# Patient Record
Sex: FEMALE | Race: BLACK OR AFRICAN AMERICAN | Employment: OTHER | URBAN - METROPOLITAN AREA
[De-identification: names, ages, dates, MRNs, and addresses within clinical notes are randomized per-mention and may not be internally consistent; named-entity substitution may affect disease eponyms.]

---

## 2017-02-16 ENCOUNTER — APPOINTMENT (OUTPATIENT)
Dept: CV DIAGNOSTICS | Facility: HOSPITAL | Age: 72
DRG: 292 | End: 2017-02-16
Attending: INTERNAL MEDICINE
Payer: MEDICARE

## 2017-02-16 ENCOUNTER — HOSPITAL ENCOUNTER (INPATIENT)
Facility: HOSPITAL | Age: 72
LOS: 4 days | Discharge: HOME | DRG: 292 | End: 2017-02-20
Attending: EMERGENCY MEDICINE | Admitting: HOSPITALIST
Payer: MEDICARE

## 2017-02-16 ENCOUNTER — PRIOR ORIGINAL RECORDS (OUTPATIENT)
Dept: OTHER | Age: 72
End: 2017-02-16

## 2017-02-16 ENCOUNTER — APPOINTMENT (OUTPATIENT)
Dept: GENERAL RADIOLOGY | Facility: HOSPITAL | Age: 72
DRG: 292 | End: 2017-02-16
Payer: MEDICARE

## 2017-02-16 DIAGNOSIS — J81.0 ACUTE PULMONARY EDEMA (HCC): ICD-10-CM

## 2017-02-16 DIAGNOSIS — I11.0 HYPERTENSIVE HEART DISEASE WITH HEART FAILURE (HCC): ICD-10-CM

## 2017-02-16 DIAGNOSIS — J44.1 COPD EXACERBATION (HCC): Primary | ICD-10-CM

## 2017-02-16 LAB
ALLENS TEST: POSITIVE
ARTERIAL BLD GAS O2 SATURATION: 95 % (ref 92–100)
ARTERIAL BLOOD GAS BASE EXCESS: 0
ARTERIAL BLOOD GAS HCO3: 24.7 MEQ/L (ref 22–26)
ARTERIAL BLOOD GAS PCO2: 41 MM HG (ref 35–45)
ARTERIAL BLOOD GAS PH: 7.4 (ref 7.35–7.45)
ARTERIAL BLOOD GAS PO2: 83 MM HG (ref 80–105)
ATRIAL RATE: 91 BPM
BASOPHILS # BLD AUTO: 0.04 X10(3) UL (ref 0–0.1)
BASOPHILS NFR BLD AUTO: 0.6 %
BUN BLD-MCNC: 12 MG/DL (ref 8–20)
CALCIUM BLD-MCNC: 8.7 MG/DL (ref 8.3–10.3)
CALCULATED O2 SATURATION: 96 % (ref 92–100)
CARBOXYHEMOGLOBIN: 1.8 % SAT (ref 0–3)
CHLORIDE: 109 MMOL/L (ref 101–111)
CO2: 27 MMOL/L (ref 22–32)
CREAT BLD-MCNC: 0.84 MG/DL (ref 0.55–1.02)
EOSINOPHIL # BLD AUTO: 0.04 X10(3) UL (ref 0–0.3)
EOSINOPHIL NFR BLD AUTO: 0.6 %
ERYTHROCYTE [DISTWIDTH] IN BLOOD BY AUTOMATED COUNT: 15.9 % (ref 11.5–16)
GLUCOSE BLD-MCNC: 140 MG/DL (ref 65–99)
GLUCOSE BLD-MCNC: 260 MG/DL (ref 65–99)
GLUCOSE BLD-MCNC: 89 MG/DL (ref 70–99)
HCT VFR BLD AUTO: 40.3 % (ref 34–50)
HGB BLD-MCNC: 12.7 G/DL (ref 12–16)
IMMATURE GRANULOCYTE COUNT: 0.02 X10(3) UL (ref 0–1)
IMMATURE GRANULOCYTE RATIO %: 0.3 %
IONIZED CALCIUM: 1.17 MMOL/L (ref 1.12–1.32)
L/M: 10 L/MIN
LACTIC ACID ARTERIAL: <1.3 MMOL/L (ref 0.5–2)
LYMPHOCYTES # BLD AUTO: 1.1 X10(3) UL (ref 0.9–4)
LYMPHOCYTES NFR BLD AUTO: 15.8 %
MCH RBC QN AUTO: 28.8 PG (ref 27–33.2)
MCHC RBC AUTO-ENTMCNC: 31.5 G/DL (ref 31–37)
MCV RBC AUTO: 91.4 FL (ref 81–100)
METHEMOGLOBIN: 0.6 % SAT (ref 0.4–1.5)
MONOCYTES # BLD AUTO: 0.43 X10(3) UL (ref 0.1–0.6)
MONOCYTES NFR BLD AUTO: 6.2 %
NEUTROPHIL ABS PRELIM: 5.35 X10 (3) UL (ref 1.3–6.7)
NEUTROPHILS # BLD AUTO: 5.35 X10(3) UL (ref 1.3–6.7)
NEUTROPHILS NFR BLD AUTO: 76.5 %
P AXIS: 67 DEGREES
P-R INTERVAL: 144 MS
PATIENT TEMPERATURE: 98.6 F
PLATELET # BLD AUTO: 252 10(3)UL (ref 150–450)
POTASSIUM BLOOD GAS: 3.3 MMOL/L (ref 3.6–5.1)
POTASSIUM SERPL-SCNC: 3.5 MMOL/L (ref 3.6–5.1)
Q-T INTERVAL: 430 MS
QRS DURATION: 146 MS
QTC CALCULATION (BEZET): 528 MS
R AXIS: 51 DEGREES
RBC # BLD AUTO: 4.41 X10(6)UL (ref 3.8–5.1)
RED CELL DISTRIBUTION WIDTH-SD: 52.9 FL (ref 35.1–46.3)
RESPIRATORY PANEL NEG:: NEGATIVE
SODIUM BLOOD GAS: 143 MMOL/L (ref 136–144)
SODIUM SERPL-SCNC: 143 MMOL/L (ref 136–144)
T AXIS: 28 DEGREES
TOTAL HEMOGLOBIN: 12.7 G/DL (ref 11.7–16)
TROPONIN: 0.05 NG/ML (ref ?–0.05)
VENTRICULAR RATE: 91 BPM
WBC # BLD AUTO: 7 X10(3) UL (ref 4–13)

## 2017-02-16 PROCEDURE — 93306 TTE W/DOPPLER COMPLETE: CPT | Performed by: INTERNAL MEDICINE

## 2017-02-16 PROCEDURE — 93306 TTE W/DOPPLER COMPLETE: CPT

## 2017-02-16 PROCEDURE — 71010 XR CHEST AP PORTABLE  (CPT=71010): CPT

## 2017-02-16 PROCEDURE — 99220 INITIAL OBSERVATION CARE,LEVL III: CPT | Performed by: HOSPITALIST

## 2017-02-16 RX ORDER — ASPIRIN 81 MG/1
324 TABLET, CHEWABLE ORAL ONCE
Status: COMPLETED | OUTPATIENT
Start: 2017-02-16 | End: 2017-02-16

## 2017-02-16 RX ORDER — SODIUM CHLORIDE 9 MG/ML
INJECTION, SOLUTION INTRAVENOUS CONTINUOUS
Status: DISCONTINUED | OUTPATIENT
Start: 2017-02-16 | End: 2017-02-17

## 2017-02-16 RX ORDER — METHYLPREDNISOLONE SODIUM SUCCINATE 125 MG/2ML
125 INJECTION, POWDER, LYOPHILIZED, FOR SOLUTION INTRAMUSCULAR; INTRAVENOUS ONCE
Status: COMPLETED | OUTPATIENT
Start: 2017-02-16 | End: 2017-02-16

## 2017-02-16 RX ORDER — BENAZEPRIL/HYDROCHLOROTHIAZIDE 20 MG-25MG
1 TABLET ORAL DAILY
Status: ON HOLD | COMMUNITY
End: 2017-02-20

## 2017-02-16 RX ORDER — SIMVASTATIN 40 MG
40 TABLET ORAL NIGHTLY
Status: ON HOLD | COMMUNITY
End: 2017-02-20

## 2017-02-16 RX ORDER — ENOXAPARIN SODIUM 100 MG/ML
0.5 INJECTION SUBCUTANEOUS EVERY EVENING
Status: DISCONTINUED | OUTPATIENT
Start: 2017-02-16 | End: 2017-02-20

## 2017-02-16 RX ORDER — BUPROPION HYDROCHLORIDE 150 MG/1
150 TABLET, EXTENDED RELEASE ORAL 2 TIMES DAILY
Status: DISCONTINUED | OUTPATIENT
Start: 2017-02-16 | End: 2017-02-20

## 2017-02-16 RX ORDER — LISINOPRIL 20 MG/1
20 TABLET ORAL 2 TIMES DAILY
Status: ON HOLD | COMMUNITY
End: 2017-02-20

## 2017-02-16 RX ORDER — NITROGLYCERIN 20 MG/100ML
50 INJECTION INTRAVENOUS CONTINUOUS
Status: DISCONTINUED | OUTPATIENT
Start: 2017-02-16 | End: 2017-02-16

## 2017-02-16 RX ORDER — IPRATROPIUM BROMIDE AND ALBUTEROL SULFATE 2.5; .5 MG/3ML; MG/3ML
3 SOLUTION RESPIRATORY (INHALATION) EVERY 4 HOURS PRN
Status: DISCONTINUED | OUTPATIENT
Start: 2017-02-16 | End: 2017-02-20

## 2017-02-16 RX ORDER — LISINOPRIL 20 MG/1
20 TABLET ORAL 2 TIMES DAILY
Status: DISCONTINUED | OUTPATIENT
Start: 2017-02-16 | End: 2017-02-16

## 2017-02-16 RX ORDER — HEPARIN SODIUM 5000 [USP'U]/ML
5000 INJECTION, SOLUTION INTRAVENOUS; SUBCUTANEOUS EVERY 12 HOURS
Status: DISCONTINUED | OUTPATIENT
Start: 2017-02-16 | End: 2017-02-16

## 2017-02-16 RX ORDER — ALBUTEROL SULFATE 2.5 MG/3ML
SOLUTION RESPIRATORY (INHALATION) EVERY 6 HOURS PRN
COMMUNITY
End: 2017-06-20 | Stop reason: ALTCHOICE

## 2017-02-16 RX ORDER — HYDRALAZINE HYDROCHLORIDE 50 MG/1
50 TABLET, FILM COATED ORAL 2 TIMES DAILY
Status: ON HOLD | COMMUNITY
End: 2017-02-20

## 2017-02-16 RX ORDER — FUROSEMIDE 20 MG/1
20 TABLET ORAL DAILY
Status: ON HOLD | COMMUNITY
End: 2017-02-20

## 2017-02-16 RX ORDER — POTASSIUM CHLORIDE 20 MEQ/1
40 TABLET, EXTENDED RELEASE ORAL EVERY 4 HOURS
Status: COMPLETED | OUTPATIENT
Start: 2017-02-16 | End: 2017-02-17

## 2017-02-16 RX ORDER — ONDANSETRON 2 MG/ML
4 INJECTION INTRAMUSCULAR; INTRAVENOUS EVERY 6 HOURS PRN
Status: DISCONTINUED | OUTPATIENT
Start: 2017-02-16 | End: 2017-02-20

## 2017-02-16 RX ORDER — ALBUTEROL SULFATE 90 UG/1
1 AEROSOL, METERED RESPIRATORY (INHALATION) AS NEEDED
COMMUNITY
End: 2017-06-20 | Stop reason: ALTCHOICE

## 2017-02-16 RX ORDER — ACETAMINOPHEN 325 MG/1
650 TABLET ORAL EVERY 6 HOURS PRN
Status: DISCONTINUED | OUTPATIENT
Start: 2017-02-16 | End: 2017-02-20

## 2017-02-16 RX ORDER — HYDRALAZINE HYDROCHLORIDE 50 MG/1
50 TABLET, FILM COATED ORAL 2 TIMES DAILY
Status: DISCONTINUED | OUTPATIENT
Start: 2017-02-16 | End: 2017-02-17

## 2017-02-16 RX ORDER — FUROSEMIDE 10 MG/ML
40 INJECTION INTRAMUSCULAR; INTRAVENOUS ONCE
Status: COMPLETED | OUTPATIENT
Start: 2017-02-16 | End: 2017-02-16

## 2017-02-16 RX ORDER — BENAZEPRIL/HYDROCHLOROTHIAZIDE 20 MG-25MG
1 TABLET ORAL DAILY
Status: DISCONTINUED | OUTPATIENT
Start: 2017-02-16 | End: 2017-02-16 | Stop reason: RX

## 2017-02-16 RX ORDER — MELOXICAM 15 MG/1
15 TABLET ORAL DAILY
COMMUNITY
End: 2017-06-20 | Stop reason: ALTCHOICE

## 2017-02-16 RX ORDER — ATORVASTATIN CALCIUM 20 MG/1
20 TABLET, FILM COATED ORAL NIGHTLY
Status: DISCONTINUED | OUTPATIENT
Start: 2017-02-16 | End: 2017-02-17

## 2017-02-16 RX ORDER — BUPROPION HYDROCHLORIDE 150 MG/1
150 TABLET, EXTENDED RELEASE ORAL 2 TIMES DAILY
COMMUNITY
End: 2017-07-14 | Stop reason: ALTCHOICE

## 2017-02-16 RX ORDER — ALENDRONATE SODIUM 10 MG/1
10 TABLET ORAL
COMMUNITY
End: 2018-01-12 | Stop reason: ALTCHOICE

## 2017-02-16 RX ORDER — NITROGLYCERIN 20 MG/100ML
INJECTION INTRAVENOUS CONTINUOUS
Status: DISCONTINUED | OUTPATIENT
Start: 2017-02-16 | End: 2017-02-17

## 2017-02-16 NOTE — H&P
[facility] HOSPITALIST  History and Physical     Nita Pena Patient Status:  Observation    1945 MRN [id number]   [facility] 2NE-A Attending Kira Corbett, [address] [facility] Day # 0 PCP PHYSICIAN NONSTAFF     Chief Complaint: SOB    Histor guarding or organomegaly. Neurologic: No focal neurological deficits. Musculoskeletal: Moves all extremities. Extremities: No edema or cyanosis. Integument: No rashes or lesions. Psychiatric: Appropriate mood and affect.       Diagnostic Data:      L

## 2017-02-16 NOTE — PROGRESS NOTES
[facility] Progress Note:  Anticoagulation Weight Dose Adjustment for enoxaparin (LOVENOX)    Codey Loja is a 73year old female who has been prescribed enoxaparin (LOVENOX) 40 mg daily for VTE prophylaxis.       Estimated Creatinine Clearance: 50 m

## 2017-02-16 NOTE — ED PROVIDER NOTES
Patient Seen in: [facility] Emergency Department    History   Patient presents with:  Dyspnea TAZ SOB (respiratory)    Stated Complaint: TAZ    HPI    Natanael Zheng is a pleasant 79-year-old female with a history of COPD who comes into the emergency departme Notable for the following:     Potassium Blood Gas 3.3 (*)     All other components within normal limits   BASIC METABOLIC PANEL (8) - Abnormal; Notable for the following:     Potassium 3.5 (*)     All other components within normal limits   TROPONIN I - A distress. Disposition and Plan     Clinical Impression:  COPD exacerbation ([facility] [address]  (primary encounter diagnosis)    Disposition:  There is no disposition on file for this visit. Follow-up:  No follow-up provider specified. Medications Prescribed:   Alejandro Khan

## 2017-02-16 NOTE — CM/SW NOTE
Pt identified as \"out of network\". Spoke with Dr. Richmond Hudson, who called [address] for direct admission. During MD to MD, it was determined that patient was not stable for transfer - Nitro gtt, elevated troponin, TAZ.   Patient to be admitted

## 2017-02-16 NOTE — CONSULTS
[facility]  Cardiology History & Physical    Ruiz Waldrop Patient Status:  Emergency    1945 MRN [id number]   Location [facility] Attending Salo Mast MD   Hosp Day # 0 PCP No primary care provider on file. Intravenous Continuous   albuterol Sulfate (VENTOLIN) (5 MG/ML) 0.5% nebulizer solution 10 mg 10 mg Nebulization Continuous   Ipratropium Bromide (ATROVENT) 0.02 % nebulizer solution 1.5 mg 1.5 mg Nebulization Once       Allergies:  No Known Allergies    R with COPD exacerbation, ?ALEXANDRA, obesity and probably systolic/diastolic HF contributing. Agree with CXR and basic labs including BNP, serial troponin and D-Dimer. Will order STAT echo in EC to assess right/left heart function.   Agree with admission with te

## 2017-02-17 ENCOUNTER — PRIOR ORIGINAL RECORDS (OUTPATIENT)
Dept: OTHER | Age: 72
End: 2017-02-17

## 2017-02-17 LAB
BUN BLD-MCNC: 20 MG/DL (ref 8–20)
CALCIUM BLD-MCNC: 7.8 MG/DL (ref 8.3–10.3)
CHLORIDE: 110 MMOL/L (ref 101–111)
CK: 1065 IU/L (ref 26–192)
CK: 562 IU/L (ref 26–192)
CKMB: 5.7 NG/ML (ref 0–5)
CKMB: 7.4 NG/ML (ref 0–5)
CO2: 27 MMOL/L (ref 22–32)
CREAT BLD-MCNC: 0.84 MG/DL (ref 0.55–1.02)
GLUCOSE BLD-MCNC: 119 MG/DL (ref 65–99)
GLUCOSE BLD-MCNC: 76 MG/DL (ref 65–99)
GLUCOSE BLD-MCNC: 80 MG/DL (ref 65–99)
GLUCOSE BLD-MCNC: 85 MG/DL (ref 65–99)
GLUCOSE BLD-MCNC: 91 MG/DL (ref 70–99)
HAV IGM SER QL: 1.9 MG/DL (ref 1.7–3)
MB INDEX: 1 % (ref ?–4)
MB INDEX: 1 % (ref ?–4)
POTASSIUM SERPL-SCNC: 4 MMOL/L (ref 3.6–5.1)
POTASSIUM SERPL-SCNC: 4 MMOL/L (ref 3.6–5.1)
PRO-BETA NATRIURETIC PEPTIDE: 1864 PG/ML (ref ?–125)
SODIUM SERPL-SCNC: 145 MMOL/L (ref 136–144)
TROPONIN: <0.046 NG/ML (ref ?–0.05)
TROPONIN: <0.046 NG/ML (ref ?–0.05)
TSI SER-ACNC: 0.81 MIU/ML (ref 0.35–5.5)

## 2017-02-17 PROCEDURE — 99232 SBSQ HOSP IP/OBS MODERATE 35: CPT | Performed by: HOSPITALIST

## 2017-02-17 RX ORDER — HYDRALAZINE HYDROCHLORIDE 50 MG/1
50 TABLET, FILM COATED ORAL EVERY 8 HOURS SCHEDULED
Status: DISCONTINUED | OUTPATIENT
Start: 2017-02-17 | End: 2017-02-20

## 2017-02-17 RX ORDER — CARVEDILOL 6.25 MG/1
6.25 TABLET ORAL 2 TIMES DAILY WITH MEALS
Status: DISCONTINUED | OUTPATIENT
Start: 2017-02-17 | End: 2017-02-19

## 2017-02-17 RX ORDER — FUROSEMIDE 20 MG/1
20 TABLET ORAL
Status: DISCONTINUED | OUTPATIENT
Start: 2017-02-18 | End: 2017-02-19

## 2017-02-17 RX ORDER — FUROSEMIDE 10 MG/ML
40 INJECTION INTRAMUSCULAR; INTRAVENOUS ONCE
Status: COMPLETED | OUTPATIENT
Start: 2017-02-17 | End: 2017-02-17

## 2017-02-17 NOTE — PAYOR COMM NOTE
Attending Physician: Julien Jorge DO    Review Type: CONTINUED STAY  Reviewer: Donovan Felton     Date: February 17, 2017 - 1:32 PM  Payor: [address] Number: [id number]  Admit date: 2/16/2017 10:22 AM        REVIEWER COMMENTS

## 2017-02-17 NOTE — CM/SW NOTE
02/17/17 1200   CM/SW Referral Data   Referral Source Physician;Nurse   Reason for Referral Protocol order set   Specify order set CHF;COPD   Informant Patient   Pertinent Medical Hx   Primary Care Physician Name Dr. Lucius Wilson   Patient Info   Patient

## 2017-02-17 NOTE — DIETARY NOTE
Nutrition Short Note   Dietitian consult received per heart failure standing order. Reviewed and provided handouts on meal planning for those with CHF. Discussed sodium and fluid guidelines, foods to avoid, seasoning without salt. Pt receptive.  Pt verbaliz

## 2017-02-17 NOTE — PROGRESS NOTES
[facility] Progress Note  Subjective:  Breathing much better today no chest pain    Objective:  133/70  Afebrile  SR  I/O -91    BUN/cr 20/0.84     Troponin negative   CPK 1065  MB 7.5  Index 1    Neuro:awake/alert  HEENT:noJVD, moist mucosa ARB/diuretic upon discharge. Follow up will be with Dr. Juliano Roy in our office.

## 2017-02-17 NOTE — PLAN OF CARE
Patient/Family Goals    • Patient/Family Long Term Goal Progressing    • Patient/Family Short Term Goal Progressing        Patient is alert and oriented x4. NSR on tele. VS stable. Patient currently on 4 L NC. Denies chest pain at this time.  Nitro gtt at 5

## 2017-02-17 NOTE — PAYOR COMM NOTE
Attending Physician: Dorothy Hicks DO    Review Type: ADMISSION   Reviewer: Katie Olivo       Date: February 17, 2017 - 1:11 PM  Payor: [address] [place] [address] Road Number: [id number]  Admit date: 2/16/2017 10:22 AM       REVIEWER COMMENTS  C consistent with a left bundle branch block. 3. Mitral valve: Structurally normal valve. There was mild regurgitation. 4. Left atrium: The left atrial volume was markedly increased. 5. Right atrium: The atrium was moderately dilated.   6. Pulmonary arteri METABOLIC PANEL (8) - Abnormal; Notable for the following:     Potassium 3.5 (*)     All other components within normal limits   TROPONIN I - Abnormal; Notable for the following:     Troponin 0.049 (*)     All other components within normal limits   BASIC

## 2017-02-17 NOTE — PROGRESS NOTES
[facility]PITALIST  Progress Note     Willis Macedo Patient Status:  Inpatient    1945 MRN [id number]   [facility] 2NE-A Attending Kristi Garcia, [address] [facility] Day # 1 PCP PHYSICIAN NONSTAFF     Chief Complaint: SOB    S: Patient seen a 20/25) combination tablet   Oral Daily       ASSESSMENT / PLAN:     1. Dyspnea secondary to acute combined systolic/diastolic heart failure and pulmonary HTN  1. Continue diuresis per cardio   2. Echo noted   3. Off nitro gtt  2.  Hypertensive heart disease

## 2017-02-18 LAB
BUN BLD-MCNC: 24 MG/DL (ref 8–20)
CALCIUM BLD-MCNC: 8.3 MG/DL (ref 8.3–10.3)
CHLORIDE: 108 MMOL/L (ref 101–111)
CK: 1661 IU/L (ref 26–192)
CKMB: 5.3 NG/ML (ref 0–5)
CO2: 30 MMOL/L (ref 22–32)
CREAT BLD-MCNC: 0.87 MG/DL (ref 0.55–1.02)
EST. AVERAGE GLUCOSE BLD GHB EST-MCNC: 123 MG/DL (ref 68–126)
GLUCOSE BLD-MCNC: 126 MG/DL (ref 65–99)
GLUCOSE BLD-MCNC: 127 MG/DL (ref 65–99)
GLUCOSE BLD-MCNC: 139 MG/DL (ref 65–99)
GLUCOSE BLD-MCNC: 148 MG/DL (ref 65–99)
GLUCOSE BLD-MCNC: 159 MG/DL (ref 65–99)
GLUCOSE BLD-MCNC: 35 MG/DL (ref 65–99)
GLUCOSE BLD-MCNC: 39 MG/DL (ref 65–99)
GLUCOSE BLD-MCNC: 39 MG/DL (ref 65–99)
GLUCOSE BLD-MCNC: 41 MG/DL (ref 70–99)
GLUCOSE BLD-MCNC: 42 MG/DL (ref 65–99)
GLUCOSE BLD-MCNC: 85 MG/DL (ref 65–99)
GLUCOSE BLD-MCNC: 98 MG/DL (ref 65–99)
HAV IGM SER QL: 2.3 MG/DL (ref 1.7–3)
HBA1C MFR BLD HPLC: 5.9 % (ref ?–5.7)
MB INDEX: <1 % (ref ?–4)
POTASSIUM SERPL-SCNC: 3.8 MMOL/L (ref 3.6–5.1)
SODIUM SERPL-SCNC: 145 MMOL/L (ref 136–144)
TROPONIN: 0.08 NG/ML (ref ?–0.05)

## 2017-02-18 PROCEDURE — 99232 SBSQ HOSP IP/OBS MODERATE 35: CPT | Performed by: HOSPITALIST

## 2017-02-18 RX ORDER — DEXTROSE MONOHYDRATE 25 G/50ML
50 INJECTION, SOLUTION INTRAVENOUS
Status: DISCONTINUED | OUTPATIENT
Start: 2017-02-18 | End: 2017-02-20

## 2017-02-18 RX ORDER — HYDROCHLOROTHIAZIDE 25 MG/1
25 TABLET ORAL DAILY
Status: DISCONTINUED | OUTPATIENT
Start: 2017-02-18 | End: 2017-02-20

## 2017-02-18 RX ORDER — LOSARTAN POTASSIUM 50 MG/1
50 TABLET ORAL DAILY
Status: DISCONTINUED | OUTPATIENT
Start: 2017-02-18 | End: 2017-02-19

## 2017-02-18 RX ORDER — DEXTROSE MONOHYDRATE 25 G/50ML
INJECTION, SOLUTION INTRAVENOUS
Status: COMPLETED
Start: 2017-02-18 | End: 2017-02-18

## 2017-02-18 RX ORDER — POTASSIUM CHLORIDE 20 MEQ/1
40 TABLET, EXTENDED RELEASE ORAL ONCE
Status: COMPLETED | OUTPATIENT
Start: 2017-02-18 | End: 2017-02-18

## 2017-02-18 RX ORDER — DEXTROSE MONOHYDRATE 25 G/50ML
INJECTION, SOLUTION INTRAVENOUS
Status: DISPENSED
Start: 2017-02-18 | End: 2017-02-18

## 2017-02-18 NOTE — PLAN OF CARE
Alert. Oriented. sr per tele. Denies cp. Sob w/ exertion. On o2 @2l/nc. Chronic back pain. Tylenol given. poc updated and discussed with patient. Cont. to monitor pt

## 2017-02-18 NOTE — PROGRESS NOTES
[facility] Progress Note  Subjective:  No pain, dyspnea improved.   Denies myalgias    Objective:  142/60  Afebrile  SR  I/O -495     BUN/cr 24/0.87     Troponin 0.080   CPK 1661    Neuro:awake/alert  HEENT:noJVD, moist mucosa  Cardiac:S1 S2 re

## 2017-02-18 NOTE — PROGRESS NOTES
[facility]PITAL[facility]  Progress Note     Melanie Knows Patient Status:  Inpatient    1945 MRN [id number]   [facility] 2NE-A Attending Juan Parkinson, [address] [facility] Day # 2 PCP PHYSICIAN NONSTAFF     Chief Complaint: SOB    S: Patient seen a 150 mg Oral BID   • insulin detemir  70 Units Subcutaneous Nightly   • enoxaparin  0.5 mg/kg Subcutaneous QPM       ASSESSMENT / PLAN:     1. Dyspnea secondary to acute combined systolic/diastolic heart failure and pulmonary HTN  1.  Continue diuresis per c

## 2017-02-18 NOTE — PROGRESS NOTES
notified w/ elevated CPK, and sl.elevated troponin. Asymptomatic. . Was also notified w/ low bs-accucheck 42 and blood draw was 41. Hypoglycemic protocol followed. 1amp d50 given. Repeat bs was 126. Pt does not have glucometer at home.  Been di

## 2017-02-18 NOTE — PLAN OF CARE
Rec up at bedside.   Pt and VS appear stable post intervention by noc RN d/t low BS  Pt glucose has been labile most off shift    Most recently noted to have BS of 35 mg/dl at lunch despite 1amp of dextrose given earlier this a.m.  1/2 amp of dextrose given

## 2017-02-19 LAB
BUN BLD-MCNC: 20 MG/DL (ref 8–20)
CALCIUM BLD-MCNC: 8.4 MG/DL (ref 8.3–10.3)
CHLORIDE: 109 MMOL/L (ref 101–111)
CK: 786 IU/L (ref 26–192)
CKMB: 2.1 NG/ML (ref 0–5)
CO2: 25 MMOL/L (ref 22–32)
CREAT BLD-MCNC: 0.79 MG/DL (ref 0.55–1.02)
GLUCOSE BLD-MCNC: 105 MG/DL (ref 70–99)
GLUCOSE BLD-MCNC: 110 MG/DL (ref 65–99)
GLUCOSE BLD-MCNC: 116 MG/DL (ref 65–99)
GLUCOSE BLD-MCNC: 137 MG/DL (ref 65–99)
GLUCOSE BLD-MCNC: 97 MG/DL (ref 65–99)
MB INDEX: <1 % (ref ?–4)
POTASSIUM SERPL-SCNC: 4.1 MMOL/L (ref 3.6–5.1)
SODIUM SERPL-SCNC: 140 MMOL/L (ref 136–144)

## 2017-02-19 PROCEDURE — 99232 SBSQ HOSP IP/OBS MODERATE 35: CPT | Performed by: HOSPITALIST

## 2017-02-19 RX ORDER — POLYETHYLENE GLYCOL 3350 17 G/17G
17 POWDER, FOR SOLUTION ORAL DAILY PRN
Status: DISCONTINUED | OUTPATIENT
Start: 2017-02-19 | End: 2017-02-20

## 2017-02-19 RX ORDER — DOCUSATE SODIUM 100 MG/1
100 CAPSULE, LIQUID FILLED ORAL 2 TIMES DAILY PRN
Status: DISCONTINUED | OUTPATIENT
Start: 2017-02-19 | End: 2017-02-20

## 2017-02-19 RX ORDER — CARVEDILOL 12.5 MG/1
12.5 TABLET ORAL 2 TIMES DAILY WITH MEALS
Status: DISCONTINUED | OUTPATIENT
Start: 2017-02-19 | End: 2017-02-20

## 2017-02-19 RX ORDER — CARVEDILOL 12.5 MG/1
12.5 TABLET ORAL 2 TIMES DAILY WITH MEALS
Status: DISCONTINUED | OUTPATIENT
Start: 2017-02-19 | End: 2017-02-19

## 2017-02-19 RX ORDER — LOSARTAN POTASSIUM 100 MG/1
100 TABLET ORAL DAILY
Status: DISCONTINUED | OUTPATIENT
Start: 2017-02-19 | End: 2017-02-20

## 2017-02-19 RX ORDER — FUROSEMIDE 10 MG/ML
40 INJECTION INTRAMUSCULAR; INTRAVENOUS
Status: DISCONTINUED | OUTPATIENT
Start: 2017-02-19 | End: 2017-02-20

## 2017-02-19 NOTE — PROGRESS NOTES
[facility] Progress Note  Subjective:  Feels so much better today - dyspnea improved    Objective:  153/77  Afebrile  SR w/ NSVT  I/O equal     BUN/cr 20/0.79  Repeat CPK pending    Neuro:awake/alert  HEENT:noJVD, moist mucosa  Cardiac:S1 S2 r

## 2017-02-19 NOTE — PROGRESS NOTES
[facility]PITAL[facility]  Progress Note     Aguila Old Patient Status:  Inpatient    1945 MRN [id number]   [facility] 2NE-A Attending Dorothy Hicks, [address] [facility] Day # 3 PCP PHYSICIAN NONSTAFF     Chief Complaint: SOB    S: Patient seen a insulin aspart  1-5 Units Subcutaneous TID CC and HS   • BuPROPion HCl ER (SR)  150 mg Oral BID   • enoxaparin  0.5 mg/kg Subcutaneous QPM       ASSESSMENT / PLAN:     1.  Dyspnea secondary to acute combined systolic/diastolic heart failure and pulmonary HT

## 2017-02-20 ENCOUNTER — PRIOR ORIGINAL RECORDS (OUTPATIENT)
Dept: OTHER | Age: 72
End: 2017-02-20

## 2017-02-20 VITALS
OXYGEN SATURATION: 97 % | HEART RATE: 82 BPM | SYSTOLIC BLOOD PRESSURE: 109 MMHG | RESPIRATION RATE: 20 BRPM | TEMPERATURE: 98 F | BODY MASS INDEX: 43.98 KG/M2 | HEIGHT: 62.01 IN | WEIGHT: 242.06 LBS | DIASTOLIC BLOOD PRESSURE: 72 MMHG

## 2017-02-20 LAB
BUN BLD-MCNC: 22 MG/DL (ref 8–20)
CALCIUM BLD-MCNC: 8.1 MG/DL (ref 8.3–10.3)
CHLORIDE: 107 MMOL/L (ref 101–111)
CO2: 27 MMOL/L (ref 22–32)
CREAT BLD-MCNC: 0.85 MG/DL (ref 0.55–1.02)
GLUCOSE BLD-MCNC: 115 MG/DL (ref 70–99)
GLUCOSE BLD-MCNC: 116 MG/DL (ref 65–99)
GLUCOSE BLD-MCNC: 128 MG/DL (ref 65–99)
POTASSIUM SERPL-SCNC: 4.3 MMOL/L (ref 3.6–5.1)
SODIUM SERPL-SCNC: 143 MMOL/L (ref 136–144)

## 2017-02-20 PROCEDURE — 99239 HOSP IP/OBS DSCHRG MGMT >30: CPT | Performed by: HOSPITALIST

## 2017-02-20 RX ORDER — HYDRALAZINE HYDROCHLORIDE 50 MG/1
50 TABLET, FILM COATED ORAL EVERY 8 HOURS SCHEDULED
Qty: 270 TABLET | Refills: 6 | Status: SHIPPED | OUTPATIENT
Start: 2017-02-20 | End: 2017-12-04 | Stop reason: DRUGHIGH

## 2017-02-20 RX ORDER — CARVEDILOL 12.5 MG/1
12.5 TABLET ORAL 2 TIMES DAILY WITH MEALS
Qty: 60 TABLET | Refills: 6 | Status: SHIPPED | OUTPATIENT
Start: 2017-02-20 | End: 2018-03-07

## 2017-02-20 RX ORDER — LOSARTAN POTASSIUM AND HYDROCHLOROTHIAZIDE 25; 100 MG/1; MG/1
1 TABLET ORAL DAILY
Qty: 30 TABLET | Refills: 6 | Status: SHIPPED | OUTPATIENT
Start: 2017-02-20 | End: 2017-10-06

## 2017-02-20 RX ORDER — TORSEMIDE 20 MG/1
20 TABLET ORAL DAILY
Status: DISCONTINUED | OUTPATIENT
Start: 2017-02-20 | End: 2017-02-20

## 2017-02-20 RX ORDER — TORSEMIDE 20 MG/1
20 TABLET ORAL DAILY
Qty: 30 TABLET | Refills: 6 | Status: SHIPPED | OUTPATIENT
Start: 2017-02-20 | End: 2019-09-24

## 2017-02-20 NOTE — PROGRESS NOTES
[facility]  Cardiology Progress Note    Subjective:  No chest pain. Remains on oxygen, typically does not use at home.     Objective:  /75 mmHg  Pulse 77  Temp(Src) 98.5 °F (36.9 °C) (Oral)  Resp 20  Ht 5' 2.01\" (1.575 m)  Wt 242 lb 1 oz (109.8 WITH OUT PT VISIT.   Polly Cali MD

## 2017-02-20 NOTE — CM/SW NOTE
SW received response through [address] from [facility] that they will be able to accept the patient. DEMARCUS spoke to Ranulfo Rouse with [facility] ([id number]), asked to deliver portable tank to patient's room from [facility]. DEMARCUS updated RN and patient.     [facility]  [phone]

## 2017-02-20 NOTE — PLAN OF CARE
Tele D/C'd. IV discontinued with catheter intact. Patient denies chest pain, SOB, dizziness or palpitations. Patient denies calf tenderness. Patient discharge instructions and medication side effects reviewed with patient and verbalized understanding.  Rx g

## 2017-02-20 NOTE — DISCHARGE SUMMARY
[facility] [place] HOSPITALIST  DISCHARGE SUMMARY     Ant Goodwin Patient Status:  Inpatient    1945 MRN [id number]   [facility] 2NE-A Attending Veronica Cardoza, 160[address] [facility] Day # 4 PCP PHYSICIAN NONSTAFF     Date of Admission: 2017  Date of outpatient monitoring. Patient discharged home in good condition.      Procedures during hospitalization:   • None    Incidental or significant findings and recommendations (brief descriptions):  • None    Lab/Test results pending at Discharge:   · None 2/20/2017  9:44 AM   Commonly known as:  WELLBUTRIN SR        Take 150 mg by mouth 2 (two) times daily. Refills:  0       Meloxicam 15 MG Tabs        Take 15 mg by mouth daily.     Refills:  0       MetFORMIN HCl 850 MG Tabs   Commonly known as:  [address]

## 2017-02-20 NOTE — PAYOR COMM NOTE
Attending Physician: Fariha Hanna DO    2/19    Chief Complaint: SOB    S: Patient seen and examined. Feeling better. Denies chest pain or SOB.      Vitals:  Temp:  [98 °F (36.7 °C)-98.4 °F (36.9 °C)] 98.3 °F (36.8 °C)  Pulse:  [73-88] 73  Resp:  [20-22]

## 2017-02-20 NOTE — CM/SW NOTE
SW informed by RN that patient will need O2 at discharge. SW looked up in network agencies for home o2, SW sent referral to [facility] through [address], waiting for response.     [place], [facility]

## 2017-02-20 NOTE — PAYOR COMM NOTE
Attending Physician: Letitia Bennett DO    2/18    Chief Complaint: SOB    S: Patient seen and examined. Breathing improved but still gets winded after walking.  Denies chest pain    Vitals:  Temp:  [97.8 °F (36.6 °C)-98.6 °F (37 °C)] 98.2 °F (36.8 °C)  Puls

## 2017-02-21 NOTE — CM/SW NOTE
02/21/17 0700   Discharge disposition   Discharged to: Home or [address] [place] [address] services after discharge DME   HME provider Other (comment)  (Scar Watkins)   Discharge transportation Private car   Patient discharged 2/20/17

## 2017-02-25 ENCOUNTER — TELEPHONE (OUTPATIENT)
Dept: RESPIRATORY THERAPY | Facility: HOSPITAL | Age: 72
End: 2017-02-25

## 2017-02-27 ENCOUNTER — PRIOR ORIGINAL RECORDS (OUTPATIENT)
Dept: OTHER | Age: 72
End: 2017-02-27

## 2017-03-01 LAB
BUN: 12 MG/DL
BUN: 22 MG/DL
CALCIUM: 8.1 MG/DL
CALCIUM: 8.7 MG/DL
CHLORIDE: 107 MEQ/L
CHLORIDE: 109 MEQ/L
CREATININE, SERUM: 0.84 MG/DL
CREATININE, SERUM: 0.85 MG/DL
GLUCOSE: 115 MG/DL
GLUCOSE: 89 MG/DL
HEMATOCRIT: 40.3 %
HEMOGLOBIN A1C: 5.9 %
HEMOGLOBIN: 12.7 G/DL
MAGNESIUM: 1.9 MG/DL
PLATELETS: 252 K/UL
POTASSIUM, SERUM: 3.5 MEQ/L
POTASSIUM, SERUM: 4 MEQ/L
POTASSIUM, SERUM: 4.3 MEQ/L
RED BLOOD COUNT: 4.41 X 10-6/U
SODIUM: 143 MEQ/L
SODIUM: 143 MEQ/L
THYROID STIMULATING HORMONE: 0.81 MLU/L
WHITE BLOOD COUNT: 7 X 10-3/U

## 2017-03-06 ENCOUNTER — PRIOR ORIGINAL RECORDS (OUTPATIENT)
Dept: OTHER | Age: 72
End: 2017-03-06

## 2017-03-28 ENCOUNTER — PRIOR ORIGINAL RECORDS (OUTPATIENT)
Dept: OTHER | Age: 72
End: 2017-03-28

## 2017-03-29 ENCOUNTER — PRIOR ORIGINAL RECORDS (OUTPATIENT)
Dept: OTHER | Age: 72
End: 2017-03-29

## 2017-03-29 LAB
BUN: 45 MG/DL
CALCIUM: 8.8 MG/DL
CHLORIDE: 98 MEQ/L
CREATININE, SERUM: 1.02 MG/DL
GLUCOSE: 227 MG/DL
POTASSIUM, SERUM: 3.6 MEQ/L
SODIUM: 139 MEQ/L

## 2017-04-04 ENCOUNTER — PRIOR ORIGINAL RECORDS (OUTPATIENT)
Dept: OTHER | Age: 72
End: 2017-04-04

## 2017-04-05 ENCOUNTER — PRIOR ORIGINAL RECORDS (OUTPATIENT)
Dept: OTHER | Age: 72
End: 2017-04-05

## 2017-04-11 ENCOUNTER — PRIOR ORIGINAL RECORDS (OUTPATIENT)
Dept: OTHER | Age: 72
End: 2017-04-11

## 2017-04-12 LAB
ALBUMIN: 4.2 G/DL
ALKALINE PHOSPHATATE(ALK PHOS): 81 IU/L
BILIRUBIN TOTAL: 0.3 MG/DL
BUN: 20 MG/DL
CALCIUM: 9.3 MG/DL
CHLORIDE: 100 MEQ/L
CHOLESTEROL, TOTAL: 229 MG/DL
CREATININE KINASE: 256 U/L
CREATININE, SERUM: 0.76 MG/DL
GLUCOSE: 145 MG/DL
HDL CHOLESTEROL: 32 MG/DL
HEMATOCRIT: 39.3 %
HEMOGLOBIN: 13 G/DL
LDL CHOLESTEROL: 145 MG/DL
PLATELETS: 272 K/UL
POTASSIUM, SERUM: 4 MEQ/L
PROBNP: 491 PG/ML
PROTEIN, TOTAL: 7.4 G/DL
RED BLOOD COUNT: 4.5 X 10-6/U
SGOT (AST): 15 IU/L
SGPT (ALT): 15 IU/L
SODIUM: 141 MEQ/L
TRIGLYCERIDES: 258 MG/DL
WHITE BLOOD COUNT: 5.22 X 10-3/U

## 2017-06-20 ENCOUNTER — TELEPHONE (OUTPATIENT)
Dept: INTERNAL MEDICINE CLINIC | Facility: CLINIC | Age: 72
End: 2017-06-20

## 2017-06-20 ENCOUNTER — OFFICE VISIT (OUTPATIENT)
Dept: INTERNAL MEDICINE CLINIC | Facility: CLINIC | Age: 72
End: 2017-06-20

## 2017-06-20 VITALS
SYSTOLIC BLOOD PRESSURE: 116 MMHG | WEIGHT: 242 LBS | HEART RATE: 78 BPM | HEIGHT: 63 IN | TEMPERATURE: 99 F | OXYGEN SATURATION: 92 % | BODY MASS INDEX: 42.88 KG/M2 | DIASTOLIC BLOOD PRESSURE: 74 MMHG | RESPIRATION RATE: 14 BRPM

## 2017-06-20 DIAGNOSIS — E11.9 TYPE 2 DIABETES MELLITUS WITHOUT COMPLICATION, WITHOUT LONG-TERM CURRENT USE OF INSULIN (HCC): Primary | ICD-10-CM

## 2017-06-20 DIAGNOSIS — E78.5 HYPERLIPIDEMIA, UNSPECIFIED HYPERLIPIDEMIA TYPE: ICD-10-CM

## 2017-06-20 DIAGNOSIS — Z99.89 OSA ON CPAP: ICD-10-CM

## 2017-06-20 DIAGNOSIS — G47.33 OSA ON CPAP: ICD-10-CM

## 2017-06-20 DIAGNOSIS — E66.01 MORBID OBESITY WITH BMI OF 40.0-44.9, ADULT (HCC): ICD-10-CM

## 2017-06-20 DIAGNOSIS — I11.0 HYPERTENSIVE HEART DISEASE WITH HEART FAILURE (HCC): ICD-10-CM

## 2017-06-20 DIAGNOSIS — M89.9 BONE DISORDER: ICD-10-CM

## 2017-06-20 PROBLEM — J81.0 ACUTE PULMONARY EDEMA (HCC): Status: RESOLVED | Noted: 2017-02-16 | Resolved: 2017-06-20

## 2017-06-20 PROCEDURE — 99204 OFFICE O/P NEW MOD 45 MIN: CPT | Performed by: PHYSICIAN ASSISTANT

## 2017-06-20 RX ORDER — FUROSEMIDE 20 MG/1
TABLET ORAL
Refills: 0 | COMMUNITY
Start: 2017-05-22 | End: 2017-06-20

## 2017-06-20 RX ORDER — METRONIDAZOLE 500 MG/1
500 TABLET ORAL 3 TIMES DAILY
COMMUNITY
End: 2017-06-20

## 2017-06-20 RX ORDER — LISINOPRIL 20 MG/1
20 TABLET ORAL
COMMUNITY
Start: 2016-09-26 | End: 2017-06-20

## 2017-06-20 RX ORDER — FUROSEMIDE 20 MG/1
20 TABLET ORAL
COMMUNITY
Start: 2017-01-20 | End: 2017-06-20

## 2017-06-20 NOTE — TELEPHONE ENCOUNTER
Patient informed of cardiology referral and provided contact information, patient verbalized understanding

## 2017-06-20 NOTE — PROGRESS NOTES
Jen Zheng is a 73year old female. HPI:   Patient presents to establish care. DM - off insulin as of 2/2017 when a1c found to be 5.9 in the hospital.  AM FBS in the 130s. No hypoglycemia. Tries to stick with a low carb diet.   HTN - complian 116/74 mmHg  Pulse 78  Temp(Src) 98.6 °F (37 °C) (Oral)  Resp 14  Ht 63\"  Wt 242 lb  BMI 42.88 kg/m2  SpO2 92%  GENERAL: well developed, well nourished, in no acute distress  SKIN: no rashes, no suspicious lesions  HEENT: normocephalic, atraumatic, conjun to Dr. Ayaz Howard - referred to Dr. Radha Moses/Sleep - referred to Dr. Nancy Pandey    The patient indicates understanding of these issues and agrees to the plan.   The patient is asked to return here in 2 weeks for f/u of above issues and l

## 2017-06-20 NOTE — PATIENT INSTRUCTIONS
Please do your lab work ASAP. Remember to fast for 10-12 hours but drink plenty of water.     Please call Zachary Jimenez at [phone] to set up the following tests:  - DEXA (bone density scan)    Please call the sleep center to sched

## 2017-07-06 NOTE — TELEPHONE ENCOUNTER
Received letter from [employer] stating that Dr Fredrick Saunders is also not in network and referral has been denied. Jes Gomez notified and per Jes Gomez, pt needs to contact insurance company to find a cardiologist in network.      Pt notified of all informa

## 2017-07-11 ENCOUNTER — LAB ENCOUNTER (OUTPATIENT)
Dept: LAB | Age: 72
End: 2017-07-11
Attending: INTERNAL MEDICINE
Payer: MEDICARE

## 2017-07-11 ENCOUNTER — PRIOR ORIGINAL RECORDS (OUTPATIENT)
Dept: OTHER | Age: 72
End: 2017-07-11

## 2017-07-11 DIAGNOSIS — G47.33 OSA ON CPAP: ICD-10-CM

## 2017-07-11 DIAGNOSIS — Z99.89 OSA ON CPAP: ICD-10-CM

## 2017-07-11 DIAGNOSIS — E11.9 TYPE 2 DIABETES MELLITUS WITHOUT COMPLICATION, WITHOUT LONG-TERM CURRENT USE OF INSULIN (HCC): ICD-10-CM

## 2017-07-11 DIAGNOSIS — E66.01 MORBID OBESITY WITH BMI OF 40.0-44.9, ADULT (HCC): ICD-10-CM

## 2017-07-11 DIAGNOSIS — I11.0 HYPERTENSIVE HEART DISEASE WITH HEART FAILURE (HCC): ICD-10-CM

## 2017-07-11 LAB
ALT SERPL-CCNC: 39 U/L (ref 14–54)
AST SERPL-CCNC: 17 U/L (ref 15–41)
BASOPHILS # BLD AUTO: 0.04 X10(3) UL (ref 0–0.1)
BASOPHILS NFR BLD AUTO: 0.5 %
BUN BLD-MCNC: 31 MG/DL (ref 8–20)
CALCIUM BLD-MCNC: 8.6 MG/DL (ref 8.3–10.3)
CHLORIDE: 104 MMOL/L (ref 101–111)
CHOLEST SMN-MCNC: 282 MG/DL (ref ?–200)
CO2: 27 MMOL/L (ref 22–32)
CREAT BLD-MCNC: 1.14 MG/DL (ref 0.55–1.02)
CREAT UR-SCNC: 158 MG/DL
EOSINOPHIL # BLD AUTO: 0.16 X10(3) UL (ref 0–0.3)
EOSINOPHIL NFR BLD AUTO: 2.1 %
ERYTHROCYTE [DISTWIDTH] IN BLOOD BY AUTOMATED COUNT: 14.2 % (ref 11.5–16)
EST. AVERAGE GLUCOSE BLD GHB EST-MCNC: 174 MG/DL (ref 68–126)
GLUCOSE BLD-MCNC: 164 MG/DL (ref 70–99)
HBA1C MFR BLD HPLC: 7.7 % (ref ?–5.7)
HCT VFR BLD AUTO: 37.2 % (ref 34–50)
HDLC SERPL-MCNC: 32 MG/DL (ref 45–?)
HDLC SERPL: 8.81 {RATIO} (ref ?–4.44)
HGB BLD-MCNC: 12 G/DL (ref 12–16)
IMMATURE GRANULOCYTE COUNT: 0.04 X10(3) UL (ref 0–1)
IMMATURE GRANULOCYTE RATIO %: 0.5 %
LDLC SERPL DIRECT ASSAY-MCNC: 140 MG/DL (ref ?–100)
LYMPHOCYTES # BLD AUTO: 1.98 X10(3) UL (ref 0.9–4)
LYMPHOCYTES NFR BLD AUTO: 25.7 %
MCH RBC QN AUTO: 29.8 PG (ref 27–33.2)
MCHC RBC AUTO-ENTMCNC: 32.3 G/DL (ref 31–37)
MCV RBC AUTO: 92.3 FL (ref 81–100)
MICROALBUMIN UR-MCNC: 0.54 MG/DL
MICROALBUMIN/CREAT 24H UR-RTO: 3.4 UG/MG (ref ?–30)
MONOCYTES # BLD AUTO: 0.61 X10(3) UL (ref 0.1–0.6)
MONOCYTES NFR BLD AUTO: 7.9 %
NEUTROPHIL ABS PRELIM: 4.87 X10 (3) UL (ref 1.3–6.7)
NEUTROPHILS # BLD AUTO: 4.87 X10(3) UL (ref 1.3–6.7)
NEUTROPHILS NFR BLD AUTO: 63.3 %
NONHDLC SERPL-MCNC: 250 MG/DL (ref ?–130)
PLATELET # BLD AUTO: 263 10(3)UL (ref 150–450)
POTASSIUM SERPL-SCNC: 3.6 MMOL/L (ref 3.6–5.1)
RBC # BLD AUTO: 4.03 X10(6)UL (ref 3.8–5.1)
RED CELL DISTRIBUTION WIDTH-SD: 48.1 FL (ref 35.1–46.3)
SODIUM SERPL-SCNC: 140 MMOL/L (ref 136–144)
TRIGLYCERIDES: 411 MG/DL (ref ?–150)
TSI SER-ACNC: 2.46 MIU/ML (ref 0.35–5.5)
WBC # BLD AUTO: 7.7 X10(3) UL (ref 4–13)

## 2017-07-11 PROCEDURE — 80061 LIPID PANEL: CPT

## 2017-07-11 PROCEDURE — 84443 ASSAY THYROID STIM HORMONE: CPT

## 2017-07-11 PROCEDURE — 83036 HEMOGLOBIN GLYCOSYLATED A1C: CPT

## 2017-07-11 PROCEDURE — 36415 COLL VENOUS BLD VENIPUNCTURE: CPT

## 2017-07-11 PROCEDURE — 85025 COMPLETE CBC W/AUTO DIFF WBC: CPT

## 2017-07-11 PROCEDURE — 82570 ASSAY OF URINE CREATININE: CPT

## 2017-07-11 PROCEDURE — 84460 ALANINE AMINO (ALT) (SGPT): CPT

## 2017-07-11 PROCEDURE — 83721 ASSAY OF BLOOD LIPOPROTEIN: CPT

## 2017-07-11 PROCEDURE — 84450 TRANSFERASE (AST) (SGOT): CPT

## 2017-07-11 PROCEDURE — 80048 BASIC METABOLIC PNL TOTAL CA: CPT

## 2017-07-11 PROCEDURE — 82043 UR ALBUMIN QUANTITATIVE: CPT

## 2017-07-14 ENCOUNTER — OFFICE VISIT (OUTPATIENT)
Dept: INTERNAL MEDICINE CLINIC | Facility: CLINIC | Age: 72
End: 2017-07-14

## 2017-07-14 VITALS
OXYGEN SATURATION: 95 % | HEART RATE: 74 BPM | BODY MASS INDEX: 43.41 KG/M2 | RESPIRATION RATE: 16 BRPM | TEMPERATURE: 98 F | DIASTOLIC BLOOD PRESSURE: 72 MMHG | HEIGHT: 63 IN | WEIGHT: 245 LBS | SYSTOLIC BLOOD PRESSURE: 130 MMHG

## 2017-07-14 DIAGNOSIS — E78.5 HYPERLIPIDEMIA, UNSPECIFIED HYPERLIPIDEMIA TYPE: ICD-10-CM

## 2017-07-14 DIAGNOSIS — E11.65 UNCONTROLLED TYPE 2 DIABETES MELLITUS WITH HYPERGLYCEMIA, WITHOUT LONG-TERM CURRENT USE OF INSULIN (HCC): Primary | ICD-10-CM

## 2017-07-14 DIAGNOSIS — G47.33 OSA ON CPAP: ICD-10-CM

## 2017-07-14 DIAGNOSIS — E66.01 MORBID OBESITY WITH BMI OF 40.0-44.9, ADULT (HCC): ICD-10-CM

## 2017-07-14 DIAGNOSIS — Z99.89 OSA ON CPAP: ICD-10-CM

## 2017-07-14 DIAGNOSIS — I11.0 HYPERTENSIVE HEART DISEASE WITH HEART FAILURE (HCC): ICD-10-CM

## 2017-07-14 PROBLEM — J44.1 COPD EXACERBATION (HCC): Status: RESOLVED | Noted: 2017-02-16 | Resolved: 2017-07-14

## 2017-07-14 PROCEDURE — 99214 OFFICE O/P EST MOD 30 MIN: CPT | Performed by: PHYSICIAN ASSISTANT

## 2017-07-14 RX ORDER — SIMVASTATIN 20 MG
20 TABLET ORAL NIGHTLY
Qty: 90 TABLET | Refills: 1 | Status: SHIPPED | OUTPATIENT
Start: 2017-07-14 | End: 2017-12-04 | Stop reason: ALTCHOICE

## 2017-07-14 NOTE — PROGRESS NOTES
Antonio Samaniego is a 73year old female. HPI:   Patient presents for f/u of recent labs. DM - off insulin as of 2/2017 when a1c found to be 5.9 in the hospital.  Compliant with metformin, tries to stick with a low carb diet.   HTN - compliant with m headaches, LH, dizziness  EXT: denies edema    EXAM:   /72 (BP Location: Left arm, Patient Position: Sitting, Cuff Size: large)   Pulse 74   Temp 97.9 °F (36.6 °C) (Oral)   Resp 16   Ht 63\"   Wt 245 lb   SpO2 95%   BMI 43.40 kg/m²   GENERAL: well de Dr. Guadalupe Moses/Sleep - referred to Dr. Prasanna Araya    The patient indicates understanding of these issues and agrees to the plan. The patient is asked to return here in 3 months (after labs complete) for med visit and Medicare AWV.

## 2017-07-14 NOTE — PATIENT INSTRUCTIONS
Diabetes:  - increase metformin to 1 tablet THREE times a day (take with meals)    High Cholesterol:  - resume simvastatin 20 mg - take 1 tablet nightly    Please call Zachary Jimenez at [phone] to set up the following tests:  - DEX

## 2017-08-21 ENCOUNTER — HOSPITAL ENCOUNTER (OUTPATIENT)
Dept: BONE DENSITY | Age: 72
Discharge: HOME | End: 2017-08-21
Attending: PHYSICIAN ASSISTANT
Payer: MEDICARE

## 2017-08-21 DIAGNOSIS — M89.9 BONE DISORDER: ICD-10-CM

## 2017-08-21 PROCEDURE — 77080 DXA BONE DENSITY AXIAL: CPT | Performed by: PHYSICIAN ASSISTANT

## 2017-09-11 ENCOUNTER — TELEPHONE (OUTPATIENT)
Dept: INTERNAL MEDICINE CLINIC | Facility: CLINIC | Age: 72
End: 2017-09-11

## 2017-09-11 NOTE — TELEPHONE ENCOUNTER
Patient called the office regarding the appointment she had scheduled today. Pt stated she will not be able to make it in, due to ride issues. Informed pt the appointment will be a no show for today. Explained no show/cancellation policy/future fee's.  Pt

## 2017-09-19 ENCOUNTER — OFFICE VISIT (OUTPATIENT)
Dept: INTERNAL MEDICINE CLINIC | Facility: CLINIC | Age: 72
End: 2017-09-19

## 2017-09-19 VITALS
HEART RATE: 70 BPM | HEIGHT: 63 IN | DIASTOLIC BLOOD PRESSURE: 86 MMHG | OXYGEN SATURATION: 93 % | TEMPERATURE: 98 F | WEIGHT: 242.5 LBS | SYSTOLIC BLOOD PRESSURE: 142 MMHG | RESPIRATION RATE: 14 BRPM | BODY MASS INDEX: 42.97 KG/M2

## 2017-09-19 DIAGNOSIS — M54.50 CHRONIC BILATERAL LOW BACK PAIN WITHOUT SCIATICA: Primary | ICD-10-CM

## 2017-09-19 DIAGNOSIS — I11.0 HYPERTENSIVE HEART DISEASE WITH HEART FAILURE (HCC): ICD-10-CM

## 2017-09-19 DIAGNOSIS — G89.29 CHRONIC BILATERAL LOW BACK PAIN WITHOUT SCIATICA: Primary | ICD-10-CM

## 2017-09-19 DIAGNOSIS — M54.2 NECK AND SHOULDER PAIN: ICD-10-CM

## 2017-09-19 DIAGNOSIS — M25.519 NECK AND SHOULDER PAIN: ICD-10-CM

## 2017-09-19 PROCEDURE — 99214 OFFICE O/P EST MOD 30 MIN: CPT | Performed by: PHYSICIAN ASSISTANT

## 2017-09-19 RX ORDER — MELOXICAM 15 MG/1
TABLET ORAL
Refills: 5 | COMMUNITY
Start: 2017-09-03 | End: 2018-01-12

## 2017-09-19 RX ORDER — LISINOPRIL 20 MG/1
TABLET ORAL
Refills: 0 | COMMUNITY
Start: 2017-07-23 | End: 2018-03-07

## 2017-09-19 RX ORDER — ALBUTEROL SULFATE 2.5 MG/3ML
SOLUTION RESPIRATORY (INHALATION)
Refills: 2 | COMMUNITY
Start: 2017-08-31 | End: 2018-08-07

## 2017-09-19 NOTE — PROGRESS NOTES
HPI:   David Ferrari is a 81year old female who is here for LBP x several months. Denies injury or new activity. C/o mid low back pain that sometimes radiates up her back. Also c/o R sided neck and upper back/shoulder pain.   Describes a constant d Full ROM of c-spine. Neg Spurling.   LUNGS: clear to auscultation  CARDIO: RRR without murmur  GI: soft, non-tender, non-distended  EXTREMITIES: no cyanosis, clubbing or edema  BACK: mid to low lumbar spine is tender to palpation, +bilat (R>L) lumbar megan to return here in 1 month after labs complete. Addendum 9/27/17:  DM eye exam done 9/15/17 by Dr. Margreta Gitelman - no DR. Exam did show developing cataracts and mild HTN retinopathy. DM flowsheet updated.

## 2017-10-04 ENCOUNTER — OFFICE VISIT (OUTPATIENT)
Dept: PHYSICAL THERAPY | Age: 72
End: 2017-10-04
Attending: PHYSICIAN ASSISTANT
Payer: MEDICARE

## 2017-10-04 DIAGNOSIS — M25.519 NECK AND SHOULDER PAIN: ICD-10-CM

## 2017-10-04 DIAGNOSIS — M54.50 CHRONIC BILATERAL LOW BACK PAIN WITHOUT SCIATICA: ICD-10-CM

## 2017-10-04 DIAGNOSIS — G89.29 CHRONIC BILATERAL LOW BACK PAIN WITHOUT SCIATICA: ICD-10-CM

## 2017-10-04 DIAGNOSIS — M54.2 NECK AND SHOULDER PAIN: ICD-10-CM

## 2017-10-04 PROCEDURE — 97163 PT EVAL HIGH COMPLEX 45 MIN: CPT

## 2017-10-06 ENCOUNTER — APPOINTMENT (OUTPATIENT)
Dept: LAB | Age: 72
End: 2017-10-06
Attending: PHYSICIAN ASSISTANT
Payer: MEDICARE

## 2017-10-06 ENCOUNTER — OFFICE VISIT (OUTPATIENT)
Dept: INTERNAL MEDICINE CLINIC | Facility: CLINIC | Age: 72
End: 2017-10-06

## 2017-10-06 ENCOUNTER — PRIOR ORIGINAL RECORDS (OUTPATIENT)
Dept: OTHER | Age: 72
End: 2017-10-06

## 2017-10-06 VITALS
WEIGHT: 245 LBS | DIASTOLIC BLOOD PRESSURE: 80 MMHG | SYSTOLIC BLOOD PRESSURE: 128 MMHG | RESPIRATION RATE: 17 BRPM | OXYGEN SATURATION: 93 % | BODY MASS INDEX: 43.41 KG/M2 | HEIGHT: 63 IN | HEART RATE: 81 BPM | TEMPERATURE: 99 F

## 2017-10-06 DIAGNOSIS — E11.65 UNCONTROLLED TYPE 2 DIABETES MELLITUS WITH HYPERGLYCEMIA, WITHOUT LONG-TERM CURRENT USE OF INSULIN (HCC): ICD-10-CM

## 2017-10-06 DIAGNOSIS — E78.5 HYPERLIPIDEMIA, UNSPECIFIED HYPERLIPIDEMIA TYPE: ICD-10-CM

## 2017-10-06 DIAGNOSIS — I11.0 HYPERTENSIVE HEART DISEASE WITH HEART FAILURE (HCC): ICD-10-CM

## 2017-10-06 DIAGNOSIS — R30.0 DYSURIA: ICD-10-CM

## 2017-10-06 DIAGNOSIS — R10.30 LOWER ABDOMINAL PAIN: Primary | ICD-10-CM

## 2017-10-06 DIAGNOSIS — K59.00 CONSTIPATION, UNSPECIFIED CONSTIPATION TYPE: ICD-10-CM

## 2017-10-06 PROCEDURE — 81003 URINALYSIS AUTO W/O SCOPE: CPT | Performed by: PHYSICIAN ASSISTANT

## 2017-10-06 PROCEDURE — 84450 TRANSFERASE (AST) (SGOT): CPT

## 2017-10-06 PROCEDURE — 80061 LIPID PANEL: CPT

## 2017-10-06 PROCEDURE — 84460 ALANINE AMINO (ALT) (SGPT): CPT

## 2017-10-06 PROCEDURE — 80048 BASIC METABOLIC PNL TOTAL CA: CPT

## 2017-10-06 PROCEDURE — 83036 HEMOGLOBIN GLYCOSYLATED A1C: CPT

## 2017-10-06 PROCEDURE — 99214 OFFICE O/P EST MOD 30 MIN: CPT | Performed by: PHYSICIAN ASSISTANT

## 2017-10-06 PROCEDURE — 36415 COLL VENOUS BLD VENIPUNCTURE: CPT

## 2017-10-06 NOTE — PATIENT INSTRUCTIONS
Abdominal Pain:  - increase fluid intake  - bland diet for the next few days  - call if symptoms changing, worsening, or not improving

## 2017-10-09 NOTE — PROGRESS NOTES
SPINE EVALUATION:   Referring Physician: Dr. Sonam Moon  Diagnosis: Posture Dysfunction     Date of Service: 10/9/2017     PATIENT SUMMARY   Greta Ding is a 81year old female who presents to therapy today with complaints of 6 month onset of neck and b pain increased with flexion and extension. Cervical Spine AROM:   All motions WFL's except R rotation, side bend and extension limited. Produce R neck and upper trapezius pain. Sitting x 1 minutes produced B posterior thigh pain.   Sustained lumbar G Code: [id number]    Patient/Family/Caregiver was advised of these findings, precautions, and treatment options and has agreed to actively participate in planning and for this course of care.     Thank you for your referral. Please co-sign or sign and return

## 2017-10-10 ENCOUNTER — PRIOR ORIGINAL RECORDS (OUTPATIENT)
Dept: OTHER | Age: 72
End: 2017-10-10

## 2017-10-11 ENCOUNTER — OFFICE VISIT (OUTPATIENT)
Dept: PHYSICAL THERAPY | Age: 72
End: 2017-10-11
Attending: PHYSICIAN ASSISTANT
Payer: MEDICARE

## 2017-10-11 PROCEDURE — 97112 NEUROMUSCULAR REEDUCATION: CPT

## 2017-10-11 NOTE — PROGRESS NOTES
Dx:     Posture Dysfunction    Authorized # of Visits:  Medicare            Subjective: Has c/o neck and referred L shoulder pain, LBP with intermittent LE symptoms.      Objective:     Date: 10/11/2017 TX#: 2/ Date:               TX#: 3/   Date: -Complaint with HEP. Plan: Check HEP, progress accordingly, address goals.      Charges: NM re ed 4      Total Timed Treatment: 50 min  Total Treatment Time: 50 min

## 2017-10-12 ENCOUNTER — OFFICE VISIT (OUTPATIENT)
Dept: PHYSICAL THERAPY | Age: 72
End: 2017-10-12
Attending: PHYSICIAN ASSISTANT
Payer: MEDICARE

## 2017-10-12 PROCEDURE — 97112 NEUROMUSCULAR REEDUCATION: CPT

## 2017-10-12 NOTE — PROGRESS NOTES
Dx:     Posture Dysfunction    Authorized # of Visits:  Medicare            Subjective: States she felt better after tx yesterday and her HEP which she did in the afternoon. Less neck and back stiffness.  No jose juan at neck or back now,     Objective:     Date Again no symptoms before, during, or after ex's. Made good effort to complete all ex's  Had greater difficulty with recruiting TA with ab brace exercise, required extensive cueing and practice to accomplish. Goals in progress.        Goals:   -Restore cer

## 2017-10-16 ENCOUNTER — APPOINTMENT (OUTPATIENT)
Dept: PHYSICAL THERAPY | Age: 72
End: 2017-10-16
Attending: PHYSICIAN ASSISTANT
Payer: MEDICARE

## 2017-10-17 ENCOUNTER — PATIENT OUTREACH (OUTPATIENT)
Dept: CASE MANAGEMENT | Age: 72
End: 2017-10-17

## 2017-10-19 ENCOUNTER — OFFICE VISIT (OUTPATIENT)
Dept: PHYSICAL THERAPY | Age: 72
End: 2017-10-19
Attending: PHYSICIAN ASSISTANT
Payer: MEDICARE

## 2017-10-19 PROCEDURE — 97530 THERAPEUTIC ACTIVITIES: CPT

## 2017-10-19 PROCEDURE — 97112 NEUROMUSCULAR REEDUCATION: CPT

## 2017-10-19 NOTE — PROGRESS NOTES
Dx:     Posture Dysfunction    Authorized # of Visits:  Medicare            Subjective: Doing her HEP regularly. Neck and back stiffness and pain well improved. Likes the exercises, feels good. Feels abdominal muscles working.  Bending during dressing ea ed:  Posture:  HEP:  Supine, cranial flexion with hold 5 seconds x 15 reps NM re ed:  Posture:  HEP:  Supine, cranial flexion with hold 5 seconds x 15 reps         There ex[de-identified]  B long axis distraction to decrease lordosis, 60 sec holds  6 min         There neck pain with raising arms. Progress abdominal, lumbo pelvic ex's and HEP.  .     Charges: NM re ed 2 there act 1     Total Timed Treatment: 45 min  Total Treatment Time: 45 min

## 2017-10-24 ENCOUNTER — OFFICE VISIT (OUTPATIENT)
Dept: PHYSICAL THERAPY | Age: 72
End: 2017-10-24
Attending: PHYSICIAN ASSISTANT
Payer: MEDICARE

## 2017-10-24 LAB
BUN: 9 MG/DL
CALCIUM: 8.6 MG/DL
CHLORIDE: 106 MEQ/L
CHOLESTEROL, TOTAL: 135 MG/DL
CHOLESTEROL, TOTAL: 282 MG/DL
CREATININE, SERUM: 0.82 MG/DL
GLUCOSE: 107 MG/DL
HDL CHOLESTEROL: 31 MG/DL
HDL CHOLESTEROL: 32 MG/DL
HEMOGLOBIN A1C: 10.3 %
LDL CHOLESTEROL: 68 MG/DL
POTASSIUM, SERUM: 3.6 MEQ/L
SGOT (AST): 15 IU/L
SGPT (ALT): 19 IU/L
SODIUM: 141 MEQ/L
TRIGLYCERIDES: 181 MG/DL
TRIGLYCERIDES: 411 MG/DL

## 2017-10-24 PROCEDURE — 97530 THERAPEUTIC ACTIVITIES: CPT

## 2017-10-24 PROCEDURE — 97112 NEUROMUSCULAR REEDUCATION: CPT

## 2017-10-31 ENCOUNTER — APPOINTMENT (OUTPATIENT)
Dept: PHYSICAL THERAPY | Age: 72
End: 2017-10-31
Attending: PHYSICIAN ASSISTANT
Payer: MEDICARE

## 2017-11-07 ENCOUNTER — APPOINTMENT (OUTPATIENT)
Dept: PHYSICAL THERAPY | Age: 72
End: 2017-11-07
Attending: PHYSICIAN ASSISTANT
Payer: MEDICARE

## 2017-11-13 ENCOUNTER — PRIOR ORIGINAL RECORDS (OUTPATIENT)
Dept: OTHER | Age: 72
End: 2017-11-13

## 2017-11-14 ENCOUNTER — APPOINTMENT (OUTPATIENT)
Dept: PHYSICAL THERAPY | Age: 72
End: 2017-11-14
Attending: PHYSICIAN ASSISTANT
Payer: MEDICARE

## 2017-11-20 ENCOUNTER — TELEPHONE (OUTPATIENT)
Dept: INTERNAL MEDICINE CLINIC | Facility: CLINIC | Age: 72
End: 2017-11-20

## 2017-11-20 NOTE — TELEPHONE ENCOUNTER
Patient called stating she thought she was suppose to start insulin again but order never called into [facility]  Please call patient

## 2017-11-28 NOTE — TELEPHONE ENCOUNTER
Patient informed to increase Metformin and have follow-up visit with Unique Gerard, patient verbalized understanding, will schedule appointment and bring BS log

## 2017-11-29 ENCOUNTER — PRIOR ORIGINAL RECORDS (OUTPATIENT)
Dept: OTHER | Age: 72
End: 2017-11-29

## 2017-12-01 ENCOUNTER — PRIOR ORIGINAL RECORDS (OUTPATIENT)
Dept: OTHER | Age: 72
End: 2017-12-01

## 2017-12-04 ENCOUNTER — LAB ENCOUNTER (OUTPATIENT)
Dept: LAB | Age: 72
End: 2017-12-04
Attending: PEDIATRICS
Payer: MEDICARE

## 2017-12-04 ENCOUNTER — OFFICE VISIT (OUTPATIENT)
Dept: INTERNAL MEDICINE CLINIC | Facility: CLINIC | Age: 72
End: 2017-12-04

## 2017-12-04 VITALS
BODY MASS INDEX: 42.88 KG/M2 | SYSTOLIC BLOOD PRESSURE: 158 MMHG | OXYGEN SATURATION: 99 % | WEIGHT: 242 LBS | HEART RATE: 72 BPM | TEMPERATURE: 99 F | HEIGHT: 63 IN | RESPIRATION RATE: 20 BRPM | DIASTOLIC BLOOD PRESSURE: 76 MMHG

## 2017-12-04 DIAGNOSIS — M54.41 ACUTE BILATERAL LOW BACK PAIN WITH BILATERAL SCIATICA: ICD-10-CM

## 2017-12-04 DIAGNOSIS — R53.83 FATIGUE: Primary | ICD-10-CM

## 2017-12-04 DIAGNOSIS — R05.9 COUGH: ICD-10-CM
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
## 2017-12-04 DIAGNOSIS — I11.0 HYPERTENSIVE HEART DISEASE WITH HEART FAILURE (HCC): Primary | ICD-10-CM

## 2017-12-04 DIAGNOSIS — I50.9 CONGESTIVE HEART FAILURE, UNSPECIFIED CONGESTIVE HEART FAILURE CHRONICITY, UNSPECIFIED CONGESTIVE HEART FAILURE TYPE: ICD-10-CM

## 2017-12-04 DIAGNOSIS — Z13.0 SCREENING FOR IRON DEFICIENCY ANEMIA: ICD-10-CM

## 2017-12-04 DIAGNOSIS — E11.65 UNCONTROLLED TYPE 2 DIABETES MELLITUS WITH HYPERGLYCEMIA, WITH LONG-TERM CURRENT USE OF INSULIN (HCC): ICD-10-CM

## 2017-12-04 DIAGNOSIS — M54.42 ACUTE BILATERAL LOW BACK PAIN WITH BILATERAL SCIATICA: ICD-10-CM

## 2017-12-04 DIAGNOSIS — E78.5 HYPERLIPIDEMIA, UNSPECIFIED HYPERLIPIDEMIA TYPE: ICD-10-CM

## 2017-12-04 DIAGNOSIS — Z79.4 UNCONTROLLED TYPE 2 DIABETES MELLITUS WITH HYPERGLYCEMIA, WITH LONG-TERM CURRENT USE OF INSULIN (HCC): ICD-10-CM

## 2017-12-04 PROCEDURE — 83550 IRON BINDING TEST: CPT

## 2017-12-04 PROCEDURE — 82728 ASSAY OF FERRITIN: CPT

## 2017-12-04 PROCEDURE — 36415 COLL VENOUS BLD VENIPUNCTURE: CPT

## 2017-12-04 PROCEDURE — 83540 ASSAY OF IRON: CPT

## 2017-12-04 PROCEDURE — 99214 OFFICE O/P EST MOD 30 MIN: CPT | Performed by: PHYSICIAN ASSISTANT

## 2017-12-04 RX ORDER — ASPIRIN 81 MG
81 TABLET, DELAYED RELEASE (ENTERIC COATED) ORAL DAILY
Refills: 0 | Status: ON HOLD | COMMUNITY
Start: 2017-12-01

## 2017-12-04 RX ORDER — ATORVASTATIN CALCIUM 20 MG/1
20 TABLET, FILM COATED ORAL NIGHTLY
Refills: 5 | COMMUNITY
Start: 2017-10-10 | End: 2021-02-16

## 2017-12-04 RX ORDER — HYDRALAZINE HYDROCHLORIDE 100 MG/1
100 TABLET, FILM COATED ORAL 2 TIMES DAILY
Refills: 1 | COMMUNITY
Start: 2017-12-04 | End: 2018-05-04

## 2017-12-04 RX ORDER — HYDRALAZINE HYDROCHLORIDE 100 MG/1
100 TABLET, FILM COATED ORAL 3 TIMES DAILY
Refills: 1 | COMMUNITY
Start: 2017-11-13 | End: 2017-12-04

## 2017-12-04 NOTE — PATIENT INSTRUCTIONS
Please see Dr. Zohra ADKINS for hospital follow up and to recheck your blood pressure. Please see Marcelo ADKINS for your diabetes. Please call to schedule physical therapy for your back. Follow up visit with Nicolás John in 3 months.
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
## 2017-12-07 ENCOUNTER — TELEPHONE (OUTPATIENT)
Dept: INTERNAL MEDICINE CLINIC | Facility: CLINIC | Age: 72
End: 2017-12-07

## 2017-12-11 ENCOUNTER — OFFICE VISIT (OUTPATIENT)
Dept: PHYSICAL THERAPY | Age: 72
End: 2017-12-11
Attending: PHYSICIAN ASSISTANT
Payer: MEDICARE

## 2017-12-11 DIAGNOSIS — M54.42 ACUTE BILATERAL LOW BACK PAIN WITH BILATERAL SCIATICA: ICD-10-CM
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
## 2017-12-11 DIAGNOSIS — M54.41 ACUTE BILATERAL LOW BACK PAIN WITH BILATERAL SCIATICA: ICD-10-CM

## 2017-12-11 PROCEDURE — 97110 THERAPEUTIC EXERCISES: CPT

## 2017-12-11 PROCEDURE — 97162 PT EVAL MOD COMPLEX 30 MIN: CPT

## 2017-12-13 ENCOUNTER — OFFICE VISIT (OUTPATIENT)
Dept: PHYSICAL THERAPY | Age: 72
End: 2017-12-13
Attending: PHYSICIAN ASSISTANT
Payer: MEDICARE

## 2017-12-13 PROCEDURE — 97110 THERAPEUTIC EXERCISES: CPT

## 2017-12-13 PROCEDURE — 97112 NEUROMUSCULAR REEDUCATION: CPT
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
## 2017-12-14 ENCOUNTER — PRIOR ORIGINAL RECORDS (OUTPATIENT)
Dept: OTHER | Age: 72
End: 2017-12-14

## 2017-12-15 ENCOUNTER — OFFICE VISIT (OUTPATIENT)
Dept: ENDOCRINOLOGY CLINIC | Facility: CLINIC | Age: 72
End: 2017-12-15

## 2017-12-15 VITALS
DIASTOLIC BLOOD PRESSURE: 72 MMHG | RESPIRATION RATE: 18 BRPM | HEIGHT: 63 IN | HEART RATE: 92 BPM | SYSTOLIC BLOOD PRESSURE: 126 MMHG | WEIGHT: 241 LBS | TEMPERATURE: 98 F | BODY MASS INDEX: 42.7 KG/M2

## 2017-12-15 DIAGNOSIS — E78.00 PURE HYPERCHOLESTEROLEMIA: ICD-10-CM

## 2017-12-15 DIAGNOSIS — I11.0 HYPERTENSIVE HEART DISEASE WITH HEART FAILURE (HCC): ICD-10-CM

## 2017-12-15 DIAGNOSIS — E11.8 TYPE 2 DIABETES MELLITUS WITH COMPLICATION, WITHOUT LONG-TERM CURRENT USE OF INSULIN (HCC): Primary | ICD-10-CM

## 2017-12-15 PROCEDURE — 83036 HEMOGLOBIN GLYCOSYLATED A1C: CPT | Performed by: NURSE PRACTITIONER

## 2017-12-15 PROCEDURE — 99214 OFFICE O/P EST MOD 30 MIN: CPT | Performed by: NURSE PRACTITIONER

## 2017-12-16 NOTE — PROGRESS NOTES
CC: Patient presents with:  Diabetes: new pt. referred by PCP. needs new meter.       HISTORY:  Past Medical History:   Diagnosis Date   • Congestive heart disease ([address]    • Diabetes ([address]    • Essential hypertension    • Hyperlipidemia    • Obesity    • S 68      Medication:  Atorvastatin  SE denies     ROS:   Constitutional: Negative for fever, chills and fatigue. No distress. Eyes: Negative for visual disturbance.  Last eye exam 9/15/17  with SANJAY Verduzco   Respiratory: Negative for cough, chest tightness, Disp: 60 tablet, Rfl: 6  •  torsemide 20 MG Oral Tab, Take 1 tablet (20 mg total) by mouth daily. , Disp: 30 tablet, Rfl: 6  •  Alendronate Sodium 10 MG Oral Tab, Take 10 mg by mouth every morning before breakfast., Disp: , Rfl:     Exam:  /72   Pulse Encounter      Hgb A1C    Meds & Refills for this Visit:  No prescriptions requested or ordered in this encounter    Imaging & Consults:  None    DM Health Maintenance  Up to date on annual labs repeat in January   Ophtho / dilated eye exam: up to date due

## 2017-12-18 ENCOUNTER — APPOINTMENT (OUTPATIENT)
Dept: PHYSICAL THERAPY | Age: 72
End: 2017-12-18
Attending: PHYSICIAN ASSISTANT
Payer: MEDICARE

## 2017-12-19 ENCOUNTER — PRIOR ORIGINAL RECORDS (OUTPATIENT)
Dept: OTHER | Age: 72
End: 2017-12-19

## 2017-12-19 RX ORDER — MELOXICAM 7.5 MG/1
7.5 TABLET ORAL DAILY
Qty: 30 TABLET | Refills: 0 | Status: SHIPPED | OUTPATIENT
Start: 2017-12-19 | End: 2018-01-19

## 2017-12-19 RX ORDER — MELOXICAM 15 MG/1
TABLET ORAL
Qty: 30 TABLET | Refills: 0 | OUTPATIENT
Start: 2017-12-19

## 2017-12-20 ENCOUNTER — OFFICE VISIT (OUTPATIENT)
Dept: PHYSICAL THERAPY | Age: 72
End: 2017-12-20
Attending: PHYSICIAN ASSISTANT
Payer: MEDICARE

## 2017-12-20 PROCEDURE — 97112 NEUROMUSCULAR REEDUCATION: CPT

## 2017-12-20 PROCEDURE — 97110 THERAPEUTIC EXERCISES: CPT

## 2017-12-20 RX ORDER — MELOXICAM 7.5 MG/1
TABLET ORAL
Qty: 90 TABLET | Refills: 0 | OUTPATIENT
Start: 2017-12-20

## 2017-12-20 NOTE — TELEPHONE ENCOUNTER
Medication(s) to Refill:   Pending Prescriptions Disp Refills    MELOXICAM 7.5 MG Oral Tab [Pharmacy Med Name: MELOXICAM 7.5MG TABLETS] 90 tablet 0     Sig: TAKE 1 TABLET(7.5 MG) BY MOUTH DAILY
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
## 2017-12-28 RX ORDER — INSULIN GLARGINE 100 [IU]/ML
INJECTION, SOLUTION SUBCUTANEOUS
Qty: 15 ML | Refills: 0 | Status: SHIPPED | OUTPATIENT
Start: 2017-12-28 | End: 2018-01-16

## 2017-12-29 ENCOUNTER — LAB ENCOUNTER (OUTPATIENT)
Dept: LAB | Age: 72
End: 2017-12-29
Attending: INTERNAL MEDICINE
Payer: MEDICARE

## 2017-12-29 ENCOUNTER — PRIOR ORIGINAL RECORDS (OUTPATIENT)
Dept: OTHER | Age: 72
End: 2017-12-29

## 2017-12-29 DIAGNOSIS — I10 ESSENTIAL HYPERTENSION, MALIGNANT: ICD-10-CM

## 2017-12-29 DIAGNOSIS — I50.9 HEART FAILURE (HCC): Primary | ICD-10-CM

## 2017-12-29 PROCEDURE — 80048 BASIC METABOLIC PNL TOTAL CA: CPT

## 2017-12-29 PROCEDURE — 36415 COLL VENOUS BLD VENIPUNCTURE: CPT

## 2018-01-02 ENCOUNTER — PRIOR ORIGINAL RECORDS (OUTPATIENT)
Dept: OTHER | Age: 73
End: 2018-01-02

## 2018-01-02 LAB
BUN: 23 MG/DL
CALCIUM: 9.3 MG/DL
CHLORIDE: 103 MEQ/L
CREATININE, SERUM: 0.94 MG/DL
GLUCOSE: 99 MG/DL
POTASSIUM, SERUM: 3.5 MEQ/L
SODIUM: 138 MEQ/L

## 2018-01-04 ENCOUNTER — OFFICE VISIT (OUTPATIENT)
Dept: PHYSICAL THERAPY | Age: 73
End: 2018-01-04
Attending: PHYSICIAN ASSISTANT
Payer: MEDICARE

## 2018-01-04 PROCEDURE — 97112 NEUROMUSCULAR REEDUCATION: CPT

## 2018-01-04 PROCEDURE — 97110 THERAPEUTIC EXERCISES: CPT
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
## 2018-01-09 NOTE — PATIENT INSTRUCTIONS
Neck & Back Pain:  - call to schedule physical therapy  - alternate ice and heat (10-15 minutes at a time, 2-3 times a day)  - may take meloxicam - 1 tablet daily AS NEEDED  - may take tylenol (acetaminophen) 1,000 mg every 8 hours as needed (do not exceed
supervision

## 2018-01-10 ENCOUNTER — OFFICE VISIT (OUTPATIENT)
Dept: PHYSICAL THERAPY | Age: 73
End: 2018-01-10
Attending: PHYSICIAN ASSISTANT
Payer: MEDICARE

## 2018-01-10 PROCEDURE — 97110 THERAPEUTIC EXERCISES: CPT

## 2018-01-10 PROCEDURE — 97112 NEUROMUSCULAR REEDUCATION: CPT
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
## 2018-01-11 LAB
BUN: 18 MG/DL
BUN: 20 MG/DL
CALCIUM: 8.6 MG/DL
CALCIUM: 8.6 MG/DL
CHLORIDE: 102 MEQ/L
CHLORIDE: 103 MEQ/L
CREATININE, SERUM: 0.7 MG/DL
CREATININE, SERUM: 0.79 MG/DL
GLUCOSE: 113 MG/DL
GLUCOSE: 200 MG/DL
HEMATOCRIT: 36.8 %
HEMOGLOBIN A1C: 7.2 %
HEMOGLOBIN: 12.2 G/DL
MAGNESIUM: 1.8 MG/DL
MAGNESIUM: 2.3 MG/DL
PLATELETS: 253 K/UL
POTASSIUM, SERUM: 3.2 MEQ/L
POTASSIUM, SERUM: 3.4 MEQ/L
POTASSIUM, SERUM: 3.5 MEQ/L
PROBNP: 642 PG/ML
RED BLOOD COUNT: 4.04 X 10-6/U
SODIUM: 144 MEQ/L
SODIUM: 144 MEQ/L
WHITE BLOOD COUNT: 8.3 X 10-3/U

## 2018-01-12 ENCOUNTER — OFFICE VISIT (OUTPATIENT)
Dept: INTERNAL MEDICINE CLINIC | Facility: CLINIC | Age: 73
End: 2018-01-12

## 2018-01-12 ENCOUNTER — HOSPITAL ENCOUNTER (OUTPATIENT)
Dept: GENERAL RADIOLOGY | Age: 73
Discharge: HOME | End: 2018-01-12
Attending: PHYSICIAN ASSISTANT
Payer: MEDICARE

## 2018-01-12 VITALS
BODY MASS INDEX: 43.32 KG/M2 | HEIGHT: 63 IN | OXYGEN SATURATION: 97 % | RESPIRATION RATE: 14 BRPM | WEIGHT: 244.5 LBS | HEART RATE: 77 BPM | SYSTOLIC BLOOD PRESSURE: 128 MMHG | DIASTOLIC BLOOD PRESSURE: 60 MMHG | TEMPERATURE: 99 F

## 2018-01-12 DIAGNOSIS — Z99.89 OSA ON CPAP: ICD-10-CM

## 2018-01-12 DIAGNOSIS — Z00.00 ENCOUNTER FOR ANNUAL HEALTH EXAMINATION: Primary | ICD-10-CM

## 2018-01-12 DIAGNOSIS — E78.00 PURE HYPERCHOLESTEROLEMIA: ICD-10-CM

## 2018-01-12 DIAGNOSIS — G47.33 OSA ON CPAP: ICD-10-CM

## 2018-01-12 DIAGNOSIS — M25.561 ACUTE PAIN OF RIGHT KNEE: ICD-10-CM

## 2018-01-12 DIAGNOSIS — M85.80 OSTEOPENIA, UNSPECIFIED LOCATION: ICD-10-CM

## 2018-01-12 DIAGNOSIS — I11.0 HYPERTENSIVE HEART DISEASE WITH HEART FAILURE (HCC): ICD-10-CM

## 2018-01-12 DIAGNOSIS — Z72.0 TOBACCO ABUSE: ICD-10-CM

## 2018-01-12 DIAGNOSIS — I42.8 NON-ISCHEMIC CARDIOMYOPATHY (HCC): ICD-10-CM

## 2018-01-12 DIAGNOSIS — E66.01 MORBID OBESITY WITH BMI OF 40.0-44.9, ADULT (HCC): ICD-10-CM

## 2018-01-12 DIAGNOSIS — Z12.11 SCREENING FOR COLON CANCER: ICD-10-CM

## 2018-01-12 DIAGNOSIS — E11.8 TYPE 2 DIABETES MELLITUS WITH COMPLICATION, WITHOUT LONG-TERM CURRENT USE OF INSULIN (HCC): ICD-10-CM

## 2018-01-12 DIAGNOSIS — Z11.59 NEED FOR HEPATITIS C SCREENING TEST: ICD-10-CM

## 2018-01-12 DIAGNOSIS — H26.9 CATARACT OF BOTH EYES, UNSPECIFIED CATARACT TYPE: ICD-10-CM

## 2018-01-12 PROCEDURE — G0438 PPPS, INITIAL VISIT: HCPCS | Performed by: PHYSICIAN ASSISTANT

## 2018-01-12 PROCEDURE — 73562 X-RAY EXAM OF KNEE 3: CPT | Performed by: PHYSICIAN ASSISTANT

## 2018-01-12 PROCEDURE — 99214 OFFICE O/P EST MOD 30 MIN: CPT | Performed by: PHYSICIAN ASSISTANT

## 2018-01-12 RX ORDER — BUPROPION HYDROCHLORIDE 150 MG/1
150 TABLET ORAL DAILY
Qty: 90 TABLET | Refills: 1 | Status: SHIPPED | OUTPATIENT
Start: 2018-01-12 | End: 2018-05-29

## 2018-01-12 NOTE — PROGRESS NOTES
HPI:   Millie Pierson is a 81year old female who presents for a Medicare Initial Annual Wellness visit (Once after 12 month Medicare anniversary) , f/u of chronic health issues, and acute R knee pain. R knee pain x 2 weeks.   Denies injury or new ac care planning including the explanation and discussion of advance directives standard forms performed Face to Face with patient and Family/surrogate (if present), and forms available to patient in AVS       She does NOT have a Power of  for Sunust 10/06/2017   ALT 19 10/06/2017   CA 9.3 12/29/2017   TSH 2.460 07/11/2017   CREATSERUM 0.94 12/29/2017   GLU 99 12/29/2017        CBC  (most recent labs)     Lab Results  Component Value Date   WBC 7.7 07/11/2017   HGB 12.0 07/11/2017   .0 07/11/201 otherwise  SKIN: denies any unusual skin lesions  EYES: denies blurred vision or double vision  HEENT: denies nasal congestion, sinus pain or ST  LUNGS: denies shortness of breath with exertion  CARDIOVASCULAR: denies chest pain on exertion, SOB, KYLE, palp abnormalities  AXILLA: no palpable lymph nodes  MUSCULOSKELETAL: no spinal tenderness, no CVA tenderness  EXTREMITIES: no cyanosis, clubbing or edema, pedal pulses present and symmetric bilaterally  R KNEE: no bruising, swelling, erythema. ROM 0-120.   Dif past six months, have you lost more than 10 pounds without trying?: 2 - No  Has your appetite been poor?: No  How does the patient maintain a good energy level?: Stretching  How would you describe your daily physical activity?: Light  How would you describ this patient. Update Health Maintenance if applicable    Chlamydia  Annually if high risk No results found for: CHLAMYDIA No flowsheet data found.     Screening Mammogram      Mammogram Annually to 78, then as discussed Mammogram,1 Yr due on 08/28/1987 [place] data found. Creat/alb ratio  Annually Malb/Cre Calc (ug/mg)   Date Value   07/11/2017 3.4        LDL  Annually LDL Cholesterol Calc (mg/dL)   Date Value   09/19/2016 64     LDL Cholesterol (mg/dL)   Date Value   10/06/2017 68    No flowsheet data found. scores of 6.3 / 0.4% thus no indication for bisphosphonate tx. # Vaccines: rec yearly flu shot, rec TdaP & shingles at pharmacy, pt states she had pneumonia vaccine at last PCP office - will request record to find out which one.   # Hep C screening: AB ord

## 2018-01-12 NOTE — PATIENT INSTRUCTIONS
Knee Pain:  - ice (10-15 minutes at a time, 3 times a day)  - meloxicam - 1 tablet daily with food as needed  - may take tylenol (acetaminophen) 1,000 mg every 8 hours as needed (do not exceed 3,000 mg daily)  - home exercises  - weight loss!  - return for

## 2018-01-16 DIAGNOSIS — E11.8 TYPE 2 DIABETES MELLITUS WITH COMPLICATION, WITHOUT LONG-TERM CURRENT USE OF INSULIN (HCC): Primary | ICD-10-CM

## 2018-01-17 ENCOUNTER — OFFICE VISIT (OUTPATIENT)
Dept: INTERNAL MEDICINE CLINIC | Facility: CLINIC | Age: 73
End: 2018-01-17

## 2018-01-17 VITALS
OXYGEN SATURATION: 95 % | TEMPERATURE: 99 F | SYSTOLIC BLOOD PRESSURE: 140 MMHG | HEIGHT: 63 IN | DIASTOLIC BLOOD PRESSURE: 70 MMHG | RESPIRATION RATE: 18 BRPM | WEIGHT: 246.5 LBS | HEART RATE: 88 BPM | BODY MASS INDEX: 43.68 KG/M2

## 2018-01-17 DIAGNOSIS — M17.11 PRIMARY OSTEOARTHRITIS OF RIGHT KNEE: Primary | ICD-10-CM

## 2018-01-17 DIAGNOSIS — E11.8 TYPE 2 DIABETES MELLITUS WITH COMPLICATION, WITHOUT LONG-TERM CURRENT USE OF INSULIN (HCC): ICD-10-CM

## 2018-01-17 PROCEDURE — 20610 DRAIN/INJ JOINT/BURSA W/O US: CPT | Performed by: PHYSICIAN ASSISTANT

## 2018-01-17 RX ORDER — INSULIN GLARGINE 100 [IU]/ML
INJECTION, SOLUTION SUBCUTANEOUS
Qty: 15 ML | Refills: 0 | Status: SHIPPED | OUTPATIENT
Start: 2018-01-17 | End: 2018-01-22

## 2018-01-17 RX ORDER — TRIAMCINOLONE ACETONIDE 40 MG/ML
40 INJECTION, SUSPENSION INTRA-ARTICULAR; INTRAMUSCULAR ONCE
Status: COMPLETED | OUTPATIENT
Start: 2018-01-17 | End: 2018-01-17

## 2018-01-17 RX ADMIN — TRIAMCINOLONE ACETONIDE 40 MG: 40 INJECTION, SUSPENSION INTRA-ARTICULAR; INTRAMUSCULAR at 10:33:00

## 2018-01-17 NOTE — PROGRESS NOTES
Emily Doherty is a 81year old female. HPI:   Patient presents for cortisone injection for R knee pain. 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 She has had pain for the past 3 weeks. Denies injury or new activity.   Sam Haro ice, tylenol/meloxicam prn, wt loss, HEP. Cortisone injection given today. # DM: uncontrolled but improving. a1c 7.7 in 12/2017.  Following with Joan Ulloa.   Monitor BS following cortisone shot.  - depression screen done 1/12/18  - foot exam done 1/1 screening: DEXA 8/2017 showed osteopenia of L spine. FRAX scores of 6.3 / 0.4% thus no indication for bisphosphonate tx.   # Vaccines: rec yearly flu shot, rec TdaP & shingles at pharmacy, pt states she had pneumonia vaccine at last PCP office - will reque

## 2018-01-20 RX ORDER — MELOXICAM 7.5 MG/1
TABLET ORAL
Qty: 30 TABLET | Refills: 0 | Status: SHIPPED | OUTPATIENT
Start: 2018-01-20 | End: 2018-03-01

## 2018-01-22 ENCOUNTER — TELEPHONE (OUTPATIENT)
Dept: ENDOCRINOLOGY CLINIC | Facility: CLINIC | Age: 73
End: 2018-01-22

## 2018-01-22 NOTE — TELEPHONE ENCOUNTER
Received fax from [facility] that insurance is not covering lantus per [facility] to switch to tresiba 70 units sending to local pharmacy called pt to let her know about the change and to call back if she has any questions.

## 2018-01-27 ENCOUNTER — TELEPHONE (OUTPATIENT)
Dept: INTERNAL MEDICINE CLINIC | Facility: CLINIC | Age: 73
End: 2018-01-27

## 2018-02-02 ENCOUNTER — TELEPHONE (OUTPATIENT)
Dept: INTERNAL MEDICINE CLINIC | Facility: CLINIC | Age: 73
End: 2018-02-02

## 2018-02-02 DIAGNOSIS — Z12.39 BREAST CANCER SCREENING: Primary | ICD-10-CM

## 2018-02-02 NOTE — TELEPHONE ENCOUNTER
Please call patient. Received [facility] records. Last one 6/2016. Due for repeat if she'd like to continue to screen yearly. [facility] ordered. She is also overdue for c-scope. Referred to Dr. David Ramirez.   Please see her ASAP or do FIT test.    Lastly, she had a p

## 2018-02-20 RX ORDER — PEN NEEDLE, DIABETIC 31 GX5/16"
NEEDLE, DISPOSABLE MISCELLANEOUS
Qty: 1 BOX | Refills: 0 | Status: SHIPPED | OUTPATIENT
Start: 2018-02-20 | End: 2018-05-04

## 2018-03-01 RX ORDER — MELOXICAM 7.5 MG/1
TABLET ORAL
Qty: 30 TABLET | Refills: 0 | Status: SHIPPED | OUTPATIENT
Start: 2018-03-01 | End: 2018-04-03

## 2018-03-07 ENCOUNTER — OFFICE VISIT (OUTPATIENT)
Dept: INTERNAL MEDICINE CLINIC | Facility: CLINIC | Age: 73
End: 2018-03-07

## 2018-03-07 ENCOUNTER — TELEPHONE (OUTPATIENT)
Dept: INTERNAL MEDICINE CLINIC | Facility: CLINIC | Age: 73
End: 2018-03-07

## 2018-03-07 VITALS
BODY MASS INDEX: 43.59 KG/M2 | HEART RATE: 72 BPM | HEIGHT: 63 IN | RESPIRATION RATE: 14 BRPM | WEIGHT: 246 LBS | TEMPERATURE: 99 F | SYSTOLIC BLOOD PRESSURE: 132 MMHG | OXYGEN SATURATION: 95 % | DIASTOLIC BLOOD PRESSURE: 74 MMHG

## 2018-03-07 DIAGNOSIS — E11.8 TYPE 2 DIABETES MELLITUS WITH COMPLICATION, WITHOUT LONG-TERM CURRENT USE OF INSULIN (HCC): ICD-10-CM

## 2018-03-07 DIAGNOSIS — E66.01 MORBID OBESITY WITH BMI OF 40.0-44.9, ADULT (HCC): ICD-10-CM

## 2018-03-07 DIAGNOSIS — M17.11 PRIMARY OSTEOARTHRITIS OF RIGHT KNEE: Primary | ICD-10-CM

## 2018-03-07 PROCEDURE — 99213 OFFICE O/P EST LOW 20 MIN: CPT | Performed by: PHYSICIAN ASSISTANT

## 2018-03-07 RX ORDER — CARVEDILOL 12.5 MG/1
25 TABLET ORAL 2 TIMES DAILY WITH MEALS
Qty: 60 TABLET | Refills: 6 | COMMUNITY
Start: 2018-03-07 | End: 2018-05-04

## 2018-03-07 RX ORDER — SACUBITRIL AND VALSARTAN 49; 51 MG/1; MG/1
1 TABLET, FILM COATED ORAL 2 TIMES DAILY
Refills: 3 | COMMUNITY
Start: 2018-02-22 | End: 2018-03-07 | Stop reason: DRUGHIGH

## 2018-03-07 RX ORDER — SACUBITRIL AND VALSARTAN 97; 103 MG/1; MG/1
1 TABLET, FILM COATED ORAL
Refills: 3 | COMMUNITY
Start: 2018-02-09 | End: 2018-05-04

## 2018-03-07 NOTE — TELEPHONE ENCOUNTER
Juan Man, please obtain PA (if necessary) and order Synvisc One injection for patient (RIGHT knee only). Please call to schedule patient for injection once medication received.   John Heredia PA-C

## 2018-03-07 NOTE — PATIENT INSTRUCTIONS
We will order the gel injection for your knee (Synvisc) and let you know when you can schedule a follow up appointment for your shot. Please do your lab work ASAP. Remember to fast for 10-12 hours but drink plenty of water.     Please see Kiko Flair

## 2018-03-07 NOTE — PROGRESS NOTES
Mitra Woo is a 81year old female. HPI:   Patient presents for f/u of R knee pain. Had a cortisone injection on 1/17/18 which provided about 75% improvement but for only two weeks.   Pain is located on the medial aspect of her knee and sometimes tylenol/meloxicam prn, wt loss, HEP. Cortisone injection 1/17/18 - good relief but for only two weeks. Will order Synvisc One injection. Again counseled on importance of weight loss. # Morbid obesity: cont to focus on lifestyle changes.   # DM: [place] Lelia Rater (daughter)     Card - sees Dr. Robson Alexander - Dr. Jayla Moses/Sleep - referred to Dr. Buddy Linares - referred to Dr. Gely Enamorado    The patient indicates understanding of these issues and agrees to the plan.   The patient is asked to retur

## 2018-03-15 ENCOUNTER — LAB ENCOUNTER (OUTPATIENT)
Dept: LAB | Age: 73
End: 2018-03-15
Attending: INTERNAL MEDICINE
Payer: MEDICARE

## 2018-03-15 ENCOUNTER — PRIOR ORIGINAL RECORDS (OUTPATIENT)
Dept: OTHER | Age: 73
End: 2018-03-15

## 2018-03-15 DIAGNOSIS — I10 ESSENTIAL HYPERTENSION, MALIGNANT: ICD-10-CM

## 2018-03-15 DIAGNOSIS — I27.20 PROGRESSIVE PULMONARY HYPERTENSION (HCC): Primary | ICD-10-CM

## 2018-03-15 DIAGNOSIS — I11.0 HYPERTENSIVE HEART DISEASE WITH HEART FAILURE (HCC): ICD-10-CM

## 2018-03-15 DIAGNOSIS — E11.8 TYPE 2 DIABETES MELLITUS WITH COMPLICATION, WITHOUT LONG-TERM CURRENT USE OF INSULIN (HCC): ICD-10-CM

## 2018-03-15 DIAGNOSIS — Z11.59 NEED FOR HEPATITIS C SCREENING TEST: ICD-10-CM

## 2018-03-15 DIAGNOSIS — E78.00 PURE HYPERCHOLESTEROLEMIA: ICD-10-CM

## 2018-03-15 DIAGNOSIS — I50.32 CHRONIC DIASTOLIC HEART FAILURE (HCC): ICD-10-CM

## 2018-03-15 LAB
ALBUMIN SERPL-MCNC: 3.6 G/DL (ref 3.5–4.8)
ALP LIVER SERPL-CCNC: 80 U/L (ref 55–142)
ALT SERPL-CCNC: 14 U/L (ref 14–54)
AST SERPL-CCNC: 9 U/L (ref 15–41)
BILIRUB SERPL-MCNC: 0.4 MG/DL (ref 0.1–2)
BUN BLD-MCNC: 45 MG/DL (ref 8–20)
BUN BLD-MCNC: 45 MG/DL (ref 8–20)
CALCIUM BLD-MCNC: 9.1 MG/DL (ref 8.3–10.3)
CALCIUM BLD-MCNC: 9.4 MG/DL (ref 8.3–10.3)
CHLORIDE: 106 MMOL/L (ref 101–111)
CHLORIDE: 106 MMOL/L (ref 101–111)
CHOLEST SMN-MCNC: 131 MG/DL (ref ?–200)
CO2: 28 MMOL/L (ref 22–32)
CO2: 29 MMOL/L (ref 22–32)
CREAT BLD-MCNC: 1.15 MG/DL (ref 0.55–1.02)
CREAT BLD-MCNC: 1.2 MG/DL (ref 0.55–1.02)
CREAT UR-SCNC: 109 MG/DL
EST. AVERAGE GLUCOSE BLD GHB EST-MCNC: 148 MG/DL (ref 68–126)
GLUCOSE BLD-MCNC: 91 MG/DL (ref 70–99)
GLUCOSE BLD-MCNC: 93 MG/DL (ref 70–99)
HBA1C MFR BLD HPLC: 6.8 % (ref ?–5.7)
HDLC SERPL-MCNC: 36 MG/DL (ref 45–?)
HDLC SERPL: 3.64 {RATIO} (ref ?–4.44)
HEPATITIS C VIRUS AB INTERPRETATION: NONREACTIVE
LDLC SERPL CALC-MCNC: 64 MG/DL (ref ?–130)
M PROTEIN MFR SERPL ELPH: 7.6 G/DL (ref 6.1–8.3)
MICROALBUMIN UR-MCNC: <0.5 MG/DL
NONHDLC SERPL-MCNC: 95 MG/DL (ref ?–130)
POTASSIUM SERPL-SCNC: 3.7 MMOL/L (ref 3.6–5.1)
POTASSIUM SERPL-SCNC: 3.9 MMOL/L (ref 3.6–5.1)
SODIUM SERPL-SCNC: 141 MMOL/L (ref 136–144)
SODIUM SERPL-SCNC: 141 MMOL/L (ref 136–144)
TRIGL SERPL-MCNC: 154 MG/DL (ref ?–150)
VLDLC SERPL CALC-MCNC: 31 MG/DL (ref 5–40)

## 2018-03-15 PROCEDURE — 83036 HEMOGLOBIN GLYCOSYLATED A1C: CPT

## 2018-03-15 PROCEDURE — 82570 ASSAY OF URINE CREATININE: CPT

## 2018-03-15 PROCEDURE — 86803 HEPATITIS C AB TEST: CPT

## 2018-03-15 PROCEDURE — 36415 COLL VENOUS BLD VENIPUNCTURE: CPT

## 2018-03-15 PROCEDURE — 80053 COMPREHEN METABOLIC PANEL: CPT

## 2018-03-15 PROCEDURE — 82043 UR ALBUMIN QUANTITATIVE: CPT

## 2018-03-15 PROCEDURE — 80061 LIPID PANEL: CPT

## 2018-03-19 LAB
BUN: 45 MG/DL
CALCIUM: 9.4 MG/DL
CHLORIDE: 106 MEQ/L
CREATININE, SERUM: 1.2 MG/DL
GLUCOSE: 93 MG/DL
POTASSIUM, SERUM: 3.9 MEQ/L
SODIUM: 141 MEQ/L

## 2018-03-20 ENCOUNTER — MED REC SCAN ONLY (OUTPATIENT)
Dept: ENDOCRINOLOGY CLINIC | Facility: CLINIC | Age: 73
End: 2018-03-20

## 2018-03-20 NOTE — TELEPHONE ENCOUNTER
Please call pt to schedule Synvisc injection - please route encounter to [place] once completed. Thank you.

## 2018-03-21 ENCOUNTER — OFFICE VISIT (OUTPATIENT)
Dept: INTERNAL MEDICINE CLINIC | Facility: CLINIC | Age: 73
End: 2018-03-21

## 2018-03-21 VITALS
BODY MASS INDEX: 43.94 KG/M2 | TEMPERATURE: 98 F | WEIGHT: 248 LBS | DIASTOLIC BLOOD PRESSURE: 60 MMHG | OXYGEN SATURATION: 97 % | HEART RATE: 70 BPM | HEIGHT: 63 IN | SYSTOLIC BLOOD PRESSURE: 120 MMHG | RESPIRATION RATE: 20 BRPM

## 2018-03-21 DIAGNOSIS — M17.11 PRIMARY OSTEOARTHRITIS OF RIGHT KNEE: Primary | ICD-10-CM

## 2018-03-21 PROCEDURE — 20610 DRAIN/INJ JOINT/BURSA W/O US: CPT | Performed by: PHYSICIAN ASSISTANT

## 2018-03-21 NOTE — PATIENT INSTRUCTIONS
Hyaluronic Acid (“Gel”) Knee Injections  Instructions Following Injections    Hyaluronic Acid Injections (Synvisc)  Synvisc is a viscosupplement injection. It is made from a natural substance which works to lubricate and provide cushion in your joint.   Laurel Paulson

## 2018-03-21 NOTE — PROGRESS NOTES
Greta Ding is a 81year old female. HPI:   Patient presents for Synvisc One injection to her R knee. Longstanding hx of R knee pain. s/p L TKA.   Had a cortisone injection to her R knee on 1/17/18 which provided about 75% improvement but for onl loss, HEP.  Cortisone injection 1/17/18 - good relief but for only two weeks. Will order Synvisc One injection. Again counseled on importance of weight loss. Issues not discussed at today's visit:  # Morbid obesity: cont to focus on lifestyle changes. Abdullahi Moses/Sleep - referred to Dr. Ciro Guido - referred to Dr. Suman Howard    Procedure Note (RIGHT knee SYNVISC ONE injection):     Indications, alternative therapies, risks and benefits of arthrocentesis and joint/soft-tissue injection were d

## 2018-03-28 ENCOUNTER — TELEPHONE (OUTPATIENT)
Dept: INTERNAL MEDICINE CLINIC | Facility: CLINIC | Age: 73
End: 2018-03-28

## 2018-03-28 NOTE — TELEPHONE ENCOUNTER
Pt reports since right injection on 3/21 she has been unable to  put any pressure/ weight on leg d/t pain. When sitting pt reports no pain. No c/o redness or swelling.

## 2018-04-03 RX ORDER — INSULIN DEGLUDEC 200 U/ML
INJECTION, SOLUTION SUBCUTANEOUS
Refills: 0 | Status: CANCELLED | OUTPATIENT
Start: 2018-04-03

## 2018-04-03 RX ORDER — MELOXICAM 7.5 MG/1
TABLET ORAL
Qty: 30 TABLET | Refills: 0 | Status: SHIPPED | OUTPATIENT
Start: 2018-04-03 | End: 2018-05-02

## 2018-04-05 ENCOUNTER — LAB ENCOUNTER (OUTPATIENT)
Dept: LAB | Age: 73
End: 2018-04-05
Attending: INTERNAL MEDICINE
Payer: MEDICARE

## 2018-04-05 ENCOUNTER — PRIOR ORIGINAL RECORDS (OUTPATIENT)
Dept: OTHER | Age: 73
End: 2018-04-05

## 2018-04-05 DIAGNOSIS — I50.42 CHRONIC COMBINED SYSTOLIC AND DIASTOLIC HEART FAILURE (HCC): ICD-10-CM

## 2018-04-05 DIAGNOSIS — I27.20 PROGRESSIVE PULMONARY HYPERTENSION (HCC): ICD-10-CM

## 2018-04-05 DIAGNOSIS — I50.32 CHRONIC DIASTOLIC HEART FAILURE (HCC): Primary | ICD-10-CM

## 2018-04-05 DIAGNOSIS — I10 ESSENTIAL HYPERTENSION, MALIGNANT: ICD-10-CM

## 2018-04-05 DIAGNOSIS — E11.9 DIABETES MELLITUS (HCC): ICD-10-CM

## 2018-04-05 PROCEDURE — 83880 ASSAY OF NATRIURETIC PEPTIDE: CPT

## 2018-04-05 PROCEDURE — 36415 COLL VENOUS BLD VENIPUNCTURE: CPT

## 2018-04-05 PROCEDURE — 80048 BASIC METABOLIC PNL TOTAL CA: CPT

## 2018-04-06 LAB
BNP: 60 PMOL/L
BUN: 24 MG/DL
CALCIUM: 9 MG/DL
CHLORIDE: 107 MEQ/L
CREATININE, SERUM: 0.83 MG/DL
GLUCOSE: 80 MG/DL
POTASSIUM, SERUM: 3.7 MEQ/L
SODIUM: 142 MEQ/L

## 2018-04-09 DIAGNOSIS — E11.8 TYPE 2 DIABETES MELLITUS WITH COMPLICATION, WITHOUT LONG-TERM CURRENT USE OF INSULIN (HCC): Primary | ICD-10-CM

## 2018-04-09 NOTE — TELEPHONE ENCOUNTER
Medication(s) to Refill:   Pending Prescriptions Disp Refills    Insulin Degludec (TRESIBA FLEXTOUCH) 200 UNIT/ML Subcutaneous Solution Pen-injector 12 mL 2     Sig: Inject 70 Units into the skin daily.              Reason for Medication Refill being sent t

## 2018-04-10 ENCOUNTER — PRIOR ORIGINAL RECORDS (OUTPATIENT)
Dept: OTHER | Age: 73
End: 2018-04-10

## 2018-04-19 ENCOUNTER — LAB ENCOUNTER (OUTPATIENT)
Dept: LAB | Age: 73
End: 2018-04-19
Attending: INTERNAL MEDICINE
Payer: MEDICARE

## 2018-04-19 ENCOUNTER — PRIOR ORIGINAL RECORDS (OUTPATIENT)
Dept: OTHER | Age: 73
End: 2018-04-19

## 2018-04-19 DIAGNOSIS — I50.9 HEART FAILURE (HCC): Primary | ICD-10-CM

## 2018-04-19 DIAGNOSIS — I10 ESSENTIAL HYPERTENSION, MALIGNANT: ICD-10-CM

## 2018-04-19 PROCEDURE — 80048 BASIC METABOLIC PNL TOTAL CA: CPT

## 2018-04-19 PROCEDURE — 36415 COLL VENOUS BLD VENIPUNCTURE: CPT

## 2018-04-19 PROCEDURE — 83880 ASSAY OF NATRIURETIC PEPTIDE: CPT

## 2018-04-25 ENCOUNTER — PRIOR ORIGINAL RECORDS (OUTPATIENT)
Dept: OTHER | Age: 73
End: 2018-04-25

## 2018-04-30 ENCOUNTER — OFFICE VISIT (OUTPATIENT)
Dept: SURGERY | Facility: CLINIC | Age: 73
End: 2018-04-30

## 2018-04-30 VITALS
HEIGHT: 63 IN | HEART RATE: 75 BPM | SYSTOLIC BLOOD PRESSURE: 134 MMHG | DIASTOLIC BLOOD PRESSURE: 74 MMHG | WEIGHT: 156 LBS | BODY MASS INDEX: 27.64 KG/M2 | TEMPERATURE: 98 F

## 2018-04-30 DIAGNOSIS — Z12.11 ENCOUNTER FOR SCREENING COLONOSCOPY: Primary | ICD-10-CM

## 2018-04-30 RX ORDER — POLYETHYLENE GLYCOL 3350, SODIUM CHLORIDE, SODIUM BICARBONATE, POTASSIUM CHLORIDE 420; 11.2; 5.72; 1.48 G/4L; G/4L; G/4L; G/4L
POWDER, FOR SOLUTION ORAL
Qty: 1 BOTTLE | Refills: 0 | Status: SHIPPED | OUTPATIENT
Start: 2018-04-30 | End: 2018-05-04

## 2018-04-30 NOTE — H&P
New Patient Visit Note       Active Problems      1.  Encounter for screening colonoscopy        Chief Complaint   Patient presents with:  Colonoscopy: NEW PT, REFERRED BY DARIELA MOSQUERA - COLONOSCOPY CONSULT- has had in past      History of Present Illness No            Other Topics            Concern  Caffeine Concern        Yes  Exercise                No  Special Diet            No         Current Outpatient Prescriptions:  PEG 3350-KCl-Na Bicarb-NaCl (TRILYTE) 420 g Oral Recon Soln Starting at 4:00 pm th Cardiovascular: Negative for chest pain, palpitations and leg swelling. Gastrointestinal: Negative for abdominal distention, abdominal pain, anal bleeding, blood in stool, constipation, diarrhea, nausea and vomiting.    Genitourinary: Negative for diffi Skin: Skin is warm and dry. Psychiatric: She has a normal mood and affect. Her speech is normal and behavior is normal. Judgment and thought content normal.   Nursing note and vitals reviewed.           Assessment   Encounter for screening colonoscopy

## 2018-05-03 RX ORDER — MELOXICAM 7.5 MG/1
TABLET ORAL
Qty: 30 TABLET | Refills: 0 | Status: SHIPPED | OUTPATIENT
Start: 2018-05-03 | End: 2018-05-29

## 2018-05-04 ENCOUNTER — OFFICE VISIT (OUTPATIENT)
Dept: ENDOCRINOLOGY CLINIC | Facility: CLINIC | Age: 73
End: 2018-05-04

## 2018-05-04 VITALS
SYSTOLIC BLOOD PRESSURE: 98 MMHG | BODY MASS INDEX: 44.3 KG/M2 | HEART RATE: 72 BPM | HEIGHT: 63 IN | DIASTOLIC BLOOD PRESSURE: 60 MMHG | TEMPERATURE: 98 F | WEIGHT: 250 LBS | RESPIRATION RATE: 18 BRPM

## 2018-05-04 DIAGNOSIS — E11.8 TYPE 2 DIABETES MELLITUS WITH COMPLICATION, WITHOUT LONG-TERM CURRENT USE OF INSULIN (HCC): Primary | ICD-10-CM

## 2018-05-04 DIAGNOSIS — Z23 NEED FOR PNEUMOCOCCAL VACCINATION: ICD-10-CM

## 2018-05-04 PROCEDURE — G0009 ADMIN PNEUMOCOCCAL VACCINE: HCPCS | Performed by: NURSE PRACTITIONER

## 2018-05-04 PROCEDURE — 99213 OFFICE O/P EST LOW 20 MIN: CPT | Performed by: NURSE PRACTITIONER

## 2018-05-04 PROCEDURE — 90670 PCV13 VACCINE IM: CPT | Performed by: NURSE PRACTITIONER

## 2018-05-04 RX ORDER — CARVEDILOL 25 MG/1
TABLET ORAL
Refills: 3 | COMMUNITY
Start: 2018-03-24 | End: 2018-05-29

## 2018-05-04 RX ORDER — HYDRALAZINE HYDROCHLORIDE 50 MG/1
TABLET, FILM COATED ORAL
COMMUNITY
Start: 2018-04-04 | End: 2018-05-24

## 2018-05-04 RX ORDER — LOSARTAN POTASSIUM AND HYDROCHLOROTHIAZIDE 25; 100 MG/1; MG/1
TABLET ORAL
COMMUNITY
Start: 2018-04-03 | End: 2018-05-24

## 2018-05-04 RX ORDER — SACUBITRIL AND VALSARTAN 49; 51 MG/1; MG/1
TABLET, FILM COATED ORAL
Refills: 3 | COMMUNITY
Start: 2018-03-23 | End: 2018-05-24

## 2018-05-04 NOTE — PATIENT INSTRUCTIONS
Stop insulin completely   Start Xultophy 16 units every day every 4 days increase by 3 units until am readings under 130 mg/dl   Test sugar 2 times/ day   Return in 5 weeks with readings

## 2018-05-04 NOTE — PROGRESS NOTES
CC: Patient presents with:  Diabetes: follow up. pt forgot book with readings.       HISTORY:  Past Medical History:   Diagnosis Date   • Congestive heart disease ([address]    • Diabetes ([address]    • Essential hypertension    • Hyperlipidemia    • Obesity    • Sl cough, chest tightness,  and wheezing. + shortness of breath with exertion. Cardiovascular: Negative for chest pain, palpitations and leg swelling. ASA 81 mg  daily.   Gastrointestinal: Negative for nausea, vomiting, abdominal pain, diarrhea and abdominal Breastfeeding? No   BMI 44.29 kg/m²   Constitutional: Oriented to person, place, and time. No distress. HEENT: Normocephalic and atraumatic. .   Eyes: Conjunctivae are normal.   Neck: Normal range of motion. Neck supple. Normal carotid pulses.    Cardiov file.    Pt verbalized understanding and has no further questions at this time. Greater than 50% of visit spent on education and counseling.     Sofie SY,CDE

## 2018-05-08 ENCOUNTER — MYAURORA ACCOUNT LINK (OUTPATIENT)
Dept: OTHER | Age: 73
End: 2018-05-08

## 2018-05-08 ENCOUNTER — HOSPITAL ENCOUNTER (OUTPATIENT)
Dept: CV DIAGNOSTICS | Facility: HOSPITAL | Age: 73
Discharge: HOME | End: 2018-05-08
Attending: INTERNAL MEDICINE

## 2018-05-08 DIAGNOSIS — I10 HYPERTENSION, UNSPECIFIED TYPE: ICD-10-CM

## 2018-05-08 DIAGNOSIS — I50.32 HEART FAILURE, DIASTOLIC, CHRONIC (HCC): ICD-10-CM

## 2018-05-14 LAB
BNP: 27 PMOL/L
BUN: 33 MG/DL
CALCIUM: 9 MG/DL
CHLORIDE: 103 MEQ/L
CREATININE, SERUM: 1.19 MG/DL
GLUCOSE: 125 MG/DL
POTASSIUM, SERUM: 3.3 MEQ/L
SODIUM: 141 MEQ/L

## 2018-05-22 ENCOUNTER — TELEPHONE (OUTPATIENT)
Dept: INTERNAL MEDICINE CLINIC | Facility: CLINIC | Age: 73
End: 2018-05-22

## 2018-05-22 DIAGNOSIS — E11.8 TYPE 2 DIABETES MELLITUS WITH COMPLICATION, WITHOUT LONG-TERM CURRENT USE OF INSULIN (HCC): ICD-10-CM

## 2018-05-22 NOTE — TELEPHONE ENCOUNTER
Patient needs a new script because she was given samples by [facility] for the Insulin Degludec-Liraglutide (XULTOPHY) 100-3.6 UNIT-MG/ML Subcutaneous Solution Pen-injector. Please sent to [facility].

## 2018-05-23 ENCOUNTER — PRIOR ORIGINAL RECORDS (OUTPATIENT)
Dept: OTHER | Age: 73
End: 2018-05-23

## 2018-05-24 RX ORDER — HYDRALAZINE HYDROCHLORIDE 100 MG/1
100 TABLET, FILM COATED ORAL 2 TIMES DAILY
Refills: 0 | COMMUNITY
Start: 2018-05-24 | End: 2019-03-26

## 2018-05-24 RX ORDER — MELOXICAM 7.5 MG/1
7.5 TABLET ORAL DAILY
Qty: 90 TABLET | Refills: 0 | OUTPATIENT
Start: 2018-05-24

## 2018-05-24 RX ORDER — LISINOPRIL 20 MG/1
20 TABLET ORAL 2 TIMES DAILY
Qty: 180 TABLET | Refills: 0 | OUTPATIENT
Start: 2018-05-24

## 2018-05-24 NOTE — TELEPHONE ENCOUNTER
Spoke to pt and reconciled medications as follows:    Hydralazine 100 mg daily  Lisinopril 20 mg daily **  Meloxicam 7.5 mg daily  Metformin 1000 bid  Entresto  mg bid  Carvedilol 25 bid  Losartan HCTZ 100-25 qd **  Torsemide 20 mg bid  Atorvastatin

## 2018-05-24 NOTE — TELEPHONE ENCOUNTER
Please call patient and review her meds. The med list in [facility] is up to date per my records and her visit with cardiology (Dr. Tammy Parisi) on 4/10/18. She should NOT be on lisinopril unless Dr. Tammy Parisi changed anything after 4/10.   Rec holding meloxicam and try

## 2018-05-29 ENCOUNTER — TELEPHONE (OUTPATIENT)
Dept: INTERNAL MEDICINE CLINIC | Facility: CLINIC | Age: 73
End: 2018-05-29

## 2018-05-29 ENCOUNTER — OFFICE VISIT (OUTPATIENT)
Dept: INTERNAL MEDICINE CLINIC | Facility: CLINIC | Age: 73
End: 2018-05-29

## 2018-05-29 VITALS
WEIGHT: 244 LBS | BODY MASS INDEX: 43 KG/M2 | RESPIRATION RATE: 16 BRPM | HEART RATE: 94 BPM | DIASTOLIC BLOOD PRESSURE: 70 MMHG | OXYGEN SATURATION: 99 % | SYSTOLIC BLOOD PRESSURE: 140 MMHG

## 2018-05-29 DIAGNOSIS — I11.0 HYPERTENSIVE HEART DISEASE WITH HEART FAILURE (HCC): Primary | ICD-10-CM

## 2018-05-29 DIAGNOSIS — E11.8 TYPE 2 DIABETES MELLITUS WITH COMPLICATION, WITHOUT LONG-TERM CURRENT USE OF INSULIN (HCC): ICD-10-CM

## 2018-05-29 DIAGNOSIS — I42.8 NON-ISCHEMIC CARDIOMYOPATHY (HCC): ICD-10-CM

## 2018-05-29 PROCEDURE — 99213 OFFICE O/P EST LOW 20 MIN: CPT | Performed by: INTERNAL MEDICINE

## 2018-05-29 RX ORDER — BUPROPION HYDROCHLORIDE 150 MG/1
150 TABLET ORAL DAILY
Qty: 90 TABLET | Refills: 1 | Status: SHIPPED | OUTPATIENT
Start: 2018-05-29 | End: 2019-04-13 | Stop reason: ALTCHOICE

## 2018-05-29 RX ORDER — CARVEDILOL 25 MG/1
TABLET ORAL
Qty: 180 TABLET | Refills: 1 | Status: SHIPPED | OUTPATIENT
Start: 2018-05-29 | End: 2018-11-21

## 2018-05-29 RX ORDER — MELOXICAM 7.5 MG/1
TABLET ORAL
Qty: 90 TABLET | Refills: 0 | Status: SHIPPED | OUTPATIENT
Start: 2018-05-29 | End: 2018-08-25

## 2018-05-29 RX ORDER — LOSARTAN POTASSIUM AND HYDROCHLOROTHIAZIDE 25; 100 MG/1; MG/1
1 TABLET ORAL DAILY
COMMUNITY
End: 2018-07-23

## 2018-05-29 NOTE — PROGRESS NOTES
Surinder Machuca  1945 is a 81year old female. Patient presents with: Follow - Up: med       HPI:   Patient see me for the first time.   Has been seeing [facility] in the past.  Here for med check  sees Dr. Belinda Murrieta from cardiology  [facility] Packs/day: 0.25      Years: 20.00        Quit date: 11/16/2016  Smokeless tobacco: Former User                        Quit date: 11/16/2016  Alcohol use: No                 REVIEW OF SYSTEMS:   Cardiovascular:   Syncope none. Rapid heart beat at rest no.  C MD

## 2018-05-29 NOTE — TELEPHONE ENCOUNTER
Please let patient know she needs to get in touch with the doctor Moses ADKINS figure out what that she should be taking the entresto or losartan/HCTZ  Prescriptions show the name of Dr. Moses Quevedo

## 2018-05-29 NOTE — PATIENT INSTRUCTIONS
Pt  to check with Dr. Rachel Mcneill about taking 2  arbs  Will need to review her entire record update her problem list and subsequently see me

## 2018-06-01 ENCOUNTER — MED REC SCAN ONLY (OUTPATIENT)
Dept: INTERNAL MEDICINE CLINIC | Facility: CLINIC | Age: 73
End: 2018-06-01

## 2018-06-05 RX ORDER — MELOXICAM 7.5 MG/1
TABLET ORAL
Qty: 30 TABLET | Refills: 0 | OUTPATIENT
Start: 2018-06-05

## 2018-06-09 ENCOUNTER — OFFICE VISIT (OUTPATIENT)
Dept: ENDOCRINOLOGY CLINIC | Facility: CLINIC | Age: 73
End: 2018-06-09

## 2018-06-09 VITALS
HEART RATE: 86 BPM | RESPIRATION RATE: 18 BRPM | HEIGHT: 63 IN | WEIGHT: 243 LBS | SYSTOLIC BLOOD PRESSURE: 110 MMHG | BODY MASS INDEX: 43.05 KG/M2 | DIASTOLIC BLOOD PRESSURE: 62 MMHG | TEMPERATURE: 98 F

## 2018-06-09 DIAGNOSIS — E11.8 TYPE 2 DIABETES MELLITUS WITH COMPLICATION, WITHOUT LONG-TERM CURRENT USE OF INSULIN (HCC): ICD-10-CM

## 2018-06-09 PROCEDURE — 99213 OFFICE O/P EST LOW 20 MIN: CPT | Performed by: NURSE PRACTITIONER

## 2018-06-09 PROCEDURE — 83036 HEMOGLOBIN GLYCOSYLATED A1C: CPT | Performed by: NURSE PRACTITIONER

## 2018-06-09 NOTE — PROGRESS NOTES
CC: Patient presents with:  Diabetes: follow up. pt forgot meter.       HISTORY:  Past Medical History:   Diagnosis Date   • Congestive heart disease ([address]    • Diabetes ([address]    • Essential hypertension    • Hyperlipidemia    • Obesity    • Sleep apnea distress. Eyes: Negative for visual disturbance. Last eye exam 9/15/17  with SANJAY Verduzco   Respiratory: Negative for cough, chest tightness,  and wheezing. + shortness of breath with exertion.    Cardiovascular: Negative for chest pain, palpitations and le Oral Tab EC, Take 81 mg by mouth daily. , Disp: , Rfl: 0  •  torsemide 20 MG Oral Tab, Take 1 tablet (20 mg total) by mouth daily. , Disp: 30 tablet, Rfl: 6  •  albuterol sulfate (2.5 MG/3ML) 0.083% Inhalation Nebu Soln, VVN TID, Disp: , Rfl: 2    Exam:  BP Flu vaccine: up to date   Pneumonia vaccine: up to date   Depression screen: up to date     Check glucoses 3 times daily - fasting, premeals and/or bedtime. Call/fax/email glucose logs in 6 months.     Return in about 6 months (around 12/9/2018) for diab

## 2018-07-09 NOTE — TELEPHONE ENCOUNTER
Medication(s) to Refill:   Pending Prescriptions Disp Refills    BUPROPION HCL ER, XL, 150 MG Oral Tablet 24 Hr [Pharmacy Med Name: BUPROPION XL 150MG TABLETS (24 H)] 90 tablet 0     Sig: TAKE 1 TABLET(150 MG) BY MOUTH DAILY           Last Time Medication

## 2018-07-10 RX ORDER — BUPROPION HYDROCHLORIDE 150 MG/1
TABLET ORAL
Qty: 90 TABLET | Refills: 0 | OUTPATIENT
Start: 2018-07-10

## 2018-07-23 ENCOUNTER — OFFICE VISIT (OUTPATIENT)
Dept: INTERNAL MEDICINE CLINIC | Facility: CLINIC | Age: 73
End: 2018-07-23
Payer: MEDICARE

## 2018-07-23 VITALS
DIASTOLIC BLOOD PRESSURE: 80 MMHG | HEART RATE: 80 BPM | WEIGHT: 242.25 LBS | RESPIRATION RATE: 24 BRPM | SYSTOLIC BLOOD PRESSURE: 138 MMHG | BODY MASS INDEX: 43 KG/M2 | TEMPERATURE: 98 F

## 2018-07-23 DIAGNOSIS — M17.11 PRIMARY OSTEOARTHRITIS OF RIGHT KNEE: Primary | ICD-10-CM

## 2018-07-23 PROCEDURE — 99213 OFFICE O/P EST LOW 20 MIN: CPT | Performed by: INTERNAL MEDICINE

## 2018-07-23 NOTE — PROGRESS NOTES
Ruiz Waldrop  1945 is a 81year old female.     Patient presents with:  Knee Pain      HPI:    Knee:   Presents with c/o Pain chronic long-standing no relief with medicines  Swelling neg  Locking and catching neg    Giving way sensation yes  Inj Quit date: 11/16/2016  Alcohol use: No                 REVIEW OF SYSTEMS:     General/Constitutional:   Chills no. Fatigue no. Fever no. Night sweats no. Skin rash no. Weakness no.            EXAM:   /80   Pulse 80   Temp 98.2 °F (36.8 °C) (Oral)   R

## 2018-08-07 RX ORDER — ALBUTEROL SULFATE 2.5 MG/3ML
SOLUTION RESPIRATORY (INHALATION)
Qty: 300 ML | Refills: 0 | Status: SHIPPED | OUTPATIENT
Start: 2018-08-07 | End: 2018-12-18

## 2018-08-07 NOTE — TELEPHONE ENCOUNTER
Protocol failed     Medication(s) to Refill:   Pending Prescriptions Disp Refills    ALBUTEROL SULFATE (2.5 MG/3ML) 0.083% Inhalation Nebu Soln [Pharmacy Med Name: ALBUTEROL 0.083%(2.5MG/3ML) 25X3ML] 300 mL 0     Sig: USE 1 VIAL VIA NEBULIZER THREE TIMES D

## 2018-08-10 DIAGNOSIS — E11.8 TYPE 2 DIABETES MELLITUS WITH COMPLICATION, WITHOUT LONG-TERM CURRENT USE OF INSULIN (HCC): ICD-10-CM

## 2018-08-10 RX ORDER — (INSULIN DEGLUDEC AND LIRAGLUTIDE) 100; 3.6 [IU]/ML; MG/ML
INJECTION, SOLUTION SUBCUTANEOUS
Qty: 9 ML | Refills: 0 | Status: SHIPPED | OUTPATIENT
Start: 2018-08-10 | End: 2018-09-12

## 2018-08-10 NOTE — TELEPHONE ENCOUNTER
Future Appointments  Date Time Provider Marline Vidal   12/3/2018 9:00 AM Rosalinda Valenzuela MD [facility]     sched w/AD in December as [facility] said to return in 6 months

## 2018-08-22 ENCOUNTER — MYAURORA ACCOUNT LINK (OUTPATIENT)
Dept: OTHER | Age: 73
End: 2018-08-22

## 2018-08-22 ENCOUNTER — PRIOR ORIGINAL RECORDS (OUTPATIENT)
Dept: OTHER | Age: 73
End: 2018-08-22

## 2018-08-24 ENCOUNTER — PRIOR ORIGINAL RECORDS (OUTPATIENT)
Dept: OTHER | Age: 73
End: 2018-08-24

## 2018-08-27 RX ORDER — MELOXICAM 7.5 MG/1
7.5 TABLET ORAL DAILY
Qty: 90 TABLET | Refills: 0 | Status: SHIPPED | OUTPATIENT
Start: 2018-08-27 | End: 2018-10-26

## 2018-08-27 NOTE — TELEPHONE ENCOUNTER
Refill requested: Meloxicam 7.5 mg     Failed protocol    Last refill: 5/29/18 #90 NR    Relevant Labs: NA  Last OV / RTC advised: 7/23/18 VM RTC 3 months     Appt Scheduled: No  Your appointments     Date & Time Appointment Department [facility])    Dec 03,

## 2018-08-29 ENCOUNTER — PRIOR ORIGINAL RECORDS (OUTPATIENT)
Dept: OTHER | Age: 73
End: 2018-08-29

## 2018-09-11 ENCOUNTER — LAB ENCOUNTER (OUTPATIENT)
Dept: LAB | Age: 73
End: 2018-09-11
Attending: INTERNAL MEDICINE
Payer: MEDICARE

## 2018-09-11 ENCOUNTER — PRIOR ORIGINAL RECORDS (OUTPATIENT)
Dept: OTHER | Age: 73
End: 2018-09-11

## 2018-09-11 DIAGNOSIS — E66.9 OBESITY, UNSPECIFIED: ICD-10-CM

## 2018-09-11 DIAGNOSIS — I10 ESSENTIAL HYPERTENSION, MALIGNANT: Primary | ICD-10-CM

## 2018-09-11 DIAGNOSIS — E78.00 PURE HYPERCHOLESTEROLEMIA: ICD-10-CM

## 2018-09-11 LAB
ANION GAP SERPL CALC-SCNC: 8 MMOL/L (ref 0–18)
BUN BLD-MCNC: 20 MG/DL (ref 8–20)
BUN/CREAT SERPL: 19.8 (ref 10–20)
CALCIUM BLD-MCNC: 7.4 MG/DL (ref 8.3–10.3)
CHLORIDE SERPL-SCNC: 103 MMOL/L (ref 101–111)
CO2 SERPL-SCNC: 28 MMOL/L (ref 22–32)
CREAT BLD-MCNC: 1.01 MG/DL (ref 0.55–1.02)
GLUCOSE BLD-MCNC: 62 MG/DL (ref 70–99)
OSMOLALITY SERPL CALC.SUM OF ELEC: 289 MOSM/KG (ref 275–295)
POTASSIUM SERPL-SCNC: 3.2 MMOL/L (ref 3.6–5.1)
SODIUM SERPL-SCNC: 139 MMOL/L (ref 136–144)

## 2018-09-11 PROCEDURE — 80048 BASIC METABOLIC PNL TOTAL CA: CPT

## 2018-09-11 PROCEDURE — 36415 COLL VENOUS BLD VENIPUNCTURE: CPT

## 2018-09-12 ENCOUNTER — PRIOR ORIGINAL RECORDS (OUTPATIENT)
Dept: OTHER | Age: 73
End: 2018-09-12

## 2018-09-12 DIAGNOSIS — E11.8 TYPE 2 DIABETES MELLITUS WITH COMPLICATION, WITHOUT LONG-TERM CURRENT USE OF INSULIN (HCC): ICD-10-CM

## 2018-09-12 LAB
BUN: 20 MG/DL
CALCIUM: 7.4 MG/DL
CHLORIDE: 103 MEQ/L
CREATININE, SERUM: 1.01 MG/DL
GLUCOSE: 62 MG/DL
POTASSIUM, SERUM: 3.2 MEQ/L
SODIUM: 139 MEQ/L

## 2018-09-12 RX ORDER — (INSULIN DEGLUDEC AND LIRAGLUTIDE) 100; 3.6 [IU]/ML; MG/ML
INJECTION, SOLUTION SUBCUTANEOUS
Qty: 9 ML | Refills: 0 | Status: SHIPPED | OUTPATIENT
Start: 2018-09-12 | End: 2018-10-11

## 2018-09-13 ENCOUNTER — OFFICE VISIT (OUTPATIENT)
Dept: INTERNAL MEDICINE CLINIC | Facility: CLINIC | Age: 73
End: 2018-09-13
Payer: MEDICARE

## 2018-09-13 VITALS
TEMPERATURE: 98 F | HEIGHT: 63 IN | HEART RATE: 93 BPM | WEIGHT: 243.5 LBS | BODY MASS INDEX: 43.14 KG/M2 | DIASTOLIC BLOOD PRESSURE: 78 MMHG | SYSTOLIC BLOOD PRESSURE: 130 MMHG | RESPIRATION RATE: 24 BRPM | OXYGEN SATURATION: 91 %

## 2018-09-13 DIAGNOSIS — E11.8 TYPE 2 DIABETES MELLITUS WITH COMPLICATION, WITHOUT LONG-TERM CURRENT USE OF INSULIN (HCC): ICD-10-CM

## 2018-09-13 DIAGNOSIS — L30.9 DERMATITIS: ICD-10-CM

## 2018-09-13 DIAGNOSIS — I11.0 HYPERTENSIVE HEART DISEASE WITH HEART FAILURE (HCC): Primary | ICD-10-CM

## 2018-09-13 PROBLEM — H26.9 CATARACTS, BILATERAL: Status: RESOLVED | Noted: 2018-01-12 | Resolved: 2018-09-13

## 2018-09-13 PROBLEM — M17.11 PRIMARY OSTEOARTHRITIS OF RIGHT KNEE: Status: RESOLVED | Noted: 2018-03-07 | Resolved: 2018-09-13

## 2018-09-13 PROBLEM — Z72.0 TOBACCO ABUSE: Chronic | Status: ACTIVE | Noted: 2018-01-12

## 2018-09-13 PROBLEM — M85.80 OSTEOPENIA: Status: RESOLVED | Noted: 2018-01-12 | Resolved: 2018-09-13

## 2018-09-13 PROBLEM — Z99.89 OSA ON CPAP: Chronic | Status: ACTIVE | Noted: 2017-06-20

## 2018-09-13 PROBLEM — E11.9 TYPE 2 DIABETES MELLITUS (HCC): Chronic | Status: ACTIVE | Noted: 2017-06-20

## 2018-09-13 PROBLEM — I42.8 NON-ISCHEMIC CARDIOMYOPATHY (HCC): Chronic | Status: ACTIVE | Noted: 2018-01-12

## 2018-09-13 PROBLEM — E78.5 HYPERLIPIDEMIA: Chronic | Status: ACTIVE | Noted: 2017-06-20

## 2018-09-13 PROBLEM — G47.33 OSA ON CPAP: Chronic | Status: ACTIVE | Noted: 2017-06-20

## 2018-09-13 PROCEDURE — 99213 OFFICE O/P EST LOW 20 MIN: CPT | Performed by: INTERNAL MEDICINE

## 2018-09-13 RX ORDER — HYDROCODONE BITARTRATE AND ACETAMINOPHEN 5; 325 MG/1; MG/1
1 TABLET ORAL EVERY 4 HOURS PRN
Refills: 0 | COMMUNITY
Start: 2018-08-13 | End: 2019-03-14 | Stop reason: ALTCHOICE

## 2018-09-13 RX ORDER — MOMETASONE FUROATE 1 MG/G
1 CREAM TOPICAL 2 TIMES DAILY PRN
Qty: 50 G | Refills: 3 | Status: SHIPPED | OUTPATIENT
Start: 2018-09-13 | End: 2018-10-13

## 2018-09-13 NOTE — PROGRESS NOTES
Antonio Samaniego  1945 is a 72year old female. Patient presents with: Follow - Up       HPI:   t.   Here for med check  Rash on right shin    Current Outpatient Medications:  Insulin Degludec-Liraglutide (XULTOPHY) 100-3.6 UNIT-MG/ML Subcutaneo History    Tobacco Use      Smoking status: Former Smoker        Packs/day: 0.25        Years: 20.00        Pack years: 5        Quit date: 2016        Years since quittin.9      Smokeless tobacco: Former User        Quit date: 2016    Alco needed. Patient Instructions   See me after blood tests       The patient indicates understanding of these issues and agrees to the plan. The patient is asked to Return in about 4 weeks (around 10/11/2018) for result discussion. Maty Vincent,

## 2018-09-17 ENCOUNTER — APPOINTMENT (OUTPATIENT)
Dept: LAB | Age: 73
End: 2018-09-17
Attending: INTERNAL MEDICINE
Payer: MEDICARE

## 2018-09-17 DIAGNOSIS — I11.0 HYPERTENSIVE HEART DISEASE WITH HEART FAILURE (HCC): ICD-10-CM

## 2018-09-17 DIAGNOSIS — E11.8 TYPE 2 DIABETES MELLITUS WITH COMPLICATION, WITHOUT LONG-TERM CURRENT USE OF INSULIN (HCC): ICD-10-CM

## 2018-09-17 LAB
ALBUMIN SERPL-MCNC: 3.4 G/DL (ref 3.5–4.8)
ALBUMIN/GLOB SERPL: 0.8 {RATIO} (ref 1–2)
ALP LIVER SERPL-CCNC: 98 U/L (ref 55–142)
ALT SERPL-CCNC: 13 U/L (ref 14–54)
ANION GAP SERPL CALC-SCNC: 11 MMOL/L (ref 0–18)
AST SERPL-CCNC: 11 U/L (ref 15–41)
BILIRUB SERPL-MCNC: 0.4 MG/DL (ref 0.1–2)
BUN BLD-MCNC: 24 MG/DL (ref 8–20)
BUN/CREAT SERPL: 23.1 (ref 10–20)
CALCIUM BLD-MCNC: 8.6 MG/DL (ref 8.3–10.3)
CHLORIDE SERPL-SCNC: 107 MMOL/L (ref 101–111)
CHOLEST SMN-MCNC: 99 MG/DL (ref ?–200)
CO2 SERPL-SCNC: 24 MMOL/L (ref 22–32)
CREAT BLD-MCNC: 1.04 MG/DL (ref 0.55–1.02)
EST. AVERAGE GLUCOSE BLD GHB EST-MCNC: 120 MG/DL (ref 68–126)
GLOBULIN PLAS-MCNC: 4.1 G/DL (ref 2.5–4)
GLUCOSE BLD-MCNC: 67 MG/DL (ref 70–99)
HBA1C MFR BLD HPLC: 5.8 % (ref ?–5.7)
HDLC SERPL-MCNC: 32 MG/DL (ref 40–59)
LDLC SERPL CALC-MCNC: 49 MG/DL (ref ?–100)
M PROTEIN MFR SERPL ELPH: 7.5 G/DL (ref 6.1–8.3)
NONHDLC SERPL-MCNC: 67 MG/DL (ref ?–130)
OSMOLALITY SERPL CALC.SUM OF ELEC: 296 MOSM/KG (ref 275–295)
POTASSIUM SERPL-SCNC: 3.8 MMOL/L (ref 3.6–5.1)
SODIUM SERPL-SCNC: 142 MMOL/L (ref 136–144)
TRIGL SERPL-MCNC: 92 MG/DL (ref 30–149)
VLDLC SERPL CALC-MCNC: 18 MG/DL (ref 0–30)

## 2018-09-17 PROCEDURE — 80061 LIPID PANEL: CPT

## 2018-09-17 PROCEDURE — 36415 COLL VENOUS BLD VENIPUNCTURE: CPT

## 2018-09-17 PROCEDURE — 83036 HEMOGLOBIN GLYCOSYLATED A1C: CPT

## 2018-09-17 PROCEDURE — 80053 COMPREHEN METABOLIC PANEL: CPT

## 2018-10-03 ENCOUNTER — APPOINTMENT (OUTPATIENT)
Dept: LAB | Age: 73
End: 2018-10-03
Attending: INTERNAL MEDICINE

## 2018-10-03 PROCEDURE — 36415 COLL VENOUS BLD VENIPUNCTURE: CPT | Performed by: INTERNAL MEDICINE

## 2018-10-03 PROCEDURE — 80076 HEPATIC FUNCTION PANEL: CPT | Performed by: INTERNAL MEDICINE

## 2018-10-09 ENCOUNTER — HOSPITAL ENCOUNTER (OUTPATIENT)
Dept: MAMMOGRAPHY | Age: 73
Discharge: HOME | End: 2018-10-09
Attending: PHYSICIAN ASSISTANT
Payer: MEDICARE

## 2018-10-09 DIAGNOSIS — Z12.39 BREAST CANCER SCREENING: ICD-10-CM

## 2018-10-09 PROCEDURE — 77063 BREAST TOMOSYNTHESIS BI: CPT | Performed by: PHYSICIAN ASSISTANT

## 2018-10-09 PROCEDURE — 77067 SCR MAMMO BI INCL CAD: CPT | Performed by: PHYSICIAN ASSISTANT

## 2018-10-11 ENCOUNTER — PRIOR ORIGINAL RECORDS (OUTPATIENT)
Dept: OTHER | Age: 73
End: 2018-10-11

## 2018-10-11 ENCOUNTER — OFFICE VISIT (OUTPATIENT)
Dept: INTERNAL MEDICINE CLINIC | Facility: CLINIC | Age: 73
End: 2018-10-11
Payer: MEDICARE

## 2018-10-11 ENCOUNTER — LAB ENCOUNTER (OUTPATIENT)
Dept: LAB | Age: 73
End: 2018-10-11
Attending: INTERNAL MEDICINE
Payer: MEDICARE

## 2018-10-11 VITALS
TEMPERATURE: 99 F | BODY MASS INDEX: 43.19 KG/M2 | DIASTOLIC BLOOD PRESSURE: 76 MMHG | HEIGHT: 63 IN | RESPIRATION RATE: 24 BRPM | HEART RATE: 94 BPM | WEIGHT: 243.75 LBS | SYSTOLIC BLOOD PRESSURE: 136 MMHG | OXYGEN SATURATION: 92 %

## 2018-10-11 DIAGNOSIS — E11.8 TYPE 2 DIABETES MELLITUS WITH COMPLICATION, WITHOUT LONG-TERM CURRENT USE OF INSULIN (HCC): Primary | Chronic | ICD-10-CM

## 2018-10-11 DIAGNOSIS — I50.42 CHRONIC COMBINED SYSTOLIC AND DIASTOLIC HEART FAILURE (HCC): ICD-10-CM

## 2018-10-11 DIAGNOSIS — I50.9 CHF (CONGESTIVE HEART FAILURE) (HCC): Primary | ICD-10-CM

## 2018-10-11 DIAGNOSIS — E78.00 PURE HYPERCHOLESTEROLEMIA: ICD-10-CM

## 2018-10-11 PROCEDURE — 99213 OFFICE O/P EST LOW 20 MIN: CPT | Performed by: INTERNAL MEDICINE

## 2018-10-11 PROCEDURE — 36415 COLL VENOUS BLD VENIPUNCTURE: CPT

## 2018-10-11 PROCEDURE — 80053 COMPREHEN METABOLIC PANEL: CPT

## 2018-10-11 PROCEDURE — 83880 ASSAY OF NATRIURETIC PEPTIDE: CPT

## 2018-10-11 PROCEDURE — 90653 IIV ADJUVANT VACCINE IM: CPT | Performed by: INTERNAL MEDICINE

## 2018-10-11 PROCEDURE — G0008 ADMIN INFLUENZA VIRUS VAC: HCPCS | Performed by: INTERNAL MEDICINE

## 2018-10-11 PROCEDURE — 80061 LIPID PANEL: CPT

## 2018-10-11 NOTE — PROGRESS NOTES
Shane Bliss [facility] 1945 is a 72year old female. Patient presents with:   Follow - Up       HPI:   Results no complaints    Current Outpatient Medications:  HYDROcodone-acetaminophen 5-325 MG Oral Tab Take 1 tablet by mouth every 4 (four) hours as 2016        Years since quittin.9      Smokeless tobacco: Former User        Quit date: 2016    Alcohol use: No    Drug use: No       REVIEW OF SYSTEMS:   Cardiovascular:   Syncope none. Rapid heart beat at rest no.  Change in exercise lillie

## 2018-10-16 ENCOUNTER — PRIOR ORIGINAL RECORDS (OUTPATIENT)
Dept: OTHER | Age: 73
End: 2018-10-16

## 2018-10-16 ENCOUNTER — TELEPHONE (OUTPATIENT)
Dept: INTERNAL MEDICINE CLINIC | Facility: CLINIC | Age: 73
End: 2018-10-16

## 2018-10-16 ENCOUNTER — MYAURORA ACCOUNT LINK (OUTPATIENT)
Dept: OTHER | Age: 73
End: 2018-10-16

## 2018-10-16 LAB
ALBUMIN: 3.3 G/DL
ALKALINE PHOSPHATATE(ALK PHOS): 106 IU/L
BILIRUBIN TOTAL: 0.3 MG/DL
BNP: 44 PMOL/L
BUN: 24 MG/DL
CALCIUM: 8 MG/DL
CHLORIDE: 104 MEQ/L
CHOLESTEROL, TOTAL: 106 MG/DL
CREATININE, SERUM: 1.15 MG/DL
GLOBULIN: 4.5 G/DL
GLUCOSE: 69 MG/DL
HDL CHOLESTEROL: 31 MG/DL
LDL CHOLESTEROL: 51 MG/DL
NON-HDL CHOLESTEROL: 75 MG/DL
POTASSIUM, SERUM: 4.1 MEQ/L
PROTEIN, TOTAL: 7.8 G/DL
SGOT (AST): 10 IU/L
SGPT (ALT): 14 IU/L
SODIUM: 140 MEQ/L
TRIGLYCERIDES: 122 MG/DL

## 2018-10-16 NOTE — TELEPHONE ENCOUNTER
Patient dropped off a letter that [employer] are not going to cover her oxygen supplies she is renting; they advised she notify doctor so our office can provide additional information for insurance to review.   Letter in fax folder for MORGAN quiroga to richar

## 2018-10-29 RX ORDER — MELOXICAM 7.5 MG/1
TABLET ORAL
Qty: 90 TABLET | Refills: 0 | Status: SHIPPED | OUTPATIENT
Start: 2018-10-29 | End: 2019-03-01

## 2018-10-29 NOTE — TELEPHONE ENCOUNTER
Meloxicam 7.5 mg 1 tab daily filled 8/27/18 90 with 0 refills    LOV 10/11/18     Return in about 3 months (around 1/11/2019), or cpx . .     Labs 9/17/18

## 2018-10-31 DIAGNOSIS — E11.8 TYPE 2 DIABETES MELLITUS WITH COMPLICATION, WITHOUT LONG-TERM CURRENT USE OF INSULIN (HCC): ICD-10-CM

## 2018-11-01 NOTE — TELEPHONE ENCOUNTER
Refill requested:   Requested Prescriptions     Pending Prescriptions Disp Refills   • METFORMIN HCL 1000 MG Oral Tab [Pharmacy Med Name: METFORMIN 1000MG TABLETS] 180 tablet 0     Sig: TAKE 1 TABLET(1000 MG) BY MOUTH TWICE DAILY WITH MEALS         Passed

## 2018-11-15 DIAGNOSIS — E11.8 TYPE 2 DIABETES MELLITUS WITH COMPLICATION, WITHOUT LONG-TERM CURRENT USE OF INSULIN (HCC): ICD-10-CM

## 2018-11-15 RX ORDER — (INSULIN DEGLUDEC AND LIRAGLUTIDE) 100; 3.6 [IU]/ML; MG/ML
INJECTION, SOLUTION SUBCUTANEOUS
Qty: 27 ML | Refills: 0 | Status: SHIPPED | OUTPATIENT
Start: 2018-11-15 | End: 2019-01-15

## 2018-11-15 NOTE — TELEPHONE ENCOUNTER
Refill requested:   Requested Prescriptions     Pending Prescriptions Disp Refills   • XULTOPHY 100-3.6 UNIT-MG/ML Subcutaneous Solution Pen-injector [Pharmacy Med Name: Maryjo Clinton 100/3.6 PEN INJ 3ML]  0     Sig: INJECT 40 UNITS UNDER THE SKINE DAILY

## 2018-11-19 ENCOUNTER — OFFICE VISIT (OUTPATIENT)
Dept: INTERNAL MEDICINE CLINIC | Facility: CLINIC | Age: 73
End: 2018-11-19
Payer: MEDICARE

## 2018-11-19 VITALS
WEIGHT: 243 LBS | OXYGEN SATURATION: 93 % | BODY MASS INDEX: 43.05 KG/M2 | HEART RATE: 88 BPM | SYSTOLIC BLOOD PRESSURE: 128 MMHG | RESPIRATION RATE: 18 BRPM | TEMPERATURE: 98 F | DIASTOLIC BLOOD PRESSURE: 76 MMHG | HEIGHT: 63 IN

## 2018-11-19 DIAGNOSIS — L30.9 ECZEMA, UNSPECIFIED TYPE: Primary | ICD-10-CM

## 2018-11-19 PROCEDURE — 99213 OFFICE O/P EST LOW 20 MIN: CPT | Performed by: INTERNAL MEDICINE

## 2018-11-19 RX ORDER — MOMETASONE FUROATE 1 MG/G
1 CREAM TOPICAL 2 TIMES DAILY PRN
Qty: 60 G | Refills: 3 | Status: SHIPPED | OUTPATIENT
Start: 2018-11-19 | End: 2019-03-01

## 2018-11-19 RX ORDER — CEPHALEXIN 500 MG/1
500 CAPSULE ORAL 3 TIMES DAILY
Qty: 30 CAPSULE | Refills: 0 | Status: SHIPPED | OUTPATIENT
Start: 2018-11-19 | End: 2018-11-29

## 2018-11-19 NOTE — PROGRESS NOTES
Surinder Machuca  1945 is a 72year old female.     Patient presents with:  Rash      HPI:   –Last few weeks noted on the anterior aspect of the left knee-rash    Current Outpatient Medications:  Mometasone Furoate 0.1 % External Cream Apply 1 Appli Essential hypertension    • Hyperlipidemia    • Obesity    • Sleep apnea       Social History:  Social History    Tobacco Use      Smoking status: Former Smoker        Packs/day: 0.25        Years: 20.00        Pack years: 5        Quit date: 11/16/2016

## 2018-11-21 DIAGNOSIS — E11.8 TYPE 2 DIABETES MELLITUS WITH COMPLICATION, WITHOUT LONG-TERM CURRENT USE OF INSULIN (HCC): ICD-10-CM

## 2018-11-21 RX ORDER — CARVEDILOL 25 MG/1
TABLET ORAL
Qty: 180 TABLET | Refills: 0 | Status: SHIPPED | OUTPATIENT
Start: 2018-11-21 | End: 2019-01-25

## 2018-11-21 NOTE — TELEPHONE ENCOUNTER
Carvedilol 25 mg 1 tab bid filled 5-29-18 180 with 1 refill     LOV 10-11-18     Return in about 3 months (around 1/11/2019), or cpx . Nunu Ansley     Labs 3-29-88

## 2018-12-03 ENCOUNTER — TELEPHONE (OUTPATIENT)
Dept: INTERNAL MEDICINE CLINIC | Facility: CLINIC | Age: 73
End: 2018-12-03

## 2018-12-18 RX ORDER — ALBUTEROL SULFATE 2.5 MG/3ML
SOLUTION RESPIRATORY (INHALATION)
Qty: 300 ML | Refills: 0 | Status: CANCELLED | OUTPATIENT
Start: 2018-12-18

## 2018-12-19 RX ORDER — ALBUTEROL SULFATE 2.5 MG/3ML
SOLUTION RESPIRATORY (INHALATION)
Qty: 300 ML | Refills: 0 | Status: SHIPPED | OUTPATIENT
Start: 2018-12-19 | End: 2019-03-01

## 2018-12-19 NOTE — TELEPHONE ENCOUNTER
Refill requested:   Requested Prescriptions     Pending Prescriptions Disp Refills   • ALBUTEROL SULFATE (2.5 MG/3ML) 0.083% Inhalation Nebu Soln [Pharmacy Med Name: ALBUTEROL 0.083%(2.5MG/3ML) 25X3ML] 300 mL 0     Sig: USE 1 VIAL VIA NEBULIZER THREE TIMES

## 2018-12-24 ENCOUNTER — TELEPHONE (OUTPATIENT)
Dept: INTERNAL MEDICINE CLINIC | Facility: CLINIC | Age: 73
End: 2018-12-24

## 2018-12-26 ENCOUNTER — TELEPHONE (OUTPATIENT)
Dept: INTERNAL MEDICINE CLINIC | Facility: CLINIC | Age: 73
End: 2018-12-26

## 2018-12-26 DIAGNOSIS — G47.33 OSA ON CPAP: Primary | Chronic | ICD-10-CM

## 2018-12-26 DIAGNOSIS — Z99.89 OSA ON CPAP: Primary | Chronic | ICD-10-CM

## 2018-12-26 NOTE — TELEPHONE ENCOUNTER
Patient is requesting a new nebulizer to be sent to [address], [address], [phone], [phone]. She stated that she would like this approved ASAP. Please advise.

## 2018-12-27 NOTE — TELEPHONE ENCOUNTER
Spoke with pt to verify if she needed refill of medication or actual nebulizer machine. Pt stated that she needed nebulizer machine. [facility] and will need to be sent to a medical supply store.  Pt verbalized understandi

## 2018-12-31 ENCOUNTER — TELEPHONE (OUTPATIENT)
Dept: INTERNAL MEDICINE CLINIC | Facility: CLINIC | Age: 73
End: 2018-12-31

## 2018-12-31 NOTE — TELEPHONE ENCOUNTER
Pt notified referral faxed and contact information given to pt to contact [facility]. Pt verbalized understanding.

## 2018-12-31 NOTE — TELEPHONE ENCOUNTER
Pt called back. Notified her of information below. Pt verbalized understanding. Faxed form to [id number] at [phone]. Fax confirmation received. Copy sent to scan and copy placed in back pod file folder under \"R\".

## 2018-12-31 NOTE — TELEPHONE ENCOUNTER
Received fax from [employer] notifying provider of medication interaction with entresto and meloxicam. Per provider pt to discontinue Meloxicam and can take tylenol if desired.      Left message for patient to call office back to notify of information abo

## 2019-01-01 ENCOUNTER — EXTERNAL RECORD (OUTPATIENT)
Dept: HEALTH INFORMATION MANAGEMENT | Facility: OTHER | Age: 74
End: 2019-01-01

## 2019-01-11 NOTE — TELEPHONE ENCOUNTER
Pt would like a call back states that she has been waiting 3wk's now for the nebulizer and needs to speak with a nurse today

## 2019-01-11 NOTE — TELEPHONE ENCOUNTER
Spoke with pt and advised that we did fax documents to Tamela Arce and most likely will have to pay out of pocket for nebulizer. Advised pt that 2 local pharmacies have this and could give her their information. Pt agreeable and wishes to use the cheaper one.  Co

## 2019-01-11 NOTE — TELEPHONE ENCOUNTER
Pt went to pharmacy to  nebulizer but stated that they did not get a Rx for it pt would like to know if it can be resent

## 2019-01-15 DIAGNOSIS — E11.8 TYPE 2 DIABETES MELLITUS WITH COMPLICATION, WITHOUT LONG-TERM CURRENT USE OF INSULIN (HCC): ICD-10-CM

## 2019-01-16 RX ORDER — (INSULIN DEGLUDEC AND LIRAGLUTIDE) 100; 3.6 [IU]/ML; MG/ML
INJECTION, SOLUTION SUBCUTANEOUS
Qty: 27 ML | Refills: 0 | Status: SHIPPED | OUTPATIENT
Start: 2019-01-16 | End: 2019-08-01

## 2019-01-21 ENCOUNTER — OFFICE VISIT (OUTPATIENT)
Dept: ENDOCRINOLOGY CLINIC | Facility: CLINIC | Age: 74
End: 2019-01-21
Payer: MEDICARE

## 2019-01-21 VITALS
TEMPERATURE: 98 F | BODY MASS INDEX: 42.52 KG/M2 | DIASTOLIC BLOOD PRESSURE: 64 MMHG | HEIGHT: 63 IN | HEART RATE: 80 BPM | SYSTOLIC BLOOD PRESSURE: 118 MMHG | WEIGHT: 240 LBS | RESPIRATION RATE: 18 BRPM

## 2019-01-21 DIAGNOSIS — E11.9 CONTROLLED TYPE 2 DIABETES MELLITUS WITHOUT COMPLICATION, WITHOUT LONG-TERM CURRENT USE OF INSULIN (HCC): Primary | ICD-10-CM

## 2019-01-21 DIAGNOSIS — E78.5 DYSLIPIDEMIA: ICD-10-CM

## 2019-01-21 LAB
CARTRIDGE EXPIRATION DATE: 2020 DATE
CARTRIDGE LOT#: 952 NUMERIC
HEMOGLOBIN A1C: 6 % (ref 4.3–5.6)

## 2019-01-21 PROCEDURE — 99214 OFFICE O/P EST MOD 30 MIN: CPT | Performed by: INTERNAL MEDICINE

## 2019-01-21 PROCEDURE — 83036 HEMOGLOBIN GLYCOSYLATED A1C: CPT | Performed by: INTERNAL MEDICINE

## 2019-01-21 NOTE — PROGRESS NOTES
Marianela Gill  8/28/1945    Patient presents with:  Diabetes: follow up- eye exam not completed      SUBJECTIVE   Marianela Gill is a 72year old female with controlled diabetes mellitus type 2 who presents as a follow-up.     The patient is compliant not apply Misc Inject 1 pen into the skin daily. Disp: 1 Box Rfl: 3   atorvastatin 20 MG Oral Tab Take 20 mg by mouth nightly. Disp:  Rfl: 5   ASPIRIN EC LOW DOSE 81 MG Oral Tab EC Take 81 mg by mouth daily.  Disp:  Rfl: 0   torsemide 20 MG Oral Tab Take 1 dry. No rash. Psychiatric: Normal mood and affect.      Diabetic foot exam:    Left: Pulses Dorsalis Pedis: present              Posterior Tibial: present   Reflexes 2+    Vibratory sensation normal   Proprioception normal   Sharp/dull discrimination asif orders of the defined types were placed in this encounter. Meds & Refills:  Requested Prescriptions      No prescriptions requested or ordered in this encounter       Imaging & Consults:  None    No Follow-up on file.   There are no Patient Instruction

## 2019-01-21 NOTE — PROGRESS NOTES
Shane Bliss  : 1945     Date: 2019       /64 (BP Location: Left arm, Patient Position: Sitting, Cuff Size: large)   Pulse 80   Temp 98.4 °F (36.9 °C) (Oral)   Resp 18   Ht 63\"   Wt 240 lb   BMI 42.51 kg/m²     HEMOGLOBIN A1C (%)

## 2019-01-22 ENCOUNTER — OFFICE VISIT (OUTPATIENT)
Dept: INTERNAL MEDICINE CLINIC | Facility: CLINIC | Age: 74
End: 2019-01-22
Payer: MEDICARE

## 2019-01-22 VITALS
HEIGHT: 63 IN | DIASTOLIC BLOOD PRESSURE: 70 MMHG | BODY MASS INDEX: 42.79 KG/M2 | WEIGHT: 241.5 LBS | SYSTOLIC BLOOD PRESSURE: 114 MMHG | RESPIRATION RATE: 24 BRPM | HEART RATE: 83 BPM | TEMPERATURE: 99 F

## 2019-01-22 DIAGNOSIS — L30.9 ECZEMA, UNSPECIFIED TYPE: Primary | ICD-10-CM

## 2019-01-22 DIAGNOSIS — E11.8 TYPE 2 DIABETES MELLITUS WITH COMPLICATION, WITHOUT LONG-TERM CURRENT USE OF INSULIN (HCC): Chronic | ICD-10-CM

## 2019-01-22 PROCEDURE — 99213 OFFICE O/P EST LOW 20 MIN: CPT | Performed by: INTERNAL MEDICINE

## 2019-01-22 NOTE — PROGRESS NOTES
Vlad Cano  1945 is a 72year old female. Patient presents with: Follow - Up      HPI:   follow up rash left knee. Now much better.   Unable to see eye doctor no one accepts her insurance    Current Outpatient Medications:  XULTOPHY 100-3 apnea       Social History:  Social History    Tobacco Use      Smoking status: Former Smoker        Packs/day: 0.25        Years: 20.00        Pack years: 5        Quit date: 2016        Years since quittin.1      Smokeless tobacco: Former User

## 2019-01-25 DIAGNOSIS — E11.8 TYPE 2 DIABETES MELLITUS WITH COMPLICATION, WITHOUT LONG-TERM CURRENT USE OF INSULIN (HCC): ICD-10-CM

## 2019-01-26 RX ORDER — CARVEDILOL 25 MG/1
TABLET ORAL
Qty: 180 TABLET | Refills: 0 | Status: SHIPPED | OUTPATIENT
Start: 2019-01-26 | End: 2019-04-27

## 2019-01-26 NOTE — TELEPHONE ENCOUNTER
Protocol passed.      Medication(s) to Refill:   Requested Prescriptions     Pending Prescriptions Disp Refills   • CARVEDILOL 25 MG Oral Tab [Pharmacy Med Name: CARVEDILOL 25MG TABLETS] 180 tablet 0     Sig: TAKE 1 TABLET BY MOUTH TWICE DAILY AS DIRECTED

## 2019-01-29 DIAGNOSIS — E11.8 TYPE 2 DIABETES MELLITUS WITH COMPLICATION, WITHOUT LONG-TERM CURRENT USE OF INSULIN (HCC): ICD-10-CM

## 2019-01-29 NOTE — TELEPHONE ENCOUNTER
Approved per protocol  Metformin  Last OV relevant to medication: 10-11-18  Last refill date: 11-1-18    #/refills: 0  When pt was asked to return for OV: 3 months  Upcoming appt/reason: none  Recent labs: 01-21-19: HgBA1C  10-11-18: BNP/ CMP/ Lipid

## 2019-02-25 ENCOUNTER — TELEPHONE (OUTPATIENT)
Dept: INTERNAL MEDICINE CLINIC | Facility: CLINIC | Age: 74
End: 2019-02-25

## 2019-02-28 VITALS
SYSTOLIC BLOOD PRESSURE: 166 MMHG | HEIGHT: 63 IN | HEART RATE: 80 BPM | BODY MASS INDEX: 43.41 KG/M2 | DIASTOLIC BLOOD PRESSURE: 80 MMHG | WEIGHT: 245 LBS

## 2019-02-28 VITALS
WEIGHT: 244 LBS | SYSTOLIC BLOOD PRESSURE: 160 MMHG | HEART RATE: 74 BPM | HEIGHT: 63 IN | BODY MASS INDEX: 43.23 KG/M2 | DIASTOLIC BLOOD PRESSURE: 70 MMHG

## 2019-02-28 VITALS
HEART RATE: 97 BPM | DIASTOLIC BLOOD PRESSURE: 64 MMHG | WEIGHT: 243 LBS | SYSTOLIC BLOOD PRESSURE: 116 MMHG | HEIGHT: 63 IN | BODY MASS INDEX: 43.05 KG/M2

## 2019-02-28 VITALS
HEART RATE: 91 BPM | WEIGHT: 248 LBS | DIASTOLIC BLOOD PRESSURE: 64 MMHG | BODY MASS INDEX: 43.94 KG/M2 | HEIGHT: 63 IN | SYSTOLIC BLOOD PRESSURE: 112 MMHG

## 2019-02-28 VITALS
DIASTOLIC BLOOD PRESSURE: 66 MMHG | HEIGHT: 63 IN | SYSTOLIC BLOOD PRESSURE: 122 MMHG | WEIGHT: 242 LBS | HEART RATE: 92 BPM | BODY MASS INDEX: 42.88 KG/M2

## 2019-02-28 VITALS
WEIGHT: 242 LBS | HEART RATE: 76 BPM | DIASTOLIC BLOOD PRESSURE: 80 MMHG | BODY MASS INDEX: 42.88 KG/M2 | SYSTOLIC BLOOD PRESSURE: 150 MMHG | HEIGHT: 63 IN

## 2019-02-28 VITALS
BODY MASS INDEX: 43.59 KG/M2 | HEART RATE: 80 BPM | HEIGHT: 63 IN | SYSTOLIC BLOOD PRESSURE: 117 MMHG | DIASTOLIC BLOOD PRESSURE: 67 MMHG | WEIGHT: 246 LBS

## 2019-02-28 VITALS
BODY MASS INDEX: 42.88 KG/M2 | DIASTOLIC BLOOD PRESSURE: 80 MMHG | WEIGHT: 242 LBS | HEIGHT: 63 IN | SYSTOLIC BLOOD PRESSURE: 160 MMHG | HEART RATE: 80 BPM

## 2019-02-28 VITALS
HEART RATE: 78 BPM | WEIGHT: 252 LBS | SYSTOLIC BLOOD PRESSURE: 132 MMHG | BODY MASS INDEX: 44.65 KG/M2 | DIASTOLIC BLOOD PRESSURE: 70 MMHG | HEIGHT: 63 IN

## 2019-03-01 VITALS
SYSTOLIC BLOOD PRESSURE: 160 MMHG | BODY MASS INDEX: 45.18 KG/M2 | DIASTOLIC BLOOD PRESSURE: 84 MMHG | HEIGHT: 63 IN | OXYGEN SATURATION: 81 % | HEART RATE: 76 BPM | WEIGHT: 255 LBS

## 2019-03-01 VITALS
HEART RATE: 80 BPM | DIASTOLIC BLOOD PRESSURE: 72 MMHG | HEIGHT: 63 IN | SYSTOLIC BLOOD PRESSURE: 128 MMHG | BODY MASS INDEX: 43.41 KG/M2 | WEIGHT: 245 LBS

## 2019-03-01 VITALS
WEIGHT: 245 LBS | DIASTOLIC BLOOD PRESSURE: 70 MMHG | BODY MASS INDEX: 43.41 KG/M2 | HEART RATE: 75 BPM | HEIGHT: 63 IN | SYSTOLIC BLOOD PRESSURE: 146 MMHG

## 2019-03-01 DIAGNOSIS — E11.8 TYPE 2 DIABETES MELLITUS WITH COMPLICATION, WITHOUT LONG-TERM CURRENT USE OF INSULIN (HCC): ICD-10-CM

## 2019-03-01 DIAGNOSIS — L30.9 ECZEMA, UNSPECIFIED TYPE: ICD-10-CM

## 2019-03-02 NOTE — TELEPHONE ENCOUNTER
Refill requested:   Requested Prescriptions     Pending Prescriptions Disp Refills   • MOMETASONE FUROATE 0.1 % External Cream [Pharmacy Med Name: MOMETASONE 0.1% CREAM 15GM] 60 g 0     Sig: APPLY EXTERNALLY TO THE AFFECTED AREA TWICE DAILY AS NEEDED   • A

## 2019-03-04 RX ORDER — MOMETASONE FUROATE 1 MG/G
CREAM TOPICAL
Qty: 60 G | Refills: 0 | Status: SHIPPED | OUTPATIENT
Start: 2019-03-04 | End: 2019-03-29

## 2019-03-04 RX ORDER — MELOXICAM 7.5 MG/1
TABLET ORAL
Qty: 90 TABLET | Refills: 0 | Status: SHIPPED | OUTPATIENT
Start: 2019-03-04 | End: 2019-04-13 | Stop reason: ALTCHOICE

## 2019-03-04 RX ORDER — ALBUTEROL SULFATE 2.5 MG/3ML
SOLUTION RESPIRATORY (INHALATION)
Qty: 300 ML | Refills: 0 | Status: SHIPPED | OUTPATIENT
Start: 2019-03-04 | End: 2019-04-13

## 2019-03-04 RX ORDER — (INSULIN DEGLUDEC AND LIRAGLUTIDE) 100; 3.6 [IU]/ML; MG/ML
INJECTION, SOLUTION SUBCUTANEOUS
Qty: 27 ML | Refills: 0 | Status: SHIPPED | OUTPATIENT
Start: 2019-03-04 | End: 2019-03-14

## 2019-03-11 RX ORDER — ATORVASTATIN CALCIUM 20 MG/1
20 TABLET, FILM COATED ORAL DAILY
Qty: 90 TABLET | Refills: 2 | Status: SHIPPED | OUTPATIENT
Start: 2019-03-11 | End: 2019-04-03 | Stop reason: SDUPTHER

## 2019-03-11 RX ORDER — ATORVASTATIN CALCIUM 20 MG/1
20 TABLET, FILM COATED ORAL DAILY
COMMUNITY
Start: 2018-10-09 | End: 2019-03-11 | Stop reason: SDUPTHER

## 2019-03-14 ENCOUNTER — OFFICE VISIT (OUTPATIENT)
Dept: INTERNAL MEDICINE CLINIC | Facility: CLINIC | Age: 74
End: 2019-03-14
Payer: MEDICARE

## 2019-03-14 VITALS
HEART RATE: 113 BPM | SYSTOLIC BLOOD PRESSURE: 118 MMHG | BODY MASS INDEX: 45.79 KG/M2 | TEMPERATURE: 98 F | WEIGHT: 242.5 LBS | OXYGEN SATURATION: 89 % | RESPIRATION RATE: 20 BRPM | DIASTOLIC BLOOD PRESSURE: 68 MMHG | HEIGHT: 61 IN

## 2019-03-14 DIAGNOSIS — L30.9 ECZEMA, UNSPECIFIED TYPE: Primary | ICD-10-CM

## 2019-03-14 PROCEDURE — 99213 OFFICE O/P EST LOW 20 MIN: CPT | Performed by: INTERNAL MEDICINE

## 2019-03-14 NOTE — PATIENT INSTRUCTIONS
Patient aware that she is seeing doctors with multiple overlapping specialties making management of her diabetes and other medical issues a little confusing  Advised she needs to make a choice of doctors to avoid any confusion

## 2019-03-14 NOTE — PROGRESS NOTES
Melanie Landis DOB 1945 is a 72year old female. Patient presents with: Follow - Up      HPI:   follow up rash left knee. Now much better.   Now also has a rash in the lower abdomen and right buttock region  Since seeing multiple doctors Gilda majano • Diabetes [facility])    • Essential hypertension    • Hyperlipidemia    • Obesity    • Sleep apnea       Social History:  Social History    Tobacco Use      Smoking status: Former Smoker        Packs/day: 0.25        Years: 20.00        Pack years: 5

## 2019-03-20 ENCOUNTER — TELEPHONE (OUTPATIENT)
Dept: INTERNAL MEDICINE CLINIC | Facility: CLINIC | Age: 74
End: 2019-03-20

## 2019-03-20 NOTE — TELEPHONE ENCOUNTER
Pt notified of provider request. Cpx scheduled. Pt verbalized understanding.   Future Appointments   Date Time Provider Marline Marcy   3/26/2019  3:00 PM Ez Villarreal MD [facility] [place]

## 2019-03-25 RX ORDER — TORSEMIDE 20 MG/1
TABLET ORAL
COMMUNITY
Start: 2018-12-31 | End: 2019-04-05 | Stop reason: SDUPTHER

## 2019-03-25 RX ORDER — ALENDRONATE SODIUM 10 MG/1
1 TABLET ORAL 2 TIMES DAILY
COMMUNITY
Start: 2017-02-16

## 2019-03-25 RX ORDER — ALBUTEROL SULFATE 90 UG/1
1 AEROSOL, METERED RESPIRATORY (INHALATION) PRN
COMMUNITY
Start: 2017-02-16

## 2019-03-25 RX ORDER — HYDRALAZINE HYDROCHLORIDE 50 MG/1
TABLET, FILM COATED ORAL
COMMUNITY
Start: 2018-04-25 | End: 2021-01-05

## 2019-03-25 RX ORDER — BUPROPION HYDROCHLORIDE 150 MG/1
1 TABLET, EXTENDED RELEASE ORAL 2 TIMES DAILY
COMMUNITY
Start: 2017-02-16

## 2019-03-25 RX ORDER — MELOXICAM 15 MG/1
1 TABLET ORAL DAILY
COMMUNITY
Start: 2017-02-16 | End: 2019-04-30 | Stop reason: ALTCHOICE

## 2019-03-25 RX ORDER — CARVEDILOL 25 MG/1
TABLET ORAL
COMMUNITY
Start: 2017-12-19

## 2019-03-25 RX ORDER — ALBUTEROL SULFATE 2.5 MG/3ML
SOLUTION RESPIRATORY (INHALATION) PRN
COMMUNITY
Start: 2017-02-16

## 2019-03-26 ENCOUNTER — OFFICE VISIT (OUTPATIENT)
Dept: INTERNAL MEDICINE CLINIC | Facility: CLINIC | Age: 74
End: 2019-03-26
Payer: MEDICARE

## 2019-03-26 VITALS
HEART RATE: 110 BPM | BODY MASS INDEX: 43.56 KG/M2 | OXYGEN SATURATION: 94 % | DIASTOLIC BLOOD PRESSURE: 60 MMHG | RESPIRATION RATE: 20 BRPM | HEIGHT: 62.25 IN | SYSTOLIC BLOOD PRESSURE: 116 MMHG | TEMPERATURE: 99 F | WEIGHT: 239.75 LBS

## 2019-03-26 DIAGNOSIS — E11.8 TYPE 2 DIABETES MELLITUS WITH COMPLICATION, WITHOUT LONG-TERM CURRENT USE OF INSULIN (HCC): Chronic | ICD-10-CM

## 2019-03-26 DIAGNOSIS — Z00.00 ROUTINE GENERAL MEDICAL EXAMINATION AT A HEALTH CARE FACILITY: Primary | ICD-10-CM

## 2019-03-26 DIAGNOSIS — E78.00 PURE HYPERCHOLESTEROLEMIA: Chronic | ICD-10-CM

## 2019-03-26 PROCEDURE — G0439 PPPS, SUBSEQ VISIT: HCPCS | Performed by: INTERNAL MEDICINE

## 2019-03-26 RX ORDER — HYDRALAZINE HYDROCHLORIDE 50 MG/1
50 TABLET, FILM COATED ORAL 2 TIMES DAILY
COMMUNITY
End: 2019-06-07

## 2019-03-26 RX ORDER — POTASSIUM CHLORIDE 20 MEQ/1
TABLET, EXTENDED RELEASE ORAL
COMMUNITY
End: 2019-06-03 | Stop reason: SDUPTHER

## 2019-03-26 NOTE — PROGRESS NOTES
Antonio Samaniego DOB 1945 is a 72year old female. Patient presents with:  Physical      HPI:   Here for CPX    Current Outpatient Medications:  hydrALAzine HCl 50 MG Oral Tab Take 50 mg by mouth 2 (two) times daily. Take 2 tablets twice daily.  Dis 0.25        Years: 20.00        Pack years: 5        Quit date: 2016        Years since quittin.3      Smokeless tobacco: Former User        Quit date: 2016    Alcohol use: No    Drug use: No         REVIEW OF SYSTEMS:   General/Constitutio weakness none. Peripheral Vascular:   General no varicosities, no claudication. Dermatologic:   Rash no. Neurologic:   Patient denies dizziness, fainting, loss of consciousness, weakness. Memory loss none. Tingling/numbness none.  Trouble with balance II-XII grossly intact. Gait: normal.   Mental Status: Alert & oriented x 3. Motor: power-normal/tone -normal/co-ordination normal/wasting -none/involuntary movements -none. Reflexes: normal.   Sensory: normal sensation to all modalities.    LYMPHATICS

## 2019-03-29 DIAGNOSIS — L30.9 ECZEMA, UNSPECIFIED TYPE: ICD-10-CM

## 2019-03-29 RX ORDER — MOMETASONE FUROATE 1 MG/G
CREAM TOPICAL
Qty: 60 G | Refills: 0 | Status: SHIPPED | OUTPATIENT
Start: 2019-03-29 | End: 2019-04-25

## 2019-03-29 NOTE — TELEPHONE ENCOUNTER
No Protocol    Requesting MOMETASONE FUROATE 0.1 % External Cream  LOV: 3/26/19  RTC: 3 months  Last Relevant Labs: 10/11/18  Filled: 3/4/19 #60g with 0 refills    Future Appointments   Date Time Provider Marline Vidal   5/9/2019 10:30 AM Joesph Jackson,

## 2019-04-02 ENCOUNTER — LAB ENCOUNTER (OUTPATIENT)
Dept: LAB | Age: 74
End: 2019-04-02
Attending: INTERNAL MEDICINE
Payer: MEDICARE

## 2019-04-02 DIAGNOSIS — E11.8 TYPE 2 DIABETES MELLITUS WITH COMPLICATION, WITHOUT LONG-TERM CURRENT USE OF INSULIN (HCC): Chronic | ICD-10-CM

## 2019-04-02 DIAGNOSIS — I50.32 CHRONIC DIASTOLIC HEART FAILURE (HCC): Primary | ICD-10-CM

## 2019-04-02 DIAGNOSIS — E11.9 DIABETES MELLITUS (HCC): ICD-10-CM

## 2019-04-02 DIAGNOSIS — E78.00 PURE HYPERCHOLESTEROLEMIA: ICD-10-CM

## 2019-04-02 DIAGNOSIS — Z78.9 10 MINUTE APGAR SCORE 0: ICD-10-CM

## 2019-04-02 DIAGNOSIS — Z00.00 ROUTINE GENERAL MEDICAL EXAMINATION AT A HEALTH CARE FACILITY: ICD-10-CM

## 2019-04-02 PROCEDURE — 36415 COLL VENOUS BLD VENIPUNCTURE: CPT

## 2019-04-02 PROCEDURE — 82570 ASSAY OF URINE CREATININE: CPT

## 2019-04-02 PROCEDURE — 84436 ASSAY OF TOTAL THYROXINE: CPT

## 2019-04-02 PROCEDURE — 83880 ASSAY OF NATRIURETIC PEPTIDE: CPT

## 2019-04-02 PROCEDURE — 82043 UR ALBUMIN QUANTITATIVE: CPT

## 2019-04-02 PROCEDURE — 84443 ASSAY THYROID STIM HORMONE: CPT

## 2019-04-02 PROCEDURE — 83036 HEMOGLOBIN GLYCOSYLATED A1C: CPT

## 2019-04-02 PROCEDURE — 81003 URINALYSIS AUTO W/O SCOPE: CPT

## 2019-04-02 PROCEDURE — 80053 COMPREHEN METABOLIC PANEL: CPT

## 2019-04-02 PROCEDURE — 85025 COMPLETE CBC W/AUTO DIFF WBC: CPT

## 2019-04-02 PROCEDURE — 80061 LIPID PANEL: CPT

## 2019-04-03 RX ORDER — ATORVASTATIN CALCIUM 20 MG/1
TABLET, FILM COATED ORAL
Qty: 90 TABLET | Refills: 1 | Status: SHIPPED | OUTPATIENT
Start: 2019-04-03 | End: 2019-09-23 | Stop reason: SDUPTHER

## 2019-04-05 ENCOUNTER — TELEPHONE (OUTPATIENT)
Dept: CARDIOLOGY | Age: 74
End: 2019-04-05

## 2019-04-08 RX ORDER — TORSEMIDE 20 MG/1
TABLET ORAL
Qty: 180 TABLET | Refills: 0 | Status: SHIPPED | OUTPATIENT
Start: 2019-04-08 | End: 2019-06-25 | Stop reason: SDUPTHER

## 2019-04-10 ENCOUNTER — TELEPHONE (OUTPATIENT)
Dept: CARDIOLOGY | Age: 74
End: 2019-04-10

## 2019-04-10 DIAGNOSIS — I50.32 HEART FAILURE, DIASTOLIC, CHRONIC (CMD): Primary | ICD-10-CM

## 2019-04-11 NOTE — TELEPHONE ENCOUNTER
Failed protocol     Last refill   5/29/2018 Bupropion XL #90 1R    3/26/2019 ANNA Donato RTC 3 months     Appt scheduled for 4/13/2019

## 2019-04-12 NOTE — TELEPHONE ENCOUNTER
Pt states it was given to stop smoking. Pt states she is looking at bottle at it says Dr. Reina Barksdale. She states she does not remember who originally prescribed since it has been over 2 years.

## 2019-04-12 NOTE — TELEPHONE ENCOUNTER
If it was meant to stop smoking she does not require it anymore -the maximum duration is generally recommended 90 days

## 2019-04-13 ENCOUNTER — OFFICE VISIT (OUTPATIENT)
Dept: INTERNAL MEDICINE CLINIC | Facility: CLINIC | Age: 74
End: 2019-04-13
Payer: MEDICARE

## 2019-04-13 VITALS
WEIGHT: 243.25 LBS | RESPIRATION RATE: 20 BRPM | HEIGHT: 62.25 IN | BODY MASS INDEX: 44.2 KG/M2 | HEART RATE: 88 BPM | SYSTOLIC BLOOD PRESSURE: 124 MMHG | OXYGEN SATURATION: 87 % | TEMPERATURE: 99 F | DIASTOLIC BLOOD PRESSURE: 76 MMHG

## 2019-04-13 DIAGNOSIS — D64.9 ANEMIA, UNSPECIFIED TYPE: ICD-10-CM

## 2019-04-13 DIAGNOSIS — I11.0 HYPERTENSIVE HEART DISEASE WITH HEART FAILURE (HCC): Primary | Chronic | ICD-10-CM

## 2019-04-13 PROCEDURE — 99213 OFFICE O/P EST LOW 20 MIN: CPT | Performed by: INTERNAL MEDICINE

## 2019-04-13 RX ORDER — ALBUTEROL SULFATE 2.5 MG/3ML
SOLUTION RESPIRATORY (INHALATION)
Qty: 300 ML | Refills: 1 | Status: SHIPPED | OUTPATIENT
Start: 2019-04-13 | End: 2019-06-27

## 2019-04-13 NOTE — PROGRESS NOTES
Curt Medina [facility] 1945 is a 72year old female.     Patient presents with:  Lab Results       HPI:   Lab results BP check    Current Outpatient Medications:  albuterol sulfate (2.5 MG/3ML) 0.083% Inhalation Nebu Solnena USE 1 VIAL VIA NEBULIZER THREE T REVIEW OF SYSTEMS:   Cardiovascular:   Syncope none. Rapid heart beat at rest no. Change in exercise tolerance no. Chest pain no. Chest pain while awake none. Cold extremities no. Dizziness no. Dyspnea on exertion none. Fainting none. Fatigue no.  High

## 2019-04-15 RX ORDER — BUPROPION HYDROCHLORIDE 150 MG/1
TABLET ORAL
Qty: 90 TABLET | Refills: 0 | OUTPATIENT
Start: 2019-04-15

## 2019-04-15 NOTE — TELEPHONE ENCOUNTER
Patient came in for OV and spoke with Dr about the medication - Dr Jerry Lewis was not the original prescriber. Medication refused.

## 2019-04-25 DIAGNOSIS — L30.9 ECZEMA, UNSPECIFIED TYPE: ICD-10-CM

## 2019-04-26 RX ORDER — MOMETASONE FUROATE 1 MG/G
CREAM TOPICAL
Qty: 60 G | Refills: 0 | Status: SHIPPED | OUTPATIENT
Start: 2019-04-26 | End: 2019-05-26

## 2019-04-26 NOTE — TELEPHONE ENCOUNTER
No Protocol    Requesting MOMETASONE FUROATE 0.1 % External Cream  LOV: 4/13/19  RTC: 3 months  Last Relevant Labs: 04/02/2019  Filled: 3/29/19 #60 g with 0 refills    Future Appointments   Date Time Provider Marline Vidal   4/30/2019  1:40 PM Cherre Child, And

## 2019-04-27 DIAGNOSIS — E11.8 TYPE 2 DIABETES MELLITUS WITH COMPLICATION, WITHOUT LONG-TERM CURRENT USE OF INSULIN (HCC): ICD-10-CM

## 2019-04-27 RX ORDER — CARVEDILOL 25 MG/1
TABLET ORAL
Qty: 180 TABLET | Refills: 0 | Status: SHIPPED | OUTPATIENT
Start: 2019-04-27 | End: 2019-06-25

## 2019-04-27 NOTE — TELEPHONE ENCOUNTER
Refill requested:   Requested Prescriptions     Pending Prescriptions Disp Refills   • CARVEDILOL 25 MG Oral Tab [Pharmacy Med Name: CARVEDILOL 25MG TABLETS] 180 tablet 0     Sig: TAKE 1 TABLET BY MOUTH TWICE DAILY AS DIRECTED     Passed protocol    Last r

## 2019-04-28 DIAGNOSIS — E11.8 TYPE 2 DIABETES MELLITUS WITH COMPLICATION, WITHOUT LONG-TERM CURRENT USE OF INSULIN (HCC): ICD-10-CM

## 2019-04-29 RX ORDER — ASPIRIN 81 MG/1
81 TABLET ORAL DAILY
COMMUNITY
Start: 2017-12-01

## 2019-04-29 NOTE — TELEPHONE ENCOUNTER
Passed protocol    Medication(s) to Refill:   Requested Prescriptions     Pending Prescriptions Disp Refills   • METFORMIN HCL 1000 MG Oral Tab [Pharmacy Med Name: METFORMIN 1000MG TABLETS] 180 tablet 0     Sig: TAKE 1 TABLET(1000 MG) BY MOUTH TWICE DAILY

## 2019-04-30 ENCOUNTER — OFFICE VISIT (OUTPATIENT)
Dept: CARDIOLOGY | Age: 74
End: 2019-04-30

## 2019-04-30 ENCOUNTER — APPOINTMENT (OUTPATIENT)
Dept: GENERAL RADIOLOGY | Age: 74
End: 2019-04-30
Attending: PHYSICIAN ASSISTANT
Payer: MEDICARE

## 2019-04-30 ENCOUNTER — HOSPITAL ENCOUNTER (OUTPATIENT)
Age: 74
Discharge: HOME | End: 2019-04-30
Payer: MEDICARE

## 2019-04-30 VITALS
SYSTOLIC BLOOD PRESSURE: 150 MMHG | RESPIRATION RATE: 18 BRPM | HEART RATE: 95 BPM | OXYGEN SATURATION: 95 % | DIASTOLIC BLOOD PRESSURE: 82 MMHG | TEMPERATURE: 99 F

## 2019-04-30 VITALS
WEIGHT: 241 LBS | DIASTOLIC BLOOD PRESSURE: 72 MMHG | BODY MASS INDEX: 42.7 KG/M2 | HEART RATE: 88 BPM | SYSTOLIC BLOOD PRESSURE: 141 MMHG | HEIGHT: 63 IN

## 2019-04-30 DIAGNOSIS — E78.00 HYPERCHOLESTEREMIA: ICD-10-CM

## 2019-04-30 DIAGNOSIS — I27.21 PULMONARY HYPERTENSION SECONDARY TO INCREASED PVR (CMD): Primary | ICD-10-CM

## 2019-04-30 DIAGNOSIS — I50.42 HEART FAILURE, SYSTOLIC AND DIASTOLIC, CHRONIC (CMD): ICD-10-CM

## 2019-04-30 DIAGNOSIS — R07.89 CHEST WALL PAIN: ICD-10-CM

## 2019-04-30 DIAGNOSIS — R06.02 SHORTNESS OF BREATH: ICD-10-CM

## 2019-04-30 DIAGNOSIS — E11.9 TYPE 2 DIABETES MELLITUS WITHOUT COMPLICATION, WITHOUT LONG-TERM CURRENT USE OF INSULIN (CMD): ICD-10-CM

## 2019-04-30 DIAGNOSIS — I10 ESSENTIAL HYPERTENSION: ICD-10-CM

## 2019-04-30 DIAGNOSIS — G47.33 OBSTRUCTIVE SLEEP APNEA SYNDROME: ICD-10-CM

## 2019-04-30 DIAGNOSIS — M79.672 PAIN OF LEFT HEEL: Primary | ICD-10-CM

## 2019-04-30 PROBLEM — E66.9 OBESITY: Status: ACTIVE | Noted: 2017-12-19

## 2019-04-30 PROCEDURE — 99213 OFFICE O/P EST LOW 20 MIN: CPT

## 2019-04-30 PROCEDURE — 73650 X-RAY EXAM OF HEEL: CPT | Performed by: PHYSICIAN ASSISTANT

## 2019-04-30 PROCEDURE — 99214 OFFICE O/P EST MOD 30 MIN: CPT | Performed by: INTERNAL MEDICINE

## 2019-04-30 RX ORDER — IBUPROFEN 600 MG/1
600 TABLET ORAL EVERY 6 HOURS
Qty: 20 TABLET | Refills: 0 | Status: SHIPPED | OUTPATIENT
Start: 2019-04-30 | End: 2019-05-05

## 2019-04-30 SDOH — HEALTH STABILITY: PHYSICAL HEALTH: ON AVERAGE, HOW MANY DAYS PER WEEK DO YOU ENGAGE IN MODERATE TO STRENUOUS EXERCISE (LIKE A BRISK WALK)?: 0 DAYS

## 2019-04-30 SDOH — HEALTH STABILITY: PHYSICAL HEALTH: ON AVERAGE, HOW MANY MINUTES DO YOU ENGAGE IN EXERCISE AT THIS LEVEL?: 0 MIN

## 2019-04-30 NOTE — ED INITIAL ASSESSMENT (HPI)
Patient report ball of the left foot pain since yesterday. Hurts more with walking. Denies known injury.

## 2019-04-30 NOTE — ED PROVIDER NOTES
Patient Seen in: [facility]    History   Patient presents with: Ball Of Foot Pain    Stated Complaint: left foot injury pain     HPI    Dora Gaines is a 68-year-old female who presents today for evaluation of left heel pain.   She ha HPI.  Constitutional and vital signs reviewed. All other systems reviewed and negative except as noted above.     Physical Exam     ED Triage Vitals [04/30/19 1119]   /82   Pulse 95   Resp 18   Temp 98.5 °F (36.9 °C)   Temp src Temporal   SpO2 95 fracture or dislocation in the left heel. Dictated by: Peg Escobedo MD on 4/30/2019 at 11:57     Approved by: Peg Escobedo MD                [facility]   Clinical impression: Left heel pain  Plan: Patient is informed of her x-ray findings.   I instruct her

## 2019-05-01 ENCOUNTER — OFFICE VISIT (OUTPATIENT)
Dept: INTERNAL MEDICINE CLINIC | Facility: CLINIC | Age: 74
End: 2019-05-01
Payer: MEDICARE

## 2019-05-01 VITALS
OXYGEN SATURATION: 90 % | SYSTOLIC BLOOD PRESSURE: 132 MMHG | HEART RATE: 90 BPM | TEMPERATURE: 99 F | DIASTOLIC BLOOD PRESSURE: 74 MMHG | RESPIRATION RATE: 22 BRPM

## 2019-05-01 DIAGNOSIS — M72.2 PLANTAR FASCIITIS: ICD-10-CM

## 2019-05-01 DIAGNOSIS — M77.32 HEEL SPUR, LEFT: Primary | ICD-10-CM

## 2019-05-01 PROCEDURE — 99213 OFFICE O/P EST LOW 20 MIN: CPT | Performed by: NURSE PRACTITIONER

## 2019-05-01 RX ORDER — PREDNISONE 10 MG/1
TABLET ORAL
Qty: 20 TABLET | Refills: 0 | Status: SHIPPED | OUTPATIENT
Start: 2019-05-01 | End: 2019-08-01 | Stop reason: ALTCHOICE

## 2019-05-01 NOTE — PATIENT INSTRUCTIONS
Heel Spurs    The plantar fascia is a thick, fibrous layer of tissue that covers the bones on the bottom of your foot. It holds the foot bones in an arched position. Plantar fasciitis is a painful swelling of the plantar fascia.   A heel spur is an overgr · Avoid activities that stress the feet: jogging, prolonged standing or walking, contact sports, etc.  · First thing in the morning and before sports, stretch the bottom of your foot. Gently flex your ankle so the toes move toward your knee.   · Icing may h · To lessen severe pain and swelling, your healthcare provider may give you pills or injections. In some cases, you may need a walking cast. Physical therapy, such as ultrasound or a daily stretching program, may also be recommended.  Surgery is rarely need How should I use this medicine? Take this medicine by mouth with a glass of water. Follow the directions on the prescription label. Take this medicine with food. If you are taking this medicine once a day, take it in the morning.  Do not take more medicine · NSAIDS, medicines for pain and inflammation, like ibuprofen or naproxen  · phenytoin  · rifampin  · toxoids  · vaccines  · warfarin  What if I miss a dose? If you miss a dose, take it as soon as you can.  If it is almost time for your next dose, talk to If you are going to have surgery, tell your doctor or health care professional that you have taken this medicine within the last twelve months. Ask your doctor or health care professional about your diet. You may need to lower the amount of salt you eat.

## 2019-05-01 NOTE — PROGRESS NOTES
CHIEF COMPLAINT:     Patient presents with: Follow - Up: Pt was in UC, left foot swollen, painful to walk. Pt states it started on the heel of the foot and radiate to ankle yesterday.        HPI:   Prabhakar Shaw is a 72year old female The patient prese Disp:  Rfl: 5   ASPIRIN EC LOW DOSE 81 MG Oral Tab EC Take 81 mg by mouth daily. Disp:  Rfl: 0   torsemide 20 MG Oral Tab Take 1 tablet (20 mg total) by mouth daily.  Disp: 30 tablet Rfl: 6   albuterol sulfate (2.5 MG/3ML) 0.083% Inhalation Nebu Soln USE 1 for 2 days  Dispense: 20 tablet; Refill: 0  - F/U with podiatry      PLAN:    The patient indicates understanding of these issues and agrees to the plan.

## 2019-05-04 DIAGNOSIS — E11.8 TYPE 2 DIABETES MELLITUS WITH COMPLICATION, WITHOUT LONG-TERM CURRENT USE OF INSULIN (HCC): ICD-10-CM

## 2019-05-04 NOTE — TELEPHONE ENCOUNTER
Last Office Visit: 1-21-19 with AD for diabetes   Last Rx Filled:   Last Labs: 4-2-19 bnp/urine microalbumin/hga1c/lipid/cmp/ua/tsh/t4/cbc  Future Appointment: none    Per protocol to provider

## 2019-05-06 RX ORDER — INSULIN DEGLUDEC 200 U/ML
INJECTION, SOLUTION SUBCUTANEOUS
Qty: 36 ML | Refills: 0 | Status: SHIPPED | OUTPATIENT
Start: 2019-05-06 | End: 2019-08-01 | Stop reason: ALTCHOICE

## 2019-05-14 ENCOUNTER — TELEPHONE (OUTPATIENT)
Dept: INTERNAL MEDICINE CLINIC | Facility: CLINIC | Age: 74
End: 2019-05-14

## 2019-05-14 NOTE — TELEPHONE ENCOUNTER
[facility] [place] would like a call back needs chronic diagnostic code

## 2019-05-14 NOTE — TELEPHONE ENCOUNTER
Spoke with Sisi with [employer]. She is wanting to verify DM or cardiovascular disease diagnosis. Verified diagnoses.

## 2019-05-17 DIAGNOSIS — E11.8 TYPE 2 DIABETES MELLITUS WITH COMPLICATION, WITHOUT LONG-TERM CURRENT USE OF INSULIN (HCC): ICD-10-CM

## 2019-05-17 RX ORDER — PEN NEEDLE, DIABETIC 32GX 5/32"
NEEDLE, DISPOSABLE MISCELLANEOUS
Qty: 100 EACH | Refills: 1 | Status: SHIPPED | OUTPATIENT
Start: 2019-05-17 | End: 2019-09-24

## 2019-05-24 ENCOUNTER — LAB ENCOUNTER (OUTPATIENT)
Dept: LAB | Age: 74
End: 2019-05-24
Attending: INTERNAL MEDICINE
Payer: MEDICARE

## 2019-05-24 DIAGNOSIS — I11.0 HYPERTENSIVE HEART DISEASE WITH HEART FAILURE (HCC): Chronic | ICD-10-CM

## 2019-05-24 DIAGNOSIS — D64.9 ANEMIA, UNSPECIFIED TYPE: ICD-10-CM

## 2019-05-24 PROCEDURE — 36415 COLL VENOUS BLD VENIPUNCTURE: CPT

## 2019-05-24 PROCEDURE — 85025 COMPLETE CBC W/AUTO DIFF WBC: CPT

## 2019-05-24 PROCEDURE — 80048 BASIC METABOLIC PNL TOTAL CA: CPT

## 2019-05-26 DIAGNOSIS — L30.9 ECZEMA, UNSPECIFIED TYPE: ICD-10-CM

## 2019-05-28 RX ORDER — MOMETASONE FUROATE 1 MG/G
CREAM TOPICAL
Qty: 60 G | Refills: 0 | Status: SHIPPED | OUTPATIENT
Start: 2019-05-28 | End: 2019-09-24

## 2019-05-28 NOTE — TELEPHONE ENCOUNTER
No protocol    Requesting MOMETASONE FUROATE 0.1 % External Cream  LOV: 4/13/19  RTC: 3 months  Last Relevant Labs: 5/24/19  Filled: 4/26/19 #60 g with 0 refills    No future appointments.

## 2019-05-30 RX ORDER — HYDRALAZINE HYDROCHLORIDE 50 MG/1
TABLET, FILM COATED ORAL
Qty: 120 TABLET | Refills: 0 | OUTPATIENT
Start: 2019-05-30

## 2019-05-31 NOTE — TELEPHONE ENCOUNTER
Patient calling in requesting a refill for hydrALAzine HCl 50 MG Oral Tab    To be sent to:  [facility], [address] [place], [phone], [phone]

## 2019-06-03 DIAGNOSIS — E11.8 TYPE 2 DIABETES MELLITUS WITH COMPLICATION, WITHOUT LONG-TERM CURRENT USE OF INSULIN (HCC): ICD-10-CM

## 2019-06-03 NOTE — TELEPHONE ENCOUNTER
LMTCB x 1 - need to clarify how pt is taking Hydralazine (dose and sig)    Passed protocol     Last refill:  Entered historically Hydralazine 50 mg   Last rx - 5/24/2018 Hydralazine 100 mg bid (discontinued on 3/26/2019)    LOV:   5/1/2019 Irene Maria RTC?  4/13/2019 Dr Enriqeu Felipa RTC 3 months     No FOV scheduled

## 2019-06-04 RX ORDER — (INSULIN DEGLUDEC AND LIRAGLUTIDE) 100; 3.6 [IU]/ML; MG/ML
INJECTION, SOLUTION SUBCUTANEOUS
Qty: 27 ML | Refills: 0 | Status: SHIPPED | OUTPATIENT
Start: 2019-06-04 | End: 2019-06-24

## 2019-06-04 RX ORDER — POTASSIUM CHLORIDE 20 MEQ/1
TABLET, EXTENDED RELEASE ORAL
Qty: 90 TABLET | Refills: 1 | Status: SHIPPED | OUTPATIENT
Start: 2019-06-04 | End: 2021-01-05 | Stop reason: SDUPTHER

## 2019-06-05 RX ORDER — HYDRALAZINE HYDROCHLORIDE 50 MG/1
100 TABLET, FILM COATED ORAL 2 TIMES DAILY
Qty: 360 TABLET | Refills: 0 | OUTPATIENT
Start: 2019-06-05

## 2019-06-05 NOTE — TELEPHONE ENCOUNTER
I do not see any record of hydralazine being prescribed for patient by [facility].

## 2019-06-06 NOTE — TELEPHONE ENCOUNTER
Appointment 7/15/19 @ 9:45am with Dr. George Lewis. Patient states she has been using Hydralazine on a daily basis (without skipping doses). States she still had refills from her previous primary care physician. Patient states she takes Hydralazine 50m tablets twice daily.

## 2019-06-07 RX ORDER — HYDRALAZINE HYDROCHLORIDE 100 MG/1
100 TABLET, FILM COATED ORAL 2 TIMES DAILY
Qty: 60 TABLET | Refills: 1 | Status: SHIPPED | OUTPATIENT
Start: 2019-06-07 | End: 2019-06-07

## 2019-06-07 NOTE — TELEPHONE ENCOUNTER
Please call and inform patient I have ordered hydralazine 100 mg tablets so she can ONE tablet twice daily. Previously she was taking TWO 50 mg tablets BID.

## 2019-06-08 RX ORDER — HYDRALAZINE HYDROCHLORIDE 100 MG/1
TABLET, FILM COATED ORAL
Qty: 180 TABLET | Refills: 0 | Status: SHIPPED | OUTPATIENT
Start: 2019-06-08 | End: 2019-09-24

## 2019-06-08 NOTE — TELEPHONE ENCOUNTER
Refill requested:   Requested Prescriptions     Pending Prescriptions Disp Refills   • HYDRALAZINE  MG Oral Tab [Pharmacy Med Name: HYDRALAZINE 100MG (HUNDRED MG) TABS] 180 tablet 1     Sig: TAKE 1 TABLET BY MOUTH TWICE DAILY       Last refill:  6-7

## 2019-06-24 ENCOUNTER — LAB ENCOUNTER (OUTPATIENT)
Dept: LAB | Age: 74
End: 2019-06-24
Attending: INTERNAL MEDICINE
Payer: MEDICARE

## 2019-06-24 ENCOUNTER — OFFICE VISIT (OUTPATIENT)
Dept: INTERNAL MEDICINE CLINIC | Facility: CLINIC | Age: 74
End: 2019-06-24
Payer: MEDICARE

## 2019-06-24 VITALS
TEMPERATURE: 98 F | HEIGHT: 62 IN | HEART RATE: 100 BPM | WEIGHT: 244 LBS | BODY MASS INDEX: 44.9 KG/M2 | OXYGEN SATURATION: 90 % | RESPIRATION RATE: 24 BRPM | DIASTOLIC BLOOD PRESSURE: 84 MMHG | SYSTOLIC BLOOD PRESSURE: 134 MMHG

## 2019-06-24 DIAGNOSIS — D64.9 ANEMIA, UNSPECIFIED TYPE: ICD-10-CM

## 2019-06-24 DIAGNOSIS — D64.9 ANEMIA, UNSPECIFIED TYPE: Primary | ICD-10-CM

## 2019-06-24 PROCEDURE — 83540 ASSAY OF IRON: CPT

## 2019-06-24 PROCEDURE — 82607 VITAMIN B-12: CPT

## 2019-06-24 PROCEDURE — 82746 ASSAY OF FOLIC ACID SERUM: CPT

## 2019-06-24 PROCEDURE — 82728 ASSAY OF FERRITIN: CPT

## 2019-06-24 PROCEDURE — 83550 IRON BINDING TEST: CPT

## 2019-06-24 PROCEDURE — 85025 COMPLETE CBC W/AUTO DIFF WBC: CPT

## 2019-06-24 PROCEDURE — 36415 COLL VENOUS BLD VENIPUNCTURE: CPT

## 2019-06-24 PROCEDURE — 99213 OFFICE O/P EST LOW 20 MIN: CPT | Performed by: INTERNAL MEDICINE

## 2019-06-24 NOTE — PROGRESS NOTES
Harjinder Stahl  1945 is a 72year old female. Patient presents with:  Physical      HPI:   Patient was scheduled for a CPX however patient just had a physical 2 months ago. Hence switched over to a lab follow-up.     Current Outpatient Medicat Obesity    • Sleep apnea       Social History:  Social History    Tobacco Use      Smoking status: Former Smoker        Packs/day: 0.25        Years: 20.00        Pack years: 5        Quit date: 2016        Years since quittin.6      Smokeless to

## 2019-06-24 NOTE — PATIENT INSTRUCTIONS
Patient CPX will be done in September 2019. Patient is in agreement. This moment she does not desire any aggressive work-up for anemia.

## 2019-06-25 DIAGNOSIS — E11.8 TYPE 2 DIABETES MELLITUS WITH COMPLICATION, WITHOUT LONG-TERM CURRENT USE OF INSULIN (HCC): ICD-10-CM

## 2019-06-25 RX ORDER — TORSEMIDE 20 MG/1
TABLET ORAL
Qty: 180 TABLET | Refills: 1 | Status: SHIPPED | OUTPATIENT
Start: 2019-06-25 | End: 2019-12-27 | Stop reason: SDUPTHER

## 2019-06-26 RX ORDER — CARVEDILOL 25 MG/1
TABLET ORAL
Qty: 180 TABLET | Refills: 0 | Status: SHIPPED | OUTPATIENT
Start: 2019-06-26 | End: 2019-09-23

## 2019-06-26 NOTE — TELEPHONE ENCOUNTER
Protocol failed for Albuterol - No AAP/ACT in last 12 months    Medication(s) to Refill:   Requested Prescriptions     Pending Prescriptions Disp Refills   • METFORMIN HCL 1000 MG Oral Tab [Pharmacy Med Name: METFORMIN 1000MG TABLETS] 180 tablet 0     Sig:

## 2019-06-26 NOTE — TELEPHONE ENCOUNTER
Patient calling in, requesting an additional med refill for albuterol sulfate (2.5 MG/3ML) 0.083% Inhalation Nebu Soln. ALSO,  Pt wondering if she would be able to get a prescription to help her stop smoking.     LOV: 06/24/19

## 2019-06-27 RX ORDER — ALBUTEROL SULFATE 2.5 MG/3ML
SOLUTION RESPIRATORY (INHALATION)
Qty: 300 ML | Refills: 1 | Status: SHIPPED | OUTPATIENT
Start: 2019-06-27 | End: 2019-09-24

## 2019-06-27 RX ORDER — ALBUTEROL SULFATE 90 UG/1
1 AEROSOL, METERED RESPIRATORY (INHALATION) EVERY 6 HOURS PRN
Qty: 1 INHALER | Refills: 3 | Status: SHIPPED | OUTPATIENT
Start: 2019-06-27 | End: 2019-11-16

## 2019-06-27 NOTE — TELEPHONE ENCOUNTER
Patient calling because she needs \"inhaler\" not nebulizer sent to pharmacy please call to discuss/verify which one

## 2019-07-01 ENCOUNTER — TELEPHONE (OUTPATIENT)
Dept: INTERNAL MEDICINE CLINIC | Facility: CLINIC | Age: 74
End: 2019-07-01

## 2019-07-01 NOTE — TELEPHONE ENCOUNTER
Can try over-the-counter NicoDerm 14 mg daily for 4 weeks followed by 7 mg daily for 4 weeks.   However if she needs a prescription medicine she needs to see me

## 2019-07-02 NOTE — TELEPHONE ENCOUNTER
Pt notified of provider response. Pt verbalized understanding and stated she would try that first and let us know if it does not work.

## 2019-07-03 ENCOUNTER — TELEPHONE (OUTPATIENT)
Dept: CARDIOLOGY | Age: 74
End: 2019-07-03

## 2019-07-03 DIAGNOSIS — I27.21 PULMONARY HYPERTENSION SECONDARY TO INCREASED PVR (CMD): Primary | ICD-10-CM

## 2019-07-05 ENCOUNTER — DOCUMENTATION (OUTPATIENT)
Dept: CARDIOLOGY | Age: 74
End: 2019-07-05

## 2019-07-08 ENCOUNTER — TELEPHONE (OUTPATIENT)
Dept: CARDIOLOGY | Age: 74
End: 2019-07-08

## 2019-07-19 ENCOUNTER — TELEPHONE (OUTPATIENT)
Dept: INTERNAL MEDICINE CLINIC | Facility: CLINIC | Age: 74
End: 2019-07-19

## 2019-07-19 NOTE — TELEPHONE ENCOUNTER
Drug therapy alert received for a medication that Dr. Solo Pastrana does not manage - the form was faxed back to [facility] with managing provider information as well as to Dr Carol Ann Escudero' office.

## 2019-07-23 ENCOUNTER — TELEPHONE (OUTPATIENT)
Dept: INTERNAL MEDICINE CLINIC | Facility: CLINIC | Age: 74
End: 2019-07-23

## 2019-07-23 DIAGNOSIS — M25.561 ACUTE PAIN OF RIGHT KNEE: Primary | ICD-10-CM

## 2019-07-23 NOTE — TELEPHONE ENCOUNTER
ORTHO REFERRAL - PT HAS APPT ON 7/25   Received:  Today   Message Contents   Sarah Barillas [facility] Clinical Staff Cc: EMMANU[facility]LE [facility] Referral Pool   Phone Number: [phone]             .Reason for the order/referral: (R) KNEE PAIN   PCP: Corrina Cat

## 2019-07-27 DIAGNOSIS — E11.8 TYPE 2 DIABETES MELLITUS WITH COMPLICATION, WITHOUT LONG-TERM CURRENT USE OF INSULIN (HCC): ICD-10-CM

## 2019-07-29 RX ORDER — (INSULIN DEGLUDEC AND LIRAGLUTIDE) 100; 3.6 [IU]/ML; MG/ML
INJECTION, SOLUTION SUBCUTANEOUS
Qty: 27 ML | Refills: 1 | Status: SHIPPED | OUTPATIENT
Start: 2019-07-29 | End: 2019-09-24

## 2019-08-01 ENCOUNTER — HOSPITAL ENCOUNTER (OUTPATIENT)
Dept: GENERAL RADIOLOGY | Age: 74
Discharge: HOME | End: 2019-08-01
Attending: INTERNAL MEDICINE
Payer: MEDICARE

## 2019-08-01 ENCOUNTER — OFFICE VISIT (OUTPATIENT)
Dept: INTERNAL MEDICINE CLINIC | Facility: CLINIC | Age: 74
End: 2019-08-01
Payer: MEDICARE

## 2019-08-01 ENCOUNTER — LAB ENCOUNTER (OUTPATIENT)
Dept: LAB | Age: 74
End: 2019-08-01
Attending: INTERNAL MEDICINE
Payer: MEDICARE

## 2019-08-01 VITALS
TEMPERATURE: 99 F | DIASTOLIC BLOOD PRESSURE: 62 MMHG | RESPIRATION RATE: 22 BRPM | OXYGEN SATURATION: 90 % | HEART RATE: 107 BPM | SYSTOLIC BLOOD PRESSURE: 112 MMHG

## 2019-08-01 DIAGNOSIS — M25.572 ACUTE LEFT ANKLE PAIN: Primary | ICD-10-CM

## 2019-08-01 DIAGNOSIS — M25.572 ACUTE LEFT ANKLE PAIN: ICD-10-CM

## 2019-08-01 DIAGNOSIS — M10.9 ACUTE GOUT OF LEFT ANKLE, UNSPECIFIED CAUSE: ICD-10-CM

## 2019-08-01 LAB
ANION GAP SERPL CALC-SCNC: 6 MMOL/L (ref 0–18)
BASOPHILS # BLD AUTO: 0.04 X10(3) UL (ref 0–0.2)
BASOPHILS NFR BLD AUTO: 0.4 %
BUN BLD-MCNC: 25 MG/DL (ref 7–18)
BUN/CREAT SERPL: 20.7 (ref 10–20)
CALCIUM BLD-MCNC: 8.2 MG/DL (ref 8.5–10.1)
CHLORIDE SERPL-SCNC: 107 MMOL/L (ref 98–112)
CO2 SERPL-SCNC: 28 MMOL/L (ref 21–32)
CREAT BLD-MCNC: 1.21 MG/DL (ref 0.55–1.02)
CRP SERPL-MCNC: 3.64 MG/DL (ref ?–0.3)
DEPRECATED RDW RBC AUTO: 48.9 FL (ref 35.1–46.3)
EOSINOPHIL # BLD AUTO: 0.1 X10(3) UL (ref 0–0.7)
EOSINOPHIL NFR BLD AUTO: 1.1 %
ERYTHROCYTE [DISTWIDTH] IN BLOOD BY AUTOMATED COUNT: 14.9 % (ref 11–15)
GLUCOSE BLD-MCNC: 84 MG/DL (ref 70–99)
HCT VFR BLD AUTO: 37.8 % (ref 35–48)
HGB BLD-MCNC: 12.1 G/DL (ref 12–16)
IMM GRANULOCYTES # BLD AUTO: 0.04 X10(3) UL (ref 0–1)
IMM GRANULOCYTES NFR BLD: 0.4 %
LYMPHOCYTES # BLD AUTO: 1.87 X10(3) UL (ref 1–4)
LYMPHOCYTES NFR BLD AUTO: 20.8 %
MCH RBC QN AUTO: 28.8 PG (ref 26–34)
MCHC RBC AUTO-ENTMCNC: 32 G/DL (ref 31–37)
MCV RBC AUTO: 90 FL (ref 80–100)
MONOCYTES # BLD AUTO: 0.68 X10(3) UL (ref 0.1–1)
MONOCYTES NFR BLD AUTO: 7.6 %
NEUTROPHILS # BLD AUTO: 6.26 X10 (3) UL (ref 1.5–7.7)
NEUTROPHILS # BLD AUTO: 6.26 X10(3) UL (ref 1.5–7.7)
NEUTROPHILS NFR BLD AUTO: 69.7 %
OSMOLALITY SERPL CALC.SUM OF ELEC: 296 MOSM/KG (ref 275–295)
PLATELET # BLD AUTO: 315 10(3)UL (ref 150–450)
POTASSIUM SERPL-SCNC: 3.7 MMOL/L (ref 3.5–5.1)
RBC # BLD AUTO: 4.2 X10(6)UL (ref 3.8–5.3)
SED RATE-ML: 60 MM/HR (ref 0–25)
SODIUM SERPL-SCNC: 141 MMOL/L (ref 136–145)
URATE SERPL-MCNC: 11.3 MG/DL (ref 2.6–6)
WBC # BLD AUTO: 9 X10(3) UL (ref 4–11)

## 2019-08-01 PROCEDURE — 86140 C-REACTIVE PROTEIN: CPT

## 2019-08-01 PROCEDURE — 84550 ASSAY OF BLOOD/URIC ACID: CPT

## 2019-08-01 PROCEDURE — 80048 BASIC METABOLIC PNL TOTAL CA: CPT

## 2019-08-01 PROCEDURE — 85025 COMPLETE CBC W/AUTO DIFF WBC: CPT

## 2019-08-01 PROCEDURE — 85652 RBC SED RATE AUTOMATED: CPT

## 2019-08-01 PROCEDURE — 73610 X-RAY EXAM OF ANKLE: CPT | Performed by: INTERNAL MEDICINE

## 2019-08-01 PROCEDURE — 99213 OFFICE O/P EST LOW 20 MIN: CPT | Performed by: INTERNAL MEDICINE

## 2019-08-01 PROCEDURE — 36415 COLL VENOUS BLD VENIPUNCTURE: CPT

## 2019-08-01 RX ORDER — PREDNISONE 50 MG/1
50 TABLET ORAL DAILY
Qty: 5 TABLET | Refills: 0 | Status: SHIPPED | OUTPATIENT
Start: 2019-08-01 | End: 2019-08-06 | Stop reason: ALTCHOICE

## 2019-08-01 NOTE — PROGRESS NOTES
Atlee Dural  1945 is a 72year old female. Patient presents with: Foot Pain      HPI:   Ankle/Foot:   c/o Pain left ankle spontaneous last 3 to 4 days  Denies : Direct trauma. Tingling/numbness none. Radiation of pain none.    Fall none Hyperlipidemia    • Obesity    • Sleep apnea       Social History:  Social History    Tobacco Use      Smoking status: Former Smoker        Packs/day: 0.25        Years: 20.00        Pack years: 5        Quit date: 2016        Years since quittin

## 2019-08-06 ENCOUNTER — OFFICE VISIT (OUTPATIENT)
Dept: INTERNAL MEDICINE CLINIC | Facility: CLINIC | Age: 74
End: 2019-08-06
Payer: MEDICARE

## 2019-08-06 VITALS
RESPIRATION RATE: 24 BRPM | WEIGHT: 239.25 LBS | BODY MASS INDEX: 44.03 KG/M2 | TEMPERATURE: 98 F | OXYGEN SATURATION: 90 % | DIASTOLIC BLOOD PRESSURE: 66 MMHG | SYSTOLIC BLOOD PRESSURE: 120 MMHG | HEIGHT: 62 IN | HEART RATE: 101 BPM

## 2019-08-06 DIAGNOSIS — M10.9 ACUTE GOUT OF LEFT ANKLE, UNSPECIFIED CAUSE: Primary | ICD-10-CM

## 2019-08-06 DIAGNOSIS — H04.123 DRY EYES: ICD-10-CM

## 2019-08-06 PROCEDURE — 99213 OFFICE O/P EST LOW 20 MIN: CPT | Performed by: INTERNAL MEDICINE

## 2019-08-06 NOTE — PROGRESS NOTES
Adiel Dural  1945 is a 72year old female. Patient presents with:   Follow - Up  Referral      HPI:   Ankle/Foot:   Much better       Current Outpatient Medications:  XULTOPHY 100-3.6 UNIT-MG/ML Subcutaneous Solution Pen-injector INJECT 40 U Years since quittin.7      Smokeless tobacco: Former User        Quit date: 2016    Alcohol use: No    Drug use: No       REVIEW OF SYSTEMS:           EXAM:   /66   Pulse 101   Temp 98.4 °F (36.9 °C) (Oral)   Resp 24   Ht 62\"   Wt 23 During a gout attack, the affected joint may become a hot, red, swollen and painful. If you have had one attack of gout, you are likely to have another. An attack of gout can be treated with medicine.  If these attacks become frequent, a daily medicine may · If you have diabetes or high blood pressure, work with your doctor to manage these conditions. · Protect the joint from injury. Trauma can trigger a gout attack.   Follow-up care  Follow up with your healthcare provider, or as advised.   When to seek med · Organ meats, such as liver, kidneys, and sweetbreads  · Legumes, such as dried beans and peas  · Other high fat foods such as gravy, whole milk, and high fat cheeses  · Vegetables such as asparagus, cauliflower, spinach, and mushrooms used to be thought Gout is a disease that affects the joints. It is caused by excess uric acid in your blood that may lead to crystals forming in your joints. Left untreated, it can lead to painful foot and joint deformities and even kidney problems.  But, by treating gout ea · Take any medicines prescribed by your healthcare provider. · Lose weight if you need to. · Reduce high fructose corn syrup in meals and drinks. · Reduce or cut out alcohol, particularly beer, but also red wine and spirits.   · Control blood pressure, d

## 2019-08-06 NOTE — PATIENT INSTRUCTIONS
Gout    Gout is an inflammation of a joint due to a build-up of gout crystals in the joint fluid. This occurs when there is an excess of uric acid (a normal waste product) in the body.  Uric acid builds up in the body when the kidneys are unable to filter · If pain medicines have been prescribed, take them exactly as directed.    Preventing attacks  · Minimize or avoid alcohol use. Excess alcohol intake can cause a gout attack. · Limit these foods and beverages:  ? Organ meats, such as kidneys and liver  ? Gout is a painful condition caused by an excess of uric acid, a waste product made by the body. Uric acid forms crystals that collect in the joints. The immune response to these crystals brings on symptoms of joint pain and swelling.  This is called a gout · Complex carbohydrate foods, including whole grains, brown rice, oats, and beans  · Coffee, in moderation  · Water, approximately 64 ounces per day  Follow-up care  Follow up with your healthcare provider as advised.   When to seek medical advice  Call you Your healthcare provider may prescribe a daily medicine to reduce levels of uric acid. Reducing your uric acid levels may help prevent gout attacks. Allopurinol is one commonly used medicine taken daily to reduce uric acid levels.  Other daily medicines use

## 2019-08-09 RX ORDER — HYDRALAZINE HYDROCHLORIDE 100 MG/1
TABLET, FILM COATED ORAL
Qty: 60 TABLET | Refills: 0 | Status: SHIPPED | OUTPATIENT
Start: 2019-08-09 | End: 2019-08-24

## 2019-08-09 NOTE — TELEPHONE ENCOUNTER
HYDRALAZINE  MG     Last OV relevant to medication: 8-6-2019     Last refill date: 6-8-2019     When pt was asked to return for OV: 9 days    Upcoming appt/reason: 8-     Labs: 8-1-2019

## 2019-08-15 ENCOUNTER — APPOINTMENT (OUTPATIENT)
Dept: LAB | Age: 74
End: 2019-08-15
Attending: INTERNAL MEDICINE
Payer: MEDICARE

## 2019-08-15 DIAGNOSIS — M10.9 ACUTE GOUT OF LEFT ANKLE, UNSPECIFIED CAUSE: ICD-10-CM

## 2019-08-15 LAB
ANION GAP SERPL CALC-SCNC: 6 MMOL/L (ref 0–18)
BUN BLD-MCNC: 10 MG/DL (ref 7–18)
BUN/CREAT SERPL: 11.5 (ref 10–20)
CALCIUM BLD-MCNC: 8.3 MG/DL (ref 8.5–10.1)
CHLORIDE SERPL-SCNC: 110 MMOL/L (ref 98–112)
CO2 SERPL-SCNC: 27 MMOL/L (ref 21–32)
CREAT BLD-MCNC: 0.87 MG/DL (ref 0.55–1.02)
CRP SERPL-MCNC: 0.83 MG/DL (ref ?–0.3)
GLUCOSE BLD-MCNC: 101 MG/DL (ref 70–99)
OSMOLALITY SERPL CALC.SUM OF ELEC: 295 MOSM/KG (ref 275–295)
POTASSIUM SERPL-SCNC: 3.9 MMOL/L (ref 3.5–5.1)
SED RATE-ML: 54 MM/HR (ref 0–25)
SODIUM SERPL-SCNC: 143 MMOL/L (ref 136–145)

## 2019-08-15 PROCEDURE — 85652 RBC SED RATE AUTOMATED: CPT

## 2019-08-15 PROCEDURE — 80048 BASIC METABOLIC PNL TOTAL CA: CPT

## 2019-08-15 PROCEDURE — 36415 COLL VENOUS BLD VENIPUNCTURE: CPT

## 2019-08-15 PROCEDURE — 86140 C-REACTIVE PROTEIN: CPT

## 2019-08-16 ENCOUNTER — OFFICE VISIT (OUTPATIENT)
Dept: INTERNAL MEDICINE CLINIC | Facility: CLINIC | Age: 74
End: 2019-08-16
Payer: MEDICARE

## 2019-08-16 VITALS
HEART RATE: 94 BPM | HEIGHT: 62 IN | BODY MASS INDEX: 44.16 KG/M2 | TEMPERATURE: 99 F | OXYGEN SATURATION: 96 % | WEIGHT: 240 LBS | DIASTOLIC BLOOD PRESSURE: 82 MMHG | RESPIRATION RATE: 24 BRPM | SYSTOLIC BLOOD PRESSURE: 156 MMHG

## 2019-08-16 DIAGNOSIS — M10.9 ACUTE GOUT OF LEFT ANKLE, UNSPECIFIED CAUSE: Primary | ICD-10-CM

## 2019-08-16 PROBLEM — M1A.0720 CHRONIC IDIOPATHIC GOUT OF LEFT ANKLE: Status: ACTIVE | Noted: 2019-08-16

## 2019-08-16 PROBLEM — D64.9 ANEMIA: Status: RESOLVED | Noted: 2019-04-13 | Resolved: 2019-08-16

## 2019-08-16 PROBLEM — M1A.0720 CHRONIC IDIOPATHIC GOUT OF LEFT ANKLE: Chronic | Status: ACTIVE | Noted: 2019-08-16

## 2019-08-16 PROBLEM — L30.9 ECZEMA: Chronic | Status: ACTIVE | Noted: 2018-11-19

## 2019-08-16 PROCEDURE — 99213 OFFICE O/P EST LOW 20 MIN: CPT | Performed by: INTERNAL MEDICINE

## 2019-08-16 RX ORDER — ALLOPURINOL 300 MG/1
300 TABLET ORAL DAILY
Qty: 90 TABLET | Refills: 3 | Status: SHIPPED | OUTPATIENT
Start: 2019-08-16 | End: 2019-09-23

## 2019-08-16 RX ORDER — ERYTHROMYCIN 5 MG/G
1 OINTMENT OPHTHALMIC NIGHTLY
Refills: 3 | COMMUNITY
Start: 2019-08-09 | End: 2020-02-17

## 2019-08-16 NOTE — PROGRESS NOTES
Melanie CRNADALL 1945 is a 72year old female. Patient presents with:   Follow - Up      HPI:   Ankle/Foot:   Much better   Near complete resolution      Current Outpatient Medications:  allopurinol 300 MG Oral Tab Take 1 tablet (300 mg total) b Congestive heart disease ([facility]    • Diabetes ([facility]    • Essential hypertension    • Hyperlipidemia    • Obesity    • Sleep apnea       Social History:  Social History    Tobacco Use      Smoking status: Former Smoker        Packs/day: 0.25        Years: 21

## 2019-08-26 RX ORDER — HYDRALAZINE HYDROCHLORIDE 100 MG/1
TABLET, FILM COATED ORAL
Qty: 60 TABLET | Refills: 0 | Status: SHIPPED | OUTPATIENT
Start: 2019-08-26 | End: 2019-09-23

## 2019-08-26 NOTE — TELEPHONE ENCOUNTER
HYDRALAZINE  MG     Last OV relevant to medication: 8-     Last refill date: 8-9-2019 # 60 tabs with 0 refills     When pt was asked to return for OV:  6 months     Upcoming appt/reason: 9/24/2019     Labs: 8-1-2019 - josh, cbc

## 2019-09-06 ENCOUNTER — MA CHART PREP (OUTPATIENT)
Dept: FAMILY MEDICINE CLINIC | Facility: CLINIC | Age: 74
End: 2019-09-06

## 2019-09-06 ENCOUNTER — TELEPHONE (OUTPATIENT)
Dept: CARDIOLOGY | Age: 74
End: 2019-09-06

## 2019-09-22 ENCOUNTER — TELEPHONE (OUTPATIENT)
Dept: INTERNAL MEDICINE CLINIC | Facility: CLINIC | Age: 74
End: 2019-09-22

## 2019-09-22 DIAGNOSIS — M10.9 ACUTE GOUT OF RIGHT FOOT, UNSPECIFIED CAUSE: Primary | ICD-10-CM

## 2019-09-22 RX ORDER — PREDNISONE 20 MG/1
40 TABLET ORAL DAILY
Qty: 10 TABLET | Refills: 0 | Status: SHIPPED | OUTPATIENT
Start: 2019-09-22 | End: 2019-09-27

## 2019-09-22 NOTE — TELEPHONE ENCOUNTER
Patient's daughter called, stated patient is having severe pain and swelling of right foot for past 2-3 days. Elevation not helping.   States symptoms similar to gout attack patient had in left ankle back in August.  Prednisone 40 mg qd x 5 days sent to ph

## 2019-09-23 ENCOUNTER — OFFICE VISIT (OUTPATIENT)
Dept: INTERNAL MEDICINE CLINIC | Facility: CLINIC | Age: 74
End: 2019-09-23
Payer: MEDICARE

## 2019-09-23 VITALS
DIASTOLIC BLOOD PRESSURE: 56 MMHG | HEART RATE: 116 BPM | OXYGEN SATURATION: 94 % | TEMPERATURE: 99 F | RESPIRATION RATE: 24 BRPM | SYSTOLIC BLOOD PRESSURE: 108 MMHG

## 2019-09-23 DIAGNOSIS — T50.905A URTICARIA DUE TO DRUG ALLERGY: ICD-10-CM

## 2019-09-23 DIAGNOSIS — L50.0 URTICARIA DUE TO DRUG ALLERGY: ICD-10-CM

## 2019-09-23 DIAGNOSIS — E11.8 TYPE 2 DIABETES MELLITUS WITH COMPLICATION, WITHOUT LONG-TERM CURRENT USE OF INSULIN (HCC): ICD-10-CM

## 2019-09-23 DIAGNOSIS — L27.0 DRUG-INDUCED SKIN RASH: Primary | ICD-10-CM

## 2019-09-23 PROCEDURE — 99214 OFFICE O/P EST MOD 30 MIN: CPT | Performed by: INTERNAL MEDICINE

## 2019-09-23 RX ORDER — DIPHENHYDRAMINE HCL 25 MG
25 TABLET ORAL EVERY 6 HOURS PRN
Qty: 21 TABLET | Refills: 0 | Status: SHIPPED | OUTPATIENT
Start: 2019-09-23 | End: 2019-09-30

## 2019-09-23 RX ORDER — LORATADINE 10 MG/1
10 TABLET ORAL DAILY
Qty: 30 TABLET | Refills: 11 | Status: SHIPPED | OUTPATIENT
Start: 2019-09-23 | End: 2020-09-22

## 2019-09-23 NOTE — PATIENT INSTRUCTIONS
Continue prednisone cool showers  Hives (Adult)  Hives are pink or red bumps on the skin. These bumps are also known as wheals. The bumps can itch, burn, or sting. Hives can occur anywhere on the body. They vary in size and shape and can form in clusters. Newer antihistamines, such as loratadine, cetirizine, and fexofenadine, are generally more expensive. But they tend to have fewer side effects, such as drowsiness. They can be taken less often. · Another type of antihistamine is used to treat heartburn.  Aurelia Linger after taking a blood pressure medicine, feeling anxious, and many other things. Symptoms of a medicine reaction can range from very mild to very severe. In most cases, the reaction goes away within 1 to 12 hours.  But it will probably occur again if you ta intended as a substitute for professional medical care. Always follow your healthcare professional's instructions. Instructed to see dermatologist ASAP. Go to the ER if symptoms signs worsen.

## 2019-09-23 NOTE — PROGRESS NOTES
Surinder Machuca   is a 69year old female. Patient presents with:  Rash  Patient noticed rash in the last 7 to 10 days with intense itching. Did not seek any medical attention.   The only new medicine that has been added on has been allopur MOMETASONE FUROATE 0.1 % External Cream APPLY EXTERNALLY TO THE AFFECTED AREA TWICE DAILY AS NEEDED Disp: 60 g Rfl: 0   BD PEN NEEDLE NAHUM U/F 32G X 4 MM Does not apply Misc INJECT 1 PEN UNDER THE SKIN DAILY Disp: 100 each Rfl: 1   Potassium Chloride ER 20 Auscultation: no wheezing/rhonchi/rales. Breath sounds bilaterally: symmetrical.     ASSESSMENT AND PLAN:   Bridgette Ross was seen today for rash.     Diagnoses and all orders for this visit:    Drug-induced skin rash  -     loratadine (CLARITIN) 10 MG Oral Tab; · Don’t scratch the hives. Scratching will delay healing. To reduce itching, apply cool, wet compresses to the skin. · Dress in soft, loose cotton clothing. · Don’t bathe in hot water. This can make the itching worse.   · Apply an ice pack or cool pack wr © 6172-7800 [employer] [address] [facility], [facility] [place]. All rights reserved. This information is not intended as a substitute for professional medical care. Always follow your healthcare professional's instructions.         Reactio Call your healthcare provider right away if any of these occur.   · New symptoms that concern you  · Worsening of your current symptoms, including rash or facial swelling  · Symptoms that are not relieved by the treatment advised  · Fever of 100.4°F (38°C)

## 2019-09-24 ENCOUNTER — APPOINTMENT (OUTPATIENT)
Dept: GENERAL RADIOLOGY | Facility: HOSPITAL | Age: 74
DRG: 606 | End: 2019-09-24
Attending: EMERGENCY MEDICINE
Payer: MEDICARE

## 2019-09-24 ENCOUNTER — APPOINTMENT (OUTPATIENT)
Dept: ULTRASOUND IMAGING | Facility: HOSPITAL | Age: 74
DRG: 606 | End: 2019-09-24
Attending: INTERNAL MEDICINE
Payer: MEDICARE

## 2019-09-24 ENCOUNTER — HOSPITAL ENCOUNTER (INPATIENT)
Facility: HOSPITAL | Age: 74
LOS: 2 days | Discharge: HOME | DRG: 606 | End: 2019-09-26
Attending: EMERGENCY MEDICINE | Admitting: HOSPITALIST
Payer: MEDICARE

## 2019-09-24 DIAGNOSIS — N30.00 ACUTE CYSTITIS WITHOUT HEMATURIA: ICD-10-CM

## 2019-09-24 DIAGNOSIS — R21 RASH: Primary | ICD-10-CM

## 2019-09-24 DIAGNOSIS — N17.9 ACUTE RENAL FAILURE, UNSPECIFIED ACUTE RENAL FAILURE TYPE (HCC): ICD-10-CM

## 2019-09-24 PROCEDURE — 99223 1ST HOSP IP/OBS HIGH 75: CPT | Performed by: INTERNAL MEDICINE

## 2019-09-24 PROCEDURE — 76770 US EXAM ABDO BACK WALL COMP: CPT | Performed by: INTERNAL MEDICINE

## 2019-09-24 PROCEDURE — 71045 X-RAY EXAM CHEST 1 VIEW: CPT | Performed by: EMERGENCY MEDICINE

## 2019-09-24 PROCEDURE — 99223 1ST HOSP IP/OBS HIGH 75: CPT | Performed by: STUDENT IN AN ORGANIZED HEALTH CARE EDUCATION/TRAINING PROGRAM

## 2019-09-24 RX ORDER — ONDANSETRON 2 MG/ML
4 INJECTION INTRAMUSCULAR; INTRAVENOUS EVERY 6 HOURS PRN
Status: DISCONTINUED | OUTPATIENT
Start: 2019-09-24 | End: 2019-09-26

## 2019-09-24 RX ORDER — ALBUTEROL SULFATE 2.5 MG/3ML
2.5 SOLUTION RESPIRATORY (INHALATION) 3 TIMES DAILY
Status: DISCONTINUED | OUTPATIENT
Start: 2019-09-24 | End: 2019-09-26

## 2019-09-24 RX ORDER — HYDRALAZINE HYDROCHLORIDE 100 MG/1
100 TABLET, FILM COATED ORAL 2 TIMES DAILY
COMMUNITY
End: 2020-01-17

## 2019-09-24 RX ORDER — METOCLOPRAMIDE HYDROCHLORIDE 5 MG/ML
5 INJECTION INTRAMUSCULAR; INTRAVENOUS EVERY 8 HOURS PRN
Status: DISCONTINUED | OUTPATIENT
Start: 2019-09-24 | End: 2019-09-26

## 2019-09-24 RX ORDER — SODIUM CHLORIDE 9 MG/ML
INJECTION, SOLUTION INTRAVENOUS CONTINUOUS
Status: ACTIVE | OUTPATIENT
Start: 2019-09-24 | End: 2019-09-24

## 2019-09-24 RX ORDER — LEVOFLOXACIN 5 MG/ML
500 INJECTION, SOLUTION INTRAVENOUS
Status: DISCONTINUED | OUTPATIENT
Start: 2019-09-26 | End: 2019-09-25

## 2019-09-24 RX ORDER — CARVEDILOL 25 MG/1
25 TABLET ORAL 2 TIMES DAILY WITH MEALS
COMMUNITY
End: 2019-12-27

## 2019-09-24 RX ORDER — SODIUM CHLORIDE 9 MG/ML
INJECTION, SOLUTION INTRAVENOUS CONTINUOUS
Status: DISCONTINUED | OUTPATIENT
Start: 2019-09-24 | End: 2019-09-24

## 2019-09-24 RX ORDER — POTASSIUM CHLORIDE 1500 MG/1
20 TABLET, FILM COATED, EXTENDED RELEASE ORAL DAILY
COMMUNITY
End: 2019-12-26

## 2019-09-24 RX ORDER — ALLOPURINOL 300 MG/1
300 TABLET ORAL DAILY
COMMUNITY
End: 2019-09-24

## 2019-09-24 RX ORDER — HYDRALAZINE HYDROCHLORIDE 100 MG/1
TABLET, FILM COATED ORAL
Qty: 180 TABLET | Refills: 0 | Status: SHIPPED | OUTPATIENT
Start: 2019-09-24 | End: 2019-09-24

## 2019-09-24 RX ORDER — SODIUM CHLORIDE 9 MG/ML
INJECTION, SOLUTION INTRAVENOUS CONTINUOUS
Status: DISCONTINUED | OUTPATIENT
Start: 2019-09-24 | End: 2019-09-25

## 2019-09-24 RX ORDER — DIPHENHYDRAMINE HYDROCHLORIDE 50 MG/ML
25 INJECTION INTRAMUSCULAR; INTRAVENOUS ONCE
Status: COMPLETED | OUTPATIENT
Start: 2019-09-24 | End: 2019-09-24

## 2019-09-24 RX ORDER — ACETAMINOPHEN 325 MG/1
650 TABLET ORAL EVERY 6 HOURS PRN
Status: DISCONTINUED | OUTPATIENT
Start: 2019-09-24 | End: 2019-09-26

## 2019-09-24 RX ORDER — ALBUTEROL SULFATE 2.5 MG/3ML
2.5 SOLUTION RESPIRATORY (INHALATION) 2 TIMES DAILY PRN
COMMUNITY
End: 2020-08-19

## 2019-09-24 RX ORDER — CARVEDILOL 25 MG/1
TABLET ORAL
Qty: 180 TABLET | Refills: 0 | Status: SHIPPED | OUTPATIENT
Start: 2019-09-24 | End: 2019-09-24

## 2019-09-24 RX ORDER — TORSEMIDE 20 MG/1
20 TABLET ORAL 2 TIMES DAILY
COMMUNITY
End: 2021-02-16

## 2019-09-24 RX ORDER — LEVOFLOXACIN 5 MG/ML
750 INJECTION, SOLUTION INTRAVENOUS ONCE
Status: COMPLETED | OUTPATIENT
Start: 2019-09-24 | End: 2019-09-24

## 2019-09-24 RX ORDER — FAMOTIDINE 10 MG/ML
20 INJECTION, SOLUTION INTRAVENOUS ONCE
Status: COMPLETED | OUTPATIENT
Start: 2019-09-24 | End: 2019-09-24

## 2019-09-24 RX ORDER — ATORVASTATIN CALCIUM 20 MG/1
TABLET, FILM COATED ORAL
Qty: 90 TABLET | Refills: 0 | Status: SHIPPED | OUTPATIENT
Start: 2019-09-24 | End: 2019-12-27 | Stop reason: SDUPTHER

## 2019-09-24 RX ORDER — HEPARIN SODIUM 5000 [USP'U]/ML
7500 INJECTION, SOLUTION INTRAVENOUS; SUBCUTANEOUS EVERY 12 HOURS SCHEDULED
Status: DISCONTINUED | OUTPATIENT
Start: 2019-09-24 | End: 2019-09-26

## 2019-09-24 RX ORDER — MOMETASONE FUROATE 1 MG/G
CREAM TOPICAL 2 TIMES DAILY PRN
COMMUNITY
End: 2020-02-17

## 2019-09-24 RX ORDER — ALBUTEROL SULFATE 90 UG/1
1 AEROSOL, METERED RESPIRATORY (INHALATION) EVERY 6 HOURS PRN
Status: DISCONTINUED | OUTPATIENT
Start: 2019-09-24 | End: 2019-09-26

## 2019-09-24 RX ORDER — METHYLPREDNISOLONE SODIUM SUCCINATE 125 MG/2ML
80 INJECTION, POWDER, LYOPHILIZED, FOR SOLUTION INTRAMUSCULAR; INTRAVENOUS EVERY 6 HOURS
Status: DISCONTINUED | OUTPATIENT
Start: 2019-09-24 | End: 2019-09-25

## 2019-09-24 RX ORDER — METHYLPREDNISOLONE SODIUM SUCCINATE 125 MG/2ML
60 INJECTION, POWDER, LYOPHILIZED, FOR SOLUTION INTRAMUSCULAR; INTRAVENOUS ONCE
Status: COMPLETED | OUTPATIENT
Start: 2019-09-24 | End: 2019-09-24

## 2019-09-24 RX ORDER — FUROSEMIDE 10 MG/ML
40 INJECTION INTRAMUSCULAR; INTRAVENOUS
Status: DISCONTINUED | OUTPATIENT
Start: 2019-09-24 | End: 2019-09-24

## 2019-09-24 RX ORDER — DEXTROSE MONOHYDRATE 25 G/50ML
50 INJECTION, SOLUTION INTRAVENOUS
Status: DISCONTINUED | OUTPATIENT
Start: 2019-09-24 | End: 2019-09-26

## 2019-09-24 NOTE — CONSULTS
[facility]  Report of Consultation    Prabhakar Shaw Patient Status:  Emergency    1945 MRN [id number]   Location [address] [facility] Attending Maile Licona MD   Hosp Day # 0 PCP Cesario Del Rosario MD       Assessment / Plan drugs.     Allergies:    Allopurinol             RASH    Medications:    Current Facility-Administered Medications:   •  sodium chloride 0.9% IV bolus 1,572 mL, 30 mL/kg (Ideal), Intravenous, Once    Prior to Admission Medications:  torsemide 20 MG Oral Tab (37.4 °C) (Temporal)   Resp 22   Ht 63\"   Wt 245 lb   SpO2 93%   BMI 43.40 kg/m²   Temp (24hrs), Av.3 °F (37.4 °C), Min:99.3 °F (37.4 °C), Max:99.3 °F (37.4 °C)       Intake/Output Summary (Last 24 hours) at 2019 1322  Last data filed at

## 2019-09-24 NOTE — ED INITIAL ASSESSMENT (HPI)
Pt presented to ED c/o SOB and generalized rash to entire body.  Pt has raised bumps and redness all over body pt states she was started on medication for gout on 09/16 and shortly there after noticed the rash, pt states was hoping it would get better but s

## 2019-09-24 NOTE — H&P
[facility]  History and Physical     Jhonathanbarbara Thurman Patient Status:  Emergency    1945 MRN [id number]   Location [address] [facility] Attending Philip Moyer MD   Hosp Day # 0 PCP Elvina Bence, MD     Chief Complaint by mouth daily. Disp:  Rfl:    carvedilol 25 MG Oral Tab Take 25 mg by mouth 2 (two) times daily with meals. Disp:  Rfl:    hydrALAZINE HCl 100 MG Oral Tab Take 100 mg by mouth 2 (two) times daily.  Disp:  Rfl:    metFORMIN HCl 1000 MG Oral Tab Take 1,000 m BMI 43.40 kg/m²   General: No acute distress. Alert and oriented x 3  Respiratory: Clear to auscultation bilaterally. No wheezes. No rhonchi. Cardiovascular: S1, S2. Regular rate and rhythm. No murmurs, rubs or gallops. Equal pulses.    Abdomen: Soft, non meds    Quality:  · DVT Prophylaxis: Heprin   · CODE status: full  · Ramirez: np    Plan of care discussed with pt, Dr Trina Byrne MD  9/24/2019

## 2019-09-24 NOTE — PROGRESS NOTES
[facility] Pharmacy Note:  Renal Dose Adjustment for Metoclopramide (REGLAN)    Ruiz Waldrop has been prescribed Metoclopramide (REGLAN) 10 mg every 8 hours as needed for nausea.     Estimated Creatinine Clearance: 14 mL/min (A) (based on SCr of 3.01 mg/dL (H)

## 2019-09-24 NOTE — PROGRESS NOTES
NURSING ADMISSION NOTE      Patient admitted via Cart  Oriented to room. Safety precautions initiated. Bed in low position. Call light in reach. Patient with genralized rash on her body, c/o itching on back and arms.  Tele shows Sinus Tachycardia, H

## 2019-09-24 NOTE — TELEPHONE ENCOUNTER
Passed protocol     Last refill:  6/26/2019 Metformin 1000 mg #180 NR  6/26/2019 Carvedilol 25 mg #180 NR  6/8/2019 Hydralazine 100 mg #180 NR    HA1C - 4/2/2019    LOV:   9/23/2019 Dr Lexie Ivy RTNATHALIE ?   FOV scheduled 2/17/2020

## 2019-09-24 NOTE — PROGRESS NOTES
[facility] Pharmacy Progress Note:  Anticoagulation Weight Dose Adjustment for heparin    Greta Ding is a 69year old female who has been prescribed heparin for VTE prophylaxis.       Estimated Creatinine Clearance: 14 mL/min (A) (based on SCr of 3.01 mg/dL

## 2019-09-24 NOTE — ED PROVIDER NOTES
Patient Seen in: [facility] Emergency Department      History   Patient presents with:  Dyspnea WHITNEY SOB (respiratory)    Stated Complaint: whitney    HPI    82-year-old -American female who presents to the emergency room today for complaint of ra Temporal   SpO2 92 %   O2 Device None (Room air)       Current:/75   Pulse 108   Temp 99.3 °F (37.4 °C) (Temporal)   Resp 22   Ht 160 cm (5' 3\")   Wt 109.7 kg   SpO2 94%   BMI 42.83 kg/m²         Physical Exam    Obese Afro-American female who is si Leukocyte Esterase Urine Large (*)     WBC Urine >50 (*)     RBC URINE >10 (*)     Bacteria Urine 3+ (*)     Squamous Epi.  Cells Large (*)     Hyaline Casts Present (*)     Mucous Urine 2+ (*)     WBC Clump Present (*)     All other components within asif BLUE   RAINBOW DRAW LAVENDER   RAINBOW DRAW LIGHT GREEN   RAINBOW DRAW GOLD   BLOOD CULTURE   BLOOD CULTURE   URINE CULTURE, ROUTINE     EKG    Rate, intervals and axes as noted on EKG Report.   Rate: 106 bpm  Rhythm: Sinus tachycardia  Reading: Sinus tachy have some early sepsis as well. Lactic acid was slightly elevated here today 2.9. Patient was feeling better after some IV steroids and Benadryl for her rash. She will be admitted to the medical floor for further work-up treatment and evaluation.   Ivana

## 2019-09-24 NOTE — ED NOTES
Nurse to Nurse report given to [facility]. Pt resting comfortably on cart VSS. Patient transport ordered.

## 2019-09-24 NOTE — TREATMENT PLAN
Pt with sinus tachycardia, MARIA G- concerns for insensible fluid losses  Her CXR is concerning for some fluid  Discussed with nephrology- plan is to hydrate and hold off lasix at this time and repeat labs in AM

## 2019-09-24 NOTE — PROGRESS NOTES
[facility] Pharmacy Note: Antimicrobial Weight Dose Adjustment for: piperacillin/tazobactam (Cassandra Pelayo)    Surinder Machuca is a 69year old female who has been prescribed piperacillin/tazobactam (ZOSYN) 3.375 g every x 1 in ER.   CrCl is estimated creatinine clearanc

## 2019-09-24 NOTE — CONSULTS
[facility]  Cardiology Consultation    Ant Goodwin Patient Status:  Inpatient    1945 MRN [id number]   [facility] 4NW-A Attending Marcelo Griffin MD   Hosp Day # 0 PCP Cyn Ross MD     Reason for Consultation:  chf medication    torsemide 20 MG Oral Tab Take 20 mg by mouth 2 (two) times daily. Disp:  Rfl:    Potassium Chloride ER 20 MEQ Oral Tab CR Take 20 mEq by mouth daily. Disp:  Rfl:    carvedilol 25 MG Oral Tab Take 25 mg by mouth 2 (two) times daily with meals. Nebulization, TID  •  Albuterol Sulfate  (90 Base) MCG/ACT inhaler 1 puff, 1 puff, Inhalation, Q6H PRN  •  methylPREDNISolone Sodium Succ (Solu-MEDROL) injection 80 mg, 80 mg, Intravenous, Q6H  •  diphenhydrAMINE HCl (BENADRYL) 25 mg in sodium chlor kg)      Physical Exam:   General: Alert and oriented x 3. No apparent distress. HEENT: edematous face;  anicteric sclera. No jvd; carotids: no bruits; no tm; mouth moist. No stridor. Cardiac: Regular rate and rhythm.  S1, S2 normal. no pericardial rub,

## 2019-09-24 NOTE — PROGRESS NOTES
[facility] Pharmacy Note:  Renal Adjustment for levofloxacin [facility])    Eddie Freeman is a 69year old female who has been prescribed levofloxacin (LEVAQUIN) 500 mg every 48 hrs.   CrCl is estimated creatinine clearance is 14 mL/min (A) (based on SCr of 3.01

## 2019-09-24 NOTE — HISTORICAL OFFICE NOTE
Patient ID: Ms. Reuben Lopez is a pleasant 72year old lady    Chief Complaint   Patient presents with   • Congestive Heart Failure   pulmonary HTN     HPI:  Ms. Esme Esparza is a pleasant 68-year-old lady with a history of systolic and diastolic heart fail Substance and Sexual Activity   • Alcohol use: Not Currently   • Drug use: Not on file   • Sexual activity: Not on file   Lifestyle   • Physical activity:   Days per week: 0 days   Minutes per session: 0 min   • Stress: Not on file   Relationships   • So TABLETS 3 TIMES DAILY. No current facility-administered medications for this visit. Review of Systems:Negative except for the above stated.    ROS    Constitution: Negative for fever, chills or changes in weight  HEENT: Negative for any new hearing catheterization that did not reveal significant coronary artery disease.  We will continue to recommend a low-fat low-cholesterol diet as well as regular exercise program as she is able to tolerate and weight loss and will follow this very nice lady in [facility]

## 2019-09-25 ENCOUNTER — APPOINTMENT (OUTPATIENT)
Dept: CV DIAGNOSTICS | Facility: HOSPITAL | Age: 74
DRG: 606 | End: 2019-09-25
Attending: INTERNAL MEDICINE
Payer: MEDICARE

## 2019-09-25 PROCEDURE — 99232 SBSQ HOSP IP/OBS MODERATE 35: CPT | Performed by: NURSE PRACTITIONER

## 2019-09-25 PROCEDURE — 99233 SBSQ HOSP IP/OBS HIGH 50: CPT | Performed by: INTERNAL MEDICINE

## 2019-09-25 PROCEDURE — 99233 SBSQ HOSP IP/OBS HIGH 50: CPT | Performed by: HOSPITALIST

## 2019-09-25 PROCEDURE — 93306 TTE W/DOPPLER COMPLETE: CPT | Performed by: INTERNAL MEDICINE

## 2019-09-25 RX ORDER — METHYLPREDNISOLONE SODIUM SUCCINATE 125 MG/2ML
60 INJECTION, POWDER, LYOPHILIZED, FOR SOLUTION INTRAMUSCULAR; INTRAVENOUS EVERY 6 HOURS
Status: DISCONTINUED | OUTPATIENT
Start: 2019-09-25 | End: 2019-09-25

## 2019-09-25 RX ORDER — DEXTROSE MONOHYDRATE 25 G/50ML
50 INJECTION, SOLUTION INTRAVENOUS
Status: DISCONTINUED | OUTPATIENT
Start: 2019-09-25 | End: 2019-09-25

## 2019-09-25 RX ORDER — DIPHENHYDRAMINE HCL 25 MG
25 CAPSULE ORAL EVERY 4 HOURS PRN
Status: DISCONTINUED | OUTPATIENT
Start: 2019-09-25 | End: 2019-09-26

## 2019-09-25 RX ORDER — DIPHENHYDRAMINE HYDROCHLORIDE 50 MG/ML
12.5 INJECTION INTRAMUSCULAR; INTRAVENOUS EVERY 4 HOURS PRN
Status: DISCONTINUED | OUTPATIENT
Start: 2019-09-25 | End: 2019-09-26

## 2019-09-25 RX ORDER — CARVEDILOL 12.5 MG/1
12.5 TABLET ORAL 2 TIMES DAILY WITH MEALS
Status: DISCONTINUED | OUTPATIENT
Start: 2019-09-25 | End: 2019-09-26

## 2019-09-25 RX ORDER — METHYLPREDNISOLONE SODIUM SUCCINATE 125 MG/2ML
60 INJECTION, POWDER, LYOPHILIZED, FOR SOLUTION INTRAMUSCULAR; INTRAVENOUS EVERY 8 HOURS
Status: DISCONTINUED | OUTPATIENT
Start: 2019-09-25 | End: 2019-09-26

## 2019-09-25 NOTE — PROGRESS NOTES
[facility]  Cardiology Progress Note    Subjective:  No chest pain or shortness of breath. No edema. She reports feeling \"much better\" today. Rash is diffuse and looks angry but per patient is definitely improved.     Objective:  /60 (BP Loc eosinophilia. 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   ·    Eosinophilic interstitial nephritis  ·    COPD w/ rec

## 2019-09-25 NOTE — PLAN OF CARE
Pt a/o x4. Oxygen saturation high 90s on room air. SOB on exertion noted pt said she always has some shortness of breath with activity, recovered quickly. US of the kidneys done tonight. Stated she feels much better.  Generalized rash noted but fading away

## 2019-09-25 NOTE — PLAN OF CARE
Problem: CARDIOVASCULAR - ADULT  Goal: Maintains optimal cardiac output and hemodynamic stability  Description  INTERVENTIONS:  - Monitor vital signs, rhythm, and trends  - Monitor for bleeding, hypotension and signs of decreased cardiac output  - Evalua assess patient for signs and symptoms of electrolyte imbalances  - Administer electrolyte replacement as ordered  - Monitor response to electrolyte replacements, including rhythm and repeat lab results as appropriate  - Fluid restriction as ordered  - Inst placed back on O2 2L NC. Pt is very short of breath with exertion . Noted short of breath at rest as well. Generalized rash present but improving per pt and rash seems to be slightly subsided this afternoon compared to this morning.  Will continue to monit

## 2019-09-25 NOTE — PROGRESS NOTES
[facility]  Progress Note     Melanie Knows Patient Status:  Inpatient    1945 MRN [id number]   [facility] 4NW-A Attending Gelacio New MD   Hosp Day # 1 PCP Michelle Robles MD     Chief Complaint: sob rash    S: Patient stil Q6H   • diphenhydrAMINE (BENADRYL) IVPB  25 mg Intravenous Q6H   • Heparin Sodium (Porcine)  7,500 Units Subcutaneous 2 times per day   • Insulin Aspart Pen  2-10 Units Subcutaneous TID CC and HS   • [START ON 9/26/2019] levofloxacin  500 mg Intravenous Q4

## 2019-09-25 NOTE — PAYOR COMM NOTE
--------------  ADMISSION REVIEW     PayorStacruz Mandujano MA [facility]  Subscriber #:  [id number]  Authorization Number: [id number]    Admit date: 9/24/19  Admit time: 1442       Admitting Physician: Manas Husain MD  Attending Physician:  Maryana Fields MD  Primary Care O2 Device None (Room air)     Current:/75   Pulse 108   Temp 99.3 (Temporal)   Resp 22   Ht 160 cm (5' 3\")   Wt 109.7 kg   SpO2 94%   BMI 42.83       Physical Exam  Vital signs show she is  tachycardic and has a low-grade temperature of  99.3, blo Esterase Urine Small (*)     WBC Urine 5-10 (*)     Bacteria Urine Rare (*)     Squamous Epi.  Cells Moderate (*)     Hyaline Casts Present (*)     Mucous Urine 1+ (*)     All other components within normal limits   POCT GLUCOSE - Abnormal; Notable for the broad-spectrum antibiotics for the UTIs patient also may have some early sepsis as well. Lactic acid was slightly elevated here today 2.9. Patient was feeling better after some IV steroids and Benadryl for her rash.   She will be admitted to the medical  Oral Tab   erythromycin 5 MG/GM Ophthalmic Ointment   Albuterol Sulfate  (90 Base) MCG/ACT Inhalation Aero Soln   Sacubitril-Valsartan (ENTRESTO)  MG Oral Tab   atorvastatin 20 MG Oral Tab   ASPIRIN EC LOW DOSE 81 MG Oral Tab EC   albuterol coelho PLAN- obtain renal US; re-eval urine sediment; check urine eos / lytes     2) Diffuse rash- likely hypertensitivity to allopurinol (started 9/16 per pt); no other new meds.  Consider steroid trial- will defer to Dr. Rosa Marshall     3) Chronic systolic and diasto worsening renal function  2. Iv diuresis  3. Hold carvedilol tonight (given rash and allergic reaction since it is a beta blocker). 4. Check an cosme since she is on hydralazine.    5. Echo in am.       9/25:    HOSPITALIST:  S: Patient still with sob with pul HTN- normally on entresto / loop diuretics- held with MARIA G. Compensated.       4) DM / HTN     5) COPD- stopped smoking recently     6) Abnormal UA- repeat without clear UTI- will d/c levaquin    Lab Results   Component Value Date     WBC 20.6 09/25/2019 so.       NURSING:  Attempted to wean off O2 this morning, Pt desats to 87% on RA, placed back on O2 2L NC. Pt is very short of breath with exertion . Noted short of breath at rest as well.   Generalized rash present but improving per pt and rash seems to b Date Action Dose Route User    9/25/2019 0549 Given 80 mg Intravenous Nela Maxwell RN    9/25/2019 0031 Given 80 mg Intravenous Nela Maxwell RN    9/24/2019 1758 Given 80 mg Intravenous Robles Yip RN      methylPREDNISolone Sodium Succ

## 2019-09-25 NOTE — CM/SW NOTE
Caregiver resources given as requested. Senior services contacted to evaluate for additional home services.      Laura ZELAYA, 09/25/19, 2:11 PM

## 2019-09-25 NOTE — PROGRESS NOTES
[facility]  Nephrology Progress Note    Codey Loja Attending:  Christie Og MD       Assessment and Plan:    1) MARIA G- likely due to acute interstitial nephritis as result of allopurinol and is consistent with marked peripheral eosinophilia and eo 09/25/2019    HCT 34.4 09/25/2019    .0 09/25/2019    CREATSERUM 2.37 09/25/2019    BUN 64 09/25/2019     09/25/2019    K 4.1 09/25/2019     09/25/2019    CO2 19.0 09/25/2019     09/25/2019    CA 7.0 09/25/2019    ALB 2.7 09/25/20 bedside.           Ethan De Oliveira  9/25/2019  8:30 AM

## 2019-09-25 NOTE — PROGRESS NOTES
09/25/19 1105   Clinical Encounter Type   Visited With Patient   Patient's Supportive Strategies/Resources  made a referral to [address] [place] [address] in [facility], as requested by the patient.   Thanh Uribe came to visit her when in

## 2019-09-25 NOTE — HOME CARE LIAISON
Received referral from [address] [place], [place] [address]steve Bullock Met with patient at the bedside to discuss home health services. Patient is declining hh services, stating that she just wants an increase in her care giving hours. Informed Laura CRUZ.

## 2019-09-25 NOTE — RESPIRATORY THERAPY NOTE
Patient has been diagnosed with sleep apnea in the past, but does not wear a CPAP at home. She does not wish to use one here. She was educated regarding the risks of refusing CPAP mask.

## 2019-09-25 NOTE — PLAN OF CARE
Respirations non-labored, O2 sat 93% on room air. Patient denies shortness of breath, denies tongue swelling nor throat discomfort.

## 2019-09-26 ENCOUNTER — TELEPHONE (OUTPATIENT)
Dept: INTERNAL MEDICINE CLINIC | Facility: CLINIC | Age: 74
End: 2019-09-26

## 2019-09-26 VITALS
WEIGHT: 241.63 LBS | OXYGEN SATURATION: 98 % | DIASTOLIC BLOOD PRESSURE: 63 MMHG | HEART RATE: 103 BPM | BODY MASS INDEX: 42.81 KG/M2 | TEMPERATURE: 99 F | RESPIRATION RATE: 18 BRPM | SYSTOLIC BLOOD PRESSURE: 148 MMHG | HEIGHT: 63 IN

## 2019-09-26 PROCEDURE — 99232 SBSQ HOSP IP/OBS MODERATE 35: CPT | Performed by: NURSE PRACTITIONER

## 2019-09-26 PROCEDURE — 99232 SBSQ HOSP IP/OBS MODERATE 35: CPT | Performed by: INTERNAL MEDICINE

## 2019-09-26 NOTE — DISCHARGE PLANNING
NURSING DISCHARGE NOTE    Discharged Home via Wheelchair. Accompanied by Family member  Belongings Taken by patient/family. PIV removed. All discharge instructions reviewed. All needs addressed.

## 2019-09-26 NOTE — PROGRESS NOTES
[facility]  Cardiology Progress Note    Subjective:  No chest pain or shortness of breath. Feeling better each day.     Objective:  /63 (BP Location: Right arm)   Pulse 103   Temp 99 °F (37.2 °C) (Oral)   Resp 18   Ht 5' 3\" (1.6 m)   Wt 241 lb Normally on entresto/coreg/hydralazine - all held in setting of MARIA G/rash.   ·    Eosinophilic interstitial nephritis  ·    COPD w/ recent tobacco cessation    Plan:     - Stop allopurinol permanently  - Nephrology notes reviewed - ok to resume Entresto/Tors

## 2019-09-26 NOTE — TELEPHONE ENCOUNTER
Jeyson called as an FYI for doctor to let him know patient was admitted to hospital 9-24-19/EDW DINA CASANOVA

## 2019-09-26 NOTE — PROGRESS NOTES
[facility]  Nephrology Progress Note    Jen Zheng Attending:  Tavares Banegas MD       Assessment and Plan:    1) MARIA G- likely due to acute interstitial nephritis as result of allopurinol and is consistent with marked peripheral eosinophilia and eo extremities  Skin: Warm and dry, diffuse rash       Labs:   Lab Results   Component Value Date    WBC 24.2 09/26/2019    HGB 10.6 09/26/2019    HCT 33.2 09/26/2019    .0 09/26/2019    CREATSERUM 1.48 09/26/2019    BUN 55 09/26/2019     09/26/2 family by bedside.           Ethan De Oliveira  9/26/2019  1009 AM

## 2019-09-27 ENCOUNTER — PATIENT OUTREACH (OUTPATIENT)
Dept: CASE MANAGEMENT | Age: 74
End: 2019-09-27

## 2019-09-27 ENCOUNTER — TELEPHONE (OUTPATIENT)
Dept: INTERNAL MEDICINE CLINIC | Facility: CLINIC | Age: 74
End: 2019-09-27

## 2019-09-27 DIAGNOSIS — M17.11 PRIMARY OSTEOARTHRITIS OF RIGHT KNEE: ICD-10-CM

## 2019-09-27 DIAGNOSIS — D72.12 DRESS SYNDROME: ICD-10-CM

## 2019-09-27 DIAGNOSIS — J44.9 CHRONIC OBSTRUCTIVE PULMONARY DISEASE, UNSPECIFIED COPD TYPE (HCC): Chronic | ICD-10-CM

## 2019-09-27 DIAGNOSIS — R21 RASH: ICD-10-CM

## 2019-09-27 DIAGNOSIS — N30.00 ACUTE CYSTITIS WITHOUT HEMATURIA: ICD-10-CM

## 2019-09-27 DIAGNOSIS — I27.20 PULMONARY HTN (HCC): Primary | Chronic | ICD-10-CM

## 2019-09-27 DIAGNOSIS — T50.905A DRESS SYNDROME: ICD-10-CM

## 2019-09-27 DIAGNOSIS — Z02.9 ENCOUNTERS FOR UNSPECIFIED ADMINISTRATIVE PURPOSE: ICD-10-CM

## 2019-09-27 DIAGNOSIS — N17.9 ACUTE RENAL FAILURE, UNSPECIFIED ACUTE RENAL FAILURE TYPE (HCC): ICD-10-CM

## 2019-09-27 NOTE — TELEPHONE ENCOUNTER
Trang Lindquist from [facility] called to make hospital follow up appointment for patient; she also requested that we let doctor know patient needs a new nebulizer as well as a walker; please begin process of ordering both of these per patient request

## 2019-09-27 NOTE — PROGRESS NOTES
Initial Post Discharge Follow Up   Discharge Date: 9/26/19  Contact Date: 9/27/2019    Consent Verification:  Assessment Completed With: Patient  HIPAA Verified?   Yes    Discharge Dx:    Rash, Drug hypersensitivity/ DRESS 2/2 allopurinol, Acute renal fa Patient reports she needs help getting in and out of the tub, patient states her granddaughter helps her. • Were you given a specific diet to follow at discharge?    no      Medications:     Current Outpatient Medications:  torsemide 20 MG Oral Tab Take 2 • When you were leaving the hospital were any medication changes discussed with you? yes  • May I go over your medications with you to make sure we are not missing anything?no, Patient states that her medications were reviewed with her today (with a nu symptoms, when to call the doctor and when to call 911. Patient verbalized understanding of these. NCM instructed patient to call PCP with any questions or needs, she states she will.     [x]  Discharge Summary, reviewed with patient, and orders reviewed an

## 2019-09-27 NOTE — PAYOR COMM NOTE
--------------  DISCHARGE REVIEW    Arianna Erica MA [facility]  Subscriber #:  [id number]  Authorization Number: [id number]    Admit date: 9/24/19  Admit time:  1442  Discharge Date: 9/26/2019  1:49 PM     Admitting Physician: Ajay Mast MD  Attending Kimberlyi

## 2019-09-27 NOTE — TELEPHONE ENCOUNTER
Kelly Mascorro from [address] [place] [address] back to give [address] ph number to call for [employer]um[employer] Morse to get list of approved medical suppliers # [phone].   Call back as needed/Thu

## 2019-09-27 NOTE — TELEPHONE ENCOUNTER
Called Servando Florentino to discuss pt's need for walker and nebulizer. LVM to call office back. Nebulizer not typically covered by insurance and can be obtained with rx from a pharmacy. We have a small list of pharmacies that pt's can get one at cheaper.      Also c

## 2019-09-30 ENCOUNTER — OFFICE VISIT (OUTPATIENT)
Dept: INTERNAL MEDICINE CLINIC | Facility: CLINIC | Age: 74
End: 2019-09-30
Payer: MEDICARE

## 2019-09-30 ENCOUNTER — LAB ENCOUNTER (OUTPATIENT)
Dept: LAB | Age: 74
DRG: 607 | End: 2019-09-30
Attending: INTERNAL MEDICINE
Payer: MEDICARE

## 2019-09-30 ENCOUNTER — HOSPITAL ENCOUNTER (OUTPATIENT)
Dept: GENERAL RADIOLOGY | Age: 74
Discharge: HOME | DRG: 607 | End: 2019-09-30
Attending: INTERNAL MEDICINE
Payer: MEDICARE

## 2019-09-30 VITALS
TEMPERATURE: 100 F | HEART RATE: 110 BPM | RESPIRATION RATE: 24 BRPM | SYSTOLIC BLOOD PRESSURE: 122 MMHG | OXYGEN SATURATION: 90 % | DIASTOLIC BLOOD PRESSURE: 68 MMHG

## 2019-09-30 DIAGNOSIS — T50.905A DRESS SYNDROME: ICD-10-CM

## 2019-09-30 DIAGNOSIS — N17.9 AKI (ACUTE KIDNEY INJURY) (HCC): ICD-10-CM

## 2019-09-30 DIAGNOSIS — R21 RASH: Primary | ICD-10-CM

## 2019-09-30 DIAGNOSIS — D72.12 DRESS SYNDROME: ICD-10-CM

## 2019-09-30 DIAGNOSIS — R05.9 COUGH: ICD-10-CM

## 2019-09-30 DIAGNOSIS — R21 RASH: ICD-10-CM

## 2019-09-30 PROCEDURE — 83880 ASSAY OF NATRIURETIC PEPTIDE: CPT

## 2019-09-30 PROCEDURE — 99495 TRANSJ CARE MGMT MOD F2F 14D: CPT | Performed by: INTERNAL MEDICINE

## 2019-09-30 PROCEDURE — 85025 COMPLETE CBC W/AUTO DIFF WBC: CPT

## 2019-09-30 PROCEDURE — 36415 COLL VENOUS BLD VENIPUNCTURE: CPT

## 2019-09-30 PROCEDURE — 85379 FIBRIN DEGRADATION QUANT: CPT

## 2019-09-30 PROCEDURE — 71046 X-RAY EXAM CHEST 2 VIEWS: CPT | Performed by: INTERNAL MEDICINE

## 2019-09-30 PROCEDURE — 80048 BASIC METABOLIC PNL TOTAL CA: CPT

## 2019-09-30 RX ORDER — HYDROXYZINE HYDROCHLORIDE 10 MG/1
10 TABLET, FILM COATED ORAL EVERY 6 HOURS PRN
Qty: 60 TABLET | Refills: 0 | Status: SHIPPED | OUTPATIENT
Start: 2019-09-30 | End: 2019-10-15

## 2019-09-30 RX ORDER — PREDNISONE 1 MG/1
5 TABLET ORAL 2 TIMES DAILY
Qty: 30 TABLET | Refills: 2 | Status: SHIPPED | OUTPATIENT
Start: 2019-09-30 | End: 2019-10-01

## 2019-09-30 NOTE — PATIENT INSTRUCTIONS
She has appointment with her cardiologist tomorrow. To apply Caladryl lotion locally. See me in the office 3 to 4 days.   Go to ER if symptoms signs worsen

## 2019-09-30 NOTE — TELEPHONE ENCOUNTER
Failed protocol    Requesting albuterol sulfate (2.5 MG/3ML) 0.083% Inhalation Nebu Soln  LOV: 9/30/19  RTC: 4 days  Last Relevant Labs: 9/26/19  Filled: 6/27/19 #300 ml with 1 refills    Future Appointments   Date Time Provider Marline Vidal   2/17/20

## 2019-09-30 NOTE — PROGRESS NOTES
Emily Doherty [facility] [address] is a 69year old female.     Patient presents with:  Swelling  –Better recent discharge from the hospital does complain of some cough a little shortness of breath more than anything else she is complaining of intense itching Ophthalmic Ointment Place 1 Application into both eyes nightly. Disp:  Rfl: 3   Albuterol Sulfate  (90 Base) MCG/ACT Inhalation Aero Soln Inhale 1 puff into the lungs every 6 (six) hours as needed for Wheezing.  Disp: 1 Inhaler Rfl: 3   Sacubitril- PLAN:   Francoise Mendiola was seen today for swelling. Diagnoses and all orders for this visit:    Rash  -     CBC WITH DIFFERENTIAL WITH PLATELET; Future  -     BASIC METABOLIC PANEL (8); Future  -     predniSONE 5 MG Oral Tab;  Take 1 tablet (5 mg total) by mouth

## 2019-10-01 ENCOUNTER — APPOINTMENT (OUTPATIENT)
Dept: CT IMAGING | Facility: HOSPITAL | Age: 74
DRG: 607 | End: 2019-10-01
Attending: EMERGENCY MEDICINE
Payer: MEDICARE

## 2019-10-01 ENCOUNTER — APPOINTMENT (OUTPATIENT)
Dept: GENERAL RADIOLOGY | Facility: HOSPITAL | Age: 74
DRG: 607 | End: 2019-10-01
Attending: EMERGENCY MEDICINE
Payer: MEDICARE

## 2019-10-01 ENCOUNTER — HOSPITAL ENCOUNTER (INPATIENT)
Facility: HOSPITAL | Age: 74
LOS: 3 days | Discharge: HOME | DRG: 607 | End: 2019-10-04
Attending: EMERGENCY MEDICINE | Admitting: HOSPITALIST
Payer: MEDICARE

## 2019-10-01 ENCOUNTER — OFFICE VISIT (OUTPATIENT)
Dept: CARDIOLOGY | Age: 74
End: 2019-10-01

## 2019-10-01 ENCOUNTER — TELEPHONE (OUTPATIENT)
Dept: INTERNAL MEDICINE CLINIC | Facility: CLINIC | Age: 74
End: 2019-10-01

## 2019-10-01 VITALS
HEART RATE: 70 BPM | SYSTOLIC BLOOD PRESSURE: 102 MMHG | BODY MASS INDEX: 42.52 KG/M2 | HEIGHT: 63 IN | WEIGHT: 240 LBS | DIASTOLIC BLOOD PRESSURE: 60 MMHG

## 2019-10-01 DIAGNOSIS — I50.42 HEART FAILURE, SYSTOLIC AND DIASTOLIC, CHRONIC (CMD): ICD-10-CM

## 2019-10-01 DIAGNOSIS — E83.51 HYPOCALCEMIA: ICD-10-CM

## 2019-10-01 DIAGNOSIS — R09.02 HYPOXIA: ICD-10-CM

## 2019-10-01 DIAGNOSIS — R07.89 CHEST WALL PAIN: ICD-10-CM

## 2019-10-01 DIAGNOSIS — I10 ESSENTIAL HYPERTENSION: ICD-10-CM

## 2019-10-01 DIAGNOSIS — R79.89 ELEVATED D-DIMER: Primary | ICD-10-CM

## 2019-10-01 DIAGNOSIS — R21 RASH: Primary | ICD-10-CM

## 2019-10-01 DIAGNOSIS — E11.9 TYPE 2 DIABETES MELLITUS WITHOUT COMPLICATION, WITHOUT LONG-TERM CURRENT USE OF INSULIN (CMD): ICD-10-CM

## 2019-10-01 DIAGNOSIS — R06.00 DYSPNEA ON EXERTION: ICD-10-CM

## 2019-10-01 DIAGNOSIS — E78.00 HYPERCHOLESTEREMIA: ICD-10-CM

## 2019-10-01 DIAGNOSIS — G47.33 OBSTRUCTIVE SLEEP APNEA SYNDROME: Primary | ICD-10-CM

## 2019-10-01 DIAGNOSIS — I27.21 PULMONARY HYPERTENSION SECONDARY TO INCREASED PVR (CMD): ICD-10-CM

## 2019-10-01 PROBLEM — R06.09 DYSPNEA ON EXERTION: Status: ACTIVE | Noted: 2019-10-01

## 2019-10-01 PROBLEM — J44.9 CHRONIC OBSTRUCTIVE PULMONARY DISEASE (HCC): Status: ACTIVE | Noted: 2017-02-16

## 2019-10-01 PROCEDURE — 71045 X-RAY EXAM CHEST 1 VIEW: CPT | Performed by: EMERGENCY MEDICINE

## 2019-10-01 PROCEDURE — 99215 OFFICE O/P EST HI 40 MIN: CPT | Performed by: INTERNAL MEDICINE

## 2019-10-01 PROCEDURE — 71275 CT ANGIOGRAPHY CHEST: CPT | Performed by: EMERGENCY MEDICINE

## 2019-10-01 PROCEDURE — 99223 1ST HOSP IP/OBS HIGH 75: CPT | Performed by: INTERNAL MEDICINE

## 2019-10-01 RX ORDER — ALBUTEROL SULFATE 2.5 MG/3ML
SOLUTION RESPIRATORY (INHALATION)
Qty: 300 ML | Refills: 0 | Status: SHIPPED | OUTPATIENT
Start: 2019-10-01 | End: 2019-10-01

## 2019-10-01 RX ORDER — FAMOTIDINE 10 MG/ML
20 INJECTION, SOLUTION INTRAVENOUS DAILY
Status: DISCONTINUED | OUTPATIENT
Start: 2019-10-01 | End: 2019-10-04

## 2019-10-01 RX ORDER — DIPHENHYDRAMINE HCL 25 MG
25 CAPSULE ORAL EVERY 6 HOURS PRN
Status: DISCONTINUED | OUTPATIENT
Start: 2019-10-01 | End: 2019-10-04

## 2019-10-01 RX ORDER — IPRATROPIUM BROMIDE AND ALBUTEROL SULFATE 2.5; .5 MG/3ML; MG/3ML
3 SOLUTION RESPIRATORY (INHALATION)
Status: DISCONTINUED | OUTPATIENT
Start: 2019-10-01 | End: 2019-10-02

## 2019-10-01 RX ORDER — DIPHENHYDRAMINE HYDROCHLORIDE 50 MG/ML
50 INJECTION INTRAMUSCULAR; INTRAVENOUS ONCE
Status: COMPLETED | OUTPATIENT
Start: 2019-10-01 | End: 2019-10-01

## 2019-10-01 RX ORDER — PREDNISONE 1 MG/1
5 TABLET ORAL 2 TIMES DAILY
Status: ON HOLD | COMMUNITY
Start: 2019-09-30 | End: 2019-10-03

## 2019-10-01 RX ORDER — ENOXAPARIN SODIUM 100 MG/ML
40 INJECTION SUBCUTANEOUS DAILY
Status: DISCONTINUED | OUTPATIENT
Start: 2019-10-01 | End: 2019-10-04

## 2019-10-01 RX ORDER — SODIUM CHLORIDE 9 MG/ML
INJECTION, SOLUTION INTRAVENOUS CONTINUOUS
Status: DISCONTINUED | OUTPATIENT
Start: 2019-10-01 | End: 2019-10-04

## 2019-10-01 RX ORDER — METHYLPREDNISOLONE SODIUM SUCCINATE 40 MG/ML
40 INJECTION, POWDER, LYOPHILIZED, FOR SOLUTION INTRAMUSCULAR; INTRAVENOUS EVERY 8 HOURS
Status: DISCONTINUED | OUTPATIENT
Start: 2019-10-01 | End: 2019-10-04

## 2019-10-01 RX ORDER — CARVEDILOL 12.5 MG/1
25 TABLET ORAL 2 TIMES DAILY WITH MEALS
Status: DISCONTINUED | OUTPATIENT
Start: 2019-10-01 | End: 2019-10-04

## 2019-10-01 RX ORDER — ATORVASTATIN CALCIUM 20 MG/1
20 TABLET, FILM COATED ORAL NIGHTLY
Status: DISCONTINUED | OUTPATIENT
Start: 2019-10-01 | End: 2019-10-04

## 2019-10-01 RX ORDER — ASPIRIN 81 MG/1
81 TABLET ORAL DAILY
Status: DISCONTINUED | OUTPATIENT
Start: 2019-10-01 | End: 2019-10-04

## 2019-10-01 RX ORDER — HYDRALAZINE HYDROCHLORIDE 50 MG/1
100 TABLET, FILM COATED ORAL 2 TIMES DAILY
Status: DISCONTINUED | OUTPATIENT
Start: 2019-10-01 | End: 2019-10-04

## 2019-10-01 RX ORDER — METOCLOPRAMIDE HYDROCHLORIDE 5 MG/ML
5 INJECTION INTRAMUSCULAR; INTRAVENOUS EVERY 8 HOURS PRN
Status: DISCONTINUED | OUTPATIENT
Start: 2019-10-01 | End: 2019-10-04

## 2019-10-01 RX ORDER — ASPIRIN 81 MG/1
324 TABLET, CHEWABLE ORAL ONCE
Status: COMPLETED | OUTPATIENT
Start: 2019-10-01 | End: 2019-10-01

## 2019-10-01 RX ORDER — ONDANSETRON 2 MG/ML
4 INJECTION INTRAMUSCULAR; INTRAVENOUS EVERY 6 HOURS PRN
Status: DISCONTINUED | OUTPATIENT
Start: 2019-10-01 | End: 2019-10-04

## 2019-10-01 RX ORDER — IPRATROPIUM BROMIDE AND ALBUTEROL SULFATE 2.5; .5 MG/3ML; MG/3ML
3 SOLUTION RESPIRATORY (INHALATION) EVERY 6 HOURS PRN
Status: DISCONTINUED | OUTPATIENT
Start: 2019-10-01 | End: 2019-10-02

## 2019-10-01 RX ORDER — IPRATROPIUM BROMIDE AND ALBUTEROL SULFATE 2.5; .5 MG/3ML; MG/3ML
SOLUTION RESPIRATORY (INHALATION)
Status: COMPLETED
Start: 2019-10-01 | End: 2019-10-01

## 2019-10-01 RX ORDER — DEXTROSE MONOHYDRATE 25 G/50ML
50 INJECTION, SOLUTION INTRAVENOUS
Status: DISCONTINUED | OUTPATIENT
Start: 2019-10-01 | End: 2019-10-04

## 2019-10-01 RX ORDER — IPRATROPIUM BROMIDE AND ALBUTEROL SULFATE 2.5; .5 MG/3ML; MG/3ML
3 SOLUTION RESPIRATORY (INHALATION) ONCE AS NEEDED
Status: COMPLETED | OUTPATIENT
Start: 2019-10-01 | End: 2019-10-01

## 2019-10-01 RX ORDER — TORSEMIDE 20 MG/1
20 TABLET ORAL
Status: DISCONTINUED | OUTPATIENT
Start: 2019-10-01 | End: 2019-10-04

## 2019-10-01 ASSESSMENT — PATIENT HEALTH QUESTIONNAIRE - PHQ9
1. LITTLE INTEREST OR PLEASURE IN DOING THINGS: NOT AT ALL
SUM OF ALL RESPONSES TO PHQ9 QUESTIONS 1 AND 2: 0
2. FEELING DOWN, DEPRESSED OR HOPELESS: NOT AT ALL
SUM OF ALL RESPONSES TO PHQ9 QUESTIONS 1 AND 2: 0

## 2019-10-01 NOTE — TELEPHONE ENCOUNTER
Notes recorded by Ramon Gallegos MD on 10/1/2019 at 8:35 AM CDT  Reviewed results   CBC is high could be related to her at high doses of steroids however the patient does have an elevated d-dimer may be a false positive however patient needs to go for CT

## 2019-10-01 NOTE — TELEPHONE ENCOUNTER
Pt daughter called to discuss test results. Pt is currently in the ER and pt daughter states she is going to be admitted.  Pt daughter upset stating there is a lot of \"disconnect\" and wanting to have primary provider discuss with hospital Dr. Jeanna Dai

## 2019-10-01 NOTE — PROGRESS NOTES
[facility] Pharmacy Note:  Renal Dose Adjustment    Thereasa Se has been prescribed famotidine (PEPCID) 20 mg orally every 12 hours. Estimated Creatinine Clearance: 37.9 mL/min (A) (based on SCr of 1.11 mg/dL (H)).     Her calculated creatinine clearance i

## 2019-10-01 NOTE — ED NOTES
Pt got up to use bedside commode, Pt became TAZ w/ the little exertion it took to go from bedside commode back into ER cart. Pt placed back on monitor and safely back into bed.  Pt able to urinate w/o difficulty or incident

## 2019-10-01 NOTE — PROGRESS NOTES
[facility] Pharmacy Note:  Renal Dose Adjustment for Metoclopramide (REGLAN)    Willis Macedo has been prescribed Metoclopramide (REGLAN) 10 mg every 8 hours as needed for nausea    Estimated Creatinine Clearance: 37.9 mL/min (A) (based on SCr of 1.11 mg/dL (H

## 2019-10-01 NOTE — ED NOTES
Pt states at this time she had a follow up yesterday with her doctor and had blood work and a cxr done.

## 2019-10-01 NOTE — ED PROVIDER NOTES
Patient Seen in: [facility] Emergency Department      History   Patient presents with:  Dyspnea TAZ SOB (respiratory)  Chest Pain Angina (cardiovascular)    Stated Complaint: TAZ.  Recent DC from [facility] on Thurs    HPI    72-year-old female presents as noted in HPI. Constitutional and vital signs reviewed. All other systems reviewed and negative except as noted above.     Physical Exam     ED Triage Vitals [10/01/19 1133]   /78   Pulse 115   Resp 22   Temp 98.5 °F (36.9 °C)   Temp src Oral Natriuretic Peptide 224 (*)     All other components within normal limits   CBC W/ DIFFERENTIAL - Abnormal; Notable for the following components:    WBC 18.9 (*)     RDW 16.4 (*)     RDW-SD 51.1 (*)     Eosinophil Absolute 8.47 (*)     All other components ([id number])    Result Date: 10/1/2019  CONCLUSION:  1. The study is limited due to breathing artifact. 2. The main pulmonary arteries and lobar pulmonary arteries appear normal bilaterally without pulmonary embolus.   There is suboptimal evaluation of the s Impression:  Rash  (primary encounter diagnosis)  Dyspnea on exertion  Hypoxia    Disposition:  Admit  10/1/2019  4:41 pm    Follow-up:  No follow-up provider specified.       Medications Prescribed:  Current Discharge Medication List                   Pres

## 2019-10-01 NOTE — ED NOTES
Pt presents w/ raised red blotches, rash, throughout body.  Pt denies pain, however, rpt feeling \"itchy\"

## 2019-10-01 NOTE — H&P
[facility]  History and Physical     Vlad Cano Patient Status:  Emergency    1945 MRN [id number]   Location [address] [facility] Attending Lakesha Judge MD   Hosp Day # 0 PCP Marilou Acosta MD     Chief Complaint: Mabel Andino Social History:  reports that she quit smoking about 2 years ago. She has a 5.00 pack-year smoking history. She quit smokeless tobacco use about 2 years ago. She reports that she does not drink alcohol or use drugs.     Family History:   Family Histor into both eyes nightly. Disp:  Rfl: 3   Albuterol Sulfate  (90 Base) MCG/ACT Inhalation Aero Soln Inhale 1 puff into the lungs every 6 (six) hours as needed for Wheezing.  Disp: 1 Inhaler Rfl: 3   Sacubitril-Valsartan (ENTRESTO)  MG Oral Tab 1.13* 1.11*   GFRAA 23* 40* 56* 57*   GFRNAA 20* 35* 49* 50*   CA 7.0* 7.4* 7.1* 6.9*   ALB 2.7*  --   --  2.9*   * 138 140 142   K 4.1 4.0 3.6 3.5    108 104 106   CO2 19.0* 22.0 29.0 28.0   ALKPHO 75  --   --  74   AST 15  --   --  13*   ALT

## 2019-10-01 NOTE — PLAN OF CARE
NURSING ADMISSION NOTE      Patient admitted via Cart  Oriented to room. Safety precautions initiated. Bed in low position. Call light in reach.   Admission completed   Report given to RN

## 2019-10-01 NOTE — ED INITIAL ASSESSMENT (HPI)
Pt presents to the ED with complaints of shortness of breath with exertion since Thursday after being discharged. Pt was admitted for cystitis and rash. Pt also c/o intermittent chest pain when having the dyspnea on exertion.  Pt awake and alert, sin w/d,re

## 2019-10-02 PROCEDURE — 99232 SBSQ HOSP IP/OBS MODERATE 35: CPT | Performed by: HOSPITALIST

## 2019-10-02 RX ORDER — ALBUTEROL SULFATE 2.5 MG/3ML
2.5 SOLUTION RESPIRATORY (INHALATION) EVERY 2 HOUR PRN
Status: DISCONTINUED | OUTPATIENT
Start: 2019-10-02 | End: 2019-10-02

## 2019-10-02 RX ORDER — ALBUTEROL SULFATE 2.5 MG/3ML
2.5 SOLUTION RESPIRATORY (INHALATION) EVERY 6 HOURS
Status: DISCONTINUED | OUTPATIENT
Start: 2019-10-02 | End: 2019-10-04

## 2019-10-02 RX ORDER — IPRATROPIUM BROMIDE AND ALBUTEROL SULFATE 2.5; .5 MG/3ML; MG/3ML
3 SOLUTION RESPIRATORY (INHALATION) EVERY 6 HOURS PRN
Status: DISCONTINUED | OUTPATIENT
Start: 2019-10-02 | End: 2019-10-04

## 2019-10-02 NOTE — PAYOR COMM NOTE
--------------  ADMISSION REVIEW     Jung Reese MA [facility]  Subscriber #:  [id number]  Authorization Number:[id number]    Admit date: 10/1/19  Admit time: 1123       Admitting Physician: Devon Lin MD  Attending Physician:  Papo Wells MD  Primary Ca 2.8      Smokeless tobacco: Former User        Quit date: 11/16/2016    Physical Exam     ED Triage Vitals [10/01/19 1133]   /78   Pulse 115   Resp 22   Temp 98.5 °F (36.9 °C)   Temp src Oral   SpO2 92 %   O2 Device None (Room air)     Current: and be observed as she was not breathing well and seems to be getting worse after discharge. Patient's rash did improve a little bit after the IV Benadryl. Did seem to perk up with some oxygen supplementally.   Did discuss case with the hospitalist who ag past medical history of type 2 diabetes mellitus, hypertension, ALEXANDRA who presents to the hospital with shortness of breath. Patient was recently admitted to the hospital and discharged last Thursday.   During the hospitalization she was noted to have acut LOW DOSE 81 MG Oral Tab EC     Physical Exam:    BP 98/85   Pulse 107   Temp 98.5  (Oral)   Resp 22   Ht 5' 3\" (1.6 m)   Wt 241 lb 10 oz (109.6 kg)   SpO2 96%      Respiratory: . + Expiratory Wheezing  Cardiovascular: S1, S2.  Tachycardic, regular rhythm otherwise comfortable  Pt got up to use bedside commode, Pt became TAZ w/ the little exertion it took to go from bedside commode back into cart    2330: Maintaining O2 saturation on 3L nasal cannula. . Baseline is room air. SOB on exertion.   Continue IV past hospitalization; continue iv steroids. 2. Leukocytosis - resolved  3. Recent Acute interstitial nephritis - recover of renal function  4. Chronic systolic/diastolic Heart failure with EF of 45%-seems compensated; monitor  5. Pulmonary HTN  6.  TIIDM Insulin Aspart Pen (NOVOLOG) 100 UNIT/ML flexpen 1-10 Units     Date Action Dose Route User    10/2/2019 1206 Given 4 Units Subcutaneous (Right Lower Abdomen) Arian Iraheta RN    10/2/2019 0825 Given 2 Units Subcutaneous (Left Upper Arm) Janeen [phone]

## 2019-10-02 NOTE — PLAN OF CARE
Pt a+o x4. On 1L NC maintaining at 94-96%, SOB on exertion. RA baseline. PRN Nebs ordered. ST on tele. ALEXANDRA protocol. Up sb assist. Voids per bathroom. IV steroids for diffuse rash. Pt and fam updated on poc. Will continue to monitor.      Problem: Patient/ prescribed range  Description  INTERVENTIONS:  - Monitor Blood Glucose as ordered  - Assess for signs and symptoms of hyperglycemia and hypoglycemia  - Administer ordered medications to maintain glucose within target range  - Assess barriers to adequate nu

## 2019-10-02 NOTE — PROGRESS NOTES
[facility]  Progress note     Tamia Nieto Patient Status:  Emergency    1945 MRN [id number]   Location [address] [facility] Attending Angella Zapata MD   Hosp Day # 1 PCP Nino Stringer MD     Chief Complaint: Shortness for input(s): PTP, INR in the last 168 hours. Recent Labs   Lab 10/01/19  1219   TROP <0.045  <0.045       Imaging: Imaging data reviewed in Epic. ASSESSMENT / PLAN:     1. Diffuse Rash and shortness of breath  1.  DRESS/Hypersensitivity syndrome coelho

## 2019-10-02 NOTE — PLAN OF CARE
Received patient A/O x4, VSS. Maintaining O2 saturation on 3L nasal cannula. . Baseline is room air. SOB on exertion. Patient is up to the bedside commode with standby assistance. Continue IV steroids. Administered Benadryl for itching.  Continue IV flui perform as needed  - Assess and instruct to report SOB or any respiratory difficulty  - Respiratory Therapy support as indicated  - Manage/alleviate anxiety  - Monitor for signs/symptoms of CO2 retention  Outcome: Progressing     Problem: METABOLIC/FLUID A

## 2019-10-02 NOTE — DISCHARGE SUMMARY
[facility] HOSPITALIST  DISCHARGE SUMMARY     Jonathan Trevino Patient Status:  Inpatient    1945 MRN [id number]   [facility] 4NW-A Attending No att. providers found   Hosp Day # 2 PCP Clarisa Cabrera MD     Date of Admission: 2019  Da atorvastatin 20 MG Tabs  Commonly known as:  LIPITOR      Take 20 mg by mouth nightly. Refills:  5     carvedilol 25 MG Tabs  Commonly known as:  COREG      Take 25 mg by mouth 2 (two) times daily with meals.    Refills:  0     ENTRESTO  MG Tabs Follow-up appointment:   No follow-up provider specified. Appointments for Next 30 Days 10/2/2019 - 11/1/2019    None          Vital signs:       Physical Exam:    General: No acute distress. Respiratory: Clear to auscultation bilaterally.  No wheeze

## 2019-10-03 PROCEDURE — 99232 SBSQ HOSP IP/OBS MODERATE 35: CPT | Performed by: HOSPITALIST

## 2019-10-03 RX ORDER — METHYLPREDNISOLONE 4 MG/1
TABLET ORAL
Qty: 21 TABLET | Refills: 0 | Status: SHIPPED | OUTPATIENT
Start: 2019-10-03 | End: 2019-10-29 | Stop reason: ALTCHOICE

## 2019-10-03 NOTE — CONSULTS
Pulmonary / Critical Care H&P/Consult       NAME: Allyn Parada - ROOM: [address] [place] [address] MRN: [id number] - Age: 69year old - :  1945    Date of Admission: 10/1/2019 11:54 AM  Admission Diagnosis: Rash [R21]  Dyspnea on exertion [R06.09]  Hypoxia [R09 quittin.8      Smokeless tobacco: Former User        Quit date: 2016    Substance and Sexual Activity      Alcohol use: No      Drug use: No      Sexual activity: Not on file    Lifestyle      Physical activity:        Days per week: Not on file meals. Disp:  Rfl:    hydrALAZINE HCl 100 MG Oral Tab Take 100 mg by mouth 2 (two) times daily. Disp:  Rfl:    metFORMIN HCl 1000 MG Oral Tab Take 1,000 mg by mouth 2 (two) times daily with meals.  Disp:  Rfl:    Insulin Degludec-Liraglutide (XULTOPHY) 100- PF     REVIEW OF SYSTEMS:   Reviewed and negative except per HPI    OBJECTIVE:   10/02/19  2012 10/02/19  2339 10/03/19  0406 10/03/19  0736   BP:  115/54 129/57 135/67   BP Location:  Right arm Left arm Right arm   Pulse:  89 91 74   Resp:  20 20 20   Tem 332.0 277.0 249.0     No results for input(s): INR in the last 168 hours.       Recent Labs   Lab 09/30/19  1606 10/01/19  1219 10/02/19  0609    142 138   K 3.6 3.5 3.5    106 104   CO2 29.0 28.0 26.0   BUN 31* 31* 28*     Creatinine (mg/dL)

## 2019-10-03 NOTE — CM/SW NOTE
Noted order for oxygen,   [facility] referral sent to katie; await response    [facility] not in network--referred to [facility] via [facility] referral, await response

## 2019-10-03 NOTE — PROGRESS NOTES
AxO x4. Room air-1 L at rest, 4L needed with exertion, baseline room air. Pt d/c home with home O2, insurance auth pending. Dyspnea on exertion. Non-productive cough, IV steroids, nebs. On tele, NSR. No edema. BM today. Voids.  Diffuse rash on body, improvi

## 2019-10-03 NOTE — PLAN OF CARE
Problem: Patient/Family Goals  Goal: Patient/Family Long Term Goal  Description  Patient's Long Term Goal: to discharge home    Interventions:  - IV steroids  - See additional Care Plan goals for specific interventions   Outcome: Progressing  Goal: Patie post-hospital services based on physician/LIP order or complex needs related to functional status, cognitive ability or social support system  Outcome: Progressing     Problem: RESPIRATORY - ADULT  Goal: Achieves optimal ventilation and oxygenation  Descri

## 2019-10-03 NOTE — PROGRESS NOTES
SpO2% on room air at rest 89%    SpO2% ambulation on room air 82%    SpO2% ambulation on O2 91% on 4 liters per minute

## 2019-10-03 NOTE — RESPIRATORY THERAPY NOTE
ALEXANDRA - Equipment Use Daily Summary:  · Set Mode   · Usage in hours:   · 90% Pressure (EPAP) level:   · 90% Insp Pressure (IPAP):   · AHI:   · Supplemental Oxygen:  · Comments: unable to access patient room

## 2019-10-03 NOTE — CM/SW NOTE
Discharge with home 02 remains pending insurance per [facility]. RN updated. / to remain available for support and/or discharge planning.

## 2019-10-03 NOTE — PROGRESS NOTES
[facility] HOSPITALIST  Progress note     Vilasluis Machuca Patient Status:  Emergency    1945 MRN [id number]   Location [facility] Attending Becky Gann MD   Hosp Day # 2 PCP Lashaun Montes MD     Chief Complaint: Shortness and shortness of breath  1. DRESS/Hypersensitivity syndrome supported by rash and eosinophilia, Has been off allopurinol (thought to be provoking agent), initially improved with steroids during past hospitalization; continue steroids.   Can d/c on oral ster

## 2019-10-04 VITALS
HEIGHT: 63 IN | TEMPERATURE: 98 F | BODY MASS INDEX: 42.61 KG/M2 | OXYGEN SATURATION: 91 % | DIASTOLIC BLOOD PRESSURE: 75 MMHG | WEIGHT: 240.5 LBS | HEART RATE: 57 BPM | RESPIRATION RATE: 20 BRPM | SYSTOLIC BLOOD PRESSURE: 151 MMHG

## 2019-10-04 PROCEDURE — 99238 HOSP IP/OBS DSCHRG MGMT 30/<: CPT | Performed by: HOSPITALIST

## 2019-10-04 RX ORDER — PREDNISONE 20 MG/1
40 TABLET ORAL
Status: DISCONTINUED | OUTPATIENT
Start: 2019-10-04 | End: 2019-10-04

## 2019-10-04 NOTE — PROGRESS NOTES
[facility] Patient Status:  Inpatient    1945 MRN [id number]   [facility] 5NW-A Attending Kadeem Patel MD   Hosp Day # 3 PCP Miah Ellington MD     Pulm / Critical Care Progress Note     S: feels well.  Steven Krishnamurthy atraumatic. Lungs: ctab   Chest wall: No tenderness or deformity. Heart: Regular rate and rhythm, normal S1S2, no murmur. Abdomen: soft, non-tender, non-distended, positive BS.    Extremity: no edema         Recent Labs   Lab 09/30/19  1606 10/01/19

## 2019-10-04 NOTE — PROGRESS NOTES
NURSING DISCHARGE NOTE    Discharged Home via Wheelchair. Accompanied by Support staff  Belongings Taken by patient/family. Pt discharged home. Discharge instructions reviewed with pt. Prescription given to pt.  Respiratory instructed patient on how

## 2019-10-04 NOTE — CM/SW NOTE
Call placed to [facility] referral [phone] to obtain referral--in process    Referral obtained for oxygen--liaison shaista met with patient and shared she has been approved for oxygen

## 2019-10-04 NOTE — CM/SW NOTE
Spoke with aimee noonan, issues with [employer], not receiving referrals, request for oxygen faxed to [phone]

## 2019-10-04 NOTE — PROGRESS NOTES
Patient seen and examined. Medically cleared for discharge, just awaiting insurance approval for O2.     Mary Steele MD  [facility]  Internal Medicine Hospitalist  Cell [phone]

## 2019-10-04 NOTE — PLAN OF CARE
Problem: Patient/Family Goals  Goal: Patient/Family Short Term Goal  Description  Patient's Short Term Goal: 10/1 noc: to sleep  10/2 am : reduce oxen need, walk in ceja  10/2 (noc):  Wean off of O2, maintain good sats at night  10/03 nocs: discharge home system  Outcome: Progressing     Problem: RESPIRATORY - ADULT  Goal: Achieves optimal ventilation and oxygenation  Description  INTERVENTIONS:  - Assess for changes in respiratory status  - Assess for changes in mentation and behavior  - Position to facili

## 2019-10-05 NOTE — DISCHARGE SUMMARY
[facility] [place] HOSPITALIST  DISCHARGE SUMMARY     Aguila Hanna Patient Status:  Inpatient    1945 MRN [id number]   [facility] 5NW-A Attending No att. providers found   Hosp Day # 3 PCP Pau Bellamy MD     Date of Admission: 10/1/2019  Da steadily. Transitioned to oral steroids on d/c. Also recognized to have hypoxia, so seen by pulm and set up for home O2, as this is likely a chronic issue; see dr. Winchester Ion note. Was recommended outpatient sleep study by pulm.       Lace+ Score: 77  59 hydrALAZINE HCl 100 MG Tabs  Commonly known as:  APRESOLINE      Take 100 mg by mouth 2 (two) times daily.    Refills:  0     hydrOXYzine HCl 10 MG Tabs  Commonly known as:  ATARAX      Take 1 tablet (10 mg total) by mouth every 6 (six) hours as needed fo Vital signs:  Temp:  [98.3 °F (36.8 °C)] 98.3 °F (36.8 °C)  Pulse:  [57] 57  Resp:  [20] 20  BP: (151)/(75) 151/75    Physical Exam:    General: No acute distress. Alert and oriented   Respiratory: Clear to auscultation bilaterally. no obvious whee

## 2019-10-07 ENCOUNTER — PATIENT OUTREACH (OUTPATIENT)
Dept: CASE MANAGEMENT | Age: 74
End: 2019-10-07

## 2019-10-07 DIAGNOSIS — D72.12 DRESS SYNDROME: ICD-10-CM

## 2019-10-07 DIAGNOSIS — Z02.9 ENCOUNTERS FOR UNSPECIFIED ADMINISTRATIVE PURPOSE: ICD-10-CM

## 2019-10-07 DIAGNOSIS — T50.905A DRESS SYNDROME: ICD-10-CM

## 2019-10-07 PROCEDURE — 1111F DSCHRG MED/CURRENT MED MERGE: CPT

## 2019-10-07 NOTE — PAYOR COMM NOTE
--------------  DISCHARGE REVIEW    Herve Escoto MA [facility]  Subscriber #:  [id number]  Authorization Number: [id number]    Admit date: 10/1/19  Admit time:  5980  Discharge Date: 10/4/2019  5:43 PM     Admitting Physician: Martha Grace MD  Attending [facility] when she got home she did not take any prednisone. She went to go see her PCP yesterday, she was not noted to be hypoxic at that time.   She went to go see her cardiologist today for follow-up of her chronic heart failure and pulmonary hypertension where s every 6 (six) hours as needed for Wheezing. Quantity:  1 Inhaler  Refills:  3     ASPIRIN EC LOW DOSE 81 MG Tbec  Generic drug:  aspirin      Take 81 mg by mouth daily.    Refills:  0     atorvastatin 20 MG Tabs  Commonly known as:  LIPITOR      Take 20 m doctor or nurse    Bring a paper prescription for each of these medications  · methylPREDNISolone 4 MG Tbpk         [place]    Follow-up appointment:   Terry Rebolledo MD  [address] [address]  [address] [id number]    In 1

## 2019-10-07 NOTE — PROGRESS NOTES
Initial Post Discharge Follow Up   Discharge Date: 10/4/19  Contact Date: 10/7/2019    Consent Verification:  Assessment Completed With: Patient  HIPAA Verified?   Yes    Discharge Dx:  Diffuse Rash and shortness of breath--DRESS/Hypersensitivity syndrom Rfl:    carvedilol 25 MG Oral Tab Take 25 mg by mouth 2 (two) times daily with meals. Disp:  Rfl:    hydrALAZINE HCl 100 MG Oral Tab Take 100 mg by mouth 2 (two) times daily.  Disp:  Rfl:    metFORMIN HCl 1000 MG Oral Tab Take 1,000 mg by mouth 2 (two) marysol DME ordered at D/C? Yes   What? Home O2  Have you received your (DME)? yes    Services ordered at D/C? No    Re-Admit:  Tell me what led up to your readmission:  Pt states she started to feel SOB and had difficulty breathing.   Did you call your PCP prescribed and completing entire course of steroids as well as close f/u with [facility]/PCP/specialists. [facility] appt reviewed with pt in detail including location/phone #, date/time, and advised to bring all d/c instructions and medications with to appt.   Pt rufus

## 2019-10-08 NOTE — CM/SW NOTE
10/08/19 0800   Discharge disposition   Expected discharge disposition Home or Self   Name of [facility]   Outpatient services   (TCC)   E provider Christiano Smallwood

## 2019-10-08 NOTE — CDS QUERY
Uncertain Diagnosis  CLINICAL DOCUMENTATION CLARIFICATION FORM  Clinical information (provided below) indicates an unknown status of a documented diagnosis.  For accurate ICD-10-CM code assignment to reflect severity of illness and risk of mortality,   BREONNA

## 2019-10-08 NOTE — PROGRESS NOTES
TRANSITIONAL CARE CLINIC PHARMACIST MEDICATION RECONCILIATION        Fátima Fierro MRN [id number]    1945 PCP Digna Lloyd MD       Comments: Medication history completed in [address] [place] [address] by pharmacist. Patient did not bring her med Apply topically 2 (two) times daily as needed. • loratadine (CLARITIN) 10 MG Oral Tab Take 1 tablet (10 mg total) by mouth daily.     • Albuterol Sulfate  (90 Base) MCG/ACT Inhalation Aero Soln Inhale 1 puff into the lungs every 6 (six) hours as n

## 2019-10-09 ENCOUNTER — OFFICE VISIT (OUTPATIENT)
Dept: INTERNAL MEDICINE CLINIC | Facility: CLINIC | Age: 74
End: 2019-10-09
Payer: MEDICARE

## 2019-10-09 VITALS
OXYGEN SATURATION: 87 % | RESPIRATION RATE: 24 BRPM | WEIGHT: 225 LBS | BODY MASS INDEX: 39.87 KG/M2 | DIASTOLIC BLOOD PRESSURE: 64 MMHG | SYSTOLIC BLOOD PRESSURE: 110 MMHG | TEMPERATURE: 98 F | HEIGHT: 63 IN | HEART RATE: 73 BPM

## 2019-10-09 DIAGNOSIS — E11.9 TYPE 2 DIABETES MELLITUS WITHOUT COMPLICATION, WITHOUT LONG-TERM CURRENT USE OF INSULIN (HCC): ICD-10-CM

## 2019-10-09 DIAGNOSIS — T50.905A DRESS SYNDROME: ICD-10-CM

## 2019-10-09 DIAGNOSIS — I27.20 PULMONARY HTN (HCC): Chronic | ICD-10-CM

## 2019-10-09 DIAGNOSIS — N10 AIN (ACUTE INTERSTITIAL NEPHRITIS): ICD-10-CM

## 2019-10-09 DIAGNOSIS — I50.42 CHRONIC COMBINED SYSTOLIC AND DIASTOLIC CONGESTIVE HEART FAILURE (HCC): ICD-10-CM

## 2019-10-09 DIAGNOSIS — J44.9 CHRONIC OBSTRUCTIVE PULMONARY DISEASE, UNSPECIFIED COPD TYPE (HCC): ICD-10-CM

## 2019-10-09 DIAGNOSIS — R09.02 HYPOXIA: ICD-10-CM

## 2019-10-09 DIAGNOSIS — D72.12 DRESS SYNDROME: ICD-10-CM

## 2019-10-09 DIAGNOSIS — R21 RASH: Primary | ICD-10-CM

## 2019-10-09 PROCEDURE — 99214 OFFICE O/P EST MOD 30 MIN: CPT | Performed by: CLINICAL NURSE SPECIALIST

## 2019-10-09 PROCEDURE — 1111F DSCHRG MED/CURRENT MED MERGE: CPT | Performed by: CLINICAL NURSE SPECIALIST

## 2019-10-09 NOTE — PATIENT INSTRUCTIONS
Patient Instructions:  1. Please call the [facility] at [phone] when you get home with your medrol dosepack instructions including your current dose.   2.  Follow up with Dr. Alicia Pollard will need to have pulmonary function testing performed when seeing D

## 2019-10-09 NOTE — PROGRESS NOTES
Quirino Hooper 6      HISTORY   CHIEF COMPLAINT: post hospital follow up visit  HPI: Prabhakar Shaw is a 69year old female here today for follow up after being hospitalized for shortness of breath.   Prabhakar Shaw Rfl:    metFORMIN HCl 1000 MG Oral Tab Take 1,000 mg by mouth 2 (two) times daily with meals. Disp:  Rfl:    Insulin Degludec-Liraglutide (XULTOPHY) 100-3.6 UNIT-MG/ML Subcutaneous Solution Pen-injector Inject 40 Units into the skin daily.  Disp:  Rfl:    M Alcohol use: No    Drug use: No       Imaging & Consults:  Xr Chest Pa + Lat Chest (cpt=71046)    Result Date: 9/30/2019  CONCLUSION:  No acute findings.     Dictated by: Eri Correa MD on 9/30/2019 at 16:33     Approved by: Eri Correa MD 10/02/2019 06:09 AM    .0 10/02/2019 06:09 AM     (H) 10/02/2019 06:09 AM     10/02/2019 06:09 AM    K 3.5 10/02/2019 06:09 AM     10/02/2019 06:09 AM    CO2 26.0 10/02/2019 06:09 AM    BUN 28 (H) 10/02/2019 06:09 AM    CREATSERUM normal respiratory effort, O2 2LNC  CARDIO: S1, S2, RRR without audible murmur  GI: + BS to all quadrants, no tenderness  MUSCULOSKELETAL: + ROM in all joints  EXTREMITIES: no edema  NEURO: oriented x3  PSYCHIATRIC: appropriate affect    ASSESSMENT/ PLAN: (XULTOPHY) 100-3.6 UNIT-MG/ML Subcutaneous Solution Pen-injector Inject 40 Units into the skin daily. Disp:  Rfl:    erythromycin 5 MG/GM Ophthalmic Ointment Place 1 Application into both eyes nightly.    Disp:  Rfl: 3   Sacubitril-Valsartan (ENTRESTO) 97-1 medications.     I certify that the following are true:  Communication with the patient was made within 2 business days of discharge on date above   Medical Decision Making- Based on service period of discharge to 30 days:   · Number of Possible Diagnoses a

## 2019-10-16 ENCOUNTER — TELEPHONE (OUTPATIENT)
Dept: INTERNAL MEDICINE CLINIC | Facility: CLINIC | Age: 74
End: 2019-10-16

## 2019-10-16 NOTE — TELEPHONE ENCOUNTER
Spoke with pt daughter and pt together on phone. Pt stated she is not taking benadryl but is taking banophen. Advised that was like benadryl. Pt stated that is not working.  Pt stated she just finished the steroid dose pack and is taking atarax prn as well

## 2019-10-16 NOTE — TELEPHONE ENCOUNTER
Pt daughter notified of provider response. She verbalized understanding and will contact office once they know the provider who is in network.

## 2019-10-16 NOTE — TELEPHONE ENCOUNTER
Patient calling in, stated the Radha Norwood has not helped her itch. Pt stated she is feeling more itchy than before, all over her body. Pt seeking a new prescription.

## 2019-10-16 NOTE — TELEPHONE ENCOUNTER
Pt daughter French Solis would like a call back from nurse daughter has some questions and concerns about pt getting another type of medication

## 2019-10-17 NOTE — TELEPHONE ENCOUNTER
Called pt and advised to call insurance to see who is in network and call their offices to see who is first available. Advised no walk-in/SDA dermatology office is known by us but to call the offices. Also advised pt to ER if symptoms worsen.  Pt verbalized

## 2019-10-17 NOTE — TELEPHONE ENCOUNTER
Patient called in, stated she is really itchy and can't wait a month to see a dermatologist.     Pt requested an appointment with Dr Mikel Bates today, but non available.   Pt stated she did not believe us about 's scheduling being completely full, then pt di

## 2019-10-23 DIAGNOSIS — L30.9 ECZEMA, UNSPECIFIED TYPE: ICD-10-CM

## 2019-10-23 NOTE — TELEPHONE ENCOUNTER
MOMETASONE FUROATE 0.1 % External Cream    Last OV relevant to medication: 9-     Last refill date: historical     When pt was asked to return for OV: 4 DAYS     Upcoming appt/reason:  10-     Labs: n/a

## 2019-10-24 RX ORDER — MOMETASONE FUROATE 1 MG/G
CREAM TOPICAL
Qty: 60 G | Refills: 0 | Status: SHIPPED | OUTPATIENT
Start: 2019-10-24 | End: 2019-10-29 | Stop reason: ALTCHOICE

## 2019-10-29 ENCOUNTER — OFFICE VISIT (OUTPATIENT)
Dept: INTERNAL MEDICINE CLINIC | Facility: CLINIC | Age: 74
End: 2019-10-29
Payer: MEDICARE

## 2019-10-29 VITALS
TEMPERATURE: 98 F | OXYGEN SATURATION: 91 % | HEIGHT: 63 IN | SYSTOLIC BLOOD PRESSURE: 130 MMHG | DIASTOLIC BLOOD PRESSURE: 70 MMHG | WEIGHT: 235.25 LBS | HEART RATE: 102 BPM | BODY MASS INDEX: 41.68 KG/M2

## 2019-10-29 DIAGNOSIS — I50.42 CHRONIC COMBINED SYSTOLIC AND DIASTOLIC CONGESTIVE HEART FAILURE (HCC): ICD-10-CM

## 2019-10-29 DIAGNOSIS — J44.9 CHRONIC OBSTRUCTIVE PULMONARY DISEASE, UNSPECIFIED COPD TYPE (HCC): ICD-10-CM

## 2019-10-29 DIAGNOSIS — Z99.89 OSA ON CPAP: Chronic | ICD-10-CM

## 2019-10-29 DIAGNOSIS — I27.20 PULMONARY HTN (HCC): Chronic | ICD-10-CM

## 2019-10-29 DIAGNOSIS — R21 RASH: Primary | ICD-10-CM

## 2019-10-29 DIAGNOSIS — G47.33 OSA ON CPAP: Chronic | ICD-10-CM

## 2019-10-29 DIAGNOSIS — E11.65 TYPE 2 DIABETES MELLITUS WITH HYPERGLYCEMIA, WITHOUT LONG-TERM CURRENT USE OF INSULIN (HCC): Chronic | ICD-10-CM

## 2019-10-29 PROBLEM — R06.00 DYSPNEA ON EXERTION: Chronic | Status: ACTIVE | Noted: 2019-10-01

## 2019-10-29 PROBLEM — R06.09 DYSPNEA ON EXERTION: Chronic | Status: ACTIVE | Noted: 2019-10-01

## 2019-10-29 PROBLEM — R09.02 HYPOXIA: Status: RESOLVED | Noted: 2019-10-01 | Resolved: 2019-10-29

## 2019-10-29 PROBLEM — E11.9 TYPE 2 DIABETES MELLITUS, WITHOUT LONG-TERM CURRENT USE OF INSULIN (HCC): Chronic | Status: ACTIVE | Noted: 2019-10-09

## 2019-10-29 PROCEDURE — 1111F DSCHRG MED/CURRENT MED MERGE: CPT | Performed by: INTERNAL MEDICINE

## 2019-10-29 PROCEDURE — 99214 OFFICE O/P EST MOD 30 MIN: CPT | Performed by: INTERNAL MEDICINE

## 2019-10-29 NOTE — PROGRESS NOTES
Tamia Nieto   is a 69year old female. Patient presents with:  Hospital F/U: rash        HPI:   Patient feels better. Was discharged on . Apparently has been seen in the transitional care clinic.   Does have an appointment wit (acute interstitial nephritis)    • Cataracts, bilateral 1/12/2018   • Congestive heart disease ([facility] [address] [facility]    • Diabetes ([facility]    • Essential hypertension    • Hyperlipidemia    • Obesity    • Sleep apnea       Social History:  Social History    Tobacco Use pulmonary disease, unspecified COPD type ([facility] [address]    Chronic combined systolic and diastolic congestive heart failure [facility])          Patient Instructions   10 minutes was spent reviewing the patient's med sheets as well as updating her problem list.  Inform p

## 2019-10-29 NOTE — PATIENT INSTRUCTIONS
10 minutes was spent reviewing the patient's med sheets as well as updating her problem list.  Inform patient that I will need to review her records in great detail and get back to her on Thursday.   Apparently there was a consideration for a right heart ca

## 2019-10-30 NOTE — TELEPHONE ENCOUNTER
Previously discussed with pt that walker was to be discussed at [facility] - pt never discussed need for walker. Also previously discussed that nebulizer order could be printed and pt could take to pharmacy for ~$30.      Pt stated she was told by [facility] that

## 2019-10-30 NOTE — TELEPHONE ENCOUNTER
Lenny Kraft from [facility] Dr. Z is calling again to follow up on the walker and nebulizer. Please call her to discuss and search for a solution on the issues.     T: [phone] extn: [phone]

## 2019-11-01 ENCOUNTER — TELEPHONE (OUTPATIENT)
Dept: INTERNAL MEDICINE CLINIC | Facility: CLINIC | Age: 74
End: 2019-11-01

## 2019-11-01 DIAGNOSIS — R21 RASH: Primary | ICD-10-CM

## 2019-11-01 NOTE — TELEPHONE ENCOUNTER
Pt is looking for a call back from pcp states that she was seen on 10.29.18 and was told they would be giving her a call back with a plan of care for gout on right foot and medication change that was causing her a rash

## 2019-11-01 NOTE — TELEPHONE ENCOUNTER
Pt notified of provider response below and verbalized understanding. Pt also asked about what she should do about her itching. Asked pt if she has scheduled to see a dermatologist yet. Pt stated she did however they cannot see her for a month.  Pt became

## 2019-11-03 ENCOUNTER — TELEPHONE (OUTPATIENT)
Dept: INTERNAL MEDICINE CLINIC | Facility: CLINIC | Age: 74
End: 2019-11-03

## 2019-11-03 DIAGNOSIS — M1A.0721 IDIOPATHIC CHRONIC GOUT OF LEFT ANKLE WITH TOPHUS: Primary | Chronic | ICD-10-CM

## 2019-11-03 DIAGNOSIS — R21 RASH: ICD-10-CM

## 2019-11-03 RX ORDER — PROBENECID 500 MG/1
500 TABLET, FILM COATED ORAL 2 TIMES DAILY
Qty: 180 TABLET | Refills: 0 | Status: SHIPPED | OUTPATIENT
Start: 2019-11-03 | End: 2020-02-01

## 2019-11-03 NOTE — TELEPHONE ENCOUNTER
D/w with daughter  Probenecid sent   Must see cardio and pulm  Lab work to be done in 8 weeks   Let pt know blood order put in

## 2019-11-04 ENCOUNTER — TELEPHONE (OUTPATIENT)
Dept: INTERNAL MEDICINE CLINIC | Facility: CLINIC | Age: 74
End: 2019-11-04

## 2019-11-04 NOTE — TELEPHONE ENCOUNTER
Called pt to discuss referral d/t Dr. Davis Lasso referral being denied. Pt stated she already had an appointment scheduled with dermatologist tomorrow. Advised I would follow up tomorrow to make sure referral worked. Pt verbalized understanding.     Referral ente

## 2019-11-04 NOTE — TELEPHONE ENCOUNTER
[place] Andujar from [facility] called in because they received referral for Dermatologist Dr. Charlee Akins, however this referral has been voided because it is not in network for the patient.  Florida Andujar stated that the PCP or patient can call [phone] to Evelyn Bertrand

## 2019-11-05 NOTE — TELEPHONE ENCOUNTER
Faxed both referrals (nebulizer to [facility] and walker to [facility]). Fax confirmation received on both. Pt notified of the above and contact information given both places. Pt verbalized understanding.

## 2019-11-06 NOTE — TELEPHONE ENCOUNTER
Received a fax from [facility] stating they are not in network with pt's insurance. Referral entered for [facility].

## 2019-11-16 ENCOUNTER — TELEPHONE (OUTPATIENT)
Dept: INTERNAL MEDICINE CLINIC | Facility: CLINIC | Age: 74
End: 2019-11-16

## 2019-11-16 NOTE — TELEPHONE ENCOUNTER
Patient calling she is \"feeling fatigued and just wants something called in. \"  I offered an appointment upcoming next week as doctor would have to see her to discuss, she would like a nurse callback first.  Explained no nurse on site today/verified she i

## 2019-11-18 RX ORDER — ALBUTEROL SULFATE 90 UG/1
AEROSOL, METERED RESPIRATORY (INHALATION)
Qty: 8.5 G | Refills: 0 | Status: SHIPPED | OUTPATIENT
Start: 2019-11-18 | End: 2021-01-13

## 2019-11-18 NOTE — TELEPHONE ENCOUNTER
Pt c/o no energy for the past few months. Pt states she sleeps well at night but she does seem to wake up more than usual through the night. Pt denies shortness of breath, swelling, chest pain, or any other symptoms.  Pt wanting something to help with energ

## 2019-11-18 NOTE — TELEPHONE ENCOUNTER
Protocol failed    Requesting Albuterol Sulfate  (90 Base) MCG/ACT Inhalation Aero Soln  LOV: 10/29/19  RTC: NA  Last Relevant Labs: 10/1/19  Filled: 6/27/19 #1 inhaler with 3 refills    Future Appointments   Date Time Provider Marline Cox

## 2019-12-07 DIAGNOSIS — E11.8 TYPE 2 DIABETES MELLITUS WITH COMPLICATION, WITHOUT LONG-TERM CURRENT USE OF INSULIN (HCC): ICD-10-CM

## 2019-12-07 RX ORDER — PEN NEEDLE, DIABETIC 32GX 5/32"
NEEDLE, DISPOSABLE MISCELLANEOUS
Qty: 100 EACH | Refills: 0 | Status: SHIPPED | OUTPATIENT
Start: 2019-12-07 | End: 2020-02-26

## 2019-12-10 ENCOUNTER — TELEPHONE (OUTPATIENT)
Dept: INTERNAL MEDICINE CLINIC | Facility: CLINIC | Age: 74
End: 2019-12-10

## 2019-12-10 NOTE — TELEPHONE ENCOUNTER
Letter of Medical Necessity faxed to [facility] @ [phone] for patients nebulizer

## 2019-12-17 ENCOUNTER — LAB ENCOUNTER (OUTPATIENT)
Dept: LAB | Age: 74
End: 2019-12-17
Attending: INTERNAL MEDICINE
Payer: MEDICARE

## 2019-12-17 DIAGNOSIS — L27.0 DRUG-INDUCED SKIN RASH: ICD-10-CM

## 2019-12-17 DIAGNOSIS — M1A.0721 IDIOPATHIC CHRONIC GOUT OF LEFT ANKLE WITH TOPHUS: Chronic | ICD-10-CM

## 2019-12-17 DIAGNOSIS — E78.5 DYSLIPIDEMIA: ICD-10-CM

## 2019-12-17 DIAGNOSIS — E11.9 CONTROLLED TYPE 2 DIABETES MELLITUS WITHOUT COMPLICATION, WITHOUT LONG-TERM CURRENT USE OF INSULIN (HCC): ICD-10-CM

## 2019-12-17 PROCEDURE — 86140 C-REACTIVE PROTEIN: CPT

## 2019-12-17 PROCEDURE — 82043 UR ALBUMIN QUANTITATIVE: CPT

## 2019-12-17 PROCEDURE — 83036 HEMOGLOBIN GLYCOSYLATED A1C: CPT

## 2019-12-17 PROCEDURE — 36415 COLL VENOUS BLD VENIPUNCTURE: CPT

## 2019-12-17 PROCEDURE — 80061 LIPID PANEL: CPT

## 2019-12-17 PROCEDURE — 82570 ASSAY OF URINE CREATININE: CPT

## 2019-12-17 PROCEDURE — 84550 ASSAY OF BLOOD/URIC ACID: CPT

## 2019-12-17 PROCEDURE — 85652 RBC SED RATE AUTOMATED: CPT

## 2019-12-17 PROCEDURE — 86038 ANTINUCLEAR ANTIBODIES: CPT

## 2019-12-17 PROCEDURE — 85025 COMPLETE CBC W/AUTO DIFF WBC: CPT

## 2019-12-17 PROCEDURE — 80053 COMPREHEN METABOLIC PANEL: CPT

## 2019-12-17 NOTE — PROGRESS NOTES
I have not evaluated this patient and she is not scheduled with me for a future visit. I also did not dictate ordering of these labs. I am more than happy to assume care, however, please inquire regarding my role in her care. Thank you.

## 2019-12-26 RX ORDER — POTASSIUM CHLORIDE 1500 MG/1
TABLET, FILM COATED, EXTENDED RELEASE ORAL
Qty: 90 TABLET | Refills: 0 | OUTPATIENT
Start: 2019-12-26

## 2019-12-26 RX ORDER — POTASSIUM CHLORIDE 1500 MG/1
20 TABLET, FILM COATED, EXTENDED RELEASE ORAL DAILY
Qty: 60 TABLET | Refills: 0 | Status: SHIPPED | OUTPATIENT
Start: 2019-12-26 | End: 2020-02-25

## 2019-12-26 NOTE — TELEPHONE ENCOUNTER
Potassium Chloride ER 20 MEQ Oral Tab CR    Last OV relevant to medication: 10-     Last refill date: n/a    When pt was asked to return for OV: none noted     Upcoming appt/reason: 2-    Labs: 12-

## 2019-12-27 RX ORDER — TORSEMIDE 20 MG/1
TABLET ORAL
Qty: 180 TABLET | Refills: 3 | Status: SHIPPED | OUTPATIENT
Start: 2019-12-27

## 2019-12-27 RX ORDER — CARVEDILOL 25 MG/1
TABLET ORAL
Qty: 180 TABLET | Refills: 0 | Status: SHIPPED | OUTPATIENT
Start: 2019-12-27 | End: 2020-03-23

## 2019-12-27 RX ORDER — ATORVASTATIN CALCIUM 20 MG/1
TABLET, FILM COATED ORAL
Qty: 90 TABLET | Refills: 3 | Status: SHIPPED | OUTPATIENT
Start: 2019-12-27 | End: 2021-02-01

## 2019-12-27 NOTE — TELEPHONE ENCOUNTER
Carvedilol 25 mg Oral Tab     Passed Protocol    Last OV relevant to medication: 9/30/2019  Last refill date: 9/24/2019     #/refills: #180 w/ 0 refills   When pt was asked to return for OV: 4 days  Upcoming appt/reason: 2/17/2020  Lab Results   Component

## 2020-01-07 NOTE — TELEPHONE ENCOUNTER
Passed protocol     I spoke to pt and confirmed she is taking Xultophy 40 units daily. LOV:  10/29/2019 Dr Tacos Calderon RTC ?   FOV scheduled 2/17/2020

## 2020-01-09 RX ORDER — (INSULIN DEGLUDEC AND LIRAGLUTIDE) 100; 3.6 [IU]/ML; MG/ML
INJECTION, SOLUTION SUBCUTANEOUS
Qty: 27 ML | Refills: 0 | Status: SHIPPED | OUTPATIENT
Start: 2020-01-09 | End: 2020-02-17

## 2020-01-09 NOTE — TELEPHONE ENCOUNTER
Dr Gaviota Mcknight sent in rx on 3/4/19 and 1/16/19 then rx was sent by Dr Boris Rodriguez on 6/4/2019 and 7/29/2019, however please see message below from Dr Ninoska Patton office.

## 2020-01-11 DIAGNOSIS — I27.21 PULMONARY HYPERTENSION SECONDARY TO INCREASED PVR (CMD): ICD-10-CM

## 2020-01-13 RX ORDER — SACUBITRIL AND VALSARTAN 97; 103 MG/1; MG/1
TABLET, FILM COATED ORAL
Qty: 180 TABLET | Refills: 1 | Status: SHIPPED | OUTPATIENT
Start: 2020-01-13 | End: 2021-01-13

## 2020-01-14 DIAGNOSIS — I27.21 PULMONARY HYPERTENSION SECONDARY TO INCREASED PVR (CMD): ICD-10-CM

## 2020-01-14 RX ORDER — SACUBITRIL AND VALSARTAN 97; 103 MG/1; MG/1
TABLET, FILM COATED ORAL
Qty: 60 TABLET | Refills: 0 | OUTPATIENT
Start: 2020-01-14

## 2020-01-15 ENCOUNTER — OFFICE VISIT (OUTPATIENT)
Dept: SLEEP CENTER | Age: 75
End: 2020-01-15
Attending: INTERNAL MEDICINE
Payer: MEDICARE

## 2020-01-15 DIAGNOSIS — R06.83 SNORING: ICD-10-CM

## 2020-01-15 PROCEDURE — 95810 POLYSOM 6/> YRS 4/> PARAM: CPT

## 2020-01-16 ENCOUNTER — TELEPHONE (OUTPATIENT)
Dept: INTERNAL MEDICINE CLINIC | Facility: CLINIC | Age: 75
End: 2020-01-16

## 2020-01-17 RX ORDER — HYDRALAZINE HYDROCHLORIDE 100 MG/1
TABLET, FILM COATED ORAL
Qty: 180 TABLET | Refills: 0 | Status: SHIPPED | OUTPATIENT
Start: 2020-01-17 | End: 2020-03-31

## 2020-01-17 NOTE — TELEPHONE ENCOUNTER
Passed protocol    Requesting HYDRALAZINE  MG Oral Tab   LOV: 10/29/19  RTC: NA  Last Relevant Labs: 12/17/19  Filled: 9/24/19 #180 with 0 refills    Future Appointments   Date Time Provider Marline Vidal   2/17/2020  9:00 AM Hafsa Fisher MD

## 2020-01-17 NOTE — TELEPHONE ENCOUNTER
Denial letter received - Pt has to try and fail all formularies before Xultophy can be considered. Formularies are:    [employer] BCise or [employer] 592/16    Trulicity      Please Advise.

## 2020-01-17 NOTE — TELEPHONE ENCOUNTER
Patient know that the insurance company has denied xultophy  she could see the physician who initiated this medicine to give her her an alternate  Or she needs to switch over to [id number] which apparently is the alternate closest to what she was taking

## 2020-01-17 NOTE — TELEPHONE ENCOUNTER
Spoke with pharmacist at pt's pharmacy regarding provider message below. Per pharmacist the xultophy did go through [employer] and was approved. Pt already picked up.

## 2020-01-24 NOTE — PROCEDURES
[address] [address]       Accredited by the [facility] ([facility])    PATIENT'S NAME:        Maryjane Menendez  ATTENDING PHYSICIAN:   Ciara Del Angel M.D.   REFERRING PHYSICIAN:   Rona Whalen M.D. sleep was seen in increased amounts comprising 30.5% of sleep. Slow-wave sleep was seen in decreased amounts comprising 1.3% of sleep. REM sleep was seen in decreased amounts comprising 11.8% of sleep.       RESPIRATORY MEASURES:  Respiratory monitoring s

## 2020-02-17 ENCOUNTER — TELEPHONE (OUTPATIENT)
Dept: INTERNAL MEDICINE CLINIC | Facility: CLINIC | Age: 75
End: 2020-02-17

## 2020-02-17 ENCOUNTER — OFFICE VISIT (OUTPATIENT)
Dept: INTERNAL MEDICINE CLINIC | Facility: CLINIC | Age: 75
End: 2020-02-17
Payer: MEDICARE

## 2020-02-17 VITALS
BODY MASS INDEX: 39.56 KG/M2 | HEIGHT: 62.5 IN | RESPIRATION RATE: 20 BRPM | TEMPERATURE: 98 F | HEART RATE: 95 BPM | OXYGEN SATURATION: 97 % | WEIGHT: 220.5 LBS | DIASTOLIC BLOOD PRESSURE: 74 MMHG | SYSTOLIC BLOOD PRESSURE: 126 MMHG

## 2020-02-17 DIAGNOSIS — E11.9 TYPE 2 DIABETES MELLITUS WITHOUT COMPLICATION, WITHOUT LONG-TERM CURRENT USE OF INSULIN (HCC): ICD-10-CM

## 2020-02-17 DIAGNOSIS — M1A.0720 IDIOPATHIC CHRONIC GOUT OF LEFT ANKLE WITHOUT TOPHUS: ICD-10-CM

## 2020-02-17 DIAGNOSIS — Z00.00 ROUTINE GENERAL MEDICAL EXAMINATION AT A HEALTH CARE FACILITY: Primary | ICD-10-CM

## 2020-02-17 PROBLEM — R06.09 DYSPNEA ON EXERTION: Chronic | Status: RESOLVED | Noted: 2019-10-01 | Resolved: 2020-02-17

## 2020-02-17 PROBLEM — R06.00 DYSPNEA ON EXERTION: Chronic | Status: RESOLVED | Noted: 2019-10-01 | Resolved: 2020-02-17

## 2020-02-17 PROCEDURE — 90732 PPSV23 VACC 2 YRS+ SUBQ/IM: CPT | Performed by: INTERNAL MEDICINE

## 2020-02-17 PROCEDURE — G0009 ADMIN PNEUMOCOCCAL VACCINE: HCPCS | Performed by: INTERNAL MEDICINE

## 2020-02-17 PROCEDURE — G0439 PPPS, SUBSEQ VISIT: HCPCS | Performed by: INTERNAL MEDICINE

## 2020-02-17 RX ORDER — PROBENECID 500 MG/1
500 TABLET, FILM COATED ORAL 2 TIMES DAILY
Qty: 180 TABLET | Refills: 0 | Status: SHIPPED | OUTPATIENT
Start: 2020-02-17 | End: 2020-05-17

## 2020-02-17 RX ORDER — BLOOD SUGAR DIAGNOSTIC
1 STRIP MISCELLANEOUS 2 TIMES DAILY
COMMUNITY
Start: 2020-01-30 | End: 2020-04-29

## 2020-02-17 RX ORDER — MOMETASONE FUROATE 1 MG/G
CREAM TOPICAL
Qty: 50 G | Refills: 0 | Status: SHIPPED | OUTPATIENT
Start: 2020-02-17 | End: 2020-04-27

## 2020-02-17 RX ORDER — LANCETS
1 EACH MISCELLANEOUS 2 TIMES DAILY
COMMUNITY
Start: 2020-01-30 | End: 2020-04-29

## 2020-02-17 NOTE — TELEPHONE ENCOUNTER
Go ahead and substitute if that is the only option  Let patient start up with 35 units.   She needs to monitor her sugar numbers

## 2020-02-17 NOTE — TELEPHONE ENCOUNTER
Pt is currently on Xultophy but insurance doesn't cover it - they informed the pt to ask if Deyanne Oh would be sufficient as this is the medication that her insurance covers.   Insurance also informed the pt that if the provider recommends the pt to be on the

## 2020-02-17 NOTE — PROGRESS NOTES
Surinder Machuca   is a 69year old female.     Patient presents with:  Physical      HPI:   Here for CPX  Current Outpatient Medications   Medication Sig Dispense Refill   • CONTOUR NEXT TEST In Vitro Strip 1 strip by Finger stick route 2 (two) AIN (acute interstitial nephritis)    • Cataracts, bilateral 1/12/2018   • Congestive heart disease ([facility] [address]    • Diabetes ([facility]    • Essential hypertension    • Hyperlipidemia    • Obesity    • ALEXANDRA (obstructive sleep apnea) 1/15/2020 [facility]    AHI 34 REM AH easy bruising. Enlarged lymph nodes none. Women Only:   Patient denies breast pain. axillary nodes . Breast lumps or discharge none. Mammogram up-to-date yes   Genitourinary:   Patient denies difficulty urinating, frequent urination, hematuria.  Dysuria no Cyanosis: absent . Edema: none. Pulses: present, bilateral.   Tremors: no.   Varicose veins: not present.    MUSCULOSKELETAL:   Cervical spines: normal.   L-S spines: normal.   Lower extremity joints: normal.  Moderate OA both knees  Upper extremity j

## 2020-02-17 NOTE — PATIENT INSTRUCTIONS
All labs discussed with patient.   Will review her allergies and come up with treatment for hyperuricemia  Patient is to see consults as advised

## 2020-02-25 RX ORDER — POTASSIUM CHLORIDE 1500 MG/1
TABLET, FILM COATED, EXTENDED RELEASE ORAL
Qty: 60 TABLET | Refills: 0 | Status: SHIPPED | OUTPATIENT
Start: 2020-02-25 | End: 2020-04-24

## 2020-02-25 NOTE — TELEPHONE ENCOUNTER
POTASSIUM CHLORIDE ER 20 MEQ     Last OV relevant to medication: 2/17/2020    Last refill date: 12- #60 tabs with 0 refills      When pt was asked to return for OV: 6 months     Upcoming appt/reason: none scheduled    Labs: 12-

## 2020-02-26 DIAGNOSIS — E11.8 TYPE 2 DIABETES MELLITUS WITH COMPLICATION, WITHOUT LONG-TERM CURRENT USE OF INSULIN (HCC): ICD-10-CM

## 2020-02-26 RX ORDER — PEN NEEDLE, DIABETIC 32GX 5/32"
NEEDLE, DISPOSABLE MISCELLANEOUS
Qty: 100 EACH | Refills: 0 | Status: SHIPPED | OUTPATIENT
Start: 2020-02-26 | End: 2020-06-15

## 2020-03-12 ENCOUNTER — OFFICE VISIT (OUTPATIENT)
Dept: INTERNAL MEDICINE CLINIC | Facility: CLINIC | Age: 75
End: 2020-03-12
Payer: MEDICARE

## 2020-03-12 VITALS
OXYGEN SATURATION: 95 % | HEIGHT: 63 IN | WEIGHT: 229.25 LBS | SYSTOLIC BLOOD PRESSURE: 142 MMHG | TEMPERATURE: 98 F | DIASTOLIC BLOOD PRESSURE: 74 MMHG | BODY MASS INDEX: 40.62 KG/M2 | RESPIRATION RATE: 20 BRPM | HEART RATE: 75 BPM

## 2020-03-12 DIAGNOSIS — L72.9 SKIN CYST: Primary | ICD-10-CM

## 2020-03-12 PROCEDURE — 99213 OFFICE O/P EST LOW 20 MIN: CPT | Performed by: INTERNAL MEDICINE

## 2020-03-12 NOTE — PROGRESS NOTES
Calin Elias  [id number] is a 69year old female. Patient presents with:  Bump      HPI:   Behind right ear 2 weeks.   No complaints otherwise  Current Outpatient Medications   Medication Sig Dispense Refill   • BD PEN NEEDLE NAHUM U/F 32G X 4 MM Do interstitial nephritis)    • Cataracts, bilateral 1/12/2018   • Congestive heart disease ([facility] [address]    • Diabetes ([facility]    • Essential hypertension    • Hyperlipidemia    • Obesity    • ALEXANDRA (obstructive sleep apnea) 1/15/2020 [facility] PSG    AHI 34 REM AHI [address]

## 2020-03-13 ENCOUNTER — HOSPITAL ENCOUNTER (OUTPATIENT)
Dept: MAMMOGRAPHY | Age: 75
Discharge: HOME | End: 2020-03-13
Attending: INTERNAL MEDICINE
Payer: MEDICARE

## 2020-03-13 DIAGNOSIS — Z12.31 ENCOUNTER FOR SCREENING MAMMOGRAM FOR MALIGNANT NEOPLASM OF BREAST: ICD-10-CM

## 2020-03-13 PROCEDURE — 77063 BREAST TOMOSYNTHESIS BI: CPT | Performed by: INTERNAL MEDICINE

## 2020-03-13 PROCEDURE — 77067 SCR MAMMO BI INCL CAD: CPT | Performed by: INTERNAL MEDICINE

## 2020-03-20 ENCOUNTER — TELEPHONE (OUTPATIENT)
Dept: INTERNAL MEDICINE CLINIC | Facility: CLINIC | Age: 75
End: 2020-03-20

## 2020-03-23 RX ORDER — CARVEDILOL 25 MG/1
TABLET ORAL
Qty: 180 TABLET | Refills: 0 | Status: SHIPPED | OUTPATIENT
Start: 2020-03-23 | End: 2020-06-19

## 2020-03-23 NOTE — TELEPHONE ENCOUNTER
Last OV: 3/12/2020 with Dr. Marquez Tavares  Last refill date: 12/27/19     #/refills: #180, 0 refills  When pt was asked to return for OV: 4 days  Upcoming appt/reason: no upcoming appt  Last labs 12/17/19

## 2020-03-25 ENCOUNTER — TELEPHONE (OUTPATIENT)
Dept: INTERNAL MEDICINE CLINIC | Facility: CLINIC | Age: 75
End: 2020-03-25

## 2020-03-31 RX ORDER — HYDRALAZINE HYDROCHLORIDE 100 MG/1
TABLET, FILM COATED ORAL
Qty: 180 TABLET | Refills: 0 | Status: SHIPPED | OUTPATIENT
Start: 2020-03-31 | End: 2020-04-23

## 2020-04-23 RX ORDER — HYDRALAZINE HYDROCHLORIDE 100 MG/1
100 TABLET, FILM COATED ORAL 2 TIMES DAILY
Qty: 180 TABLET | Refills: 0 | Status: SHIPPED | OUTPATIENT
Start: 2020-04-23 | End: 2021-02-16

## 2020-04-23 NOTE — TELEPHONE ENCOUNTER
Potassium Chloride ER 20 MEQ  Last OV relevant to medication: 2-17-20  Last refill date: 2-25-20 #/refills: 0  When pt was asked to return for OV: 6 mo.   Upcoming appt/reason: none  Recent labs: 12-17-19: CMP

## 2020-04-24 RX ORDER — POTASSIUM CHLORIDE 1500 MG/1
TABLET, FILM COATED, EXTENDED RELEASE ORAL
Qty: 60 TABLET | Refills: 0 | Status: SHIPPED | OUTPATIENT
Start: 2020-04-24 | End: 2020-04-27

## 2020-04-27 RX ORDER — MOMETASONE FUROATE 1 MG/G
CREAM TOPICAL
Qty: 45 G | Refills: 0 | Status: SHIPPED | OUTPATIENT
Start: 2020-04-27 | End: 2020-05-18

## 2020-04-27 RX ORDER — POTASSIUM CHLORIDE 1500 MG/1
TABLET, FILM COATED, EXTENDED RELEASE ORAL
Qty: 60 TABLET | Refills: 0 | Status: SHIPPED | OUTPATIENT
Start: 2020-04-27 | End: 2020-06-25

## 2020-04-29 RX ORDER — LANCETS
EACH MISCELLANEOUS
Qty: 100 EACH | Refills: 0 | Status: SHIPPED | OUTPATIENT
Start: 2020-04-29 | End: 2020-08-03

## 2020-04-29 RX ORDER — BLOOD SUGAR DIAGNOSTIC
STRIP MISCELLANEOUS
Qty: 100 STRIP | Refills: 0 | Status: SHIPPED | OUTPATIENT
Start: 2020-04-29 | End: 2020-08-03

## 2020-05-05 ENCOUNTER — TELEPHONE (OUTPATIENT)
Dept: INTERNAL MEDICINE CLINIC | Facility: CLINIC | Age: 75
End: 2020-05-05

## 2020-05-05 NOTE — TELEPHONE ENCOUNTER
Diabetic detail written orders signed and faxed to [facility] at [phone]  Fax confirmation received   Sent to scan and placed in accordion

## 2020-05-06 RX ORDER — INSULIN GLARGINE AND LIXISENATIDE 100; 33 U/ML; UG/ML
INJECTION, SOLUTION SUBCUTANEOUS
Qty: 30 ML | Refills: 0 | Status: SHIPPED | OUTPATIENT
Start: 2020-05-06 | End: 2021-01-13

## 2020-05-12 ENCOUNTER — TELEPHONE (OUTPATIENT)
Dept: INTERNAL MEDICINE CLINIC | Facility: CLINIC | Age: 75
End: 2020-05-12

## 2020-05-18 ENCOUNTER — OFFICE VISIT (OUTPATIENT)
Dept: INTERNAL MEDICINE CLINIC | Facility: CLINIC | Age: 75
End: 2020-05-18
Payer: MEDICARE

## 2020-05-18 VITALS
SYSTOLIC BLOOD PRESSURE: 136 MMHG | WEIGHT: 239 LBS | HEIGHT: 63 IN | DIASTOLIC BLOOD PRESSURE: 72 MMHG | BODY MASS INDEX: 42.35 KG/M2 | HEART RATE: 82 BPM | TEMPERATURE: 98 F | OXYGEN SATURATION: 95 % | RESPIRATION RATE: 20 BRPM

## 2020-05-18 DIAGNOSIS — L30.8 OTHER ECZEMA: Primary | ICD-10-CM

## 2020-05-18 PROCEDURE — 99213 OFFICE O/P EST LOW 20 MIN: CPT | Performed by: INTERNAL MEDICINE

## 2020-05-18 RX ORDER — MOMETASONE FUROATE 1 MG/G
CREAM TOPICAL
Qty: 45 G | Refills: 0 | Status: SHIPPED | OUTPATIENT
Start: 2020-05-18 | End: 2020-06-16

## 2020-05-18 NOTE — PROGRESS NOTES
Greta Ding  [id number] is a 69year old female. No chief complaint on file.       HPI:   Has discoloration of the right great toe quite itchy patient concerned in view of her diabetes  Current Outpatient Medications   Medication Sig Dispense Refi Past Medical History:   Diagnosis Date   • AIN (acute interstitial nephritis)    • Cataracts, bilateral 1/12/2018   • Congestive heart disease ([facility] [address]    • Diabetes ([facility]    • Essential hypertension    • Hyperlipidemia    • Obesity    • ALEXANDRA (obstructive sl

## 2020-06-12 DIAGNOSIS — E11.8 TYPE 2 DIABETES MELLITUS WITH COMPLICATION, WITHOUT LONG-TERM CURRENT USE OF INSULIN (HCC): ICD-10-CM

## 2020-06-15 DIAGNOSIS — L30.8 OTHER ECZEMA: ICD-10-CM

## 2020-06-15 RX ORDER — PEN NEEDLE, DIABETIC 32GX 5/32"
NEEDLE, DISPOSABLE MISCELLANEOUS
Qty: 100 EACH | Refills: 0 | Status: SHIPPED | OUTPATIENT
Start: 2020-06-15 | End: 2020-09-21

## 2020-06-16 ENCOUNTER — TELEPHONE (OUTPATIENT)
Dept: CARDIOLOGY | Age: 75
End: 2020-06-16

## 2020-06-16 ENCOUNTER — APPOINTMENT (OUTPATIENT)
Dept: CARDIOLOGY | Age: 75
End: 2020-06-16

## 2020-06-16 RX ORDER — MOMETASONE FUROATE 1 MG/G
CREAM TOPICAL
Qty: 45 G | Refills: 0 | Status: SHIPPED | OUTPATIENT
Start: 2020-06-16 | End: 2020-07-08

## 2020-06-16 NOTE — TELEPHONE ENCOUNTER
No protocol    Requesting Mometasone Furoate 0.1 % External Cream  LOV: 5/18/20  RTC: 3 months  Last Relevant Labs: 12/17/19  Filled: 5/18/20 #45 g with 0 refills    Future Appointments   Date Time Provider Marline Vidal   9/16/2020  9:40 AM Darryl

## 2020-06-19 RX ORDER — CARVEDILOL 25 MG/1
TABLET ORAL
Qty: 180 TABLET | Refills: 0 | Status: SHIPPED | OUTPATIENT
Start: 2020-06-19 | End: 2021-01-13

## 2020-06-19 NOTE — TELEPHONE ENCOUNTER
Failed protocol for Metformin - Last HgBA1C < 7.5    Medication(s) to Refill:   Requested Prescriptions     Pending Prescriptions Disp Refills   • METFORMIN HCL 1000 MG Oral Tab [Pharmacy Med Name: METFORMIN 1000MG TABLETS] 180 tablet 0     Sig: TAKE 1 TAB

## 2020-06-25 RX ORDER — POTASSIUM CHLORIDE 1500 MG/1
TABLET, FILM COATED, EXTENDED RELEASE ORAL
Qty: 60 TABLET | Refills: 0 | Status: SHIPPED | OUTPATIENT
Start: 2020-06-25 | End: 2020-06-25

## 2020-06-25 RX ORDER — POTASSIUM CHLORIDE 1500 MG/1
TABLET, FILM COATED, EXTENDED RELEASE ORAL
Qty: 90 TABLET | Refills: 0 | Status: SHIPPED | OUTPATIENT
Start: 2020-06-25 | End: 2021-02-16

## 2020-06-25 NOTE — TELEPHONE ENCOUNTER
Potassium er 20 meq 1 tab daily filled 4-27-20 60 with 0 refills     LOV 5-18-20   No follow-ups on file.   No upcoming apt on file   Labs 12-17-19   Potassium  3.5 - 5.1 mmol/L 4.0

## 2020-07-07 DIAGNOSIS — L30.8 OTHER ECZEMA: ICD-10-CM

## 2020-07-08 RX ORDER — MOMETASONE FUROATE 1 MG/G
CREAM TOPICAL
Qty: 45 G | Refills: 0 | Status: SHIPPED | OUTPATIENT
Start: 2020-07-08 | End: 2020-09-24

## 2020-07-08 NOTE — TELEPHONE ENCOUNTER
No protocol    Requesting MOMETASONE FUROATE 0.1 % External Cream  LOV: 5/18/20  RTC: 3 months  Last Relevant Labs: 12.17.19  Filled: 6/16/20 #45 g with 0 refills    Future Appointments   Date Time Provider Marline Vidal   9/16/2020  9:40 AM Darryl

## 2020-08-03 RX ORDER — BLOOD SUGAR DIAGNOSTIC
STRIP MISCELLANEOUS
Qty: 100 STRIP | Refills: 0 | Status: SHIPPED | OUTPATIENT
Start: 2020-08-03 | End: 2021-02-16 | Stop reason: ALTCHOICE

## 2020-08-03 RX ORDER — LANCETS
EACH MISCELLANEOUS
Qty: 100 EACH | Refills: 0 | Status: SHIPPED | OUTPATIENT
Start: 2020-08-03 | End: 2021-02-16 | Stop reason: ALTCHOICE

## 2020-08-03 NOTE — TELEPHONE ENCOUNTER
Refill requested:   Requested Prescriptions     Signed Prescriptions Disp Refills   • CONTOUR NEXT TEST In Vitro Strip 100 strip 0     Sig: TEST BLOOD SUGAR DAILY     Authorizing Provider: Charline Stout     Ordering User: Thomas Sellers   • Alysha Martinez

## 2020-08-19 RX ORDER — ALBUTEROL SULFATE 2.5 MG/3ML
SOLUTION RESPIRATORY (INHALATION)
Qty: 300 ML | Refills: 0 | Status: SHIPPED | OUTPATIENT
Start: 2020-08-19 | End: 2021-02-16

## 2020-08-19 NOTE — TELEPHONE ENCOUNTER
Albuterol solution filled 10-1-19 300 ml with 0 refills     LOV 5-18-20    No follow-ups on file.   No upcoming apt on file   No hx of asthma

## 2020-09-19 DIAGNOSIS — E11.8 TYPE 2 DIABETES MELLITUS WITH COMPLICATION, WITHOUT LONG-TERM CURRENT USE OF INSULIN (HCC): ICD-10-CM

## 2020-09-21 RX ORDER — PEN NEEDLE, DIABETIC 32GX 5/32"
NEEDLE, DISPOSABLE MISCELLANEOUS
Qty: 100 EACH | Refills: 0 | Status: SHIPPED | OUTPATIENT
Start: 2020-09-21 | End: 2020-12-21

## 2020-09-21 NOTE — TELEPHONE ENCOUNTER
Passed protocol     Last refill:  6/15/2020 BD Pen Needle #100 NR     LOV:   5/18/2020 Dr Miguel Roque RTC 3 months  No FOV scheduled

## 2020-10-05 ENCOUNTER — TELEPHONE (OUTPATIENT)
Dept: CARDIOLOGY | Age: 75
End: 2020-10-05

## 2020-10-06 ENCOUNTER — APPOINTMENT (OUTPATIENT)
Dept: CARDIOLOGY | Age: 75
End: 2020-10-06

## 2020-10-20 ENCOUNTER — APPOINTMENT (OUTPATIENT)
Dept: CARDIOLOGY | Age: 75
End: 2020-10-20

## 2020-10-28 ENCOUNTER — TELEPHONE (OUTPATIENT)
Dept: INTERNAL MEDICINE CLINIC | Facility: CLINIC | Age: 75
End: 2020-10-28

## 2020-10-28 NOTE — TELEPHONE ENCOUNTER
Test requisition form faxed back to [facility] at [phone] - fax confirmation received. Copies made for scan and pod folder.

## 2020-10-28 NOTE — TELEPHONE ENCOUNTER
Signed order for ankle and back brace faxed back to [facility] at [phone] - fax confirmation received. Copy made for scan and pod folder.

## 2020-12-08 NOTE — PROGRESS NOTES
SPINE EVALUATION:   Referring Physician: Pinky GARZON  Diagnosis: Lumbar spine paraspinal strain   Date of Service: 12/12/2017     PATIENT SUMMARY   Betina Joseph is a 81year old female who presents to therapy today with complaints of LBP with r tests: Negative LE neural tension tests    Today’s Treatment and Response: Discussed findings and POC. Pt performed hEP of lumbar spine flexion stretch sitting, lTR in hook lying, B ankle CKC PF and DF exercises. HO was issued for HEP.      Charges: PT Isadora Curry the need for these services furnished under this plan of treatment and while under my care.     X___________________________________________________ Date____________________    Certification From: 97/54/6541  To:3/12/2018 Length To Time In Minutes Device Was In Place: 10

## 2020-12-10 ENCOUNTER — TELEPHONE (OUTPATIENT)
Dept: INTERNAL MEDICINE CLINIC | Facility: CLINIC | Age: 75
End: 2020-12-10

## 2020-12-10 NOTE — TELEPHONE ENCOUNTER
Pt was transferred to me by psr for covid s/s with sob. Pt states that she is normally on o2 and get sob with activities w/o o2 but when she reapplies o2 her sob resolves.      Denies chest pain, sob at rest, being light headed/dizzy, numbness/tingling

## 2020-12-11 ENCOUNTER — TELEMEDICINE (OUTPATIENT)
Dept: INTERNAL MEDICINE CLINIC | Facility: CLINIC | Age: 75
End: 2020-12-11
Payer: MEDICARE

## 2020-12-11 DIAGNOSIS — Z20.822 EXPOSURE TO COVID-19 VIRUS: Primary | ICD-10-CM

## 2020-12-11 PROCEDURE — 99213 OFFICE O/P EST LOW 20 MIN: CPT | Performed by: INTERNAL MEDICINE

## 2020-12-11 NOTE — PROGRESS NOTES
Virtual Telephone Check-In    Scot Fu verbally consents to a Virtual/Telephone Check-In visit on 12/11/20. Patient has been referred to the [facility] website at [email or web address] to review the yearly Consent to Treat document.     Patient unders

## 2020-12-20 DIAGNOSIS — E11.8 TYPE 2 DIABETES MELLITUS WITH COMPLICATION, WITHOUT LONG-TERM CURRENT USE OF INSULIN (HCC): ICD-10-CM

## 2020-12-21 RX ORDER — PEN NEEDLE, DIABETIC 32GX 5/32"
NEEDLE, DISPOSABLE MISCELLANEOUS
Qty: 100 EACH | Refills: 0 | Status: SHIPPED | OUTPATIENT
Start: 2020-12-21 | End: 2021-03-23

## 2021-01-04 RX ORDER — POTASSIUM CHLORIDE 1500 MG/1
TABLET, EXTENDED RELEASE ORAL
COMMUNITY
Start: 2020-06-25

## 2021-01-04 RX ORDER — INSULIN GLARGINE AND LIXISENATIDE 100; 33 U/ML; UG/ML
INJECTION, SOLUTION SUBCUTANEOUS
COMMUNITY
Start: 2020-05-06

## 2021-01-04 RX ORDER — MELOXICAM 7.5 MG/1
TABLET ORAL
COMMUNITY
Start: 2020-05-04

## 2021-01-05 ENCOUNTER — OFFICE VISIT (OUTPATIENT)
Dept: CARDIOLOGY | Age: 76
End: 2021-01-05

## 2021-01-05 VITALS
DIASTOLIC BLOOD PRESSURE: 72 MMHG | BODY MASS INDEX: 43.41 KG/M2 | HEIGHT: 63 IN | WEIGHT: 245 LBS | SYSTOLIC BLOOD PRESSURE: 140 MMHG | HEART RATE: 80 BPM

## 2021-01-05 DIAGNOSIS — I10 ESSENTIAL HYPERTENSION: ICD-10-CM

## 2021-01-05 DIAGNOSIS — G47.33 OBSTRUCTIVE SLEEP APNEA SYNDROME: ICD-10-CM

## 2021-01-05 DIAGNOSIS — E11.9 TYPE 2 DIABETES MELLITUS WITHOUT COMPLICATION, WITHOUT LONG-TERM CURRENT USE OF INSULIN (CMD): Primary | ICD-10-CM

## 2021-01-05 DIAGNOSIS — E78.00 HYPERCHOLESTEREMIA: ICD-10-CM

## 2021-01-05 DIAGNOSIS — I27.21 PULMONARY HYPERTENSION SECONDARY TO INCREASED PVR (CMD): ICD-10-CM

## 2021-01-05 DIAGNOSIS — I50.42 HEART FAILURE, SYSTOLIC AND DIASTOLIC, CHRONIC (CMD): ICD-10-CM

## 2021-01-05 DIAGNOSIS — R07.89 CHEST WALL PAIN: ICD-10-CM

## 2021-01-05 DIAGNOSIS — R06.02 SHORTNESS OF BREATH: ICD-10-CM

## 2021-01-05 PROCEDURE — 3078F DIAST BP <80 MM HG: CPT | Performed by: INTERNAL MEDICINE

## 2021-01-05 PROCEDURE — 99214 OFFICE O/P EST MOD 30 MIN: CPT | Performed by: INTERNAL MEDICINE

## 2021-01-05 PROCEDURE — 3077F SYST BP >= 140 MM HG: CPT | Performed by: INTERNAL MEDICINE

## 2021-01-05 RX ORDER — HYDRALAZINE HYDROCHLORIDE 100 MG/1
TABLET, FILM COATED ORAL
Qty: 225 TABLET | Refills: 0 | Status: SHIPPED | OUTPATIENT
Start: 2021-01-05

## 2021-01-05 RX ORDER — HYDRALAZINE HYDROCHLORIDE 100 MG/1
100 TABLET, FILM COATED ORAL 2 TIMES DAILY
COMMUNITY
Start: 2020-12-15 | End: 2021-01-05 | Stop reason: DRUGHIGH

## 2021-01-05 SDOH — HEALTH STABILITY: PHYSICAL HEALTH: ON AVERAGE, HOW MANY MINUTES DO YOU ENGAGE IN EXERCISE AT THIS LEVEL?: 0 MIN

## 2021-01-05 SDOH — HEALTH STABILITY: PHYSICAL HEALTH: ON AVERAGE, HOW MANY DAYS PER WEEK DO YOU ENGAGE IN MODERATE TO STRENUOUS EXERCISE (LIKE A BRISK WALK)?: 0 DAYS

## 2021-01-05 ASSESSMENT — PATIENT HEALTH QUESTIONNAIRE - PHQ9
2. FEELING DOWN, DEPRESSED OR HOPELESS: NOT AT ALL
CLINICAL INTERPRETATION OF PHQ2 SCORE: NO FURTHER SCREENING NEEDED
CLINICAL INTERPRETATION OF PHQ9 SCORE: NO FURTHER SCREENING NEEDED
SUM OF ALL RESPONSES TO PHQ9 QUESTIONS 1 AND 2: 0
SUM OF ALL RESPONSES TO PHQ9 QUESTIONS 1 AND 2: 0
1. LITTLE INTEREST OR PLEASURE IN DOING THINGS: NOT AT ALL

## 2021-01-07 ENCOUNTER — LAB ENCOUNTER (OUTPATIENT)
Dept: LAB | Age: 76
End: 2021-01-07
Attending: INTERNAL MEDICINE
Payer: MEDICARE

## 2021-01-07 DIAGNOSIS — I10 ESSENTIAL HYPERTENSION, MALIGNANT: ICD-10-CM

## 2021-01-07 DIAGNOSIS — E78.00 PURE HYPERCHOLESTEROLEMIA: ICD-10-CM

## 2021-01-07 DIAGNOSIS — E11.9 DIABETES MELLITUS (HCC): Primary | ICD-10-CM

## 2021-01-07 DIAGNOSIS — R06.02 SHORTNESS OF BREATH: ICD-10-CM

## 2021-01-07 DIAGNOSIS — I27.21 PULMONARY ARTERY HYPERTENSION (HCC): ICD-10-CM

## 2021-01-07 DIAGNOSIS — I50.42 CHRONIC COMBINED SYSTOLIC AND DIASTOLIC HEART FAILURE (HCC): ICD-10-CM

## 2021-01-07 LAB
ALBUMIN SERPL-MCNC: 3.4 G/DL (ref 3.4–5)
ALBUMIN/GLOB SERPL: 1 {RATIO} (ref 1–2)
ALP LIVER SERPL-CCNC: 108 U/L
ALT SERPL-CCNC: 14 U/L
ANION GAP SERPL CALC-SCNC: 8 MMOL/L (ref 0–18)
AST SERPL-CCNC: 8 U/L (ref 15–37)
BASOPHILS # BLD AUTO: 0.04 X10(3) UL (ref 0–0.2)
BASOPHILS NFR BLD AUTO: 0.7 %
BILIRUB SERPL-MCNC: 0.3 MG/DL (ref 0.1–2)
BUN BLD-MCNC: 21 MG/DL (ref 7–18)
BUN/CREAT SERPL: 23.9 (ref 10–20)
CALCIUM BLD-MCNC: 8.1 MG/DL (ref 8.5–10.1)
CHLORIDE SERPL-SCNC: 106 MMOL/L (ref 98–112)
CHOLEST SMN-MCNC: 130 MG/DL (ref ?–200)
CO2 SERPL-SCNC: 28 MMOL/L (ref 21–32)
CREAT BLD-MCNC: 0.88 MG/DL
DEPRECATED RDW RBC AUTO: 47.7 FL (ref 35.1–46.3)
EOSINOPHIL # BLD AUTO: 0.17 X10(3) UL (ref 0–0.7)
EOSINOPHIL NFR BLD AUTO: 2.9 %
ERYTHROCYTE [DISTWIDTH] IN BLOOD BY AUTOMATED COUNT: 14.1 % (ref 11–15)
GLOBULIN PLAS-MCNC: 3.3 G/DL (ref 2.8–4.4)
GLUCOSE BLD-MCNC: 92 MG/DL (ref 70–99)
HCT VFR BLD AUTO: 38.2 %
HDLC SERPL-MCNC: 39 MG/DL (ref 40–59)
HGB BLD-MCNC: 11.9 G/DL
IMM GRANULOCYTES # BLD AUTO: 0.02 X10(3) UL (ref 0–1)
IMM GRANULOCYTES NFR BLD: 0.3 %
LDLC SERPL CALC-MCNC: 70 MG/DL (ref ?–100)
LYMPHOCYTES # BLD AUTO: 1.34 X10(3) UL (ref 1–4)
LYMPHOCYTES NFR BLD AUTO: 23.1 %
M PROTEIN MFR SERPL ELPH: 6.7 G/DL (ref 6.4–8.2)
MCH RBC QN AUTO: 28.7 PG (ref 26–34)
MCHC RBC AUTO-ENTMCNC: 31.2 G/DL (ref 31–37)
MCV RBC AUTO: 92.3 FL
MONOCYTES # BLD AUTO: 0.46 X10(3) UL (ref 0.1–1)
MONOCYTES NFR BLD AUTO: 7.9 %
NEUTROPHILS # BLD AUTO: 3.78 X10 (3) UL (ref 1.5–7.7)
NEUTROPHILS # BLD AUTO: 3.78 X10(3) UL (ref 1.5–7.7)
NEUTROPHILS NFR BLD AUTO: 65.1 %
NONHDLC SERPL-MCNC: 91 MG/DL (ref ?–130)
NT-PROBNP SERPL-MCNC: 179 PG/ML (ref ?–125)
OSMOLALITY SERPL CALC.SUM OF ELEC: 297 MOSM/KG (ref 275–295)
PATIENT FASTING Y/N/NP: YES
PATIENT FASTING Y/N/NP: YES
PLATELET # BLD AUTO: 281 10(3)UL (ref 150–450)
POTASSIUM SERPL-SCNC: 3.6 MMOL/L (ref 3.5–5.1)
RBC # BLD AUTO: 4.14 X10(6)UL
SODIUM SERPL-SCNC: 142 MMOL/L (ref 136–145)
TRIGL SERPL-MCNC: 104 MG/DL (ref 30–149)
TSI SER-ACNC: 1.41 MIU/ML (ref 0.36–3.74)
VLDLC SERPL CALC-MCNC: 21 MG/DL (ref 0–30)
WBC # BLD AUTO: 5.8 X10(3) UL (ref 4–11)

## 2021-01-07 PROCEDURE — 85025 COMPLETE CBC W/AUTO DIFF WBC: CPT

## 2021-01-07 PROCEDURE — 83880 ASSAY OF NATRIURETIC PEPTIDE: CPT

## 2021-01-07 PROCEDURE — 84443 ASSAY THYROID STIM HORMONE: CPT

## 2021-01-07 PROCEDURE — 36415 COLL VENOUS BLD VENIPUNCTURE: CPT

## 2021-01-07 PROCEDURE — 80061 LIPID PANEL: CPT

## 2021-01-07 PROCEDURE — 80053 COMPREHEN METABOLIC PANEL: CPT

## 2021-01-13 DIAGNOSIS — L27.0 DRUG-INDUCED SKIN RASH: ICD-10-CM

## 2021-01-13 DIAGNOSIS — I27.21 PULMONARY HYPERTENSION SECONDARY TO INCREASED PVR (CMD): ICD-10-CM

## 2021-01-13 DIAGNOSIS — L30.9 ECZEMA, UNSPECIFIED TYPE: ICD-10-CM

## 2021-01-13 RX ORDER — INSULIN GLARGINE AND LIXISENATIDE 100; 33 U/ML; UG/ML
35 INJECTION, SOLUTION SUBCUTANEOUS DAILY
Qty: 30 ML | Refills: 0 | Status: SHIPPED | OUTPATIENT
Start: 2021-01-13 | End: 2021-06-29

## 2021-01-13 RX ORDER — MOMETASONE FUROATE 1 MG/G
CREAM TOPICAL
Qty: 60 G | Refills: 0 | OUTPATIENT
Start: 2021-01-13

## 2021-01-13 RX ORDER — ALBUTEROL SULFATE 90 UG/1
AEROSOL, METERED RESPIRATORY (INHALATION)
Qty: 18 G | Refills: 0 | Status: SHIPPED | OUTPATIENT
Start: 2021-01-13 | End: 2021-02-16

## 2021-01-13 RX ORDER — SACUBITRIL AND VALSARTAN 97; 103 MG/1; MG/1
TABLET, FILM COATED ORAL
Qty: 180 TABLET | Refills: 3 | Status: SHIPPED | OUTPATIENT
Start: 2021-01-13

## 2021-01-13 RX ORDER — LORATADINE 10 MG/1
TABLET ORAL
Qty: 30 TABLET | Refills: 11 | OUTPATIENT
Start: 2021-01-13

## 2021-01-13 RX ORDER — CARVEDILOL 25 MG/1
25 TABLET ORAL 2 TIMES DAILY
Qty: 180 TABLET | Refills: 0 | Status: SHIPPED | OUTPATIENT
Start: 2021-01-13 | End: 2021-07-09

## 2021-01-13 NOTE — TELEPHONE ENCOUNTER
Spoke to pt - she is only using the cream that the dermatology prescribed and does not take loratadine.      Passed protocol - Loratadine  Failed protocol - Albuterol and Mometasone    Last refill:  11/18/2019 Albuterol INH 8.5g NR    7/8/2020 Mometasone -

## 2021-01-13 NOTE — TELEPHONE ENCOUNTER
Future Appointments   Date Time Provider Marline Marcy   1/19/2021 10:40 AM [facility]ger, [facility]   1/26/2021  8:15 AM [facility] CARD STRESS ECHO RM1 [facility] C[facility] [place]   2/16/2021 10:00 AM Uriel Hernandez MD EM[facility] 8 [facility] [place]

## 2021-01-13 NOTE — TELEPHONE ENCOUNTER
Francisco Gallardo 100-33 units failed protocol due to  Diabetic Medication Protocol [id number]/13/2021 11:58 AM   HgBA1C procedure resulted in past 6 months Protocol Details    Last HgBA1C < 7.5     Microalbumin procedure in past 12 months or taking ACE/ARB     Appoin

## 2021-01-25 ENCOUNTER — ORDER TRANSCRIPTION (OUTPATIENT)
Dept: SLEEP CENTER | Age: 76
End: 2021-01-25

## 2021-01-25 DIAGNOSIS — Z23 NEED FOR VACCINATION: ICD-10-CM

## 2021-01-25 DIAGNOSIS — Z01.818 PREOP EXAMINATION: Primary | ICD-10-CM

## 2021-01-25 DIAGNOSIS — Z11.59 SCREENING FOR VIRAL DISEASE: ICD-10-CM

## 2021-02-01 RX ORDER — ATORVASTATIN CALCIUM 20 MG/1
TABLET, FILM COATED ORAL
Qty: 90 TABLET | Refills: 3 | Status: SHIPPED | OUTPATIENT
Start: 2021-02-01

## 2021-02-02 ENCOUNTER — TELEPHONE (OUTPATIENT)
Dept: INTERNAL MEDICINE CLINIC | Facility: CLINIC | Age: 76
End: 2021-02-02

## 2021-02-02 NOTE — TELEPHONE ENCOUNTER
Fax received from [employer] stating that patients insurance will no longer cover Albuterol Aer HFA and they have filled 30 days temporary supply    Per [facility], it is okay to change Rx to ProAir at next refill.      Written order sent to [facility]

## 2021-02-10 ENCOUNTER — IMMUNIZATION (OUTPATIENT)
Dept: LAB | Age: 76
End: 2021-02-10
Attending: HOSPITALIST
Payer: MEDICARE

## 2021-02-10 DIAGNOSIS — Z23 NEED FOR VACCINATION: Primary | ICD-10-CM

## 2021-02-10 PROCEDURE — 0001A SARSCOV2 VAC 30MCG/0.3ML IM: CPT

## 2021-02-16 ENCOUNTER — LAB ENCOUNTER (OUTPATIENT)
Dept: LAB | Age: 76
End: 2021-02-16
Attending: INTERNAL MEDICINE
Payer: MEDICARE

## 2021-02-16 ENCOUNTER — OFFICE VISIT (OUTPATIENT)
Dept: INTERNAL MEDICINE CLINIC | Facility: CLINIC | Age: 76
End: 2021-02-16
Payer: COMMERCIAL

## 2021-02-16 VITALS
SYSTOLIC BLOOD PRESSURE: 164 MMHG | RESPIRATION RATE: 18 BRPM | BODY MASS INDEX: 43.17 KG/M2 | HEIGHT: 62.5 IN | WEIGHT: 240.63 LBS | DIASTOLIC BLOOD PRESSURE: 86 MMHG | HEART RATE: 73 BPM | TEMPERATURE: 98 F | OXYGEN SATURATION: 98 %

## 2021-02-16 DIAGNOSIS — I42.8 NON-ISCHEMIC CARDIOMYOPATHY (HCC): Chronic | ICD-10-CM

## 2021-02-16 DIAGNOSIS — Z00.00 ROUTINE GENERAL MEDICAL EXAMINATION AT A HEALTH CARE FACILITY: ICD-10-CM

## 2021-02-16 DIAGNOSIS — J44.9 CHRONIC OBSTRUCTIVE PULMONARY DISEASE, UNSPECIFIED COPD TYPE (HCC): Chronic | ICD-10-CM

## 2021-02-16 DIAGNOSIS — Z01.818 PREOP EXAMINATION: ICD-10-CM

## 2021-02-16 DIAGNOSIS — Z11.59 SCREENING FOR VIRAL DISEASE: ICD-10-CM

## 2021-02-16 DIAGNOSIS — I27.20 PULMONARY HTN (HCC): Chronic | ICD-10-CM

## 2021-02-16 DIAGNOSIS — E78.00 PURE HYPERCHOLESTEROLEMIA: Chronic | ICD-10-CM

## 2021-02-16 DIAGNOSIS — E11.9 TYPE 2 DIABETES MELLITUS WITHOUT COMPLICATION, WITHOUT LONG-TERM CURRENT USE OF INSULIN (HCC): Chronic | ICD-10-CM

## 2021-02-16 DIAGNOSIS — Z99.89 OSA ON CPAP: Chronic | ICD-10-CM

## 2021-02-16 DIAGNOSIS — M1A.0720 IDIOPATHIC CHRONIC GOUT OF LEFT ANKLE WITHOUT TOPHUS: Chronic | ICD-10-CM

## 2021-02-16 DIAGNOSIS — Z00.00 ROUTINE GENERAL MEDICAL EXAMINATION AT A HEALTH CARE FACILITY: Primary | ICD-10-CM

## 2021-02-16 DIAGNOSIS — E66.01 MORBID OBESITY WITH BMI OF 40.0-44.9, ADULT (HCC): Chronic | ICD-10-CM

## 2021-02-16 DIAGNOSIS — I11.0 HYPERTENSIVE HEART DISEASE WITH HEART FAILURE (HCC): Chronic | ICD-10-CM

## 2021-02-16 DIAGNOSIS — G47.33 OSA ON CPAP: Chronic | ICD-10-CM

## 2021-02-16 PROBLEM — Z72.0 TOBACCO ABUSE: Chronic | Status: RESOLVED | Noted: 2018-01-12 | Resolved: 2021-02-16

## 2021-02-16 PROBLEM — L30.9 ECZEMA: Chronic | Status: RESOLVED | Noted: 2018-11-19 | Resolved: 2021-02-16

## 2021-02-16 LAB
BILIRUB UR QL STRIP.AUTO: NEGATIVE
CLARITY UR REFRACT.AUTO: CLEAR
COLOR UR AUTO: YELLOW
CREAT UR-SCNC: 76 MG/DL
EST. AVERAGE GLUCOSE BLD GHB EST-MCNC: 137 MG/DL (ref 68–126)
GLUCOSE UR STRIP.AUTO-MCNC: NEGATIVE MG/DL
HBA1C MFR BLD HPLC: 6.4 % (ref ?–5.7)
HYALINE CASTS #/AREA URNS AUTO: PRESENT /LPF
KETONES UR STRIP.AUTO-MCNC: NEGATIVE MG/DL
MICROALBUMIN UR-MCNC: 3.48 MG/DL
MICROALBUMIN/CREAT 24H UR-RTO: 45.8 UG/MG (ref ?–30)
NITRITE UR QL STRIP.AUTO: NEGATIVE
PH UR STRIP.AUTO: 6 [PH] (ref 4.5–8)
PROT UR STRIP.AUTO-MCNC: NEGATIVE MG/DL
RBC UR QL AUTO: NEGATIVE
SP GR UR STRIP.AUTO: 1.01 (ref 1–1.03)
T4 SERPL-MCNC: 9.6 UG/DL
TSI SER-ACNC: 1.83 MIU/ML (ref 0.36–3.74)
URATE SERPL-MCNC: 10.6 MG/DL
UROBILINOGEN UR STRIP.AUTO-MCNC: <2 MG/DL
VIT D+METAB SERPL-MCNC: 14.5 NG/ML (ref 30–100)

## 2021-02-16 PROCEDURE — 83036 HEMOGLOBIN GLYCOSYLATED A1C: CPT

## 2021-02-16 PROCEDURE — 3008F BODY MASS INDEX DOCD: CPT | Performed by: INTERNAL MEDICINE

## 2021-02-16 PROCEDURE — 84443 ASSAY THYROID STIM HORMONE: CPT

## 2021-02-16 PROCEDURE — 3077F SYST BP >= 140 MM HG: CPT | Performed by: INTERNAL MEDICINE

## 2021-02-16 PROCEDURE — 84436 ASSAY OF TOTAL THYROXINE: CPT

## 2021-02-16 PROCEDURE — 99397 PER PM REEVAL EST PAT 65+ YR: CPT | Performed by: INTERNAL MEDICINE

## 2021-02-16 PROCEDURE — G0439 PPPS, SUBSEQ VISIT: HCPCS | Performed by: INTERNAL MEDICINE

## 2021-02-16 PROCEDURE — 96160 PT-FOCUSED HLTH RISK ASSMT: CPT | Performed by: INTERNAL MEDICINE

## 2021-02-16 PROCEDURE — 84550 ASSAY OF BLOOD/URIC ACID: CPT

## 2021-02-16 PROCEDURE — 3061F NEG MICROALBUMINURIA REV: CPT | Performed by: INTERNAL MEDICINE

## 2021-02-16 PROCEDURE — 82570 ASSAY OF URINE CREATININE: CPT

## 2021-02-16 PROCEDURE — 36415 COLL VENOUS BLD VENIPUNCTURE: CPT

## 2021-02-16 PROCEDURE — 3060F POS MICROALBUMINURIA REV: CPT | Performed by: INTERNAL MEDICINE

## 2021-02-16 PROCEDURE — 82043 UR ALBUMIN QUANTITATIVE: CPT

## 2021-02-16 PROCEDURE — 81001 URINALYSIS AUTO W/SCOPE: CPT

## 2021-02-16 PROCEDURE — 82306 VITAMIN D 25 HYDROXY: CPT

## 2021-02-16 PROCEDURE — 3079F DIAST BP 80-89 MM HG: CPT | Performed by: INTERNAL MEDICINE

## 2021-02-16 RX ORDER — BLOOD SUGAR DIAGNOSTIC
1 STRIP MISCELLANEOUS DAILY
COMMUNITY
End: 2021-02-16 | Stop reason: ALTCHOICE

## 2021-02-16 RX ORDER — LORATADINE 10 MG/1
1 TABLET ORAL DAILY
COMMUNITY
End: 2021-02-16 | Stop reason: ALTCHOICE

## 2021-02-16 RX ORDER — KETOCONAZOLE 20 MG/G
1 CREAM TOPICAL DAILY
COMMUNITY
Start: 2020-10-31 | End: 2021-02-16

## 2021-02-16 RX ORDER — PROBENECID 500 MG/1
500 TABLET, FILM COATED ORAL 2 TIMES DAILY
Qty: 180 TABLET | Refills: 0 | Status: SHIPPED | OUTPATIENT
Start: 2021-02-16 | End: 2021-05-31

## 2021-02-16 RX ORDER — BETAMETHASONE DIPROPIONATE 0.05 %
OINTMENT (GRAM) TOPICAL
Qty: 60 G | Refills: 0 | Status: CANCELLED | OUTPATIENT
Start: 2021-02-16

## 2021-02-16 RX ORDER — PREDNISOLONE ACETATE 10 MG/ML
1 SUSPENSION/ DROPS OPHTHALMIC 4 TIMES DAILY
COMMUNITY
Start: 2021-01-13 | End: 2021-03-11 | Stop reason: ALTCHOICE

## 2021-02-16 RX ORDER — MOMETASONE FUROATE 1 MG/G
CREAM TOPICAL
Qty: 45 G | Refills: 0 | Status: SHIPPED | OUTPATIENT
Start: 2021-02-16 | End: 2021-02-16

## 2021-02-16 RX ORDER — MELOXICAM 7.5 MG/1
TABLET ORAL
Qty: 60 TABLET | Refills: 1 | Status: SHIPPED | OUTPATIENT
Start: 2021-02-16 | End: 2021-06-29

## 2021-02-16 RX ORDER — PEN NEEDLE, DIABETIC 32GX 5/32"
NEEDLE, DISPOSABLE MISCELLANEOUS
COMMUNITY
End: 2021-02-16 | Stop reason: ALTCHOICE

## 2021-02-16 RX ORDER — POTASSIUM CHLORIDE 1500 MG/1
1 TABLET, FILM COATED, EXTENDED RELEASE ORAL DAILY
Qty: 90 TABLET | Refills: 0 | Status: SHIPPED | OUTPATIENT
Start: 2021-02-16 | End: 2021-05-31

## 2021-02-16 RX ORDER — ALBUTEROL SULFATE 90 UG/1
1 AEROSOL, METERED RESPIRATORY (INHALATION) EVERY 6 HOURS PRN
Qty: 18 G | Refills: 0 | Status: SHIPPED | OUTPATIENT
Start: 2021-02-16 | End: 2021-03-13

## 2021-02-16 RX ORDER — HYDROXYZINE HYDROCHLORIDE 10 MG/1
10 TABLET, FILM COATED ORAL DAILY
Qty: 90 TABLET | Refills: 0 | Status: SHIPPED | OUTPATIENT
Start: 2021-02-16 | End: 2021-05-13

## 2021-02-16 RX ORDER — TORSEMIDE 20 MG/1
20 TABLET ORAL 2 TIMES DAILY
Qty: 180 TABLET | Refills: 0 | Status: SHIPPED | OUTPATIENT
Start: 2021-02-16 | End: 2021-07-09

## 2021-02-16 RX ORDER — PROBENECID 500 MG/1
1 TABLET, FILM COATED ORAL 2 TIMES DAILY
COMMUNITY
End: 2021-02-16

## 2021-02-16 RX ORDER — ALBUTEROL SULFATE 2.5 MG/3ML
2.5 SOLUTION RESPIRATORY (INHALATION) 3 TIMES DAILY
Qty: 300 ML | Refills: 0 | Status: SHIPPED | OUTPATIENT
Start: 2021-02-16 | End: 2021-09-28

## 2021-02-16 RX ORDER — HYDRALAZINE HYDROCHLORIDE 100 MG/1
100 TABLET, FILM COATED ORAL 2 TIMES DAILY
Qty: 180 TABLET | Refills: 0 | Status: SHIPPED | OUTPATIENT
Start: 2021-02-16 | End: 2021-03-11

## 2021-02-16 RX ORDER — SACUBITRIL AND VALSARTAN 97; 103 MG/1; MG/1
1 TABLET, FILM COATED ORAL 2 TIMES DAILY
Qty: 180 TABLET | Refills: 0 | Status: ON HOLD | OUTPATIENT
Start: 2021-02-16

## 2021-02-16 RX ORDER — MOXIFLOXACIN 5 MG/ML
SOLUTION/ DROPS OPHTHALMIC
COMMUNITY
End: 2021-03-11 | Stop reason: ALTCHOICE

## 2021-02-16 RX ORDER — KETOROLAC TROMETHAMINE 5 MG/ML
1 SOLUTION OPHTHALMIC 4 TIMES DAILY
COMMUNITY
Start: 2021-01-13 | End: 2021-03-11 | Stop reason: ALTCHOICE

## 2021-02-16 RX ORDER — MOMETASONE FUROATE 1 MG/G
CREAM TOPICAL
COMMUNITY
End: 2021-02-16

## 2021-02-16 RX ORDER — KETOCONAZOLE 20 MG/G
1 CREAM TOPICAL DAILY
Qty: 30 G | Refills: 0 | Status: CANCELLED | OUTPATIENT
Start: 2021-02-16

## 2021-02-16 RX ORDER — ATORVASTATIN CALCIUM 20 MG/1
20 TABLET, FILM COATED ORAL NIGHTLY
Qty: 90 TABLET | Refills: 0 | Status: ON HOLD | OUTPATIENT
Start: 2021-02-16

## 2021-02-16 RX ORDER — HYDROXYZINE HYDROCHLORIDE 10 MG/1
10 TABLET, FILM COATED ORAL DAILY
COMMUNITY
End: 2021-02-16

## 2021-02-16 NOTE — PROGRESS NOTES
REASON FOR VISIT:    Emily Doherty is a 70year old female who presents for a MA Supervisit.     female     Patient Care Team: Patient Care Team:  Hafsa Fisher MD as PCP - General (Internal Medicine)  Zara Ho PT as Physical Therapist    Pa Base) MCG/ACT Inhalation Aero Soln Inhale 1 puff into the lungs every 6 (six) hours as needed for Wheezing. 18 g 0   • hydrOXYzine HCl 10 MG Oral Tab Take 1 tablet (10 mg total) by mouth daily.  90 tablet 0   • probenecid 500 MG Oral Tab Take 1 tablet (500 7/11/2017 2/17/2017   TSH 0.358 - 3.740 mIU/mL 1.410 1.840 2.460 0.810        General Health            Functional Ability           Functional Status            Fall/Risk Assessment                 Depression Screening (PHQ-2/PHQ-9): Over the LAST 2 WEEKS Cholesterol Calc (mg/dL)   Date Value   09/19/2016 64     LDL Cholesterol (mg/dL)   Date Value   01/07/2021 70       Dilated Eye exam  Annually Data entered on: 3/11/2020   Last Dilated Eye Exam 3/11/2020         COPD     Spirometry Testing Annually No res HISTORY:   Social History    Tobacco Use      Smoking status: Former Smoker        Packs/day: 0.25        Years: 20.00        Pack years: 5        Start date: 1/10/1974        Quit date: 2016        Years since quittin.2      Smokeless tobacco: F urination, hematuria. Dysuria none. Nocturia None. Urinary frequency no. Urinary incontinence no. Musculoskeletal:   Arthritis No. Back pain no. Joint pain no. Joint stiffness no. Muscle weakness none.    Peripheral Vascular:   General no varicosities, no present.    MUSCULOSKELETAL:   Cervical spines: normal.   L-S spines: normal.   Lower extremity joints: normal.  Moderate OA both knees  Upper extremity joints: normal.  Heberden and Wilman nodes  NEUROLOGICAL:   Babinski: negative/all reflexes are normal labs    Other orders  -     Cancel: ketoconazole 2 % External Cream; Apply 1 Application topically daily.  APPLY TO AFFECTED AREA  -     Discontinue: Mometasone Furoate 0.1 % External Cream; mometasone 0.1 % topical cream  -     Cancel: triamcinolone aceton this patient.   Shingrix shingles vaccine is due

## 2021-02-17 LAB — SARS-COV-2 RNA RESP QL NAA+PROBE: NOT DETECTED

## 2021-02-19 ENCOUNTER — TELEPHONE (OUTPATIENT)
Dept: INTERNAL MEDICINE CLINIC | Facility: CLINIC | Age: 76
End: 2021-02-19

## 2021-02-22 ENCOUNTER — ORDER TRANSCRIPTION (OUTPATIENT)
Dept: SLEEP CENTER | Age: 76
End: 2021-02-22

## 2021-02-22 DIAGNOSIS — Z11.59 SCREENING FOR VIRAL DISEASE: ICD-10-CM

## 2021-02-22 DIAGNOSIS — Z01.818 PREOP EXAMINATION: Primary | ICD-10-CM

## 2021-03-02 ENCOUNTER — HOSPITAL ENCOUNTER (OUTPATIENT)
Dept: CV DIAGNOSTICS | Age: 76
Discharge: HOME | End: 2021-03-02
Attending: INTERNAL MEDICINE
Payer: MEDICARE

## 2021-03-02 DIAGNOSIS — I10 HYPERTENSION, ESSENTIAL: ICD-10-CM

## 2021-03-02 DIAGNOSIS — I27.21 PULMONARY HYPERTENSION SECONDARY TO INCREASED PVR (HCC): ICD-10-CM

## 2021-03-02 DIAGNOSIS — I50.42 HEART FAILURE, SYSTOLIC AND DIASTOLIC, CHRONIC (HCC): ICD-10-CM

## 2021-03-02 DIAGNOSIS — R07.89 CHEST WALL PAIN: ICD-10-CM

## 2021-03-02 DIAGNOSIS — E78.00 HYPERCHOLESTEROLEMIA: ICD-10-CM

## 2021-03-02 DIAGNOSIS — R06.02 SHORTNESS OF BREATH: ICD-10-CM

## 2021-03-02 PROCEDURE — 93306 TTE W/DOPPLER COMPLETE: CPT | Performed by: INTERNAL MEDICINE

## 2021-03-03 ENCOUNTER — IMMUNIZATION (OUTPATIENT)
Dept: LAB | Age: 76
End: 2021-03-03
Attending: HOSPITALIST
Payer: MEDICARE

## 2021-03-03 DIAGNOSIS — Z23 NEED FOR VACCINATION: Primary | ICD-10-CM

## 2021-03-03 PROCEDURE — 0002A SARSCOV2 VAC 30MCG/0.3ML IM: CPT

## 2021-03-08 ENCOUNTER — LAB ENCOUNTER (OUTPATIENT)
Dept: LAB | Age: 76
End: 2021-03-08
Attending: INTERNAL MEDICINE
Payer: MEDICARE

## 2021-03-08 DIAGNOSIS — Z01.818 PREOP EXAMINATION: ICD-10-CM

## 2021-03-08 DIAGNOSIS — Z11.59 SCREENING FOR VIRAL DISEASE: ICD-10-CM

## 2021-03-09 ENCOUNTER — TELEPHONE (OUTPATIENT)
Dept: CARDIOLOGY | Age: 76
End: 2021-03-09

## 2021-03-09 LAB — SARS-COV-2 RNA RESP QL NAA+PROBE: NOT DETECTED

## 2021-03-11 ENCOUNTER — OFFICE VISIT (OUTPATIENT)
Dept: SLEEP CENTER | Age: 76
End: 2021-03-11
Attending: INTERNAL MEDICINE
Payer: MEDICARE

## 2021-03-11 ENCOUNTER — OFFICE VISIT (OUTPATIENT)
Dept: INTERNAL MEDICINE CLINIC | Facility: CLINIC | Age: 76
End: 2021-03-11
Payer: COMMERCIAL

## 2021-03-11 VITALS
BODY MASS INDEX: 43.89 KG/M2 | OXYGEN SATURATION: 98 % | SYSTOLIC BLOOD PRESSURE: 180 MMHG | HEART RATE: 83 BPM | HEIGHT: 62.5 IN | DIASTOLIC BLOOD PRESSURE: 104 MMHG | WEIGHT: 244.63 LBS | RESPIRATION RATE: 20 BRPM | TEMPERATURE: 98 F

## 2021-03-11 DIAGNOSIS — E55.9 VITAMIN D DEFICIENCY: ICD-10-CM

## 2021-03-11 DIAGNOSIS — I11.0 HYPERTENSIVE HEART DISEASE WITH HEART FAILURE (HCC): Primary | ICD-10-CM

## 2021-03-11 DIAGNOSIS — E11.9 TYPE 2 DIABETES MELLITUS WITHOUT COMPLICATION, WITHOUT LONG-TERM CURRENT USE OF INSULIN (HCC): ICD-10-CM

## 2021-03-11 DIAGNOSIS — G47.33 OSA (OBSTRUCTIVE SLEEP APNEA): ICD-10-CM

## 2021-03-11 PROCEDURE — 3008F BODY MASS INDEX DOCD: CPT | Performed by: INTERNAL MEDICINE

## 2021-03-11 PROCEDURE — 3077F SYST BP >= 140 MM HG: CPT | Performed by: INTERNAL MEDICINE

## 2021-03-11 PROCEDURE — 3080F DIAST BP >= 90 MM HG: CPT | Performed by: INTERNAL MEDICINE

## 2021-03-11 PROCEDURE — 99213 OFFICE O/P EST LOW 20 MIN: CPT | Performed by: INTERNAL MEDICINE

## 2021-03-11 PROCEDURE — 95811 POLYSOM 6/>YRS CPAP 4/> PARM: CPT

## 2021-03-11 RX ORDER — HYDRALAZINE HYDROCHLORIDE 100 MG/1
100 TABLET, FILM COATED ORAL 3 TIMES DAILY
Qty: 180 TABLET | Refills: 0 | Status: ON HOLD | COMMUNITY
Start: 2021-03-11

## 2021-03-11 RX ORDER — CHOLECALCIFEROL (VITAMIN D3) 50 MCG
1 TABLET ORAL DAILY
Qty: 90 TABLET | Refills: 3 | Status: SHIPPED | OUTPATIENT
Start: 2021-03-11 | End: 2021-06-09

## 2021-03-11 NOTE — PROGRESS NOTES
Shane Bliss  [id number] is a 70year old female.     Patient presents with:  Test Results       HPI:   Lab discussion  Current Outpatient Medications   Medication Sig Dispense Refill   • Cholecalciferol (VITAMIN D) 50 MCG (2000) Oral Tab Take 1 ta 100 each 0   • METFORMIN HCL 1000 MG Oral Tab TAKE 1 TABLET(1000 MG) BY MOUTH TWICE DAILY WITH MEALS 180 tablet 0   • ASPIRIN EC LOW DOSE 81 MG Oral Tab EC Take 81 mg by mouth daily.   0      Past Medical History:   Diagnosis Date   • AIN (acute interstitia AND PLAN:   Lisa Osgood was seen today for test results.     Diagnoses and all orders for this visit:    Hypertensive heart disease with heart failure ([facility] [address]    Type 2 diabetes mellitus without complication, without long-term current use of insulin ([facility] [address]  -     O

## 2021-03-12 NOTE — TELEPHONE ENCOUNTER
LOV: 3/11/2021 with Dr. Jose Scruggs  RTC: 4 weeks  Last Relevant Labs: 2/16/2021  Filled: 2/16/2021   #18 g with 0 refills    No future appointments.

## 2021-03-13 RX ORDER — ALBUTEROL SULFATE 90 UG/1
AEROSOL, METERED RESPIRATORY (INHALATION)
Qty: 18 G | Refills: 0 | Status: SHIPPED | OUTPATIENT
Start: 2021-03-13 | End: 2021-04-18

## 2021-03-22 DIAGNOSIS — E11.8 TYPE 2 DIABETES MELLITUS WITH COMPLICATION, WITHOUT LONG-TERM CURRENT USE OF INSULIN (HCC): ICD-10-CM

## 2021-03-23 RX ORDER — PEN NEEDLE, DIABETIC 32GX 5/32"
NEEDLE, DISPOSABLE MISCELLANEOUS
Qty: 100 EACH | Refills: 0 | Status: SHIPPED | OUTPATIENT
Start: 2021-03-23 | End: 2021-06-29

## 2021-04-15 ENCOUNTER — OFFICE VISIT (OUTPATIENT)
Dept: SLEEP CENTER | Age: 76
End: 2021-04-15
Attending: INTERNAL MEDICINE
Payer: MEDICARE

## 2021-04-15 DIAGNOSIS — G47.33 OSA (OBSTRUCTIVE SLEEP APNEA): ICD-10-CM

## 2021-04-15 PROCEDURE — 95810 POLYSOM 6/> YRS 4/> PARAM: CPT

## 2021-04-17 NOTE — TELEPHONE ENCOUNTER
No protocol     Last refill:  3/13/2021 Albuterol HFA 18G NR    LOV:   3/11/2021 Dr Mikel Bates RTC 4 weeks  No FOV scheduled

## 2021-04-18 RX ORDER — ALBUTEROL SULFATE 90 UG/1
AEROSOL, METERED RESPIRATORY (INHALATION)
Qty: 18 G | Refills: 0 | Status: SHIPPED | OUTPATIENT
Start: 2021-04-18 | End: 2021-05-11

## 2021-05-11 RX ORDER — ALBUTEROL SULFATE 90 UG/1
AEROSOL, METERED RESPIRATORY (INHALATION)
Qty: 8.5 G | Refills: 0 | Status: SHIPPED | OUTPATIENT
Start: 2021-05-11 | End: 2021-06-29

## 2021-05-11 NOTE — TELEPHONE ENCOUNTER
Protocol passed  Medication(s) to Refill:   Requested Prescriptions     Pending Prescriptions Disp Refills   • ALBUTEROL SULFATE  (90 Base) MCG/ACT Inhalation Aero Soln [Pharmacy Med Name: ALBUTEROL HFA INH (200 PUFFS)8.5GM] 8.5 g 0     Sig: INHALE

## 2021-05-13 RX ORDER — HYDROXYZINE HYDROCHLORIDE 10 MG/1
TABLET, FILM COATED ORAL
Qty: 90 TABLET | Refills: 0 | Status: SHIPPED | OUTPATIENT
Start: 2021-05-13 | End: 2021-07-10

## 2021-05-13 NOTE — TELEPHONE ENCOUNTER
Medication(s) to Refill:   Requested Prescriptions     Pending Prescriptions Disp Refills   • HYDROXYZINE HCL 10 MG Oral Tab [Pharmacy Med Name: HYDROXYZINE HCL 10MG TABLETS] 90 tablet 0     Sig: TAKE 1 TABLET(10 MG) BY MOUTH DAILY       LOV: 3-

## 2021-05-29 NOTE — TELEPHONE ENCOUNTER
Medication(s) to Refill:   Requested Prescriptions     Pending Prescriptions Disp Refills   • POTASSIUM CHLORIDE ER 20 MEQ Oral Tab CR [Pharmacy Med Name: POTASSIUM CHLORIDE 20MEQ ER TABLETS] 90 tablet 0     Sig: TAKE 1 TABLET BY MOUTH DAILY   • MERCED

## 2021-05-31 RX ORDER — POTASSIUM CHLORIDE 1500 MG/1
1 TABLET, FILM COATED, EXTENDED RELEASE ORAL DAILY
Qty: 90 TABLET | Refills: 0 | Status: SHIPPED | OUTPATIENT
Start: 2021-05-31 | End: 2021-08-31

## 2021-05-31 RX ORDER — PROBENECID 500 MG/1
TABLET, FILM COATED ORAL
Qty: 180 TABLET | Refills: 0 | Status: SHIPPED | OUTPATIENT
Start: 2021-05-31 | End: 2021-08-31

## 2021-06-12 NOTE — ADDENDUM NOTE
Addended by: Britney Farah on: 2/17/2020 11:14 AM     Modules accepted: Orders Problem: Skin Integrity:  Goal: Will show no infection signs and symptoms  Description: Will show no infection signs and symptoms  6/12/2021 1140 by Tal Santamaria RN  Outcome: Met This Shift     Problem: Skin Integrity:  Goal: Absence of new skin breakdown  Description: Absence of new skin breakdown  6/12/2021 1140 by Tal Santamaria RN  Outcome: Met This Shift     Problem: Skin Integrity:  Goal: Risk for impaired skin integrity will decrease  Description: Risk for impaired skin integrity will decrease  Outcome: Met This Shift     Problem: Cardiac:  Goal: Hemodynamic stability will improve  Description: Hemodynamic stability will improve  Outcome: Met This Shift     Problem: Coping:  Goal: Level of anxiety will decrease  Description: Level of anxiety will decrease  Outcome: Met This Shift     Problem: Nutritional:  Goal: Consumption of the prescribed amount of daily calories will improve  Description: Consumption of the prescribed amount of daily calories will improve  Outcome: Met This Shift     Problem: Respiratory:  Goal: Ability to maintain a clear airway will improve  Description: Ability to maintain a clear airway will improve  Outcome: Met This Shift     Problem: Respiratory:  Goal: Ability to maintain adequate ventilation will improve  Description: Ability to maintain adequate ventilation will improve  Outcome: Met This Shift     Problem: Respiratory:  Goal: Complications related to the disease process, condition or treatment will be avoided or minimized  Description: Complications related to the disease process, condition or treatment will be avoided or minimized  Outcome: Met This Shift

## 2021-06-29 ENCOUNTER — OFFICE VISIT (OUTPATIENT)
Dept: INTERNAL MEDICINE CLINIC | Facility: CLINIC | Age: 76
End: 2021-06-29
Payer: COMMERCIAL

## 2021-06-29 VITALS
TEMPERATURE: 99 F | BODY MASS INDEX: 43.48 KG/M2 | HEART RATE: 78 BPM | SYSTOLIC BLOOD PRESSURE: 190 MMHG | WEIGHT: 245.38 LBS | RESPIRATION RATE: 18 BRPM | DIASTOLIC BLOOD PRESSURE: 90 MMHG | HEIGHT: 63 IN | OXYGEN SATURATION: 95 %

## 2021-06-29 DIAGNOSIS — M25.512 CHRONIC LEFT SHOULDER PAIN: Primary | ICD-10-CM

## 2021-06-29 DIAGNOSIS — G89.29 CHRONIC LEFT SHOULDER PAIN: Primary | ICD-10-CM

## 2021-06-29 PROCEDURE — 3008F BODY MASS INDEX DOCD: CPT | Performed by: INTERNAL MEDICINE

## 2021-06-29 PROCEDURE — 3077F SYST BP >= 140 MM HG: CPT | Performed by: INTERNAL MEDICINE

## 2021-06-29 PROCEDURE — 99213 OFFICE O/P EST LOW 20 MIN: CPT | Performed by: INTERNAL MEDICINE

## 2021-06-29 PROCEDURE — 3080F DIAST BP >= 90 MM HG: CPT | Performed by: INTERNAL MEDICINE

## 2021-06-29 RX ORDER — ALBUTEROL SULFATE 90 UG/1
1 AEROSOL, METERED RESPIRATORY (INHALATION) EVERY 6 HOURS PRN
Qty: 8.5 G | Refills: 0 | Status: SHIPPED | OUTPATIENT
Start: 2021-06-29 | End: 2021-09-28

## 2021-06-29 RX ORDER — INSULIN GLARGINE AND LIXISENATIDE 100; 33 U/ML; UG/ML
35 INJECTION, SOLUTION SUBCUTANEOUS DAILY
Qty: 30 ML | Refills: 0 | Status: ON HOLD | OUTPATIENT
Start: 2021-06-29

## 2021-06-29 RX ORDER — PEN NEEDLE, DIABETIC 32GX 5/32"
1 NEEDLE, DISPOSABLE MISCELLANEOUS DAILY
Qty: 100 EACH | Refills: 0 | Status: SHIPPED | OUTPATIENT
Start: 2021-06-29 | End: 2021-08-31

## 2021-06-29 NOTE — PROGRESS NOTES
Prabhakar Shaw   is a 70year old female. Patient presents with:  Arm Pain  Abdominal Pain      HPI:   Shoulder/Upper arm:   Shoulder pain longstanding left shoulder pain has difficulty reaching above her shoulder level  Fall none.    Dire daily.  0   • triamcinolone acetonide 0.1 % External Cream Apply topically 2 (two) times daily as needed.  (Patient not taking: Reported on 6/29/2021 ) 60 g 3      Past Medical History:   Diagnosis Date   • AIN (acute interstitial nephritis)    • Cataracts, Insulin Pen Needle (BD PEN NEEDLE NAHUM 2ND GEN) 32G X 4 MM Does not apply Misc; 1 strip by In Vitro route daily. , Referral to PT tending after x-rays available. Patient Instructions   Cannot stop taking all the blood pressure medicines.   Noa Lucas

## 2021-06-29 NOTE — PATIENT INSTRUCTIONS
Cannot stop taking all the blood pressure medicines. Thinking that they were causing her to have stomach discomfort.   Patient advised to resume all her medicines and discontinue meloxicam

## 2021-07-08 RX ORDER — HYDROXYZINE HYDROCHLORIDE 10 MG/1
TABLET, FILM COATED ORAL
Qty: 90 TABLET | Refills: 0 | Status: CANCELLED | OUTPATIENT
Start: 2021-07-08

## 2021-07-09 RX ORDER — TORSEMIDE 20 MG/1
TABLET ORAL
Qty: 180 TABLET | Refills: 0 | Status: SHIPPED | OUTPATIENT
Start: 2021-07-09 | End: 2022-01-03

## 2021-07-09 RX ORDER — CARVEDILOL 25 MG/1
TABLET ORAL
Qty: 180 TABLET | Refills: 0 | Status: SHIPPED | OUTPATIENT
Start: 2021-07-09 | End: 2021-08-31

## 2021-07-10 RX ORDER — HYDROXYZINE HYDROCHLORIDE 10 MG/1
TABLET, FILM COATED ORAL
Qty: 90 TABLET | Refills: 0 | Status: SHIPPED | OUTPATIENT
Start: 2021-07-10 | End: 2022-01-17

## 2021-07-14 RX ORDER — HYDROXYZINE HYDROCHLORIDE 10 MG/1
TABLET, FILM COATED ORAL
Qty: 90 TABLET | Refills: 0 | OUTPATIENT
Start: 2021-07-14

## 2021-07-22 ENCOUNTER — TELEPHONE (OUTPATIENT)
Dept: INTERNAL MEDICINE CLINIC | Facility: CLINIC | Age: 76
End: 2021-07-22

## 2021-07-22 NOTE — TELEPHONE ENCOUNTER
Request for additional information received from [address] [place] [address] regarding pts back and ankle supports. Form placed in provider in box due to there being no recent office notes justifying the need for the braces.

## 2021-07-28 NOTE — TELEPHONE ENCOUNTER
Spoke with [facility] [employer] regarding fax received. She stated Dr. Reina Barksdale signed an order for back and ankle supports 11/4/21. Unable to locate any record of this in patient's chart.  Dennie Leber states she will re-fax original order for reference at [facility]

## 2021-08-03 NOTE — TELEPHONE ENCOUNTER
Paperwork in accordion file pod 2 refaxed with confirmation.     Placed copy to scanning as well as in pod   # 2 accordion file

## 2021-08-24 ENCOUNTER — OFFICE VISIT (OUTPATIENT)
Dept: INTERNAL MEDICINE CLINIC | Facility: CLINIC | Age: 76
End: 2021-08-24
Payer: COMMERCIAL

## 2021-08-24 ENCOUNTER — LAB ENCOUNTER (OUTPATIENT)
Dept: LAB | Age: 76
End: 2021-08-24
Attending: INTERNAL MEDICINE
Payer: MEDICARE

## 2021-08-24 VITALS
TEMPERATURE: 98 F | DIASTOLIC BLOOD PRESSURE: 88 MMHG | BODY MASS INDEX: 44.09 KG/M2 | HEIGHT: 63 IN | SYSTOLIC BLOOD PRESSURE: 168 MMHG | WEIGHT: 248.81 LBS | RESPIRATION RATE: 20 BRPM | OXYGEN SATURATION: 96 % | HEART RATE: 80 BPM

## 2021-08-24 DIAGNOSIS — E11.9 TYPE 2 DIABETES MELLITUS WITHOUT COMPLICATION, WITHOUT LONG-TERM CURRENT USE OF INSULIN (HCC): ICD-10-CM

## 2021-08-24 DIAGNOSIS — E78.00 PURE HYPERCHOLESTEROLEMIA: ICD-10-CM

## 2021-08-24 DIAGNOSIS — M65.332 TRIGGER MIDDLE FINGER OF LEFT HAND: Primary | ICD-10-CM

## 2021-08-24 LAB
ANION GAP SERPL CALC-SCNC: 3 MMOL/L (ref 0–18)
BUN BLD-MCNC: 15 MG/DL (ref 7–18)
CALCIUM BLD-MCNC: 8.6 MG/DL (ref 8.5–10.1)
CHLORIDE SERPL-SCNC: 109 MMOL/L (ref 98–112)
CHOLEST SMN-MCNC: 149 MG/DL (ref ?–200)
CO2 SERPL-SCNC: 28 MMOL/L (ref 21–32)
CREAT BLD-MCNC: 1.01 MG/DL
EST. AVERAGE GLUCOSE BLD GHB EST-MCNC: 140 MG/DL (ref 68–126)
GLUCOSE BLD-MCNC: 84 MG/DL (ref 70–99)
HBA1C MFR BLD HPLC: 6.5 % (ref ?–5.7)
HDLC SERPL-MCNC: 33 MG/DL (ref 40–59)
LDLC SERPL CALC-MCNC: 87 MG/DL (ref ?–100)
NONHDLC SERPL-MCNC: 116 MG/DL (ref ?–130)
OSMOLALITY SERPL CALC.SUM OF ELEC: 290 MOSM/KG (ref 275–295)
PATIENT FASTING Y/N/NP: YES
PATIENT FASTING Y/N/NP: YES
POTASSIUM SERPL-SCNC: 4.1 MMOL/L (ref 3.5–5.1)
SODIUM SERPL-SCNC: 140 MMOL/L (ref 136–145)
TRIGL SERPL-MCNC: 165 MG/DL (ref 30–149)
VLDLC SERPL CALC-MCNC: 27 MG/DL (ref 0–30)

## 2021-08-24 PROCEDURE — 3079F DIAST BP 80-89 MM HG: CPT | Performed by: INTERNAL MEDICINE

## 2021-08-24 PROCEDURE — 80048 BASIC METABOLIC PNL TOTAL CA: CPT

## 2021-08-24 PROCEDURE — 36415 COLL VENOUS BLD VENIPUNCTURE: CPT

## 2021-08-24 PROCEDURE — 3044F HG A1C LEVEL LT 7.0%: CPT | Performed by: INTERNAL MEDICINE

## 2021-08-24 PROCEDURE — 3077F SYST BP >= 140 MM HG: CPT | Performed by: INTERNAL MEDICINE

## 2021-08-24 PROCEDURE — 99213 OFFICE O/P EST LOW 20 MIN: CPT | Performed by: INTERNAL MEDICINE

## 2021-08-24 PROCEDURE — 80061 LIPID PANEL: CPT

## 2021-08-24 PROCEDURE — 3008F BODY MASS INDEX DOCD: CPT | Performed by: INTERNAL MEDICINE

## 2021-08-24 PROCEDURE — 83036 HEMOGLOBIN GLYCOSYLATED A1C: CPT

## 2021-08-24 NOTE — PATIENT INSTRUCTIONS
What is Trigger Finger? Trigger finger is an inflammation of tissue inside your finger or thumb. It is also called tenosynovitis (ten-oh-sin-oh-VY-tis).  Tendons (cordlike fibers that attach muscle to bone and allow you to bend the joints) become swollen information is not intended as a substitute for professional medical care. Always follow your healthcare professional's instructions. Treating Trigger Finger    Trigger finger occurs when the tissue inside your finger or thumb becomes inflamed.  Mild

## 2021-08-24 NOTE — PROGRESS NOTES
Ant Goodwin  [id number] is a 70year old female.     Patient presents with:  Hand Pain      HPI:   Left middle finger locks up periodically specially at night  Current Outpatient Medications   Medication Sig Dispense Refill   • HYDROXYZINE HCL 10 MG disease ([facility]    • Diabetes ([facility]    • Essential hypertension    • Hyperlipidemia    • Obesity    • ALEXANDRA (obstructive sleep apnea) 1/15/2020 [facility]    AHI 34 REM AHI [address] 59%    • Sleep apnea    • Tobacco abuse 1/12/2018      Social History:  Soci diabetes. But often the cause of trigger finger is unknown. Inside your finger  Tendons connect muscles in your forearm to the bones in your fingers. The tendons in each finger are surrounded by a protective tendon sheath.  This sheath is lined with synovi an injection of medicine in the base of the finger or thumb. This typically is a steroid, such as cortisone. Surgery  If nonsurgical treatments don’t ease your symptoms, you may need surgery.  A tendon is a cordlike fiber that attaches muscle to bone and a

## 2021-08-27 ENCOUNTER — TELEPHONE (OUTPATIENT)
Dept: INTERNAL MEDICINE CLINIC | Facility: CLINIC | Age: 76
End: 2021-08-27

## 2021-08-27 NOTE — TELEPHONE ENCOUNTER
See result note. Also attempted to call patient with number [phone], patient's daughter answered and asked that we stop using this number to contact patient at this time.

## 2021-08-30 NOTE — TELEPHONE ENCOUNTER
Protocol passed   Carvedilol 25mg filled 7/9/21 #180 0 refills   BD Pen needle corrine filled 6/29/21 #100 each 0 refills     No protocol   Probenecid 500mg  Filled 5/31/21 #180 0 refills   Potassium chloride ER 20meq filled 5/31/21 #90 0 refills       LOV: 8

## 2021-08-31 RX ORDER — PEN NEEDLE, DIABETIC 32GX 5/32"
NEEDLE, DISPOSABLE MISCELLANEOUS
Qty: 100 EACH | Refills: 0 | Status: SHIPPED | OUTPATIENT
Start: 2021-08-31 | End: 2021-09-11

## 2021-08-31 RX ORDER — PROBENECID 500 MG/1
TABLET, FILM COATED ORAL
Qty: 180 TABLET | Refills: 0 | Status: SHIPPED | OUTPATIENT
Start: 2021-08-31 | End: 2021-12-08

## 2021-08-31 RX ORDER — POTASSIUM CHLORIDE 1500 MG/1
1 TABLET, FILM COATED, EXTENDED RELEASE ORAL DAILY
Qty: 90 TABLET | Refills: 0 | Status: SHIPPED | OUTPATIENT
Start: 2021-08-31 | End: 2021-12-13

## 2021-08-31 RX ORDER — CARVEDILOL 25 MG/1
TABLET ORAL
Qty: 180 TABLET | Refills: 0 | Status: ON HOLD | OUTPATIENT
Start: 2021-08-31

## 2021-09-09 ENCOUNTER — OFFICE VISIT (OUTPATIENT)
Dept: ORTHOPEDICS CLINIC | Facility: CLINIC | Age: 76
End: 2021-09-09
Payer: COMMERCIAL

## 2021-09-09 DIAGNOSIS — M65.332 TRIGGER MIDDLE FINGER OF LEFT HAND: Primary | ICD-10-CM

## 2021-09-09 PROCEDURE — 99203 OFFICE O/P NEW LOW 30 MIN: CPT | Performed by: ORTHOPAEDIC SURGERY

## 2021-09-09 PROCEDURE — 20550 NJX 1 TENDON SHEATH/LIGAMENT: CPT | Performed by: ORTHOPAEDIC SURGERY

## 2021-09-09 RX ORDER — TRIAMCINOLONE ACETONIDE 40 MG/ML
40 INJECTION, SUSPENSION INTRA-ARTICULAR; INTRAMUSCULAR ONCE
Status: COMPLETED | OUTPATIENT
Start: 2021-09-09 | End: 2021-09-09

## 2021-09-09 RX ADMIN — TRIAMCINOLONE ACETONIDE 40 MG: 40 INJECTION, SUSPENSION INTRA-ARTICULAR; INTRAMUSCULAR at 09:30:00

## 2021-09-09 NOTE — H&P
Clinic Note [facility]     Assessment/Plan:  78year old with triggering of left middle. I discussed with the patient various treatment options and their success rate/risks.   We using a shared decision-making process decided to proceed with rufus calles AHI [address] 59%    • Sleep apnea    • Tobacco abuse 1/12/2018     Past Surgical History:   Procedure Laterality Date   • [facility]OS[facility]  [facility]   • COLONOSCOPY  05/18/2018    diverticulosis - repeat 10 years - Dr. Ortega Fallon Family History   Problem Relation Age of Onset   • Diabetes Mother    • Other (Lung Cancer) Mother    • Breast Cancer Maternal Aunt 61     Social History    Occupational History      Not on file    Tobacco Use      Smoking status: Former Smoker        Pack

## 2021-09-11 RX ORDER — PEN NEEDLE, DIABETIC 32GX 5/32"
NEEDLE, DISPOSABLE MISCELLANEOUS
Qty: 100 EACH | Refills: 0 | Status: SHIPPED | OUTPATIENT
Start: 2021-09-11 | End: 2022-01-10

## 2021-09-28 ENCOUNTER — OFFICE VISIT (OUTPATIENT)
Dept: INTERNAL MEDICINE CLINIC | Facility: CLINIC | Age: 76
End: 2021-09-28
Payer: COMMERCIAL

## 2021-09-28 VITALS
WEIGHT: 251.19 LBS | OXYGEN SATURATION: 98 % | RESPIRATION RATE: 18 BRPM | SYSTOLIC BLOOD PRESSURE: 136 MMHG | HEIGHT: 63 IN | HEART RATE: 71 BPM | BODY MASS INDEX: 44.51 KG/M2 | TEMPERATURE: 98 F | DIASTOLIC BLOOD PRESSURE: 70 MMHG

## 2021-09-28 DIAGNOSIS — E11.9 TYPE 2 DIABETES MELLITUS WITHOUT COMPLICATION, WITHOUT LONG-TERM CURRENT USE OF INSULIN (HCC): ICD-10-CM

## 2021-09-28 DIAGNOSIS — Z12.31 SCREENING MAMMOGRAM, ENCOUNTER FOR: ICD-10-CM

## 2021-09-28 DIAGNOSIS — I10 PRIMARY HYPERTENSION: Primary | ICD-10-CM

## 2021-09-28 PROBLEM — E55.9 VITAMIN D DEFICIENCY: Chronic | Status: ACTIVE | Noted: 2021-03-11

## 2021-09-28 PROCEDURE — 3075F SYST BP GE 130 - 139MM HG: CPT | Performed by: INTERNAL MEDICINE

## 2021-09-28 PROCEDURE — 90662 IIV NO PRSV INCREASED AG IM: CPT | Performed by: INTERNAL MEDICINE

## 2021-09-28 PROCEDURE — 99213 OFFICE O/P EST LOW 20 MIN: CPT | Performed by: INTERNAL MEDICINE

## 2021-09-28 PROCEDURE — 3078F DIAST BP <80 MM HG: CPT | Performed by: INTERNAL MEDICINE

## 2021-09-28 PROCEDURE — G0008 ADMIN INFLUENZA VIRUS VAC: HCPCS | Performed by: INTERNAL MEDICINE

## 2021-09-28 PROCEDURE — 3008F BODY MASS INDEX DOCD: CPT | Performed by: INTERNAL MEDICINE

## 2021-09-28 RX ORDER — ALBUTEROL SULFATE 2.5 MG/3ML
2.5 SOLUTION RESPIRATORY (INHALATION) 3 TIMES DAILY
Qty: 300 ML | Refills: 0 | Status: ON HOLD | OUTPATIENT
Start: 2021-09-28

## 2021-09-28 RX ORDER — ALBUTEROL SULFATE 90 UG/1
1 AEROSOL, METERED RESPIRATORY (INHALATION) EVERY 6 HOURS PRN
Qty: 8.5 G | Refills: 0 | Status: ON HOLD | OUTPATIENT
Start: 2021-09-28

## 2021-09-28 NOTE — PROGRESS NOTES
Betina Joseph  [id number] is a 78year old female.     Patient presents with:  Lab Results       HPI:     Current Outpatient Medications   Medication Sig Dispense Refill   • albuterol (2.5 MG/3ML) 0.083% Inhalation Nebu Soln Take 3 mL (2.5 mg total) by (obstructive sleep apnea) 1/15/2020 [facility] PS[facility]    AHI 34 REM AHI [address] 59%    • Sleep apnea    • Tobacco abuse 1/12/2018      Social History:  Social History    Tobacco Use      Smoking status: Former Smoker        Packs/day: 0.25        Years: 20.00 (2.5 mg total) by nebulization 3 (three) times daily. -     albuterol 108 (90 Base) MCG/ACT Inhalation Aero Soln;  Inhale 1 puff into the lungs every 6 (six) hours as needed for Wheezing.  -     FLU VACC HIGH DOSE PRSV FREE      All labs discussed with aldair

## 2021-10-20 ENCOUNTER — DIABETIC EDUCATION (OUTPATIENT)
Dept: INTERNAL MEDICINE CLINIC | Facility: CLINIC | Age: 76
End: 2021-10-20

## 2021-11-09 ENCOUNTER — TELEPHONE (OUTPATIENT)
Dept: INTERNAL MEDICINE CLINIC | Facility: CLINIC | Age: 76
End: 2021-11-09

## 2021-11-09 NOTE — TELEPHONE ENCOUNTER
Called patient to inquire about form received from [address] [place] [address]   Patient stated that she knows nothing about this form and to disregard

## 2021-12-08 RX ORDER — PROBENECID 500 MG/1
TABLET, FILM COATED ORAL
Qty: 180 TABLET | Refills: 0 | Status: ON HOLD | OUTPATIENT
Start: 2021-12-08

## 2021-12-08 NOTE — TELEPHONE ENCOUNTER
Medication(s) to Refill:   Requested Prescriptions     Pending Prescriptions Disp Refills   • PROBENECID 500 MG Oral Tab [Pharmacy Med Name: PROBENECID 500MG TABLETS] 180 tablet 0     Sig: TAKE 1 TABLET(500 MG) BY MOUTH TWICE DAILY       LOV: 9-

## 2021-12-13 RX ORDER — POTASSIUM CHLORIDE 1500 MG/1
1 TABLET, FILM COATED, EXTENDED RELEASE ORAL DAILY
Qty: 90 TABLET | Refills: 0 | Status: ON HOLD | OUTPATIENT
Start: 2021-12-13

## 2022-01-03 RX ORDER — TORSEMIDE 20 MG/1
TABLET ORAL
Qty: 180 TABLET | Refills: 0 | Status: ON HOLD | OUTPATIENT
Start: 2022-01-03

## 2022-01-05 ENCOUNTER — TELEPHONE (OUTPATIENT)
Dept: INTERNAL MEDICINE CLINIC | Facility: CLINIC | Age: 77
End: 2022-01-05

## 2022-01-05 NOTE — TELEPHONE ENCOUNTER
Incoming fax from [employer] with pharmacogenetics requisition to be reviewed and signed   Placed in covering provider RP in-basket

## 2022-01-10 RX ORDER — PEN NEEDLE, DIABETIC 32GX 5/32"
NEEDLE, DISPOSABLE MISCELLANEOUS
Qty: 100 EACH | Refills: 0 | Status: ON HOLD | OUTPATIENT
Start: 2022-01-10

## 2022-01-17 RX ORDER — HYDROXYZINE HYDROCHLORIDE 10 MG/1
TABLET, FILM COATED ORAL
Qty: 90 TABLET | Refills: 0 | Status: ON HOLD | OUTPATIENT
Start: 2022-01-17

## 2022-01-21 NOTE — TELEPHONE ENCOUNTER
Katy from [facility] calling for a status update.  She is requesting to be notified once form has been completed    Ph: [phone]

## 2022-02-03 NOTE — TELEPHONE ENCOUNTER
Per  he will not authorize genetics testing for patient   Order denied   Spoke with charles and informed her

## 2022-02-03 NOTE — TELEPHONE ENCOUNTER
Kasey Lui from [facility] is calling for an update. Please advise if order is approved or denied.  Order is for [employer] genetics test.    Ph: [phone]

## 2022-02-07 ENCOUNTER — APPOINTMENT (OUTPATIENT)
Dept: CT IMAGING | Facility: HOSPITAL | Age: 77
End: 2022-02-07
Attending: EMERGENCY MEDICINE
Payer: MEDICARE

## 2022-02-07 ENCOUNTER — HOSPITAL ENCOUNTER (OUTPATIENT)
Facility: HOSPITAL | Age: 77
Setting detail: OBSERVATION
Discharge: HOME | End: 2022-02-08
Attending: EMERGENCY MEDICINE | Admitting: HOSPITALIST
Payer: MEDICARE

## 2022-02-07 ENCOUNTER — APPOINTMENT (OUTPATIENT)
Dept: GENERAL RADIOLOGY | Facility: HOSPITAL | Age: 77
End: 2022-02-07
Attending: EMERGENCY MEDICINE
Payer: MEDICARE

## 2022-02-07 DIAGNOSIS — I50.9 ACUTE ON CHRONIC CONGESTIVE HEART FAILURE, UNSPECIFIED HEART FAILURE TYPE (HCC): Primary | ICD-10-CM

## 2022-02-07 DIAGNOSIS — R09.02 HYPOXIA: ICD-10-CM

## 2022-02-07 PROBLEM — E11.69 DIABETES MELLITUS TYPE 2 IN OBESE: Status: ACTIVE | Noted: 2019-10-09

## 2022-02-07 PROBLEM — E66.9 DIABETES MELLITUS TYPE 2 IN OBESE (HCC): Status: ACTIVE | Noted: 2019-10-09

## 2022-02-07 PROBLEM — E66.9 DIABETES MELLITUS TYPE 2 IN OBESE: Status: ACTIVE | Noted: 2019-10-09

## 2022-02-07 PROBLEM — E66.9 DIABETES MELLITUS TYPE 2 IN OBESE  (HCC): Status: ACTIVE | Noted: 2019-10-09

## 2022-02-07 PROBLEM — E11.69 DIABETES MELLITUS TYPE 2 IN OBESE (HCC): Status: ACTIVE | Noted: 2019-10-09

## 2022-02-07 PROBLEM — R06.00 DYSPNEA: Status: ACTIVE | Noted: 2019-10-01

## 2022-02-07 PROBLEM — E11.69 DIABETES MELLITUS TYPE 2 IN OBESE  (HCC): Status: ACTIVE | Noted: 2019-10-09

## 2022-02-07 LAB
ADENOVIRUS PCR:: NOT DETECTED
ALBUMIN SERPL-MCNC: 3.1 G/DL (ref 3.4–5)
ALBUMIN/GLOB SERPL: 0.7 {RATIO} (ref 1–2)
ALP LIVER SERPL-CCNC: 106 U/L
ALT SERPL-CCNC: 15 U/L
ANION GAP SERPL CALC-SCNC: 7 MMOL/L (ref 0–18)
AST SERPL-CCNC: 12 U/L (ref 15–37)
ATRIAL RATE: 84 BPM
B PARAPERT DNA SPEC QL NAA+PROBE: NOT DETECTED
B PERT DNA SPEC QL NAA+PROBE: NOT DETECTED
BASOPHILS # BLD AUTO: 0.03 X10(3) UL (ref 0–0.2)
BASOPHILS NFR BLD AUTO: 0.3 %
BILIRUB SERPL-MCNC: 0.4 MG/DL (ref 0.1–2)
BUN BLD-MCNC: 15 MG/DL (ref 7–18)
C PNEUM DNA SPEC QL NAA+PROBE: NOT DETECTED
CALCIUM BLD-MCNC: 8 MG/DL (ref 8.5–10.1)
CHLORIDE SERPL-SCNC: 105 MMOL/L (ref 98–112)
CO2 SERPL-SCNC: 26 MMOL/L (ref 21–32)
CORONAVIRUS 229E PCR:: NOT DETECTED
CORONAVIRUS HKU1 PCR:: NOT DETECTED
CORONAVIRUS NL63 PCR:: NOT DETECTED
CORONAVIRUS OC43 PCR:: NOT DETECTED
CREAT BLD-MCNC: 1.35 MG/DL
D DIMER PPP FEU-MCNC: 0.74 UG/ML FEU (ref ?–0.74)
EOSINOPHIL # BLD AUTO: 0.01 X10(3) UL (ref 0–0.7)
EOSINOPHIL NFR BLD AUTO: 0.1 %
ERYTHROCYTE [DISTWIDTH] IN BLOOD BY AUTOMATED COUNT: 14.6 %
EST. AVERAGE GLUCOSE BLD GHB EST-MCNC: 174 MG/DL (ref 68–126)
FLUAV RNA SPEC QL NAA+PROBE: NOT DETECTED
FLUBV RNA SPEC QL NAA+PROBE: NOT DETECTED
GLOBULIN PLAS-MCNC: 4.3 G/DL (ref 2.8–4.4)
GLUCOSE BLD-MCNC: 328 MG/DL (ref 70–99)
GLUCOSE BLD-MCNC: 332 MG/DL (ref 70–99)
HBA1C MFR BLD: 7.7 % (ref ?–5.7)
HCT VFR BLD AUTO: 39.8 %
HGB BLD-MCNC: 12.5 G/DL
IMM GRANULOCYTES # BLD AUTO: 0.05 X10(3) UL (ref 0–1)
IMM GRANULOCYTES NFR BLD: 0.5 %
LYMPHOCYTES # BLD AUTO: 0.68 X10(3) UL (ref 1–4)
LYMPHOCYTES NFR BLD AUTO: 7 %
MAGNESIUM SERPL-MCNC: 1.5 MG/DL (ref 1.7–2.8)
MCH RBC QN AUTO: 28.7 PG (ref 26–34)
MCHC RBC AUTO-ENTMCNC: 31.4 G/DL (ref 31–37)
MCV RBC AUTO: 91.5 FL
METAPNEUMOVIRUS PCR:: NOT DETECTED
MONOCYTES # BLD AUTO: 0.08 X10(3) UL (ref 0.1–1)
MONOCYTES NFR BLD AUTO: 0.8 %
MYCOPLASMA PNEUMONIA PCR:: NOT DETECTED
NEUTROPHILS # BLD AUTO: 8.8 X10 (3) UL (ref 1.5–7.7)
NEUTROPHILS # BLD AUTO: 8.8 X10(3) UL (ref 1.5–7.7)
NEUTROPHILS NFR BLD AUTO: 91.3 %
NT-PROBNP SERPL-MCNC: 180 PG/ML (ref ?–125)
OSMOLALITY SERPL CALC.SUM OF ELEC: 300 MOSM/KG (ref 275–295)
P AXIS: 58 DEGREES
P-R INTERVAL: 160 MS
PARAINFLUENZA 1 PCR:: NOT DETECTED
PARAINFLUENZA 2 PCR:: NOT DETECTED
PARAINFLUENZA 3 PCR:: NOT DETECTED
PARAINFLUENZA 4 PCR:: NOT DETECTED
PLATELET # BLD AUTO: 293 10(3)UL (ref 150–450)
POTASSIUM SERPL-SCNC: 3.9 MMOL/L (ref 3.5–5.1)
PROT SERPL-MCNC: 7.4 G/DL (ref 6.4–8.2)
Q-T INTERVAL: 442 MS
QRS DURATION: 146 MS
QTC CALCULATION (BEZET): 522 MS
R AXIS: 61 DEGREES
RBC # BLD AUTO: 4.35 X10(6)UL
RHINOVIRUS/ENTERO PCR:: NOT DETECTED
RSV RNA SPEC QL NAA+PROBE: NOT DETECTED
SARS-COV-2 RNA NPH QL NAA+NON-PROBE: NOT DETECTED
SARS-COV-2 RNA RESP QL NAA+PROBE: NOT DETECTED
SODIUM SERPL-SCNC: 138 MMOL/L (ref 136–145)
T AXIS: 51 DEGREES
TROPONIN I HIGH SENSITIVITY: 14 NG/L
TROPONIN I HIGH SENSITIVITY: 16 NG/L
VENTRICULAR RATE: 84 BPM
WBC # BLD AUTO: 9.7 X10(3) UL (ref 4–11)

## 2022-02-07 PROCEDURE — 71045 X-RAY EXAM CHEST 1 VIEW: CPT | Performed by: EMERGENCY MEDICINE

## 2022-02-07 PROCEDURE — 71275 CT ANGIOGRAPHY CHEST: CPT | Performed by: EMERGENCY MEDICINE

## 2022-02-07 PROCEDURE — 99220 INITIAL OBSERVATION CARE,LEVL III: CPT | Performed by: HOSPITALIST

## 2022-02-07 PROCEDURE — 3051F HG A1C>EQUAL 7.0%<8.0%: CPT | Performed by: INTERNAL MEDICINE

## 2022-02-07 RX ORDER — HYDROXYZINE HYDROCHLORIDE 10 MG/1
10 TABLET, FILM COATED ORAL DAILY
Status: DISCONTINUED | OUTPATIENT
Start: 2022-02-07 | End: 2022-02-08

## 2022-02-07 RX ORDER — METOCLOPRAMIDE HYDROCHLORIDE 5 MG/ML
5 INJECTION INTRAMUSCULAR; INTRAVENOUS EVERY 8 HOURS PRN
Status: DISCONTINUED | OUTPATIENT
Start: 2022-02-07 | End: 2022-02-08

## 2022-02-07 RX ORDER — MELATONIN
3 NIGHTLY PRN
Status: DISCONTINUED | OUTPATIENT
Start: 2022-02-07 | End: 2022-02-08

## 2022-02-07 RX ORDER — IOHEXOL 350 MG/ML
100 INJECTION, SOLUTION INTRAVENOUS
Status: COMPLETED | OUTPATIENT
Start: 2022-02-07 | End: 2022-02-07

## 2022-02-07 RX ORDER — NICOTINE POLACRILEX 4 MG
15 LOZENGE BUCCAL
Status: DISCONTINUED | OUTPATIENT
Start: 2022-02-07 | End: 2022-02-08

## 2022-02-07 RX ORDER — ASPIRIN 81 MG/1
81 TABLET ORAL DAILY
Status: DISCONTINUED | OUTPATIENT
Start: 2022-02-07 | End: 2022-02-08

## 2022-02-07 RX ORDER — SODIUM PHOSPHATE, DIBASIC AND SODIUM PHOSPHATE, MONOBASIC 7; 19 G/133ML; G/133ML
1 ENEMA RECTAL ONCE AS NEEDED
Status: DISCONTINUED | OUTPATIENT
Start: 2022-02-07 | End: 2022-02-08

## 2022-02-07 RX ORDER — ONDANSETRON 2 MG/ML
4 INJECTION INTRAMUSCULAR; INTRAVENOUS EVERY 6 HOURS PRN
Status: DISCONTINUED | OUTPATIENT
Start: 2022-02-07 | End: 2022-02-08

## 2022-02-07 RX ORDER — TORSEMIDE 20 MG/1
20 TABLET ORAL
Status: DISCONTINUED | OUTPATIENT
Start: 2022-02-07 | End: 2022-02-08

## 2022-02-07 RX ORDER — SENNOSIDES 8.6 MG
17.2 TABLET ORAL NIGHTLY PRN
Status: DISCONTINUED | OUTPATIENT
Start: 2022-02-07 | End: 2022-02-08

## 2022-02-07 RX ORDER — MAGNESIUM OXIDE 400 MG (241.3 MG MAGNESIUM) TABLET
800 TABLET ONCE
Status: COMPLETED | OUTPATIENT
Start: 2022-02-07 | End: 2022-02-07

## 2022-02-07 RX ORDER — ATORVASTATIN CALCIUM 20 MG/1
20 TABLET, FILM COATED ORAL NIGHTLY
Status: DISCONTINUED | OUTPATIENT
Start: 2022-02-07 | End: 2022-02-08

## 2022-02-07 RX ORDER — HYDRALAZINE HYDROCHLORIDE 50 MG/1
100 TABLET, FILM COATED ORAL 3 TIMES DAILY
Status: DISCONTINUED | OUTPATIENT
Start: 2022-02-07 | End: 2022-02-08

## 2022-02-07 RX ORDER — NICOTINE POLACRILEX 4 MG
30 LOZENGE BUCCAL
Status: DISCONTINUED | OUTPATIENT
Start: 2022-02-07 | End: 2022-02-08

## 2022-02-07 RX ORDER — BISACODYL 10 MG
10 SUPPOSITORY, RECTAL RECTAL
Status: DISCONTINUED | OUTPATIENT
Start: 2022-02-07 | End: 2022-02-08

## 2022-02-07 RX ORDER — ALBUTEROL SULFATE 2.5 MG/3ML
2.5 SOLUTION RESPIRATORY (INHALATION) 3 TIMES DAILY
Status: DISCONTINUED | OUTPATIENT
Start: 2022-02-07 | End: 2022-02-08

## 2022-02-07 RX ORDER — FUROSEMIDE 10 MG/ML
40 INJECTION INTRAMUSCULAR; INTRAVENOUS ONCE
Status: COMPLETED | OUTPATIENT
Start: 2022-02-07 | End: 2022-02-07

## 2022-02-07 RX ORDER — DEXTROSE MONOHYDRATE 25 G/50ML
50 INJECTION, SOLUTION INTRAVENOUS
Status: DISCONTINUED | OUTPATIENT
Start: 2022-02-07 | End: 2022-02-08

## 2022-02-07 RX ORDER — ENOXAPARIN SODIUM 100 MG/ML
40 INJECTION SUBCUTANEOUS NIGHTLY
Status: DISCONTINUED | OUTPATIENT
Start: 2022-02-07 | End: 2022-02-08

## 2022-02-07 RX ORDER — CARVEDILOL 12.5 MG/1
25 TABLET ORAL 2 TIMES DAILY WITH MEALS
Status: DISCONTINUED | OUTPATIENT
Start: 2022-02-07 | End: 2022-02-08

## 2022-02-07 RX ORDER — POLYETHYLENE GLYCOL 3350 17 G/17G
17 POWDER, FOR SOLUTION ORAL DAILY PRN
Status: DISCONTINUED | OUTPATIENT
Start: 2022-02-07 | End: 2022-02-08

## 2022-02-07 RX ORDER — PROBENECID 500 MG/1
500 TABLET, FILM COATED ORAL 2 TIMES DAILY
Status: DISCONTINUED | OUTPATIENT
Start: 2022-02-07 | End: 2022-02-08

## 2022-02-07 RX ORDER — ACETAMINOPHEN 325 MG/1
650 TABLET ORAL EVERY 6 HOURS PRN
Status: DISCONTINUED | OUTPATIENT
Start: 2022-02-07 | End: 2022-02-08

## 2022-02-07 RX ORDER — ASPIRIN 81 MG/1
324 TABLET, CHEWABLE ORAL ONCE
Status: COMPLETED | OUTPATIENT
Start: 2022-02-07 | End: 2022-02-07

## 2022-02-07 RX ORDER — ALBUTEROL SULFATE 90 UG/1
1 AEROSOL, METERED RESPIRATORY (INHALATION) EVERY 6 HOURS PRN
Status: DISCONTINUED | OUTPATIENT
Start: 2022-02-07 | End: 2022-02-08

## 2022-02-07 NOTE — ED QUICK NOTES
Pt awake and alert, skin w/d,resps reg/slightly labored. Pt ambulated to restroom with steady gait. Pt aware of plan of care.

## 2022-02-07 NOTE — ED INITIAL ASSESSMENT (HPI)
Pt presents to the ED via EMS with complaints of feeling short of breath. Pt made an appointment at [facility] and sat was found to be in the 80's. Pt placed on 6l/nc and ems called. Pt denies chest pain. Pt states she is feeling much better now and denies feeling short of breath. Pt reports she is normally on 2l/nc during the day but did not wear it today when coming to the [facility]. Pt awake and alert, skin w/d,resps appear slightly labored.

## 2022-02-07 NOTE — ED QUICK NOTES
Orders for admission, patient is aware of plan and ready to go upstairs. Any questions, please call ED KESHAV Esparza at extension [phone]. Vaccinated? yes  Type of COVID test sent:rapid  COVID Suspicion level: Low/High low      Titratable drug(s) infusing:  Rate:none    LOC at time of transport:alert and oriented x 3    Other pertinent information: pt is normally on 2l/nc most of the day, been feeling sob and [facility] appt [facility]. Pt arrived without her oxygen, was found to have sat in 80's. Pt now on 5l/nc and sats 97%. Pt states she is feeling much better although does appear sob intermittently.     CIWA score=n/a  NIH score=n/a

## 2022-02-07 NOTE — ED QUICK NOTES
Pt awake and alert, skin w/d,resps appear unlabored at this time. Pt states she is feeling much better, oxygen at 5l/nc at this time. Pt aware awaiting bed to be cleaned at this time. Pt intermittently noted to have pursed lip breathing, which she has had since upon arrival but otherwise comfortable.

## 2022-02-07 NOTE — ED QUICK NOTES
Pt awake and alert, appears comfortable on cart, pt eating dinner tray, aware awaiting for bed to be cleaned.

## 2022-02-07 NOTE — ED QUICK NOTES
Pt's oxygen sat 97% on 5l/nc so pt turned down to 2l/nc and pt became more dyspneic so oxygen increased to 5L.

## 2022-02-08 ENCOUNTER — TELEPHONE (OUTPATIENT)
Dept: INTERNAL MEDICINE CLINIC | Facility: CLINIC | Age: 77
End: 2022-02-08

## 2022-02-08 ENCOUNTER — APPOINTMENT (OUTPATIENT)
Dept: CV DIAGNOSTICS | Facility: HOSPITAL | Age: 77
End: 2022-02-08
Attending: NURSE PRACTITIONER
Payer: MEDICARE

## 2022-02-08 VITALS
OXYGEN SATURATION: 92 % | HEART RATE: 71 BPM | SYSTOLIC BLOOD PRESSURE: 157 MMHG | RESPIRATION RATE: 24 BRPM | TEMPERATURE: 98 F | BODY MASS INDEX: 45.24 KG/M2 | WEIGHT: 255.31 LBS | HEIGHT: 63 IN | DIASTOLIC BLOOD PRESSURE: 86 MMHG

## 2022-02-08 LAB
ANION GAP SERPL CALC-SCNC: 7 MMOL/L (ref 0–18)
BASOPHILS # BLD AUTO: 0.03 X10(3) UL (ref 0–0.2)
BASOPHILS NFR BLD AUTO: 0.3 %
BUN BLD-MCNC: 17 MG/DL (ref 7–18)
CALCIUM BLD-MCNC: 8 MG/DL (ref 8.5–10.1)
CHLORIDE SERPL-SCNC: 105 MMOL/L (ref 98–112)
CO2 SERPL-SCNC: 30 MMOL/L (ref 21–32)
CREAT BLD-MCNC: 1 MG/DL
EOSINOPHIL # BLD AUTO: 0.08 X10(3) UL (ref 0–0.7)
EOSINOPHIL NFR BLD AUTO: 0.9 %
ERYTHROCYTE [DISTWIDTH] IN BLOOD BY AUTOMATED COUNT: 14.9 %
GLUCOSE BLD-MCNC: 118 MG/DL (ref 70–99)
GLUCOSE BLD-MCNC: 125 MG/DL (ref 70–99)
GLUCOSE BLD-MCNC: 154 MG/DL (ref 70–99)
GLUCOSE BLD-MCNC: 305 MG/DL (ref 70–99)
GLUCOSE BLD-MCNC: 81 MG/DL (ref 70–99)
HCT VFR BLD AUTO: 34.6 %
HGB BLD-MCNC: 11 G/DL
IMM GRANULOCYTES NFR BLD: 0.5 %
LYMPHOCYTES # BLD AUTO: 1.94 X10(3) UL (ref 1–4)
LYMPHOCYTES NFR BLD AUTO: 21.2 %
MAGNESIUM SERPL-MCNC: 1.9 MG/DL (ref 1.7–2.8)
MCH RBC QN AUTO: 29.1 PG (ref 26–34)
MCHC RBC AUTO-ENTMCNC: 31.8 G/DL (ref 31–37)
MCV RBC AUTO: 91.5 FL
MONOCYTES # BLD AUTO: 0.68 X10(3) UL (ref 0.1–1)
MONOCYTES NFR BLD AUTO: 7.4 %
NEUTROPHILS # BLD AUTO: 6.39 X10 (3) UL (ref 1.5–7.7)
NEUTROPHILS # BLD AUTO: 6.39 X10(3) UL (ref 1.5–7.7)
NEUTROPHILS NFR BLD AUTO: 69.7 %
NT-PROBNP SERPL-MCNC: 228 PG/ML (ref ?–125)
OSMOLALITY SERPL CALC.SUM OF ELEC: 297 MOSM/KG (ref 275–295)
PLATELET # BLD AUTO: 247 10(3)UL (ref 150–450)
POTASSIUM SERPL-SCNC: 3.1 MMOL/L (ref 3.5–5.1)
POTASSIUM SERPL-SCNC: 4.2 MMOL/L (ref 3.5–5.1)
RBC # BLD AUTO: 3.78 X10(6)UL
SODIUM SERPL-SCNC: 142 MMOL/L (ref 136–145)

## 2022-02-08 PROCEDURE — 93306 TTE W/DOPPLER COMPLETE: CPT | Performed by: NURSE PRACTITIONER

## 2022-02-08 PROCEDURE — 78452 HT MUSCLE IMAGE SPECT MULT: CPT | Performed by: NURSE PRACTITIONER

## 2022-02-08 PROCEDURE — 99217 OBSERVATION CARE DISCHARGE: CPT | Performed by: HOSPITALIST

## 2022-02-08 PROCEDURE — 93017 CV STRESS TEST TRACING ONLY: CPT | Performed by: NURSE PRACTITIONER

## 2022-02-08 PROCEDURE — 93018 CV STRESS TEST I&R ONLY: CPT | Performed by: NURSE PRACTITIONER

## 2022-02-08 RX ORDER — POTASSIUM CHLORIDE 20 MEQ/1
20 TABLET, EXTENDED RELEASE ORAL 2 TIMES DAILY
Qty: 60 TABLET | Refills: 1 | Status: SHIPPED | OUTPATIENT
Start: 2022-02-09

## 2022-02-08 RX ORDER — POTASSIUM CHLORIDE 20 MEQ/1
20 TABLET, EXTENDED RELEASE ORAL 2 TIMES DAILY
Status: DISCONTINUED | OUTPATIENT
Start: 2022-02-08 | End: 2022-02-08

## 2022-02-08 RX ORDER — POTASSIUM CHLORIDE 20 MEQ/1
40 TABLET, EXTENDED RELEASE ORAL EVERY 4 HOURS
Status: COMPLETED | OUTPATIENT
Start: 2022-02-08 | End: 2022-02-08

## 2022-02-08 RX ORDER — POTASSIUM CHLORIDE 20 MEQ/1
20 TABLET, EXTENDED RELEASE ORAL 2 TIMES DAILY
Status: DISCONTINUED | OUTPATIENT
Start: 2022-02-09 | End: 2022-02-08

## 2022-02-08 RX ORDER — MAGNESIUM OXIDE 400 MG (241.3 MG MAGNESIUM) TABLET
800 TABLET ONCE
Qty: 2 TABLET | Refills: 0 | Status: SHIPPED | OUTPATIENT
Start: 2022-02-08 | End: 2022-02-08

## 2022-02-08 RX ORDER — MAGNESIUM OXIDE 400 MG (241.3 MG MAGNESIUM) TABLET
800 TABLET ONCE
Status: DISCONTINUED | OUTPATIENT
Start: 2022-02-08 | End: 2022-02-08

## 2022-02-08 NOTE — TELEPHONE ENCOUNTER
Called pt, pts grandson answered, informed him to have pt or family member call us back to schedule appt for  to sign HH orders. He verbalized understanding and stated they will call later.

## 2022-02-08 NOTE — CM/SW NOTE
Confirmed with [employer] (phone ) that patient is current with this company for oxygen @ 4 liter nc continuously

## 2022-02-08 NOTE — PLAN OF CARE
Assumed pt care at 0730. A&Ox4, glasses and dentures at bedside. Received pt on 4L--weaned to 2L/CPAP HS, denies chest pain/SOB, lung sounds diminished, VSS, NSR on tele. Voids. Up with SBA. POC stress test, echo, possible dc, POC updated with pt, questions answered, verbalized understanding. Will continue to monitor.       Problem: Diabetes/Glucose Control  Goal: Glucose maintained within prescribed range  Description: INTERVENTIONS:  - Monitor Blood Glucose as ordered  - Assess for signs and symptoms of hyperglycemia and hypoglycemia  - Administer ordered medications to maintain glucose within target range  - Assess barriers to adequate nutritional intake and initiate nutrition consult as needed  - Instruct patient on self management of diabetes  Outcome: Progressing     Problem: Patient/Family Goals  Goal: Patient/Family Long Term Goal  Description: Patient's Long Term Goal: \"To go home\"    Interventions:  - Monitor O2 needs   - Wean O2 as able   - Cardiology to see   - Ambulate as tolerated   - Comply with medical regimen   - See additional Care Plan goals for specific interventions  Outcome: Progressing  Goal: Patient/Family Short Term Goal  Description: Patient's Short Term Goal: \"To figure out why I am short of breath\"     Interventions:   - Monitor O2 needs   - Wean O2 as able   - Cardiology to see   - Ambulate as tolerated   - Comply with medical regimen   - See additional Care Plan goals for specific interventions  Outcome: Progressing     Problem: CARDIOVASCULAR - ADULT  Goal: Maintains optimal cardiac output and hemodynamic stability  Description: INTERVENTIONS:  - Monitor vital signs, rhythm, and trends  - Monitor for bleeding, hypotension and signs of decreased cardiac output  - Evaluate effectiveness of vasoactive medications to optimize hemodynamic stability  - Monitor arterial and/or venous puncture sites for bleeding and/or hematoma  - Assess quality of pulses, skin color and temperature  - Assess for signs of decreased coronary artery perfusion - ex.  Angina  - Evaluate fluid balance, assess for edema, trend weights  Outcome: Progressing  Goal: Absence of cardiac arrhythmias or at baseline  Description: INTERVENTIONS:  - Continuous cardiac monitoring, monitor vital signs, obtain 12 lead EKG if indicated  - Evaluate effectiveness of antiarrhythmic and heart rate control medications as ordered  - Initiate emergency measures for life threatening arrhythmias  - Monitor electrolytes and administer replacement therapy as ordered  Outcome: Progressing     Problem: RESPIRATORY - ADULT  Goal: Achieves optimal ventilation and oxygenation  Description: INTERVENTIONS:  - Assess for changes in respiratory status  - Assess for changes in mentation and behavior  - Position to facilitate oxygenation and minimize respiratory effort  - Oxygen supplementation based on oxygen saturation or ABGs  - Provide Smoking Cessation handout, if applicable  - Encourage broncho-pulmonary hygiene including cough, deep breathe, Incentive Spirometry  - Assess the need for suctioning and perform as needed  - Assess and instruct to report SOB or any respiratory difficulty  - Respiratory Therapy support as indicated  - Manage/alleviate anxiety  - Monitor for signs/symptoms of CO2 retention  Outcome: Progressing     Problem: METABOLIC/FLUID AND ELECTROLYTES - ADULT  Goal: Glucose maintained within prescribed range  Description: INTERVENTIONS:  - Monitor Blood Glucose as ordered  - Assess for signs and symptoms of hyperglycemia and hypoglycemia  - Administer ordered medications to maintain glucose within target range  - Assess barriers to adequate nutritional intake and initiate nutrition consult as needed  - Instruct patient on self management of diabetes  Outcome: Progressing  Goal: Electrolytes maintained within normal limits  Description: INTERVENTIONS:  - Monitor labs and rhythm and assess patient for signs and symptoms of electrolyte imbalances  - Administer electrolyte replacement as ordered  - Monitor response to electrolyte replacements, including rhythm and repeat lab results as appropriate  - Fluid restriction as ordered  - Instruct patient on fluid and nutrition restrictions as appropriate  Outcome: Progressing     Problem: PAIN - ADULT  Goal: Verbalizes/displays adequate comfort level or patient's stated pain goal  Description: INTERVENTIONS:  - Encourage pt to monitor pain and request assistance  - Assess pain using appropriate pain scale  - Administer analgesics based on type and severity of pain and evaluate response  - Implement non-pharmacological measures as appropriate and evaluate response  - Consider cultural and social influences on pain and pain management  - Manage/alleviate anxiety  - Utilize distraction and/or relaxation techniques  - Monitor for opioid side effects  - Notify MD/LIP if interventions unsuccessful or patient reports new pain  - Anticipate increased pain with activity and pre-medicate as appropriate  Outcome: Progressing     Problem: RISK FOR INFECTION - ADULT  Goal: Absence of fever/infection during anticipated neutropenic period  Description: INTERVENTIONS  - Monitor WBC  - Administer growth factors as ordered  - Implement neutropenic guidelines  Outcome: Progressing     Problem: SAFETY ADULT - FALL  Goal: Free from fall injury  Description: INTERVENTIONS:  - Assess pt frequently for physical needs  - Identify cognitive and physical deficits and behaviors that affect risk of falls.   - [place] fall precautions as indicated by assessment.  - Educate pt/family on patient safety including physical limitations  - Instruct pt to call for assistance with activity based on assessment  - Modify environment to reduce risk of injury  - Provide assistive devices as appropriate  - Consider OT/PT consult to assist with strengthening/mobility  - Encourage toileting schedule  Outcome: Progressing     Problem: DISCHARGE PLANNING  Goal: Discharge to home or other facility with appropriate resources  Description: INTERVENTIONS:  - Identify barriers to discharge w/pt and caregiver  - Include patient/family/discharge partner in discharge planning  - Arrange for needed discharge resources and transportation as appropriate  - Identify discharge learning needs (meds, wound care, etc)  - Arrange for interpreters to assist at discharge as needed  - Consider post-discharge preferences of patient/family/discharge partner  - Complete POLST form as appropriate  - Assess patient's ability to be responsible for managing their own health  - Refer to Case Management Department for coordinating discharge planning if the patient needs post-hospital services based on physician/LIP order or complex needs related to functional status, cognitive ability or social support system  Outcome: Progressing

## 2022-02-08 NOTE — PROGRESS NOTES
02/07/22 1743 02/07/22 1747 02/07/22 1750   Vital Signs   BP (!) 164/67 (!) 162/83 (!) 162/76   MAP (mmHg) 88 101 97   BP Location Left arm Left arm Left arm   BP Method Automatic Automatic Automatic   Patient Position Lying Sitting Standing

## 2022-02-08 NOTE — TELEPHONE ENCOUNTER
Jeanette Ferrell Bygget 66     Patient to discharge today, 2/8/22 from [facility] with orders for home health nursing and PT. Will Dr. Rayray Leija sign and follow ? Feli's phone is secure, OK to leave detailed message if no answer.

## 2022-02-08 NOTE — HOME CARE LIAISON
Received referral via [facility] for Home Health services. Spoke w/ patient and patient stated she gets home health RN, PT, nad caregiver help through [facility] and wants to use them again.  Notified ANTHONY Bull

## 2022-02-08 NOTE — PLAN OF CARE
Explained discharge instructions including medications and follow-ups to the patient, verbalized understanding, IV removed, tele monitor discontinued, will be transported via wheelchair.      Problem: Diabetes/Glucose Control  Goal: Glucose maintained within prescribed range  Description: INTERVENTIONS:  - Monitor Blood Glucose as ordered  - Assess for signs and symptoms of hyperglycemia and hypoglycemia  - Administer ordered medications to maintain glucose within target range  - Assess barriers to adequate nutritional intake and initiate nutrition consult as needed  - Instruct patient on self management of diabetes  2/8/2022 1752 by Maria Antonia Graves RN  Outcome: Completed  2/8/2022 1153 by Maria Antonia Graves RN  Outcome: Progressing     Problem: Patient/Family Goals  Goal: Patient/Family Long Term Goal  Description: Patient's Long Term Goal: \"To go home\"    Interventions:  - Monitor O2 needs   - Wean O2 as able   - Cardiology to see   - Ambulate as tolerated   - Comply with medical regimen   - See additional Care Plan goals for specific interventions  2/8/2022 1752 by Maria Antonia Graves RN  Outcome: Completed  2/8/2022 1153 by Maria Antonia Graves RN  Outcome: Progressing  Goal: Patient/Family Short Term Goal  Description: Patient's Short Term Goal: \"To figure out why I am short of breath\"     Interventions:   - Monitor O2 needs   - Wean O2 as able   - Cardiology to see   - Ambulate as tolerated   - Comply with medical regimen   - See additional Care Plan goals for specific interventions  2/8/2022 1752 by Maria Antonia Graves RN  Outcome: Completed  2/8/2022 1153 by Maria Antonia Graves RN  Outcome: Progressing     Problem: CARDIOVASCULAR - ADULT  Goal: Maintains optimal cardiac output and hemodynamic stability  Description: INTERVENTIONS:  - Monitor vital signs, rhythm, and trends  - Monitor for bleeding, hypotension and signs of decreased cardiac output  - Evaluate effectiveness of vasoactive medications to optimize hemodynamic stability  - Monitor arterial and/or venous puncture sites for bleeding and/or hematoma  - Assess quality of pulses, skin color and temperature  - Assess for signs of decreased coronary artery perfusion - ex.  Angina  - Evaluate fluid balance, assess for edema, trend weights  2/8/2022 1752 by Sonam Caruso RN  Outcome: Completed  2/8/2022 1153 by Sonam Caruso RN  Outcome: Progressing  Goal: Absence of cardiac arrhythmias or at baseline  Description: INTERVENTIONS:  - Continuous cardiac monitoring, monitor vital signs, obtain 12 lead EKG if indicated  - Evaluate effectiveness of antiarrhythmic and heart rate control medications as ordered  - Initiate emergency measures for life threatening arrhythmias  - Monitor electrolytes and administer replacement therapy as ordered  2/8/2022 1752 by Sonam Caruso RN  Outcome: Completed  2/8/2022 1153 by Sonam Caruso RN  Outcome: Progressing     Problem: RESPIRATORY - ADULT  Goal: Achieves optimal ventilation and oxygenation  Description: INTERVENTIONS:  - Assess for changes in respiratory status  - Assess for changes in mentation and behavior  - Position to facilitate oxygenation and minimize respiratory effort  - Oxygen supplementation based on oxygen saturation or ABGs  - Provide Smoking Cessation handout, if applicable  - Encourage broncho-pulmonary hygiene including cough, deep breathe, Incentive Spirometry  - Assess the need for suctioning and perform as needed  - Assess and instruct to report SOB or any respiratory difficulty  - Respiratory Therapy support as indicated  - Manage/alleviate anxiety  - Monitor for signs/symptoms of CO2 retention  2/8/2022 1752 by Sonam Caruso RN  Outcome: Completed  2/8/2022 1153 by Sonam Caruso RN  Outcome: Progressing     Problem: METABOLIC/FLUID AND ELECTROLYTES - ADULT  Goal: Glucose maintained within prescribed range  Description: INTERVENTIONS:  - Monitor Blood Glucose as ordered  - Assess for signs and symptoms of hyperglycemia and hypoglycemia  - Administer ordered medications to maintain glucose within target range  - Assess barriers to adequate nutritional intake and initiate nutrition consult as needed  - Instruct patient on self management of diabetes  2/8/2022 1752 by Landen Mueller RN  Outcome: Completed  2/8/2022 1153 by Landen Mueller RN  Outcome: Progressing  Goal: Electrolytes maintained within normal limits  Description: INTERVENTIONS:  - Monitor labs and rhythm and assess patient for signs and symptoms of electrolyte imbalances  - Administer electrolyte replacement as ordered  - Monitor response to electrolyte replacements, including rhythm and repeat lab results as appropriate  - Fluid restriction as ordered  - Instruct patient on fluid and nutrition restrictions as appropriate  2/8/2022 1752 by Landen Mueller RN  Outcome: Completed  2/8/2022 1153 by Landen Mueller RN  Outcome: Progressing     Problem: PAIN - ADULT  Goal: Verbalizes/displays adequate comfort level or patient's stated pain goal  Description: INTERVENTIONS:  - Encourage pt to monitor pain and request assistance  - Assess pain using appropriate pain scale  - Administer analgesics based on type and severity of pain and evaluate response  - Implement non-pharmacological measures as appropriate and evaluate response  - Consider cultural and social influences on pain and pain management  - Manage/alleviate anxiety  - Utilize distraction and/or relaxation techniques  - Monitor for opioid side effects  - Notify MD/LIP if interventions unsuccessful or patient reports new pain  - Anticipate increased pain with activity and pre-medicate as appropriate  2/8/2022 1752 by Landen Mueller RN  Outcome: Completed  2/8/2022 1153 by Landen Mueller RN  Outcome: Progressing     Problem: RISK FOR INFECTION - ADULT  Goal: Absence of fever/infection during anticipated neutropenic period  Description: INTERVENTIONS  - Monitor WBC  - Administer growth factors as ordered  - Implement neutropenic guidelines  2/8/2022 1752 by Nikolai Portillo RN  Outcome: Completed  2/8/2022 1153 by Nikolai Portillo RN  Outcome: Progressing     Problem: SAFETY ADULT - FALL  Goal: Free from fall injury  Description: INTERVENTIONS:  - Assess pt frequently for physical needs  - Identify cognitive and physical deficits and behaviors that affect risk of falls.   - Welcome fall precautions as indicated by assessment.  - Educate pt/family on patient safety including physical limitations  - Instruct pt to call for assistance with activity based on assessment  - Modify environment to reduce risk of injury  - Provide assistive devices as appropriate  - Consider OT/PT consult to assist with strengthening/mobility  - Encourage toileting schedule  2/8/2022 1752 by Nikolai Portillo RN  Outcome: Completed  2/8/2022 1153 by Nikolai Portillo RN  Outcome: Progressing     Problem: DISCHARGE PLANNING  Goal: Discharge to home or other facility with appropriate resources  Description: INTERVENTIONS:  - Identify barriers to discharge w/pt and caregiver  - Include patient/family/discharge partner in discharge planning  - Arrange for needed discharge resources and transportation as appropriate  - Identify discharge learning needs (meds, wound care, etc)  - Arrange for interpreters to assist at discharge as needed  - Consider post-discharge preferences of patient/family/discharge partner  - Complete POLST form as appropriate  - Assess patient's ability to be responsible for managing their own health  - Refer to Case Management Department for coordinating discharge planning if the patient needs post-hospital services based on physician/LIP order or complex needs related to functional status, cognitive ability or social support system  2/8/2022 1752 by Nikolai Portillo RN  Outcome: Completed  2/8/2022 1153 by Nikolai Portillo RN  Outcome: Progressing

## 2022-02-08 NOTE — TELEPHONE ENCOUNTER
LOV 9/28/2021    Spoke with Bettie Lew from [facility]--Patient will need to schedule follow-up with  or any other available provider. Please assist with scheduling. TY!

## 2022-02-08 NOTE — PLAN OF CARE
Alert. Oriented. Sr per tele. Hr 90s. Denies sob, cp. No edema noted. Up w/ sb. Gait steady. Compliant w/ cpap. Poc discussed with pt. Voiced understanding. Cont to monitor pt.

## 2022-02-08 NOTE — PLAN OF CARE
Received patient from the ER at approx. 1750. Admitted for CHF exacerbation. PTA meds reviewed with patient. Admission navigator complete. AOx4. Calm, cooperative. Independent. Up with SBA. NSR with BBB on cardiac monitor. Adequate saturation on 5L NC. Pursed-lip breathing noted. When asked how her breathing is, patient states \"I feel so much better! \" Lung sounds diminished. Denies chest discomfort, N/V. Denies pain. Voiding without difficulty. LBM this morning PTA. Plan is for Amaya Clutter in the morning. Hourly and PRN rounding in place. Call light within reach. All needs met at this time. Will continue with the current plan of care.        Problem: Diabetes/Glucose Control  Goal: Glucose maintained within prescribed range  Description: INTERVENTIONS:  - Monitor Blood Glucose as ordered  - Assess for signs and symptoms of hyperglycemia and hypoglycemia  - Administer ordered medications to maintain glucose within target range  - Assess barriers to adequate nutritional intake and initiate nutrition consult as needed  - Instruct patient on self management of diabetes  Outcome: Progressing     Problem: Patient/Family Goals  Goal: Patient/Family Long Term Goal  Description: Patient's Long Term Goal: \"To go home\"    Interventions:  - Monitor O2 needs   - Wean O2 as able   - Cardiology to see   - Ambulate as tolerated   - Comply with medical regimen   - See additional Care Plan goals for specific interventions  Outcome: Progressing  Goal: Patient/Family Short Term Goal  Description: Patient's Short Term Goal: \"To figure out why I am short of breath\"     Interventions:   - Monitor O2 needs   - Wean O2 as able   - Cardiology to see   - Ambulate as tolerated   - Comply with medical regimen   - See additional Care Plan goals for specific interventions  Outcome: Progressing

## 2022-02-09 ENCOUNTER — TELEPHONE (OUTPATIENT)
Dept: INTERNAL MEDICINE CLINIC | Facility: CLINIC | Age: 77
End: 2022-02-09

## 2022-02-09 ENCOUNTER — PATIENT OUTREACH (OUTPATIENT)
Dept: CASE MANAGEMENT | Age: 77
End: 2022-02-09

## 2022-02-09 PROCEDURE — 1111F DSCHRG MED/CURRENT MED MERGE: CPT

## 2022-02-09 NOTE — TELEPHONE ENCOUNTER
Spoke to pt for TCM today. Pt is not able to do a virtual visit and is asking if PCP would do a telephone follow up. TCM/HFU appt recommended by 2/15/22 as pt is a high risk for readmission. Please discuss with PCP and follow up with patient.  Thank you    BOOK BY DATE (last date for TCM): 2/22/22

## 2022-02-09 NOTE — PAYOR COMM NOTE
--------------  DISCHARGE REVIEW    Payor: [facility] [place] #:  [id number]  Authorization Number: [id number]    Admit date: 2/7/22  Admit time:   5:38 PM  Discharge Date: 2/8/2022  7:06 PM     Admitting Physician: Saba Street MD  Attending Physician:  Erin Plascencia MD  Primary Care Physician: Paulina Wan MD       Discharge Summary Notes    No notes of this type exist for this encounter.          REVIEWER COMMENTS

## 2022-02-14 NOTE — TELEPHONE ENCOUNTER
Switched to telephone visit, daughter telephone number on demographics, informed her switched to telephone call.

## 2022-02-24 ENCOUNTER — OFFICE VISIT (OUTPATIENT)
Dept: INTERNAL MEDICINE CLINIC | Facility: CLINIC | Age: 77
End: 2022-02-24
Payer: COMMERCIAL

## 2022-02-24 VITALS
SYSTOLIC BLOOD PRESSURE: 134 MMHG | WEIGHT: 261.19 LBS | TEMPERATURE: 98 F | HEART RATE: 84 BPM | OXYGEN SATURATION: 96 % | BODY MASS INDEX: 46.28 KG/M2 | DIASTOLIC BLOOD PRESSURE: 70 MMHG | HEIGHT: 63 IN | RESPIRATION RATE: 20 BRPM

## 2022-02-24 DIAGNOSIS — I50.20 HFREF (HEART FAILURE WITH REDUCED EJECTION FRACTION) (HCC): Chronic | ICD-10-CM

## 2022-02-24 DIAGNOSIS — I27.20 PULMONARY HTN (HCC): Chronic | ICD-10-CM

## 2022-02-24 DIAGNOSIS — R23.8 SKIN IRRITATION: ICD-10-CM

## 2022-02-24 DIAGNOSIS — E66.9 DIABETES MELLITUS TYPE 2 IN OBESE (HCC): ICD-10-CM

## 2022-02-24 DIAGNOSIS — J44.9 CHRONIC OBSTRUCTIVE PULMONARY DISEASE, UNSPECIFIED COPD TYPE (HCC): Chronic | ICD-10-CM

## 2022-02-24 DIAGNOSIS — I10 BENIGN ESSENTIAL HTN: Primary | Chronic | ICD-10-CM

## 2022-02-24 DIAGNOSIS — E11.69 DIABETES MELLITUS TYPE 2 IN OBESE (HCC): ICD-10-CM

## 2022-02-24 DIAGNOSIS — R06.02 SHORTNESS OF BREATH: ICD-10-CM

## 2022-02-24 PROCEDURE — 99214 OFFICE O/P EST MOD 30 MIN: CPT | Performed by: INTERNAL MEDICINE

## 2022-02-24 PROCEDURE — 3008F BODY MASS INDEX DOCD: CPT | Performed by: INTERNAL MEDICINE

## 2022-02-24 PROCEDURE — 3078F DIAST BP <80 MM HG: CPT | Performed by: INTERNAL MEDICINE

## 2022-02-24 PROCEDURE — 3075F SYST BP GE 130 - 139MM HG: CPT | Performed by: INTERNAL MEDICINE

## 2022-02-24 RX ORDER — COLCHICINE 0.6 MG/1
TABLET ORAL
COMMUNITY

## 2022-02-24 RX ORDER — PREDNISONE 20 MG/1
40 TABLET ORAL DAILY
Qty: 10 TABLET | Refills: 0 | Status: SHIPPED | OUTPATIENT
Start: 2022-02-24 | End: 2022-03-01

## 2022-02-24 NOTE — PATIENT INSTRUCTIONS
- Prednisone prescription sent to pharmacy  - Triamcinolone steroid cream refilled. You can use this on your hands/irritated skin. Do not use for more than a couple of weeks in a row  - Follow up with your heart doctor  Dr. Trace Garcia  [address]  [facility], [address]  [phone]    - Follow up with your lung doctor  Dr. Irene Garcia  [address] [facility] [place] [facility] [place]  [phone]    - Make sure to see both these specialists within the next 1-2 months. It was a pleasure seeing you in the clinic today. Thank you for choosing the [facility] office for your healthcare needs. Please call at [phone] with any questions or concerns.     Johny Barone MD

## 2022-02-27 PROBLEM — Z99.81 DEPENDENCE ON SUPPLEMENTAL OXYGEN: Status: ACTIVE | Noted: 2020-06-26

## 2022-03-07 NOTE — CONSULTS
[facility]    PATIENT'S NAME: Xavier Sen   ATTENDING PHYSICIAN: AMIE Raza So: Denise Head M.D. PATIENT ACCOUNT#:   [de-identified]    LOCATION:  [address] [place] [address]  MEDICAL RECORD #:   [id number]       YOB: 1945  ADMISSION DATE:       02/07/2022      CONSULT DATE:  02/07/2022    REPORT OF CONSULTATION    HISTORY OF PRESENT ILLNESS:  This is a 82-year-old female with a history of hypertension, dyslipidemia, prior ejection fraction of 40%, who has been having increasing shortness of breath. Patient denies any syncope, orthopnea, or PND. She presented today to the emergency room, and her O2 saturations were in the 80s, so she was referred for further evaluation. PAST MEDICAL HISTORY:  History of cardiomyopathy, history of heart failure with reduced ejection fraction, diabetes, hypertension, sleep apnea. MEDICATIONS:  As listed. FAMILY HISTORY:  Noncontributory. REVIEW OF SYSTEMS:  Denies hemoptysis, denies hematemesis, denies hematuria. The rest of the review of systems is normal except as per HPI. PHYSICAL EXAMINATION:    GENERAL:  Mild distress. VITAL SIGNS:  O2 saturation 85%. Pulse is 80. Respiratory rate increased. HEENT:  Pupils equal and reactive to light and accommodation. LUNGS:  Rhonchi. HEART:  S1, S2. Soft systolic murmur. ABDOMEN:  Soft. No hepatosplenomegaly. No masses. Nontender. EXTREMITIES:  No clubbing, cyanosis, or edema. BACK:  No kyphosis. IMPRESSION:    1. Dyspnea, heart failure exacerbation. Patient will be diuresed. She is already on Entresto. She has some mild LV dysfunction as well as heart failure with preserved ejection fraction. Will diurese, observe, and patient should be admitted. Patient will need evaluation to rule out ischemia. 2.   Hypertension. 3.   History of home O2.  4.   Diabetes. 5.   Morbid obesity.      Dictated By Denise Head M.D.  d: 03/05/2022 10:28:19  t: 03/05/2022 13:43:43  Job [id number]  ANUPAMA/

## 2022-03-11 ENCOUNTER — HOSPITAL ENCOUNTER (OUTPATIENT)
Dept: LAB | Facility: HOSPITAL | Age: 77
Discharge: HOME | End: 2022-03-11
Attending: NURSE PRACTITIONER
Payer: MEDICARE

## 2022-03-11 ENCOUNTER — HOSPITAL ENCOUNTER (OUTPATIENT)
Dept: CARDIOLOGY CLINIC | Facility: HOSPITAL | Age: 77
Discharge: HOME | End: 2022-03-11
Attending: NURSE PRACTITIONER
Payer: MEDICARE

## 2022-03-11 VITALS
SYSTOLIC BLOOD PRESSURE: 182 MMHG | OXYGEN SATURATION: 99 % | RESPIRATION RATE: 22 BRPM | HEART RATE: 56 BPM | DIASTOLIC BLOOD PRESSURE: 86 MMHG

## 2022-03-11 DIAGNOSIS — I50.20 HFREF (HEART FAILURE WITH REDUCED EJECTION FRACTION) (HCC): ICD-10-CM

## 2022-03-11 LAB
ANION GAP SERPL CALC-SCNC: 1 MMOL/L (ref 0–18)
BUN BLD-MCNC: 17 MG/DL (ref 7–18)
CALCIUM BLD-MCNC: 8.5 MG/DL (ref 8.5–10.1)
CHLORIDE SERPL-SCNC: 105 MMOL/L (ref 98–112)
CO2 SERPL-SCNC: 31 MMOL/L (ref 21–32)
CREAT BLD-MCNC: 1.02 MG/DL
GLUCOSE BLD-MCNC: 257 MG/DL (ref 70–99)
NT-PROBNP SERPL-MCNC: 241 PG/ML (ref ?–125)
OSMOLALITY SERPL CALC.SUM OF ELEC: 294 MOSM/KG (ref 275–295)
POTASSIUM SERPL-SCNC: 4.2 MMOL/L (ref 3.5–5.1)
SODIUM SERPL-SCNC: 137 MMOL/L (ref 136–145)

## 2022-03-11 PROCEDURE — 83880 ASSAY OF NATRIURETIC PEPTIDE: CPT | Performed by: INTERNAL MEDICINE

## 2022-03-11 PROCEDURE — 36415 COLL VENOUS BLD VENIPUNCTURE: CPT | Performed by: INTERNAL MEDICINE

## 2022-03-11 PROCEDURE — 96374 THER/PROPH/DIAG INJ IV PUSH: CPT

## 2022-03-11 PROCEDURE — 99214 OFFICE O/P EST MOD 30 MIN: CPT

## 2022-03-11 PROCEDURE — 80048 BASIC METABOLIC PNL TOTAL CA: CPT | Performed by: INTERNAL MEDICINE

## 2022-03-11 RX ORDER — FUROSEMIDE 10 MG/ML
40 INJECTION INTRAMUSCULAR; INTRAVENOUS ONCE
Status: DISPENSED | OUTPATIENT
Start: 2022-03-11

## 2022-03-11 NOTE — PROGRESS NOTES
Patient sent from Dr Rosey Saldivar office for IV Lasix 40 mg. IV started per protocol. Labs drawn per orders. IV 40 mg  given. Patient tolerated it well. IV discontinued and pressure applied with gauze. Vital signs stable(see flowsheet). Patient discharged in stable condition. Patient advised to take her evening BP meds once home.

## 2022-03-30 ENCOUNTER — TELEPHONE (OUTPATIENT)
Dept: INTERNAL MEDICINE CLINIC | Facility: CLINIC | Age: 77
End: 2022-03-30

## 2022-03-30 RX ORDER — SOFT LENS DISINFECTANT
SOLUTION, NON-ORAL MISCELLANEOUS
Qty: 1 EACH | Refills: 0 | Status: SHIPPED | OUTPATIENT
Start: 2022-03-30

## 2022-03-30 NOTE — TELEPHONE ENCOUNTER
Pt called requesting dr. Jessie Coello to order her a nebulizer machine. Please advise.     [facility] #[id number] [place], [address], [phone], [address] [place] [place] [address] [address]   Phone: [phone] Fax: [phone]

## 2022-04-01 ENCOUNTER — LAB ENCOUNTER (OUTPATIENT)
Dept: LAB | Age: 77
End: 2022-04-01
Attending: NURSE PRACTITIONER
Payer: MEDICARE

## 2022-04-01 DIAGNOSIS — I27.21 PULMONARY HYPERTENSION SECONDARY TO INCREASED PVR (HCC): ICD-10-CM

## 2022-04-01 DIAGNOSIS — I50.42 SYSTOLIC AND DIASTOLIC CHF, CHRONIC (HCC): ICD-10-CM

## 2022-04-01 DIAGNOSIS — R07.89 CHEST WALL PAIN: Primary | ICD-10-CM

## 2022-04-01 DIAGNOSIS — R06.02 SOB (SHORTNESS OF BREATH): ICD-10-CM

## 2022-04-01 DIAGNOSIS — I10 HYPERTENSION, ESSENTIAL: ICD-10-CM

## 2022-04-01 DIAGNOSIS — G47.33 OBSTRUCTIVE SLEEP APNEA (ADULT) (PEDIATRIC): ICD-10-CM

## 2022-04-01 LAB
ALBUMIN SERPL-MCNC: 3.2 G/DL (ref 3.4–5)
ALBUMIN/GLOB SERPL: 0.9 {RATIO} (ref 1–2)
ALP LIVER SERPL-CCNC: 97 U/L
ALT SERPL-CCNC: 15 U/L
ANION GAP SERPL CALC-SCNC: 5 MMOL/L (ref 0–18)
AST SERPL-CCNC: 13 U/L (ref 15–37)
BASOPHILS # BLD AUTO: 0.04 X10(3) UL (ref 0–0.2)
BASOPHILS NFR BLD AUTO: 0.6 %
BILIRUB SERPL-MCNC: 0.2 MG/DL (ref 0.1–2)
BUN BLD-MCNC: 17 MG/DL (ref 7–18)
CALCIUM BLD-MCNC: 8.9 MG/DL (ref 8.5–10.1)
CHLORIDE SERPL-SCNC: 105 MMOL/L (ref 98–112)
CO2 SERPL-SCNC: 30 MMOL/L (ref 21–32)
CREAT BLD-MCNC: 1.18 MG/DL
EOSINOPHIL # BLD AUTO: 0.16 X10(3) UL (ref 0–0.7)
EOSINOPHIL NFR BLD AUTO: 2.2 %
ERYTHROCYTE [DISTWIDTH] IN BLOOD BY AUTOMATED COUNT: 15.1 %
FASTING STATUS PATIENT QL REPORTED: YES
GLOBULIN PLAS-MCNC: 3.5 G/DL (ref 2.8–4.4)
GLUCOSE BLD-MCNC: 179 MG/DL (ref 70–99)
HCT VFR BLD AUTO: 38.3 %
HGB BLD-MCNC: 11.4 G/DL
IMM GRANULOCYTES # BLD AUTO: 0.04 X10(3) UL (ref 0–1)
IMM GRANULOCYTES NFR BLD: 0.6 %
LYMPHOCYTES # BLD AUTO: 1.87 X10(3) UL (ref 1–4)
LYMPHOCYTES NFR BLD AUTO: 25.8 %
MCH RBC QN AUTO: 28.1 PG (ref 26–34)
MCHC RBC AUTO-ENTMCNC: 29.8 G/DL (ref 31–37)
MCV RBC AUTO: 94.3 FL
MONOCYTES # BLD AUTO: 0.66 X10(3) UL (ref 0.1–1)
MONOCYTES NFR BLD AUTO: 9.1 %
NEUTROPHILS # BLD AUTO: 4.49 X10 (3) UL (ref 1.5–7.7)
NEUTROPHILS # BLD AUTO: 4.49 X10(3) UL (ref 1.5–7.7)
NEUTROPHILS NFR BLD AUTO: 61.7 %
NT-PROBNP SERPL-MCNC: 146 PG/ML (ref ?–125)
OSMOLALITY SERPL CALC.SUM OF ELEC: 296 MOSM/KG (ref 275–295)
PLATELET # BLD AUTO: 271 10(3)UL (ref 150–450)
POTASSIUM SERPL-SCNC: 4.2 MMOL/L (ref 3.5–5.1)
PROT SERPL-MCNC: 6.7 G/DL (ref 6.4–8.2)
RBC # BLD AUTO: 4.06 X10(6)UL
SODIUM SERPL-SCNC: 140 MMOL/L (ref 136–145)
WBC # BLD AUTO: 7.3 X10(3) UL (ref 4–11)

## 2022-04-01 PROCEDURE — 85025 COMPLETE CBC W/AUTO DIFF WBC: CPT

## 2022-04-01 PROCEDURE — 80053 COMPREHEN METABOLIC PANEL: CPT

## 2022-04-01 PROCEDURE — 83880 ASSAY OF NATRIURETIC PEPTIDE: CPT

## 2022-04-01 PROCEDURE — 36415 COLL VENOUS BLD VENIPUNCTURE: CPT

## 2022-04-04 RX ORDER — PROBENECID 500 MG/1
TABLET, FILM COATED ORAL
Qty: 180 TABLET | Refills: 0 | Status: SHIPPED | OUTPATIENT
Start: 2022-04-04

## 2022-04-04 NOTE — TELEPHONE ENCOUNTER
Pt called and stated she needs machine information sent to [address] [place] [address]. Per pt [facility] is charging her too much. Pt stated they need to know why she needs the machine, the dosage, and how often she needs to use it.      [address] [place] [address]  [address] [facility], [address] [place] [address]  [phone]

## 2022-04-04 NOTE — TELEPHONE ENCOUNTER
Called pharmacy to inquire about nebulizer machine   It is currently awaiting  by patient   Called patient and informed Equal and normal pulses (carotid, femoral, dorsalis pedis)

## 2022-04-04 NOTE — TELEPHONE ENCOUNTER
Can we place referral for nebulizer machine to [facility]  [address] [facility], [address] [place] [address]  [phone]

## 2022-04-04 NOTE — TELEPHONE ENCOUNTER
Pt stated that pharmacy gave her solution for her nebulizer machine, not the actual machine. Pt states that she does not have a preferred machine.      [facility] [address] [place], [phone], [address] [facility] [address]

## 2022-04-05 RX ORDER — TORSEMIDE 20 MG/1
TABLET ORAL
Qty: 180 TABLET | Refills: 0 | Status: SHIPPED | OUTPATIENT
Start: 2022-04-05

## 2022-04-05 NOTE — TELEPHONE ENCOUNTER
Protocol passed     Requesting: torsemide 20mg     LOV: 2/24/22   RTC: 3-6 months   Filled: 1/3/22 #180 0 refills   Recent Labs: 8/24/21     Upcoming OV: none scheduled

## 2022-04-06 RX ORDER — PEN NEEDLE, DIABETIC 32GX 5/32"
NEEDLE, DISPOSABLE MISCELLANEOUS
Qty: 100 EACH | Refills: 0 | Status: SHIPPED | OUTPATIENT
Start: 2022-04-06

## 2022-04-07 NOTE — TELEPHONE ENCOUNTER
Pt called requesting a prescription for nicorette gum. Pt states the craving to smoke is coming back again and she is having a lot of trouble. Pt denied scheduling an appt but would like to know if she can get a prescription w/o one.     [facility] #[id number] [facility], [address] [place] [address] [place] , [phone], [phone]

## 2022-04-08 NOTE — TELEPHONE ENCOUNTER
Please advise, see message from [facility] [place] [place]?     LOV with Dr Levon Banks RTC 3-6 months with Dr Samantha Joshua   No FOV scheduled

## 2022-04-11 ENCOUNTER — TELEPHONE (OUTPATIENT)
Dept: INTERNAL MEDICINE CLINIC | Facility: CLINIC | Age: 77
End: 2022-04-11

## 2022-05-03 RX ORDER — PROBENECID 500 MG/1
TABLET, FILM COATED ORAL
Qty: 180 TABLET | Refills: 0 | Status: SHIPPED | OUTPATIENT
Start: 2022-05-03

## 2022-05-23 RX ORDER — INSULIN GLARGINE AND LIXISENATIDE 100; 33 U/ML; UG/ML
35 INJECTION, SOLUTION SUBCUTANEOUS DAILY
Qty: 30 ML | Refills: 0 | Status: SHIPPED | OUTPATIENT
Start: 2022-05-23

## 2022-05-23 NOTE — TELEPHONE ENCOUNTER
Pt requesting refill    Insulin Glargine-Lixisenatide RESTPADD [facility]) 100-33 UNT-MCG/ML Subcutaneous Solution Pen-injector      [facility] #[id number] [address] AT [address], [phone], [phone]

## 2022-06-23 ENCOUNTER — TELEPHONE (OUTPATIENT)
Dept: INTERNAL MEDICINE CLINIC | Facility: CLINIC | Age: 77
End: 2022-06-23

## 2022-06-23 NOTE — TELEPHONE ENCOUNTER
Spoke to pt's daughter regarding pt's super visit appt, she stated she will call back to schedule it as soonest she checks her own schedule.

## 2022-07-01 NOTE — TELEPHONE ENCOUNTER
[facility]    Subjective:       Patient ID: Nelia Brizuela is a 87 y.o. female.    Chief Complaint: Non-healing Wound Follow Up and Venous Stasis    7/1/22   Patient seen in clinic for sacral and RLE wounds.  Transfer w/c to bed and back requires assisstance of 3. Wounds RLE are macerated.  New orders routed to .  notified to use ambulance for further transfers.    Review of Systems   All other systems reviewed and are negative.        Objective:        Physical Exam       Altered Skin Integrity 09/24/21 1130 Sacral spine #4 Partial thickness tissue loss. Shallow open ulcer with a red or pink wound bed, without slough. Intact or Open/Ruptured Serum-filled blister. (Active)   09/24/21 1130   Altered Skin Integrity Present on Admission:    Side:    Orientation:    Location: Sacral spine   Wound Number: #4   Is this injury device related?:    Primary Wound Type:    Description of Altered Skin Integrity: Partial thickness tissue loss. Shallow open ulcer with a red or pink wound bed, without slough. Intact or Open/Ruptured Serum-filled blister.   Removal Indication and Assessment:    Wound Outcome:    (Retired) Wound Length (cm):    (Retired) Wound Width (cm):    (Retired) Depth (cm):    Wound Description (Comments):    Removal Indications:    Description of Altered Skin Integrity Full thickness tissue loss with exposed bone, tendon, or muscle. Often includes undermining and tunneling. May extend into muscle and/or supporting structures. 07/01/22 1242   Dressing Appearance Moist drainage;No dressing;Open to air 07/01/22 1242   Drainage Amount Moderate 07/01/22 1242   Drainage Characteristics/Odor Serous 07/01/22 1242   Appearance Pink;Smooth;Not granulating 07/01/22 1242   Tissue loss description Full thickness 07/01/22 1242   Red (%), Wound Tissue Color 100 % 07/01/22 1242   Periwound Area Intact;Moist 07/01/22 1242   Wound Edges Defined 07/01/22 1242   Wound  Passed protocol for Torsemide and Carvedilol      Medication(s) to Refill:   Requested Prescriptions     Pending Prescriptions Disp Refills   • HYDROXYZINE HCL 10 MG Oral Tab [Pharmacy Med Name: HYDROXYZINE HCL 10MG TABLETS] 90 tablet 0     Sig: TAKE 1 TAB Length (cm) 1 cm 07/01/22 1242   Wound Width (cm) 1.3 cm 07/01/22 1242   Wound Depth (cm) 0.2 cm 07/01/22 1242   Wound Volume (cm^3) 0.26 cm^3 07/01/22 1242   Wound Surface Area (cm^2) 1.3 cm^2 07/01/22 1242   Undermining (depth (cm)/location) 12-4=0.5cm 07/01/22 1242   Care Cleansed with:;Sterile normal saline 07/01/22 1242   Dressing Applied;Calcium alginate;Foam 07/01/22 1242   Dressing Change Due 07/04/22 07/01/22 1242            Wound 07/12/21 1159 Venous Ulcer Right lateral;lower Leg (Active)   07/12/21 1159    Pre-existing: Yes   Primary Wound Type: Venous ulcer   Side: Right   Orientation: lateral;lower   Location: Leg   Wound Number:    Ankle-Brachial Index:    Pulses: positive    Removal Indication and Assessment:    Wound Outcome:    (Retired) Wound Type:    (Retired) Wound Length (cm):    (Retired) Wound Width (cm):    (Retired) Depth (cm):    Wound Description (Comments):    Removal Indications:    Wound Image   07/01/22 1242   Dressing Appearance Moist drainage 07/01/22 1242   Drainage Amount Moderate 07/01/22 1242   Drainage Characteristics/Odor Serous 07/01/22 1242   Appearance Pink;Granulating 07/01/22 1242   Tissue loss description Full thickness 07/01/22 1242   Red (%), Wound Tissue Color 100 % 07/01/22 1242   Periwound Area Moist;Macerated 07/01/22 1242   Wound Edges Open;Defined 07/01/22 1242   Wound Length (cm) 1.5 cm 07/01/22 1242   Wound Width (cm) 1.2 cm 07/01/22 1242   Wound Depth (cm) 0.2 cm 07/01/22 1242   Wound Volume (cm^3) 0.36 cm^3 07/01/22 1242   Wound Surface Area (cm^2) 1.8 cm^2 07/01/22 1242   Care Cleansed with:;Soap and water;Sterile normal saline 07/01/22 1242   Dressing Applied;Calcium alginate;Foam 07/01/22 1242   Compression Tubular elasticized bandage 07/01/22 1242   Dressing Change Due 07/04/22 07/01/22 1242            Wound 08/16/21 1000 Venous Ulcer Right dorsal Foot (Active)   08/16/21 1000    Pre-existing: Yes   Primary Wound Type: Venous ulcer   Side: Right    Orientation: dorsal   Location: Foot   Wound Number:    Ankle-Brachial Index:    Pulses:    Removal Indication and Assessment:    Wound Outcome:    (Retired) Wound Type:    (Retired) Wound Length (cm):    (Retired) Wound Width (cm):    (Retired) Depth (cm):    Wound Description (Comments):    Removal Indications:    Wound Image   07/01/22 1242   Dressing Appearance Moist drainage 07/01/22 1242   Drainage Amount Moderate 07/01/22 1242   Drainage Characteristics/Odor Serous 07/01/22 1242   Appearance Slough;Granulating 07/01/22 1242   Tissue loss description Full thickness 07/01/22 1242   Red (%), Wound Tissue Color 50 % 07/01/22 1242   Yellow (%), Wound Tissue Color 50 % 07/01/22 1242   Periwound Area Moist;Macerated;Pale white 07/01/22 1242   Wound Edges Open;Defined 07/01/22 1242   Wound Length (cm) 2.7 cm 07/01/22 1242   Wound Width (cm) 1.5 cm 07/01/22 1242   Wound Depth (cm) 0.5 cm 07/01/22 1242   Wound Volume (cm^3) 2.025 cm^3 07/01/22 1242   Wound Surface Area (cm^2) 4.05 cm^2 07/01/22 1242   Care Cleansed with:;Soap and water;Sterile normal saline 07/01/22 1242   Dressing Applied;Calcium alginate;Foam 07/01/22 1242   Dressing Change Due 07/04/22 07/01/22 1242       With patient's verbal consent, nails were aggressively reduced and debrided x 10 to their soft tissue attachment mechanically with nippers. Patient reports relief following the procedure. No anesthesia or hemostasis required.       Assessment:         ICD-10-CM ICD-9-CM   1. Pressure injury of sacral region, stage 3  L89.153 707.03     707.23   2. Ulcer of right foot with fat layer exposed  L97.512 707.15   3. Enlarged and hypertrophic nails  Q84.5 757.5   4. Venous stasis of both lower extremities  I87.8 459.81         Plan:   Tissue pathology and/or culture taken:  [] Yes [x] No   Sharp debridement performed:   [] Yes [x] No   Labs ordered this visit:   [] Yes [x] No   Imaging ordered this visit:   [] Yes [x] No           Orders Placed This  Encounter   Procedures    Change dressing     Right lateral lower leg and right dorsal foot:     Cleanse wound with: Vashe soak x 5 min, rinse with normal saline   Lidocaine: prn   Silver nitrate: prn   Periwound care: DO not use Gention Alyson  Primary dressing: Aquacel AG  Secondary dressing: bordered foam   Edema control: Double Tubigrip G from toes to knee+bootie+Darco boot to BLE          Sacral wound   Cleanse wound with: Vashe soak x 5 min, rinse with normal saline   Lidocaine: prn   Silver nitrate: prn   Primary dressing: Aquacel AG   Secondary dressing: Foam island dressing  Frequency: Twice Weekly and PRN per Home Health   Follow-up: with Dr. Wilkes in 8  weeks     Continue [facility]: SN to perform wound care twice weekly (except when in clinic) and PRN.   PT to eval patient's transfers  Patient is to come to clinic via ambulance        Follow up in about 2 months (around 9/1/2022) for Dr Wilkes.

## 2022-07-05 RX ORDER — TORSEMIDE 20 MG/1
TABLET ORAL
Qty: 180 TABLET | Refills: 0 | Status: SHIPPED | OUTPATIENT
Start: 2022-07-05

## 2022-07-05 RX ORDER — PROBENECID 500 MG/1
TABLET, FILM COATED ORAL
Qty: 180 TABLET | Refills: 0 | Status: SHIPPED | OUTPATIENT
Start: 2022-07-05

## 2022-07-07 ENCOUNTER — OFFICE VISIT (OUTPATIENT)
Dept: INTERNAL MEDICINE CLINIC | Facility: CLINIC | Age: 77
End: 2022-07-07
Payer: COMMERCIAL

## 2022-07-07 VITALS
DIASTOLIC BLOOD PRESSURE: 80 MMHG | HEIGHT: 63 IN | BODY MASS INDEX: 45.21 KG/M2 | RESPIRATION RATE: 16 BRPM | SYSTOLIC BLOOD PRESSURE: 130 MMHG | TEMPERATURE: 99 F | OXYGEN SATURATION: 95 % | WEIGHT: 255.19 LBS | HEART RATE: 70 BPM

## 2022-07-07 DIAGNOSIS — Z79.4 CONTROLLED TYPE 2 DIABETES MELLITUS WITHOUT COMPLICATION, WITH LONG-TERM CURRENT USE OF INSULIN (HCC): ICD-10-CM

## 2022-07-07 DIAGNOSIS — Z78.0 POSTMENOPAUSAL: ICD-10-CM

## 2022-07-07 DIAGNOSIS — E78.00 PURE HYPERCHOLESTEROLEMIA: ICD-10-CM

## 2022-07-07 DIAGNOSIS — N18.31 STAGE 3A CHRONIC KIDNEY DISEASE (HCC): ICD-10-CM

## 2022-07-07 DIAGNOSIS — I50.9 CHRONIC CONGESTIVE HEART FAILURE, UNSPECIFIED HEART FAILURE TYPE (HCC): ICD-10-CM

## 2022-07-07 DIAGNOSIS — E11.9 CONTROLLED TYPE 2 DIABETES MELLITUS WITHOUT COMPLICATION, WITH LONG-TERM CURRENT USE OF INSULIN (HCC): ICD-10-CM

## 2022-07-07 DIAGNOSIS — E66.01 MORBID OBESITY WITH BMI OF 45.0-49.9, ADULT (HCC): ICD-10-CM

## 2022-07-07 DIAGNOSIS — Z00.00 ENCOUNTER FOR ANNUAL HEALTH EXAMINATION: Primary | ICD-10-CM

## 2022-07-07 DIAGNOSIS — R23.4 PEELING SKIN: ICD-10-CM

## 2022-07-07 DIAGNOSIS — I10 PRIMARY HYPERTENSION: ICD-10-CM

## 2022-07-07 DIAGNOSIS — G47.33 OSA (OBSTRUCTIVE SLEEP APNEA): ICD-10-CM

## 2022-07-07 PROCEDURE — G0439 PPPS, SUBSEQ VISIT: HCPCS | Performed by: PHYSICIAN ASSISTANT

## 2022-07-07 PROCEDURE — 99397 PER PM REEVAL EST PAT 65+ YR: CPT | Performed by: PHYSICIAN ASSISTANT

## 2022-07-07 PROCEDURE — 1126F AMNT PAIN NOTED NONE PRSNT: CPT | Performed by: PHYSICIAN ASSISTANT

## 2022-07-07 PROCEDURE — 3075F SYST BP GE 130 - 139MM HG: CPT | Performed by: PHYSICIAN ASSISTANT

## 2022-07-07 PROCEDURE — 3079F DIAST BP 80-89 MM HG: CPT | Performed by: PHYSICIAN ASSISTANT

## 2022-07-07 PROCEDURE — 3008F BODY MASS INDEX DOCD: CPT | Performed by: PHYSICIAN ASSISTANT

## 2022-07-07 PROCEDURE — 96160 PT-FOCUSED HLTH RISK ASSMT: CPT | Performed by: PHYSICIAN ASSISTANT

## 2022-07-07 RX ORDER — TRIAMCINOLONE ACETONIDE 1 MG/G
CREAM TOPICAL 2 TIMES DAILY PRN
Qty: 45 G | Refills: 0 | Status: SHIPPED | OUTPATIENT
Start: 2022-07-07

## 2022-07-07 NOTE — PATIENT INSTRUCTIONS
Please use the grafter or call Zachary Jimenez at [phone] to set up the following tests:  - fasting blood work  - DEXA (bone density test)    Skin peeling:  - start triamcinolone cream - thin layer twice a day for 7-10 days  - use over the counter moisturizing lotion such as Cerave, Aveeno, Eucerin  - if no improvement please follow up with dermatology    If labs are stable please follow up in 6 months.

## 2022-07-20 RX ORDER — PEN NEEDLE, DIABETIC 32GX 5/32"
NEEDLE, DISPOSABLE MISCELLANEOUS
Qty: 100 EACH | Refills: 0 | Status: SHIPPED | OUTPATIENT
Start: 2022-07-20

## 2022-07-26 ENCOUNTER — TELEPHONE (OUTPATIENT)
Dept: INTERNAL MEDICINE CLINIC | Facility: CLINIC | Age: 77
End: 2022-07-26

## 2022-07-26 NOTE — TELEPHONE ENCOUNTER
Haja Childs know that she is quite stable on this medicine if she is having a problem with the pen she should take it  to the pharmacy- may be she needs a little education as to how to use it. -changing  the insulin may upset her current diabetes control and there could be problems with insurance covering the alternate meds. I would recommend she go to the pharmacist to get educated and ask the pharmacist what  substitute can be used if she is not happy with the current pen.   They can look up what the insurance will cover

## 2022-07-26 NOTE — TELEPHONE ENCOUNTER
Pt requesting that we change her insulin pen to a different brand because hers keeps jamming and then she is unable to use it.      Please advise

## 2022-08-09 ENCOUNTER — ORDER TRANSCRIPTION (OUTPATIENT)
Dept: ADMINISTRATIVE | Facility: HOSPITAL | Age: 77
End: 2022-08-09

## 2022-08-09 ENCOUNTER — OFFICE VISIT (OUTPATIENT)
Dept: INTERNAL MEDICINE CLINIC | Facility: CLINIC | Age: 77
End: 2022-08-09
Payer: COMMERCIAL

## 2022-08-09 VITALS
DIASTOLIC BLOOD PRESSURE: 84 MMHG | HEART RATE: 80 BPM | WEIGHT: 255.63 LBS | SYSTOLIC BLOOD PRESSURE: 160 MMHG | OXYGEN SATURATION: 95 % | RESPIRATION RATE: 20 BRPM | HEIGHT: 63 IN | BODY MASS INDEX: 45.29 KG/M2 | TEMPERATURE: 99 F

## 2022-08-09 DIAGNOSIS — E78.00 PURE HYPERCHOLESTEROLEMIA: ICD-10-CM

## 2022-08-09 DIAGNOSIS — R20.2 TINGLING OF BOTH FEET: Primary | ICD-10-CM

## 2022-08-09 DIAGNOSIS — E66.9 DIABETES MELLITUS TYPE 2 IN OBESE (HCC): ICD-10-CM

## 2022-08-09 DIAGNOSIS — E11.69 DIABETES MELLITUS TYPE 2 IN OBESE (HCC): ICD-10-CM

## 2022-08-09 PROCEDURE — 99214 OFFICE O/P EST MOD 30 MIN: CPT | Performed by: INTERNAL MEDICINE

## 2022-08-09 PROCEDURE — 3077F SYST BP >= 140 MM HG: CPT | Performed by: INTERNAL MEDICINE

## 2022-08-09 PROCEDURE — 3008F BODY MASS INDEX DOCD: CPT | Performed by: INTERNAL MEDICINE

## 2022-08-09 PROCEDURE — 3079F DIAST BP 80-89 MM HG: CPT | Performed by: INTERNAL MEDICINE

## 2022-08-09 RX ORDER — PEAK FLOW METER
1 EACH MISCELLANEOUS AS DIRECTED
COMMUNITY
Start: 2022-03-31 | End: 2022-08-09

## 2022-08-09 NOTE — PATIENT INSTRUCTIONS
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

## 2022-08-12 ENCOUNTER — HOSPITAL ENCOUNTER (OUTPATIENT)
Dept: GENERAL RADIOLOGY | Age: 77
Discharge: HOME | End: 2022-08-12
Attending: INTERNAL MEDICINE
Payer: MEDICARE

## 2022-08-12 ENCOUNTER — LAB ENCOUNTER (OUTPATIENT)
Dept: LAB | Age: 77
End: 2022-08-12
Attending: INTERNAL MEDICINE
Payer: MEDICARE

## 2022-08-12 DIAGNOSIS — E78.00 PURE HYPERCHOLESTEROLEMIA: ICD-10-CM

## 2022-08-12 DIAGNOSIS — E66.9 DIABETES MELLITUS TYPE 2 IN OBESE (HCC): ICD-10-CM

## 2022-08-12 DIAGNOSIS — R20.2 TINGLING OF BOTH FEET: ICD-10-CM

## 2022-08-12 DIAGNOSIS — E11.69 DIABETES MELLITUS TYPE 2 IN OBESE (HCC): ICD-10-CM

## 2022-08-12 LAB
ANION GAP SERPL CALC-SCNC: 5 MMOL/L (ref 0–18)
BUN BLD-MCNC: 17 MG/DL (ref 7–18)
CALCIUM BLD-MCNC: 8.9 MG/DL (ref 8.5–10.1)
CHLORIDE SERPL-SCNC: 108 MMOL/L (ref 98–112)
CHOLEST SERPL-MCNC: 116 MG/DL (ref ?–200)
CO2 SERPL-SCNC: 28 MMOL/L (ref 21–32)
CREAT BLD-MCNC: 1.07 MG/DL
CREAT UR-SCNC: 86.2 MG/DL
EST. AVERAGE GLUCOSE BLD GHB EST-MCNC: 143 MG/DL (ref 68–126)
FASTING PATIENT LIPID ANSWER: YES
FASTING STATUS PATIENT QL REPORTED: YES
FOLATE SERPL-MCNC: 8 NG/ML (ref 8.7–?)
GFR SERPLBLD BASED ON 1.73 SQ M-ARVRAT: 55 ML/MIN/1.73M2 (ref 60–?)
GLUCOSE BLD-MCNC: 79 MG/DL (ref 70–99)
HBA1C MFR BLD: 6.6 % (ref ?–5.7)
HDLC SERPL-MCNC: 34 MG/DL (ref 40–59)
LDLC SERPL CALC-MCNC: 57 MG/DL (ref ?–100)
MICROALBUMIN UR-MCNC: 4.89 MG/DL
MICROALBUMIN/CREAT 24H UR-RTO: 56.7 UG/MG (ref ?–30)
NONHDLC SERPL-MCNC: 82 MG/DL (ref ?–130)
OSMOLALITY SERPL CALC.SUM OF ELEC: 292 MOSM/KG (ref 275–295)
POTASSIUM SERPL-SCNC: 4.2 MMOL/L (ref 3.5–5.1)
SODIUM SERPL-SCNC: 141 MMOL/L (ref 136–145)
TRIGL SERPL-MCNC: 143 MG/DL (ref 30–149)
TSI SER-ACNC: 1.44 MIU/ML (ref 0.36–3.74)
VIT B12 SERPL-MCNC: 211 PG/ML (ref 193–986)
VLDLC SERPL CALC-MCNC: 21 MG/DL (ref 0–30)

## 2022-08-12 PROCEDURE — 83036 HEMOGLOBIN GLYCOSYLATED A1C: CPT

## 2022-08-12 PROCEDURE — 82607 VITAMIN B-12: CPT

## 2022-08-12 PROCEDURE — 3060F POS MICROALBUMINURIA REV: CPT | Performed by: PHYSICIAN ASSISTANT

## 2022-08-12 PROCEDURE — 73630 X-RAY EXAM OF FOOT: CPT | Performed by: INTERNAL MEDICINE

## 2022-08-12 PROCEDURE — 82043 UR ALBUMIN QUANTITATIVE: CPT

## 2022-08-12 PROCEDURE — 3061F NEG MICROALBUMINURIA REV: CPT | Performed by: PHYSICIAN ASSISTANT

## 2022-08-12 PROCEDURE — 82746 ASSAY OF FOLIC ACID SERUM: CPT

## 2022-08-12 PROCEDURE — 80048 BASIC METABOLIC PNL TOTAL CA: CPT

## 2022-08-12 PROCEDURE — 82570 ASSAY OF URINE CREATININE: CPT

## 2022-08-12 PROCEDURE — 84443 ASSAY THYROID STIM HORMONE: CPT

## 2022-08-12 PROCEDURE — 3044F HG A1C LEVEL LT 7.0%: CPT | Performed by: PHYSICIAN ASSISTANT

## 2022-08-12 PROCEDURE — 80061 LIPID PANEL: CPT

## 2022-08-12 PROCEDURE — 36415 COLL VENOUS BLD VENIPUNCTURE: CPT

## 2022-08-15 PROBLEM — E53.8 FOLIC ACID DEFICIENCY: Status: ACTIVE | Noted: 2022-08-15

## 2022-08-15 RX ORDER — FOLIC ACID 1 MG/1
1 TABLET ORAL DAILY
Qty: 90 TABLET | Refills: 3 | Status: SHIPPED | OUTPATIENT
Start: 2022-08-15 | End: 2023-08-15

## 2022-08-17 NOTE — PROGRESS NOTES
An UPPER RESPIRATORY ILLNESS is initially caused by a virus or allergies 95% of the time. The goal to help you feel better is drying up the drainage & stopping the cough so the virus can run it's course in about 10 days. If your drainage becomes more thick and worse after 7-10 days of trying the below over the counter medications, please see your PCP or return to Urgent Care for further evaluation.  Take daily Children's Claritin for sinus drainage and cough relief.  Below are suggestions for symptomatic relief:              -Tylenol every 4 hours OR ibuprofen every 6 hours as needed for pain/fever.              - Stay well hydrated and get plenty of rest to promote healing.             -Children's approved lozenges for relief during the day.              -Vicks vapor rub at night.              -Simple foods like chicken noodle soup.  You must understand that you have received an Urgent Care treatment only and that you may be released before all of your medical problems are known or treated.   You, the patient, will arrange for follow up care as instructed.   If your condition worsens or fails to improve we recommend that you receive another evaluation at the ER immediately or contact your PCP to discuss your concerns or return here.      Kristan Levin is a 81year old female. HPI:   Patient presents for GI symptoms x 3 days. C/o mid lower abd pain described as a constant cramping pain. Also c/o excessive gas, feeling bloated.   BMs are soft and regular but she does feel like she ha pain, palpitations  GI: per HPI  : denies dysuria, hematuria  NEURO: denies headaches, numbness, tingling, weakness  EXT: denies edema    EXAM:   /80 (BP Location: Left arm, Patient Position: Sitting, Cuff Size: large)   Pulse 81   Temp 98.8 °F (37 resume simvastatin 20 mg nightly.  Watch for myalgias.  FLP done today - result pending. # CHF: euvolemic on exam today.  Cont anti-htn / diuretics. # COPD: not on any inhalers.  Discuss next visit.   # Non-ischemic cardiomyopathy: LVEF 40% on echo 2/2017

## 2022-08-25 ENCOUNTER — HOSPITAL ENCOUNTER (OUTPATIENT)
Dept: MAMMOGRAPHY | Age: 77
Discharge: HOME | End: 2022-08-25
Attending: INTERNAL MEDICINE
Payer: MEDICARE

## 2022-08-25 DIAGNOSIS — Z12.31 SCREENING MAMMOGRAM, ENCOUNTER FOR: ICD-10-CM

## 2022-08-25 PROCEDURE — 77067 SCR MAMMO BI INCL CAD: CPT | Performed by: INTERNAL MEDICINE

## 2022-08-25 PROCEDURE — 77063 BREAST TOMOSYNTHESIS BI: CPT | Performed by: INTERNAL MEDICINE

## 2022-08-27 ENCOUNTER — HOSPITAL ENCOUNTER (OUTPATIENT)
Dept: BONE DENSITY | Age: 77
Discharge: HOME | End: 2022-08-27
Attending: PHYSICIAN ASSISTANT
Payer: MEDICARE

## 2022-08-27 DIAGNOSIS — Z78.0 POSTMENOPAUSAL: ICD-10-CM

## 2022-08-27 PROCEDURE — 77080 DXA BONE DENSITY AXIAL: CPT | Performed by: PHYSICIAN ASSISTANT

## 2022-08-29 ENCOUNTER — TELEPHONE (OUTPATIENT)
Dept: INTERNAL MEDICINE CLINIC | Facility: CLINIC | Age: 77
End: 2022-08-29

## 2022-08-29 NOTE — TELEPHONE ENCOUNTER
Pt requesting to get a script for a stronger dose because current dose is not working.      Please advise     nicotine polacrilex (NICORETTE STARTER KIT) 2 MG Mouth/Throat Gum

## 2022-08-29 NOTE — TELEPHONE ENCOUNTER
Pended rx for Nicorette 4 mg, please review if appropriate. See message from [facility] [place] [place].

## 2022-08-30 NOTE — PROGRESS NOTES
Virtual Telephone Check-In    Radha American verbally consents to a Virtual/Telephone Check-In visit on 08/30/22. Patient has been referred to the [facility] website at [email or web address] to review the yearly Consent to Treat document. Patient understands and accepts financial responsibility for any deductible, co-insurance and/or co-pays associated with this service. Duration of the service: 13 minutes      Summary of topics discussed: Patient smoking 5 to 6 cigarettes/day requesting a stronger nicotine gum stating that the 2 mg gum is not giving her any good. Cussed with patient is important to be determined that she wants to get rid of smoking also recommended that she just switch over to the nicotine patch in place of the gum. Patient in agreement. Diagnoses and all orders for this visit:    Smoker  -     nicotine (NICODERM CQ) 14 MG/24HR Transdermal Patch 24 Hr; Place 1 patch onto the skin daily. -     nicotine (NICODERM CQ) 7 MG/24HR Transdermal Patch 24 Hr; Place 1 patch onto the skin daily.         10 Minutes spent talking about smoking cessation  Isaiah Alcazar MD

## 2022-08-30 NOTE — TELEPHONE ENCOUNTER
Future Appointments   Date Time Provider Marline Bradfordi   8/30/2022 10:15 AM Andre Valadez MD [facility] [place]   9/6/2022  8:00 AM Simi Castillo DPM [id number]   12/20/2022  2:00 PM Shannon Lovell DO [facility]

## 2022-09-08 ENCOUNTER — LAB ENCOUNTER (OUTPATIENT)
Dept: LAB | Age: 77
End: 2022-09-08
Attending: PHYSICIAN ASSISTANT
Payer: MEDICARE

## 2022-09-08 ENCOUNTER — OFFICE VISIT (OUTPATIENT)
Dept: INTERNAL MEDICINE CLINIC | Facility: CLINIC | Age: 77
End: 2022-09-08
Payer: COMMERCIAL

## 2022-09-08 VITALS
HEART RATE: 74 BPM | BODY MASS INDEX: 45.25 KG/M2 | RESPIRATION RATE: 20 BRPM | WEIGHT: 255.38 LBS | OXYGEN SATURATION: 94 % | TEMPERATURE: 99 F | SYSTOLIC BLOOD PRESSURE: 128 MMHG | DIASTOLIC BLOOD PRESSURE: 70 MMHG | HEIGHT: 63 IN

## 2022-09-08 DIAGNOSIS — I50.20 HFREF (HEART FAILURE WITH REDUCED EJECTION FRACTION) (HCC): ICD-10-CM

## 2022-09-08 DIAGNOSIS — E55.9 VITAMIN D DEFICIENCY: ICD-10-CM

## 2022-09-08 DIAGNOSIS — E53.8 FOLIC ACID DEFICIENCY: ICD-10-CM

## 2022-09-08 DIAGNOSIS — M81.0 AGE-RELATED OSTEOPOROSIS WITHOUT CURRENT PATHOLOGICAL FRACTURE: ICD-10-CM

## 2022-09-08 DIAGNOSIS — R06.00 DYSPNEA, UNSPECIFIED TYPE: Primary | ICD-10-CM

## 2022-09-08 LAB — VIT D+METAB SERPL-MCNC: 11.8 NG/ML (ref 30–100)

## 2022-09-08 PROCEDURE — 82306 VITAMIN D 25 HYDROXY: CPT

## 2022-09-08 PROCEDURE — 99214 OFFICE O/P EST MOD 30 MIN: CPT | Performed by: PHYSICIAN ASSISTANT

## 2022-09-08 PROCEDURE — 36415 COLL VENOUS BLD VENIPUNCTURE: CPT

## 2022-09-08 PROCEDURE — 3074F SYST BP LT 130 MM HG: CPT | Performed by: PHYSICIAN ASSISTANT

## 2022-09-08 PROCEDURE — 3008F BODY MASS INDEX DOCD: CPT | Performed by: PHYSICIAN ASSISTANT

## 2022-09-08 PROCEDURE — 3078F DIAST BP <80 MM HG: CPT | Performed by: PHYSICIAN ASSISTANT

## 2022-09-08 RX ORDER — ALENDRONATE SODIUM 70 MG/1
70 TABLET ORAL
Qty: 12 TABLET | Refills: 3 | Status: SHIPPED | OUTPATIENT
Start: 2022-09-08

## 2022-09-08 NOTE — PATIENT INSTRUCTIONS
Check vitamin D level today. Please start over the counter calcium (1,200 mg daily) and vitamin D3 (2,000 IU daily) for bone health. Start Fosamax (alendronate) 70 mg - 1 tablet once a week. Take this on an empty stomach with a full glass of water. Do not eat or lay down for 1 hour after taking this. Start folic acid 1 mg daily. If the prescription is too expensive you can take over the counter folic acid 899 mcg daily. Please see Dr. Eligio Dunn for your breathing.

## 2022-09-09 DIAGNOSIS — E55.9 VITAMIN D DEFICIENCY: Primary | ICD-10-CM

## 2022-09-09 RX ORDER — ERGOCALCIFEROL 1.25 MG/1
50000 CAPSULE ORAL WEEKLY
Qty: 12 CAPSULE | Refills: 0 | Status: SHIPPED | OUTPATIENT
Start: 2022-09-09 | End: 2022-11-26

## 2022-09-30 RX ORDER — HYDRALAZINE HYDROCHLORIDE 100 MG/1
TABLET, FILM COATED ORAL
Qty: 180 TABLET | Refills: 0 | Status: SHIPPED | OUTPATIENT
Start: 2022-09-30

## 2022-09-30 RX ORDER — TORSEMIDE 20 MG/1
20 TABLET ORAL 2 TIMES DAILY
Qty: 180 TABLET | Refills: 0 | Status: SHIPPED | OUTPATIENT
Start: 2022-09-30

## 2022-10-07 ENCOUNTER — TELEPHONE (OUTPATIENT)
Dept: ORTHOPEDICS CLINIC | Facility: CLINIC | Age: 77
End: 2022-10-07

## 2022-10-07 NOTE — TELEPHONE ENCOUNTER
Future Appointments   Date Time Provider Marline Vidal   10/10/2022  1:40 PM Ariana Mayberry MD [facility] [id number]   12/20/2022  2:00 PM Lizeth Rivas DO [facility]     This patient is coming for LT Middle Finger Freezes. No prior imaging done yet. Please advise if views are needed for this appt. Thanks.       Patient can be reached at [phone]

## 2022-10-10 ENCOUNTER — OFFICE VISIT (OUTPATIENT)
Dept: ORTHOPEDICS CLINIC | Facility: CLINIC | Age: 77
End: 2022-10-10
Payer: COMMERCIAL

## 2022-10-10 VITALS — HEIGHT: 63 IN | BODY MASS INDEX: 44.83 KG/M2 | WEIGHT: 253 LBS

## 2022-10-10 DIAGNOSIS — M65.332 TRIGGER MIDDLE FINGER OF LEFT HAND: Primary | ICD-10-CM

## 2022-10-10 RX ORDER — TRIAMCINOLONE ACETONIDE 40 MG/ML
40 INJECTION, SUSPENSION INTRA-ARTICULAR; INTRAMUSCULAR ONCE
Status: COMPLETED | OUTPATIENT
Start: 2022-10-10 | End: 2022-10-10

## 2022-10-10 RX ORDER — GABAPENTIN 100 MG/1
200 CAPSULE ORAL NIGHTLY
COMMUNITY
Start: 2022-10-06

## 2022-10-10 RX ORDER — SPIRONOLACTONE 25 MG/1
TABLET ORAL
COMMUNITY
Start: 2022-09-12

## 2022-10-10 RX ADMIN — TRIAMCINOLONE ACETONIDE 40 MG: 40 INJECTION, SUSPENSION INTRA-ARTICULAR; INTRAMUSCULAR at 13:58:00

## 2022-10-18 RX ORDER — PEN NEEDLE, DIABETIC 32GX 5/32"
NEEDLE, DISPOSABLE MISCELLANEOUS
Qty: 100 EACH | Refills: 0 | Status: SHIPPED | OUTPATIENT
Start: 2022-10-18

## 2022-10-20 ENCOUNTER — OFFICE VISIT (OUTPATIENT)
Dept: INTERNAL MEDICINE CLINIC | Facility: CLINIC | Age: 77
End: 2022-10-20
Payer: COMMERCIAL

## 2022-10-20 VITALS
HEART RATE: 95 BPM | RESPIRATION RATE: 16 BRPM | WEIGHT: 252.38 LBS | OXYGEN SATURATION: 94 % | SYSTOLIC BLOOD PRESSURE: 156 MMHG | HEIGHT: 63 IN | BODY MASS INDEX: 44.72 KG/M2 | TEMPERATURE: 98 F | DIASTOLIC BLOOD PRESSURE: 80 MMHG

## 2022-10-20 DIAGNOSIS — I11.0 HYPERTENSIVE HEART DISEASE WITH HEART FAILURE (HCC): ICD-10-CM

## 2022-10-20 DIAGNOSIS — J34.89 IRRITATION OF NOSE: Primary | ICD-10-CM

## 2022-10-20 DIAGNOSIS — R06.00 DYSPNEA, UNSPECIFIED TYPE: ICD-10-CM

## 2022-10-20 DIAGNOSIS — G47.33 OSA (OBSTRUCTIVE SLEEP APNEA): ICD-10-CM

## 2022-10-20 DIAGNOSIS — R09.02 HYPOXIA: ICD-10-CM

## 2022-10-20 PROCEDURE — 3079F DIAST BP 80-89 MM HG: CPT | Performed by: PHYSICIAN ASSISTANT

## 2022-10-20 PROCEDURE — 3077F SYST BP >= 140 MM HG: CPT | Performed by: PHYSICIAN ASSISTANT

## 2022-10-20 PROCEDURE — 3008F BODY MASS INDEX DOCD: CPT | Performed by: PHYSICIAN ASSISTANT

## 2022-10-20 PROCEDURE — 99214 OFFICE O/P EST MOD 30 MIN: CPT | Performed by: PHYSICIAN ASSISTANT

## 2022-10-20 NOTE — PATIENT INSTRUCTIONS
Nasal irritation:  - start over the counter saline spray -- 2 sprays in each nostril 3 times a day  - apply a thin layer of vaseline on a q-tip to the inside of your nose twice a day    Call Dr. Elizabeth  office to notify them that you are not currently able to tolerate your oxygen with nasal cannula or your CPAP machine due to nasal irritation -- ph: [phone].     Please discuss your insulin pen with the pharmacist.

## 2022-10-28 ENCOUNTER — TELEPHONE (OUTPATIENT)
Dept: INTERNAL MEDICINE CLINIC | Facility: CLINIC | Age: 77
End: 2022-10-28

## 2022-10-28 NOTE — TELEPHONE ENCOUNTER
Pt states she is out of insulin however she is not due for a refill and insurance wont cover. Pt states her insulin pens keep jamming and breaking and LL is aware.      Please advise

## 2022-10-29 RX ORDER — INSULIN GLARGINE 100 [IU]/ML
35 INJECTION, SOLUTION SUBCUTANEOUS NIGHTLY
Qty: 12 ML | Refills: 0 | Status: SHIPPED | OUTPATIENT
Start: 2022-10-29

## 2022-10-29 RX ORDER — SEMAGLUTIDE 1.34 MG/ML
0.5 INJECTION, SOLUTION SUBCUTANEOUS
Qty: 3 EACH | Refills: 1 | Status: SHIPPED | OUTPATIENT
Start: 2022-12-10

## 2022-10-29 RX ORDER — SEMAGLUTIDE 1.34 MG/ML
INJECTION, SOLUTION SUBCUTANEOUS
Qty: 1.5 ML | Refills: 0 | Status: SHIPPED | OUTPATIENT
Start: 2022-10-29 | End: 2022-12-10

## 2022-10-29 RX ORDER — PEN NEEDLE, DIABETIC 30 GX3/16"
1 NEEDLE, DISPOSABLE MISCELLANEOUS
Qty: 30 EACH | Refills: 5 | Status: SHIPPED | OUTPATIENT
Start: 2022-10-29

## 2022-10-29 NOTE — TELEPHONE ENCOUNTER
Please call patient with the following instructions:  Tam Cope. START Lantus insulin - 35 units nightly. START Ozempic - 0.25 mg injected once a WEEK x 4 weeks, then increase to 0.5 mg once a week for maintenance. To notify me if blood sugars running higher or lower for adjustment. Rx sent to pharmacy. She should have the pharmacist show her how to use the pens.

## 2022-10-29 NOTE — TELEPHONE ENCOUNTER
Spoke with patient and gave her LL instructions and that if she had any issues to be2 or give our office a call. Patient is aware of new instructions and said thank you.

## 2022-11-28 RX ORDER — HYDRALAZINE HYDROCHLORIDE 100 MG/1
TABLET, FILM COATED ORAL
Qty: 180 TABLET | Refills: 0 | Status: SHIPPED | OUTPATIENT
Start: 2022-11-28

## 2022-12-03 RX ORDER — ALBUTEROL SULFATE 90 UG/1
AEROSOL, METERED RESPIRATORY (INHALATION)
Qty: 8.5 G | Refills: 0 | Status: SHIPPED | OUTPATIENT
Start: 2022-12-03

## 2022-12-13 RX ORDER — ERGOCALCIFEROL 1.25 MG/1
CAPSULE ORAL
Qty: 12 CAPSULE | Refills: 0 | OUTPATIENT
Start: 2022-12-13

## 2022-12-13 NOTE — TELEPHONE ENCOUNTER
Pt is due to recheck vit D level. For now should start OTC vit D3 (2,000 IU daily) and take this indefinitely.   If D still low will resume prescription D.

## 2022-12-21 ENCOUNTER — LAB ENCOUNTER (OUTPATIENT)
Dept: LAB | Age: 77
End: 2022-12-21
Attending: PHYSICIAN ASSISTANT
Payer: MEDICARE

## 2022-12-21 DIAGNOSIS — Z79.4 CONTROLLED TYPE 2 DIABETES MELLITUS WITHOUT COMPLICATION, WITH LONG-TERM CURRENT USE OF INSULIN (HCC): ICD-10-CM

## 2022-12-21 DIAGNOSIS — I50.9 CHRONIC CONGESTIVE HEART FAILURE, UNSPECIFIED HEART FAILURE TYPE (HCC): ICD-10-CM

## 2022-12-21 DIAGNOSIS — E11.9 CONTROLLED TYPE 2 DIABETES MELLITUS WITHOUT COMPLICATION, WITH LONG-TERM CURRENT USE OF INSULIN (HCC): ICD-10-CM

## 2022-12-21 DIAGNOSIS — E55.9 VITAMIN D DEFICIENCY: ICD-10-CM

## 2022-12-21 DIAGNOSIS — G47.33 OSA (OBSTRUCTIVE SLEEP APNEA): ICD-10-CM

## 2022-12-21 DIAGNOSIS — I10 PRIMARY HYPERTENSION: ICD-10-CM

## 2022-12-21 DIAGNOSIS — Z78.0 POSTMENOPAUSAL: ICD-10-CM

## 2022-12-21 DIAGNOSIS — R23.4 PEELING SKIN: ICD-10-CM

## 2022-12-21 DIAGNOSIS — E66.01 MORBID OBESITY WITH BMI OF 45.0-49.9, ADULT (HCC): ICD-10-CM

## 2022-12-21 DIAGNOSIS — E78.00 PURE HYPERCHOLESTEROLEMIA: ICD-10-CM

## 2022-12-21 LAB
ANION GAP SERPL CALC-SCNC: 8 MMOL/L (ref 0–18)
BUN BLD-MCNC: 16 MG/DL (ref 7–18)
CALCIUM BLD-MCNC: 9.5 MG/DL (ref 8.5–10.1)
CHLORIDE SERPL-SCNC: 106 MMOL/L (ref 98–112)
CHOLEST SERPL-MCNC: 122 MG/DL (ref ?–200)
CO2 SERPL-SCNC: 30 MMOL/L (ref 21–32)
CREAT BLD-MCNC: 1.26 MG/DL
CREAT UR-SCNC: 173 MG/DL
EST. AVERAGE GLUCOSE BLD GHB EST-MCNC: 140 MG/DL (ref 68–126)
FASTING PATIENT LIPID ANSWER: YES
FASTING STATUS PATIENT QL REPORTED: YES
GFR SERPLBLD BASED ON 1.73 SQ M-ARVRAT: 45 ML/MIN/1.73M2 (ref 60–?)
GLUCOSE BLD-MCNC: 129 MG/DL (ref 70–99)
HBA1C MFR BLD: 6.5 % (ref ?–5.7)
HDLC SERPL-MCNC: 43 MG/DL (ref 40–59)
LDLC SERPL CALC-MCNC: 52 MG/DL (ref ?–100)
MICROALBUMIN UR-MCNC: 6.72 MG/DL
MICROALBUMIN/CREAT 24H UR-RTO: 38.8 UG/MG (ref ?–30)
NONHDLC SERPL-MCNC: 79 MG/DL (ref ?–130)
OSMOLALITY SERPL CALC.SUM OF ELEC: 301 MOSM/KG (ref 275–295)
POTASSIUM SERPL-SCNC: 3.5 MMOL/L (ref 3.5–5.1)
SODIUM SERPL-SCNC: 144 MMOL/L (ref 136–145)
TRIGL SERPL-MCNC: 160 MG/DL (ref 30–149)
TSI SER-ACNC: 1.41 MIU/ML (ref 0.36–3.74)
VIT D+METAB SERPL-MCNC: 50.3 NG/ML (ref 30–100)
VLDLC SERPL CALC-MCNC: 23 MG/DL (ref 0–30)

## 2022-12-21 PROCEDURE — 82306 VITAMIN D 25 HYDROXY: CPT

## 2022-12-21 PROCEDURE — 82570 ASSAY OF URINE CREATININE: CPT

## 2022-12-21 PROCEDURE — 83036 HEMOGLOBIN GLYCOSYLATED A1C: CPT

## 2022-12-21 PROCEDURE — 80061 LIPID PANEL: CPT

## 2022-12-21 PROCEDURE — 82043 UR ALBUMIN QUANTITATIVE: CPT

## 2022-12-21 PROCEDURE — 36415 COLL VENOUS BLD VENIPUNCTURE: CPT

## 2022-12-21 PROCEDURE — 84443 ASSAY THYROID STIM HORMONE: CPT

## 2022-12-21 PROCEDURE — 80048 BASIC METABOLIC PNL TOTAL CA: CPT

## 2023-01-04 RX ORDER — ALBUTEROL SULFATE 90 UG/1
AEROSOL, METERED RESPIRATORY (INHALATION)
Qty: 8.5 G | Refills: 0 | Status: SHIPPED | OUTPATIENT
Start: 2023-01-04

## 2023-01-04 NOTE — TELEPHONE ENCOUNTER
Future Appointments   Date Time Provider Marline Vidal   1/25/2023  9:00 AM DURAN Costello [facility] [place]

## 2023-01-04 NOTE — TELEPHONE ENCOUNTER
Protocol failed     Requesting: albuterol 108 (90 Base) mcg/act     LOV:10/20/22   RTC: 3 months   Filled: 12/3/22 # 8.5 0 refills   Recent Labs: 12/21/22     Upcoming OV: none scheduled

## 2023-01-24 ENCOUNTER — OFFICE VISIT (OUTPATIENT)
Dept: INTERNAL MEDICINE CLINIC | Facility: CLINIC | Age: 78
End: 2023-01-24
Payer: COMMERCIAL

## 2023-01-24 VITALS
OXYGEN SATURATION: 88 % | RESPIRATION RATE: 20 BRPM | WEIGHT: 245.19 LBS | SYSTOLIC BLOOD PRESSURE: 140 MMHG | HEART RATE: 104 BPM | BODY MASS INDEX: 43.45 KG/M2 | TEMPERATURE: 99 F | HEIGHT: 63 IN | DIASTOLIC BLOOD PRESSURE: 76 MMHG

## 2023-01-24 DIAGNOSIS — M54.50 CHRONIC BILATERAL LOW BACK PAIN WITHOUT SCIATICA: ICD-10-CM

## 2023-01-24 DIAGNOSIS — I50.9 CHRONIC CONGESTIVE HEART FAILURE, UNSPECIFIED HEART FAILURE TYPE (HCC): ICD-10-CM

## 2023-01-24 DIAGNOSIS — I10 PRIMARY HYPERTENSION: ICD-10-CM

## 2023-01-24 DIAGNOSIS — G47.33 OSA (OBSTRUCTIVE SLEEP APNEA): ICD-10-CM

## 2023-01-24 DIAGNOSIS — R06.09 DOE (DYSPNEA ON EXERTION): Primary | ICD-10-CM

## 2023-01-24 DIAGNOSIS — Z79.4 CONTROLLED TYPE 2 DIABETES MELLITUS WITHOUT COMPLICATION, WITH LONG-TERM CURRENT USE OF INSULIN (HCC): ICD-10-CM

## 2023-01-24 DIAGNOSIS — E11.9 CONTROLLED TYPE 2 DIABETES MELLITUS WITHOUT COMPLICATION, WITH LONG-TERM CURRENT USE OF INSULIN (HCC): ICD-10-CM

## 2023-01-24 DIAGNOSIS — R09.02 HYPOXIA: ICD-10-CM

## 2023-01-24 DIAGNOSIS — G89.29 CHRONIC BILATERAL LOW BACK PAIN WITHOUT SCIATICA: ICD-10-CM

## 2023-01-24 PROCEDURE — 3077F SYST BP >= 140 MM HG: CPT | Performed by: PHYSICIAN ASSISTANT

## 2023-01-24 PROCEDURE — 99214 OFFICE O/P EST MOD 30 MIN: CPT | Performed by: PHYSICIAN ASSISTANT

## 2023-01-24 PROCEDURE — 3078F DIAST BP <80 MM HG: CPT | Performed by: PHYSICIAN ASSISTANT

## 2023-01-24 PROCEDURE — 3008F BODY MASS INDEX DOCD: CPT | Performed by: PHYSICIAN ASSISTANT

## 2023-01-24 RX ORDER — PEN NEEDLE, DIABETIC 30 GX3/16"
1 NEEDLE, DISPOSABLE MISCELLANEOUS
Qty: 30 EACH | Refills: 0 | Status: SHIPPED | OUTPATIENT
Start: 2023-01-24

## 2023-01-24 RX ORDER — ALBUTEROL SULFATE 2.5 MG/3ML
2.5 SOLUTION RESPIRATORY (INHALATION) 3 TIMES DAILY
Qty: 300 ML | Refills: 0 | Status: SHIPPED | OUTPATIENT
Start: 2023-01-24

## 2023-01-24 RX ORDER — SEMAGLUTIDE 1.34 MG/ML
INJECTION, SOLUTION SUBCUTANEOUS
Qty: 1.5 ML | Refills: 0 | Status: SHIPPED | OUTPATIENT
Start: 2023-01-24 | End: 2023-03-07

## 2023-01-24 NOTE — PATIENT INSTRUCTIONS
Diabetes:  - resume Ozempic -- 0.25 mg once a week x 4 weeks  - THEN increase to 0.5 mg once a week and continue this (*notify Doris's office when you need a refill instead of stopping medication)    Low back pain:  - ice/heat  - home exercises  - tylenol if needed    Please follow up with Dr Sunday Akhtar (cardiology). Schedule an appointment with Dr. Matthew Becerra (or one of his partners) from pulmonology. Follow up in May for your wellness visit.

## 2023-02-01 ENCOUNTER — TELEPHONE (OUTPATIENT)
Dept: INTERNAL MEDICINE CLINIC | Facility: CLINIC | Age: 78
End: 2023-02-01

## 2023-02-01 NOTE — TELEPHONE ENCOUNTER
Pt calling because pharmacy is out of Ozempic and they're unsure as to when they will have new shipment. Pt would like to know if she should continue Lantus until she is able to receive Ozempic. Pt will call [facility] to see if insurance covers medication and if it's in stock there. F/u with pt to discuss furthermore.

## 2023-02-02 RX ORDER — DULAGLUTIDE 1.5 MG/.5ML
1.5 INJECTION, SOLUTION SUBCUTANEOUS
Qty: 2 ML | Refills: 2 | Status: SHIPPED | OUTPATIENT
Start: 2023-02-02

## 2023-02-02 NOTE — TELEPHONE ENCOUNTER
Rx for Trulicity 1.5 mg weekly sent to pharmacy to replace Ozempic. If not covered pt to call and let us know.

## 2023-02-02 NOTE — TELEPHONE ENCOUNTER
Spoke with pt and informed her of new RX and gave instructions on how to inject. Pt aware to call us back if insurance does not cover it.

## 2023-02-02 NOTE — TELEPHONE ENCOUNTER
Spoke with pt. She said she has been currently taking . 5mg of Ozempic for about a month. She is aware to continue with Lantus.

## 2023-02-02 NOTE — TELEPHONE ENCOUNTER
Please clarify what dose of Ozempic she's been on and we can see if she can fill Rx for alternative med (Victoza or Trulicity) in the meantime as there is a nationwide Ozempic shortage. In the meantime, YES, she should cont Lantus.

## 2023-02-24 RX ORDER — ALBUTEROL SULFATE 90 UG/1
AEROSOL, METERED RESPIRATORY (INHALATION)
Qty: 8.5 G | Refills: 0 | Status: SHIPPED | OUTPATIENT
Start: 2023-02-24

## 2023-03-08 ENCOUNTER — OFFICE VISIT (OUTPATIENT)
Facility: CLINIC | Age: 78
End: 2023-03-08
Payer: COMMERCIAL

## 2023-03-08 VITALS
HEIGHT: 63 IN | DIASTOLIC BLOOD PRESSURE: 78 MMHG | HEART RATE: 75 BPM | WEIGHT: 242 LBS | SYSTOLIC BLOOD PRESSURE: 122 MMHG | OXYGEN SATURATION: 92 % | RESPIRATION RATE: 20 BRPM | BODY MASS INDEX: 42.88 KG/M2

## 2023-03-08 DIAGNOSIS — Z72.0 TOBACCO ABUSE: ICD-10-CM

## 2023-03-08 DIAGNOSIS — G47.33 OSA (OBSTRUCTIVE SLEEP APNEA): ICD-10-CM

## 2023-03-08 DIAGNOSIS — J96.11 CHRONIC RESPIRATORY FAILURE WITH HYPOXIA (HCC): ICD-10-CM

## 2023-03-08 DIAGNOSIS — R06.09 DOE (DYSPNEA ON EXERTION): Primary | ICD-10-CM

## 2023-03-08 PROCEDURE — 99204 OFFICE O/P NEW MOD 45 MIN: CPT | Performed by: INTERNAL MEDICINE

## 2023-03-08 PROCEDURE — 3008F BODY MASS INDEX DOCD: CPT | Performed by: INTERNAL MEDICINE

## 2023-03-08 PROCEDURE — 3074F SYST BP LT 130 MM HG: CPT | Performed by: INTERNAL MEDICINE

## 2023-03-08 PROCEDURE — 3078F DIAST BP <80 MM HG: CPT | Performed by: INTERNAL MEDICINE

## 2023-03-08 PROCEDURE — 99406 BEHAV CHNG SMOKING 3-10 MIN: CPT | Performed by: INTERNAL MEDICINE

## 2023-03-08 RX ORDER — FLUTICASONE FUROATE AND VILANTEROL 100; 25 UG/1; UG/1
1 POWDER RESPIRATORY (INHALATION) DAILY
Qty: 1 EACH | Refills: 11 | Status: SHIPPED | OUTPATIENT
Start: 2023-03-08

## 2023-03-08 NOTE — PATIENT INSTRUCTIONS
Please obtain a breathing test and a walk test - you can do on the same day but the breathing test is done at the hospital (5th floor) and the walk test is done here (2nd floor of the doctor's office building). We talked about the inhalers - try using breo one puff once a day. Rinse your mouth out after using it. Use the albuterol 2 puffs every 6 hours as needed for your breathing. Work on quitting smoking - try changing your morning routine to help stop smoking. We also talked about low dose CT scans for screening for lung cancer. Consider obtaining it to check for any concern for lung cancer. This test is allowed once every year. Your last CT scan was in February 2022. [place] [id number]  Smoking Cessation Clinic    If you are a current smoker and are interested in quitting, we are here to help you. We offer 1:1 counseling with a Nurse Practitioner (includes individualized prescriptive medications as appropriate). Visits are conducted at one of our [facility] located in [facility] and [place]. Please call [phone] (CARE) and ask for a Smoking Cessation appointment with:      Kaylah Mejia RN, APN-BC, AOCN ([place])    James Márquez RN, APN-BC ([place])    To help you get started, visit  [email or web address]  [email or web address]/quit-[email or web address]-bcts-nf-quit  Health Benefits of Quitting Smoking Over Time (cancer. org)

## 2023-03-11 RX ORDER — HYDRALAZINE HYDROCHLORIDE 100 MG/1
TABLET, FILM COATED ORAL
Qty: 180 TABLET | Refills: 0 | Status: SHIPPED | OUTPATIENT
Start: 2023-03-11

## 2023-03-15 ENCOUNTER — NURSE ONLY (OUTPATIENT)
Facility: CLINIC | Age: 78
End: 2023-03-15
Payer: COMMERCIAL

## 2023-03-15 DIAGNOSIS — R09.02 HYPOXEMIA: ICD-10-CM

## 2023-03-15 DIAGNOSIS — I27.20 PULMONARY HTN (HCC): Primary | ICD-10-CM

## 2023-03-15 DIAGNOSIS — R09.02 HYPOXEMIA: Primary | ICD-10-CM

## 2023-03-15 PROCEDURE — 94618 PULMONARY STRESS TESTING: CPT | Performed by: INTERNAL MEDICINE

## 2023-03-15 NOTE — PROGRESS NOTES
OXYGEN CLINIC ASSESSMENT    Date: 3/15/2023 Physician: DR. Magui Polk   Diagnosis: PULM HYPERTENSION Technician: Rory Chance., RT     Patient: Hermelindo George MRN: [id number] : 1945     Height:   Weight:   Age: 78year old         BP HR SpO2 RR W[place] DIST  (FT) TIME Reason Stopped   RA         REST    84   85     20     1     N/A         N/A         N/A           RA       PEAK  EXERCISE                                     O2 FLOW LPM  BP HR SpO2 RR [place] DIST  (FT) TIME Reason Stopped   2 LPM   REST 120/78   84   94     20     1                             2 LPM PEAK  EXERCISE 140/88 96 85       3   150   1 MIN   O2 Desaturation, Leg Weakness           O2 FLOW LPM  BP HR SpO2 RR W[place] DIST  (FT) TIME Reason Stopped   4 LPM REST   84 98   20   1                     4 LPM PEAK  EXERCISE   90 86   28   3   400   4 MIN   O2 Desaturation, Leg Weakness           O2 FLOW LPM  BP HR SpO2 RR [place] DIST  (FT) TIME Reason Stopped   6 LPM REST   84 99   20   1                     6 LPM PEAK  EXERCISE   96 94   24   3   500   6 MIN   Study Ended, Leg Weakness                  PULSE DOSE WITH PORTABLE OXYGEN CONCENTRATOR  O2 FLOW LPM  BP HR SpO2 RR W[place] DIST  (FT) TIME Reason Stopped   5 LPM REST                                   5 LPM PEAK  EXERCISE       98   85       24       3       200       3 MIN       O2 Desaturation, Leg Weakness, Study Ended               FINDINGS  2 LPM required to maintain a saturation of 94 % at rest   6 LPM required to maintain a saturation of 94 % with exertion   5 LPM Pulse dose to maintain a saturation of 85 % with exertion   Patient will need 2 LPM at rest and 6 LPM with exertion. Patient unable to tolerate POC;  O2 sat went down to 85% at 5 - pulse volume.

## 2023-03-17 RX ORDER — SEMAGLUTIDE 1.34 MG/ML
0.5 INJECTION, SOLUTION SUBCUTANEOUS WEEKLY
Qty: 1.5 ML | Refills: 0 | OUTPATIENT
Start: 2023-03-17

## 2023-03-20 RX ORDER — SEMAGLUTIDE 1.34 MG/ML
INJECTION, SOLUTION SUBCUTANEOUS
Qty: 1.5 ML | Refills: 0 | OUTPATIENT
Start: 2023-03-20

## 2023-03-21 ENCOUNTER — TELEPHONE (OUTPATIENT)
Dept: INTERNAL MEDICINE CLINIC | Facility: CLINIC | Age: 78
End: 2023-03-21

## 2023-03-21 RX ORDER — SEMAGLUTIDE 1.34 MG/ML
0.5 INJECTION, SOLUTION SUBCUTANEOUS
Refills: 0 | COMMUNITY
Start: 2023-03-21

## 2023-03-21 NOTE — TELEPHONE ENCOUNTER
Spoke with patient via phone. Informed of LL 's recommendations as outlined in note bellow. Patient verbalized understanding. Denies further questions. Return call placed to patient, left message directly patient to specifically contact Dr. Meraz Fails office ([phone]) to address settings/mask.

## 2023-03-21 NOTE — TELEPHONE ENCOUNTER
Spoke with patient via phone. Patient confirmed she completed 4-week course of Ozempic 0.25 mg. Per LL's note below directed patient to continue with Ozempic and increase to 0.5mg does. Patient verbalized understanding. Denies further questions.

## 2023-03-21 NOTE — TELEPHONE ENCOUNTER
Pt called stating her mouth and nose feels sore and irritated from her cpap machine. Pt states she has brought this issue up to LL during her last OV but it was before she knew her mouth was sore from the CPAP machine.     Pt unsure what she should do

## 2023-03-21 NOTE — TELEPHONE ENCOUNTER
Per records pt had most recently been on Ozempic and was only switched to Trulicity d/t availability at the pharmacy. If she now has scripts for both then recommend sticking with Ozempic. If she has completed at least 4 weeks of Ozempic 0.25 mg then she can increase to Ozempic 0.5 mg weekly.

## 2023-03-21 NOTE — TELEPHONE ENCOUNTER
Spoke with pt. She said based off TE from 3/15, pt picked up Trulicity but hasn't used it yet. Then yesterday she got a message from Shashi Jenkins that [address] was in and so she also picked up Ozempic. Pt is wondering if she should take Ozempic, if so what dose? Or if she should open trulicity and take that.

## 2023-03-21 NOTE — TELEPHONE ENCOUNTER
Rec starting with sleep med to ensure she's on the right settings or if she would benefit from a different type of mask.

## 2023-03-21 NOTE — TELEPHONE ENCOUNTER
LL:   Spoke with pt. She stated she has used her cpap machine for over a year. About 6 months in, she stated she got nasal/sinus irritation everytime she uses it. She stated she wakes up and it feels like a painful  \"head cold\". She describes it as she feels congested everymorning.   - pt has a full face mask cpap    She stated she hasn't used it the last few days and the irritation is better. Any recs? ENT?

## 2023-04-13 RX ORDER — ALBUTEROL SULFATE 90 UG/1
AEROSOL, METERED RESPIRATORY (INHALATION)
Qty: 8.5 G | Refills: 0 | Status: SHIPPED | OUTPATIENT
Start: 2023-04-13

## 2023-04-24 ENCOUNTER — LAB ENCOUNTER (OUTPATIENT)
Dept: LAB | Age: 78
End: 2023-04-24
Attending: PHYSICIAN ASSISTANT
Payer: MEDICARE

## 2023-04-24 ENCOUNTER — TELEPHONE (OUTPATIENT)
Dept: INTERNAL MEDICINE CLINIC | Facility: CLINIC | Age: 78
End: 2023-04-24

## 2023-04-24 ENCOUNTER — LAB ENCOUNTER (OUTPATIENT)
Dept: LAB | Age: 78
End: 2023-04-24
Attending: INTERNAL MEDICINE
Payer: MEDICARE

## 2023-04-24 ENCOUNTER — OFFICE VISIT (OUTPATIENT)
Dept: INTERNAL MEDICINE CLINIC | Facility: CLINIC | Age: 78
End: 2023-04-24
Payer: COMMERCIAL

## 2023-04-24 VITALS
RESPIRATION RATE: 24 BRPM | HEIGHT: 63 IN | SYSTOLIC BLOOD PRESSURE: 120 MMHG | OXYGEN SATURATION: 92 % | HEART RATE: 70 BPM | TEMPERATURE: 98 F | DIASTOLIC BLOOD PRESSURE: 70 MMHG | BODY MASS INDEX: 42.77 KG/M2 | WEIGHT: 241.38 LBS

## 2023-04-24 DIAGNOSIS — G47.33 OSA ON CPAP: Chronic | ICD-10-CM

## 2023-04-24 DIAGNOSIS — I42.8 NON-ISCHEMIC CARDIOMYOPATHY (HCC): Chronic | ICD-10-CM

## 2023-04-24 DIAGNOSIS — E11.59 HYPERTENSION ASSOCIATED WITH TYPE 2 DIABETES MELLITUS (HCC): ICD-10-CM

## 2023-04-24 DIAGNOSIS — Z79.4 CONTROLLED TYPE 2 DIABETES MELLITUS WITH MICROALBUMINURIA, WITH LONG-TERM CURRENT USE OF INSULIN (HCC): ICD-10-CM

## 2023-04-24 DIAGNOSIS — E66.01 MORBID OBESITY WITH BMI OF 40.0-44.9, ADULT (HCC): ICD-10-CM

## 2023-04-24 DIAGNOSIS — G47.33 OSA ON CPAP: ICD-10-CM

## 2023-04-24 DIAGNOSIS — R80.9 CONTROLLED TYPE 2 DIABETES MELLITUS WITH MICROALBUMINURIA, WITH LONG-TERM CURRENT USE OF INSULIN (HCC): ICD-10-CM

## 2023-04-24 DIAGNOSIS — E78.5 HYPERLIPIDEMIA ASSOCIATED WITH TYPE 2 DIABETES MELLITUS (HCC): ICD-10-CM

## 2023-04-24 DIAGNOSIS — I15.2 HYPERTENSION ASSOCIATED WITH TYPE 2 DIABETES MELLITUS (HCC): ICD-10-CM

## 2023-04-24 DIAGNOSIS — R06.09 DOE (DYSPNEA ON EXERTION): ICD-10-CM

## 2023-04-24 DIAGNOSIS — I50.20 HFREF (HEART FAILURE WITH REDUCED EJECTION FRACTION) (HCC): ICD-10-CM

## 2023-04-24 DIAGNOSIS — J96.11 CHRONIC RESPIRATORY FAILURE WITH HYPOXIA (HCC): ICD-10-CM

## 2023-04-24 DIAGNOSIS — R22.42 SUBCUTANEOUS MASS OF LEFT LOWER EXTREMITY: ICD-10-CM

## 2023-04-24 DIAGNOSIS — E11.29 CONTROLLED TYPE 2 DIABETES MELLITUS WITH MICROALBUMINURIA, WITH LONG-TERM CURRENT USE OF INSULIN (HCC): ICD-10-CM

## 2023-04-24 DIAGNOSIS — M81.0 AGE-RELATED OSTEOPOROSIS WITHOUT CURRENT PATHOLOGICAL FRACTURE: ICD-10-CM

## 2023-04-24 DIAGNOSIS — E66.01 MORBID OBESITY WITH BMI OF 40.0-44.9, ADULT (HCC): Chronic | ICD-10-CM

## 2023-04-24 DIAGNOSIS — I27.20 PULMONARY HTN (HCC): Chronic | ICD-10-CM

## 2023-04-24 DIAGNOSIS — Z12.31 VISIT FOR SCREENING MAMMOGRAM: ICD-10-CM

## 2023-04-24 DIAGNOSIS — E53.8 FOLIC ACID DEFICIENCY: ICD-10-CM

## 2023-04-24 DIAGNOSIS — Z99.89 OSA ON CPAP: ICD-10-CM

## 2023-04-24 DIAGNOSIS — Z00.00 ENCOUNTER FOR ANNUAL HEALTH EXAMINATION: Primary | ICD-10-CM

## 2023-04-24 DIAGNOSIS — I27.20 PULMONARY HTN (HCC): ICD-10-CM

## 2023-04-24 DIAGNOSIS — E11.69 HYPERLIPIDEMIA ASSOCIATED WITH TYPE 2 DIABETES MELLITUS (HCC): ICD-10-CM

## 2023-04-24 DIAGNOSIS — M1A.0720 IDIOPATHIC CHRONIC GOUT OF LEFT ANKLE WITHOUT TOPHUS: Chronic | ICD-10-CM

## 2023-04-24 DIAGNOSIS — Z99.89 OSA ON CPAP: Chronic | ICD-10-CM

## 2023-04-24 DIAGNOSIS — N18.31 STAGE 3A CHRONIC KIDNEY DISEASE (HCC): ICD-10-CM

## 2023-04-24 DIAGNOSIS — I42.8 NON-ISCHEMIC CARDIOMYOPATHY (HCC): ICD-10-CM

## 2023-04-24 DIAGNOSIS — I50.20 HFREF (HEART FAILURE WITH REDUCED EJECTION FRACTION) (HCC): Chronic | ICD-10-CM

## 2023-04-24 DIAGNOSIS — M1A.0720 IDIOPATHIC CHRONIC GOUT OF LEFT ANKLE WITHOUT TOPHUS: ICD-10-CM

## 2023-04-24 PROBLEM — J44.9 CHRONIC OBSTRUCTIVE PULMONARY DISEASE, UNSPECIFIED (HCC): Status: ACTIVE | Noted: 2023-04-24

## 2023-04-24 LAB
ALT SERPL-CCNC: 18 U/L
ANION GAP SERPL CALC-SCNC: 6 MMOL/L (ref 0–18)
AST SERPL-CCNC: 9 U/L (ref 15–37)
BASOPHILS # BLD AUTO: 0.04 X10(3) UL (ref 0–0.2)
BASOPHILS NFR BLD AUTO: 0.5 %
BUN BLD-MCNC: 13 MG/DL (ref 7–18)
CALCIUM BLD-MCNC: 9.2 MG/DL (ref 8.5–10.1)
CHLORIDE SERPL-SCNC: 104 MMOL/L (ref 98–112)
CHOLEST SERPL-MCNC: 108 MG/DL (ref ?–200)
CO2 SERPL-SCNC: 29 MMOL/L (ref 21–32)
CREAT BLD-MCNC: 0.83 MG/DL
CREAT UR-SCNC: 52.1 MG/DL
EOSINOPHIL # BLD AUTO: 0.1 X10(3) UL (ref 0–0.7)
EOSINOPHIL NFR BLD AUTO: 1.3 %
ERYTHROCYTE [DISTWIDTH] IN BLOOD BY AUTOMATED COUNT: 14.9 %
EST. AVERAGE GLUCOSE BLD GHB EST-MCNC: 148 MG/DL (ref 68–126)
FASTING PATIENT LIPID ANSWER: YES
FASTING STATUS PATIENT QL REPORTED: YES
FOLATE SERPL-MCNC: 14.9 NG/ML (ref 8.7–?)
GFR SERPLBLD BASED ON 1.73 SQ M-ARVRAT: 73 ML/MIN/1.73M2 (ref 60–?)
GLUCOSE BLD-MCNC: 95 MG/DL (ref 70–99)
HBA1C MFR BLD: 6.8 % (ref ?–5.7)
HCT VFR BLD AUTO: 41 %
HDLC SERPL-MCNC: 34 MG/DL (ref 40–59)
HGB BLD-MCNC: 12.8 G/DL
IMM GRANULOCYTES # BLD AUTO: 0.02 X10(3) UL (ref 0–1)
IMM GRANULOCYTES NFR BLD: 0.3 %
LDLC SERPL CALC-MCNC: 49 MG/DL (ref ?–100)
LYMPHOCYTES # BLD AUTO: 2.04 X10(3) UL (ref 1–4)
LYMPHOCYTES NFR BLD AUTO: 26.1 %
MCH RBC QN AUTO: 29.3 PG (ref 26–34)
MCHC RBC AUTO-ENTMCNC: 31.2 G/DL (ref 31–37)
MCV RBC AUTO: 93.8 FL
MICROALBUMIN UR-MCNC: 3.47 MG/DL
MICROALBUMIN/CREAT 24H UR-RTO: 66.6 UG/MG (ref ?–30)
MONOCYTES # BLD AUTO: 0.58 X10(3) UL (ref 0.1–1)
MONOCYTES NFR BLD AUTO: 7.4 %
NEUTROPHILS # BLD AUTO: 5.03 X10 (3) UL (ref 1.5–7.7)
NEUTROPHILS # BLD AUTO: 5.03 X10(3) UL (ref 1.5–7.7)
NEUTROPHILS NFR BLD AUTO: 64.4 %
NONHDLC SERPL-MCNC: 74 MG/DL (ref ?–130)
OSMOLALITY SERPL CALC.SUM OF ELEC: 288 MOSM/KG (ref 275–295)
PLATELET # BLD AUTO: 298 10(3)UL (ref 150–450)
POTASSIUM SERPL-SCNC: 3.4 MMOL/L (ref 3.5–5.1)
RBC # BLD AUTO: 4.37 X10(6)UL
SODIUM SERPL-SCNC: 139 MMOL/L (ref 136–145)
TRIGL SERPL-MCNC: 144 MG/DL (ref 30–149)
TSI SER-ACNC: 1.44 MIU/ML (ref 0.36–3.74)
URATE SERPL-MCNC: 7.5 MG/DL
VIT B12 SERPL-MCNC: 335 PG/ML (ref 193–986)
VLDLC SERPL CALC-MCNC: 21 MG/DL (ref 0–30)
WBC # BLD AUTO: 7.8 X10(3) UL (ref 4–11)

## 2023-04-24 PROCEDURE — 82607 VITAMIN B-12: CPT

## 2023-04-24 PROCEDURE — 80061 LIPID PANEL: CPT

## 2023-04-24 PROCEDURE — 85025 COMPLETE CBC W/AUTO DIFF WBC: CPT

## 2023-04-24 PROCEDURE — 84443 ASSAY THYROID STIM HORMONE: CPT

## 2023-04-24 PROCEDURE — 84450 TRANSFERASE (AST) (SGOT): CPT

## 2023-04-24 PROCEDURE — 82570 ASSAY OF URINE CREATININE: CPT

## 2023-04-24 PROCEDURE — 84460 ALANINE AMINO (ALT) (SGPT): CPT

## 2023-04-24 PROCEDURE — 83036 HEMOGLOBIN GLYCOSYLATED A1C: CPT

## 2023-04-24 PROCEDURE — 36415 COLL VENOUS BLD VENIPUNCTURE: CPT

## 2023-04-24 PROCEDURE — 82043 UR ALBUMIN QUANTITATIVE: CPT

## 2023-04-24 PROCEDURE — 80048 BASIC METABOLIC PNL TOTAL CA: CPT

## 2023-04-24 PROCEDURE — 84550 ASSAY OF BLOOD/URIC ACID: CPT

## 2023-04-24 PROCEDURE — 82746 ASSAY OF FOLIC ACID SERUM: CPT

## 2023-04-24 RX ORDER — GABAPENTIN 100 MG/1
200 CAPSULE ORAL NIGHTLY
Qty: 180 CAPSULE | Refills: 1 | Status: SHIPPED | OUTPATIENT
Start: 2023-04-24

## 2023-04-24 NOTE — PATIENT INSTRUCTIONS
Contact [facility] to let them know you are a patient of Dr. Domenico Layton. I recommend the following vaccine(s) at the pharmacy:  - newer version of the shingles vaccine [facility]). Please note this is a **2** part vaccine. - updated tetanus (TdaP) vaccine. *Please have a copy of your vaccine record faxed to Doris's office at [phone]. If labs are stable please see Toni Pino in 6 months.

## 2023-04-24 NOTE — TELEPHONE ENCOUNTER
Amanda Lord MD - [facility] for patients last eye exam     Report will be faxed to our office.

## 2023-04-25 ENCOUNTER — TELEPHONE (OUTPATIENT)
Dept: INTERNAL MEDICINE CLINIC | Facility: CLINIC | Age: 78
End: 2023-04-25

## 2023-04-25 LAB — SARS-COV-2 RNA RESP QL NAA+PROBE: NOT DETECTED

## 2023-04-25 RX ORDER — FOLIC ACID 1 MG/1
1 TABLET ORAL DAILY
Qty: 90 TABLET | Refills: 3 | COMMUNITY
Start: 2023-04-25 | End: 2023-04-25

## 2023-04-25 RX ORDER — HYDROXYZINE HYDROCHLORIDE 10 MG/1
10 TABLET, FILM COATED ORAL DAILY
Qty: 90 TABLET | Refills: 0 | COMMUNITY
Start: 2023-04-25 | End: 2023-04-25

## 2023-04-25 NOTE — TELEPHONE ENCOUNTER
Spoke with pt. Relayed results and recs. Pt went through all medications by reading off bottles. Updated medchart. Pt stated she is no longer taking spironolactone, folic acid, and hydroxyzine. Pt couldn't remember exactly when and why she stopped taking these, she just confirmed she is not taking them. Any further recs?

## 2023-04-25 NOTE — TELEPHONE ENCOUNTER
Please call patient. Recent labs  - a1c has increased a bit but still at goal  - uric acid improving but still a bit high. If she is continuing to have gout flares rec f/u with rheumatology (Dr. Tripp Montoya)  - K is slightly low  - rest of labs stable    Please clarify and update pt's med list as she didn't seem totally sure of what she's taking at her OV and some meds may be coming from outside providers. Have her check bottles to confirm what she's taking and get doses. Send back to me when complete.

## 2023-04-26 RX ORDER — POTASSIUM CHLORIDE 750 MG/1
10 TABLET, FILM COATED, EXTENDED RELEASE ORAL DAILY
Qty: 90 TABLET | Refills: 1 | Status: SHIPPED | OUTPATIENT
Start: 2023-04-26

## 2023-04-26 NOTE — TELEPHONE ENCOUNTER
JESUSM (detailed message, OK per verbal). Informed pt on message that potassium was sent to her pharmacy and to start taking daily. Asked pt to call back if further questions.

## 2023-04-26 NOTE — TELEPHONE ENCOUNTER
Thank you for going through meds. Please encourage patient to keep an up to date list of her meds with names and dosages with her at all times. If she'd like we can print an updated med list and send it to her. Pt to start K supp (1 tab daily). Rx sent to pharmacy.

## 2023-04-27 ENCOUNTER — RT VISIT (OUTPATIENT)
Dept: RESPIRATORY THERAPY | Facility: HOSPITAL | Age: 78
End: 2023-04-27
Attending: INTERNAL MEDICINE
Payer: MEDICARE

## 2023-04-27 DIAGNOSIS — R06.09 DOE (DYSPNEA ON EXERTION): ICD-10-CM

## 2023-04-27 PROCEDURE — 94729 DIFFUSING CAPACITY: CPT

## 2023-04-27 PROCEDURE — 94726 PLETHYSMOGRAPHY LUNG VOLUMES: CPT

## 2023-04-27 PROCEDURE — 94010 BREATHING CAPACITY TEST: CPT

## 2023-04-28 NOTE — PROCEDURES
Patient is a 53-year-old female being assessed with a diagnosis of dyspnea on exertion. Results-FEV1 1.36 L normal at 81% of predicted with an FVC 1.73 L normal at 80% of predicted with a ratio of 79%. MVV however is severely reduced at 31% of predicted. Total lung capacity 4.03 L normal at 86% of predicted with a residual volume 2.28 L normal at 110% of predicted. Diffusion capacity is severely reduced 24% of predicted corrects to 36% of predicted. Impression --no evidence of airways obstruction or restrictive defect. Severely reduced MVV with clinical correlation suggested to assess for body habitus/neuromuscular weakness. Diffusion capacity is very severely reduced with clinical correlation to assess for component hypoxia/anemia/evolving interstitial lung disease/progressive pulmonary hypertension/vascular disease. Testing from 2019 is unavailable for review.

## 2023-05-03 ENCOUNTER — OFFICE VISIT (OUTPATIENT)
Facility: CLINIC | Age: 78
End: 2023-05-03
Payer: COMMERCIAL

## 2023-05-03 VITALS
SYSTOLIC BLOOD PRESSURE: 130 MMHG | DIASTOLIC BLOOD PRESSURE: 70 MMHG | RESPIRATION RATE: 16 BRPM | OXYGEN SATURATION: 94 % | HEART RATE: 81 BPM

## 2023-05-03 DIAGNOSIS — J96.11 CHRONIC RESPIRATORY FAILURE WITH HYPOXIA (HCC): ICD-10-CM

## 2023-05-03 DIAGNOSIS — R09.02 HYPOXEMIA: ICD-10-CM

## 2023-05-03 DIAGNOSIS — G47.33 OSA (OBSTRUCTIVE SLEEP APNEA): ICD-10-CM

## 2023-05-03 DIAGNOSIS — Z72.0 TOBACCO ABUSE: ICD-10-CM

## 2023-05-03 DIAGNOSIS — R06.09 DOE (DYSPNEA ON EXERTION): ICD-10-CM

## 2023-05-03 DIAGNOSIS — J84.9 ILD (INTERSTITIAL LUNG DISEASE) (HCC): Primary | ICD-10-CM

## 2023-05-03 PROCEDURE — 3075F SYST BP GE 130 - 139MM HG: CPT | Performed by: INTERNAL MEDICINE

## 2023-05-03 PROCEDURE — 99214 OFFICE O/P EST MOD 30 MIN: CPT | Performed by: INTERNAL MEDICINE

## 2023-05-03 PROCEDURE — 3078F DIAST BP <80 MM HG: CPT | Performed by: INTERNAL MEDICINE

## 2023-05-03 PROCEDURE — 1160F RVW MEDS BY RX/DR IN RCRD: CPT | Performed by: INTERNAL MEDICINE

## 2023-05-03 PROCEDURE — 1159F MED LIST DOCD IN RCRD: CPT | Performed by: INTERNAL MEDICINE

## 2023-05-03 PROCEDURE — 99406 BEHAV CHNG SMOKING 3-10 MIN: CPT | Performed by: INTERNAL MEDICINE

## 2023-05-03 RX ORDER — VARENICLINE TARTRATE 0.5 MG/1
TABLET, FILM COATED ORAL
Qty: 11 TABLET | Refills: 0 | Status: SHIPPED | OUTPATIENT
Start: 2023-05-03 | End: 2023-05-10

## 2023-05-03 RX ORDER — VARENICLINE TARTRATE 1 MG/1
TABLET, FILM COATED ORAL
Qty: 60 TABLET | Refills: 0 | Status: SHIPPED | OUTPATIENT
Start: 2023-05-10

## 2023-05-03 NOTE — PATIENT INSTRUCTIONS
Continue to use breo one puff once a day. Rinse your mouth out after using it. Continue to use the albuterol 2 puffs every 6 hours as needed for your breathing.    Work on quitting smoking - start chantix per prescription  Obtain the CT scan in the next month  Obtain the blood test (arterial blood gas) in the next month  See the weight loss team-call [phone]  Follow up in 1-2months

## 2023-05-07 ENCOUNTER — TELEPHONE (OUTPATIENT)
Dept: INTERNAL MEDICINE CLINIC | Facility: CLINIC | Age: 78
End: 2023-05-07

## 2023-05-08 RX ORDER — SEMAGLUTIDE 0.68 MG/ML
INJECTION, SOLUTION SUBCUTANEOUS
Qty: 3 ML | Refills: 0 | Status: SHIPPED | OUTPATIENT
Start: 2023-05-08

## 2023-05-08 RX ORDER — SEMAGLUTIDE 0.68 MG/ML
INJECTION, SOLUTION SUBCUTANEOUS
Qty: 3 ML | Refills: 0 | OUTPATIENT
Start: 2023-05-08

## 2023-05-08 NOTE — TELEPHONE ENCOUNTER
Patient called stating she received a call from [facility] informing her the prescription for Ozempic has been denied. Patient wants to know why, I informed her if it was sent in more than once, it would be considered as a dup.     Patient has a questions to ask the Dr.      She can be reached at [phone]

## 2023-05-09 ENCOUNTER — TELEPHONE (OUTPATIENT)
Dept: INTERNAL MEDICINE CLINIC | Facility: CLINIC | Age: 78
End: 2023-05-09

## 2023-05-09 NOTE — TELEPHONE ENCOUNTER
Received request from    [facility]  [address], [address] [facility], [address] [place] [address]     Forwarded request to medical records via fax for processing. Sent copy to scan.

## 2023-05-19 ENCOUNTER — HOSPITAL ENCOUNTER (OUTPATIENT)
Dept: CT IMAGING | Age: 78
Discharge: HOME | End: 2023-05-19
Attending: INTERNAL MEDICINE
Payer: MEDICARE

## 2023-05-19 DIAGNOSIS — J84.9 ILD (INTERSTITIAL LUNG DISEASE) (HCC): ICD-10-CM

## 2023-05-19 PROCEDURE — 71250 CT THORAX DX C-: CPT | Performed by: INTERNAL MEDICINE

## 2023-06-05 RX ORDER — PROBENECID 500 MG/1
TABLET, FILM COATED ORAL
Qty: 180 TABLET | Refills: 0 | OUTPATIENT
Start: 2023-06-05

## 2023-06-05 RX ORDER — TORSEMIDE 20 MG/1
TABLET ORAL
Qty: 180 TABLET | Refills: 0 | Status: SHIPPED | OUTPATIENT
Start: 2023-06-05

## 2023-06-06 NOTE — TELEPHONE ENCOUNTER
LL-    Spoke w/pt, states she too probenecid starting in 7/2022 for only 90 days. Would like to know if she should continue it/restart Rx. Please advise-TY!

## 2023-06-07 NOTE — TELEPHONE ENCOUNTER
LOV: 4/24/2023 with Mihir Junior PA-C  RTC: 6 months  Last Relevant Labs: 4/24/2023  Filled: 3/11/2023    #180 with 0 refills    Future Appointments   Date Time Provider Marline Vidal   6/14/2023  1:00 PM Agustin Mason DO [facility] [place]   6/21/2023  3:30 PM Rosalina Stockton MD  [facility] [place]   8/1/2023  1:00 PM Celia De Jesus DO [facility] AT [facility]

## 2023-06-09 RX ORDER — HYDRALAZINE HYDROCHLORIDE 100 MG/1
TABLET, FILM COATED ORAL
Qty: 180 TABLET | Refills: 1 | Status: SHIPPED | OUTPATIENT
Start: 2023-06-09

## 2023-06-12 RX ORDER — PROBENECID 500 MG/1
TABLET, FILM COATED ORAL
Qty: 180 TABLET | Refills: 0 | OUTPATIENT
Start: 2023-06-12

## 2023-06-12 NOTE — TELEPHONE ENCOUNTER
Spoke to patient and informed recommendations from LL. Patient denies having any gout symptoms. Also inquiring on ozempic refill - advised was sent in today.

## 2023-06-21 ENCOUNTER — OFFICE VISIT (OUTPATIENT)
Facility: CLINIC | Age: 78
End: 2023-06-21
Payer: COMMERCIAL

## 2023-06-21 VITALS
HEIGHT: 63 IN | BODY MASS INDEX: 43.05 KG/M2 | RESPIRATION RATE: 16 BRPM | WEIGHT: 243 LBS | DIASTOLIC BLOOD PRESSURE: 70 MMHG | OXYGEN SATURATION: 93 % | SYSTOLIC BLOOD PRESSURE: 122 MMHG

## 2023-06-21 DIAGNOSIS — R06.09 DOE (DYSPNEA ON EXERTION): ICD-10-CM

## 2023-06-21 DIAGNOSIS — Z72.0 TOBACCO ABUSE: ICD-10-CM

## 2023-06-21 DIAGNOSIS — J96.11 CHRONIC RESPIRATORY FAILURE WITH HYPOXIA (HCC): ICD-10-CM

## 2023-06-21 DIAGNOSIS — G47.33 OSA (OBSTRUCTIVE SLEEP APNEA): ICD-10-CM

## 2023-06-21 DIAGNOSIS — R91.1 PULMONARY NODULE: Primary | ICD-10-CM

## 2023-06-21 PROCEDURE — 1160F RVW MEDS BY RX/DR IN RCRD: CPT | Performed by: NURSE PRACTITIONER

## 2023-06-21 PROCEDURE — 99214 OFFICE O/P EST MOD 30 MIN: CPT | Performed by: NURSE PRACTITIONER

## 2023-06-21 PROCEDURE — 3078F DIAST BP <80 MM HG: CPT | Performed by: NURSE PRACTITIONER

## 2023-06-21 PROCEDURE — 1159F MED LIST DOCD IN RCRD: CPT | Performed by: NURSE PRACTITIONER

## 2023-06-21 PROCEDURE — 3008F BODY MASS INDEX DOCD: CPT | Performed by: NURSE PRACTITIONER

## 2023-06-21 PROCEDURE — 3074F SYST BP LT 130 MM HG: CPT | Performed by: NURSE PRACTITIONER

## 2023-06-21 NOTE — PATIENT INSTRUCTIONS
Plan for probable PET scan and followup to review results  Talk to primary care office for recommendation for cardiologist or please call [phone] for evaluation  Try out new masks from [address] for 6MWT  Please contact office at [phone] with any decline in respiratory status, questions or concerns

## 2023-06-22 ENCOUNTER — TELEPHONE (OUTPATIENT)
Facility: CLINIC | Age: 78
End: 2023-06-22

## 2023-06-30 ENCOUNTER — HOSPITAL ENCOUNTER (OUTPATIENT)
Dept: NUCLEAR MEDICINE | Facility: HOSPITAL | Age: 78
Discharge: HOME | End: 2023-06-30
Attending: NURSE PRACTITIONER
Payer: MEDICARE

## 2023-06-30 ENCOUNTER — APPOINTMENT (OUTPATIENT)
Dept: RESPIRATORY THERAPY | Facility: HOSPITAL | Age: 78
End: 2023-06-30
Attending: INTERNAL MEDICINE
Payer: MEDICARE

## 2023-06-30 DIAGNOSIS — R91.1 PULMONARY NODULE: ICD-10-CM

## 2023-06-30 DIAGNOSIS — J84.9 ILD (INTERSTITIAL LUNG DISEASE) (HCC): ICD-10-CM

## 2023-06-30 LAB
ARTERIAL PATENCY WRIST A: POSITIVE
BASE EXCESS BLDA CALC-SCNC: 4.9 MMOL/L (ref ?–2)
BODY TEMPERATURE: 98.6 F
COHGB MFR BLD: 4.8 % SAT (ref 0–3)
GLUCOSE BLD-MCNC: 134 MG/DL (ref 70–99)
HCO3 BLDA-SCNC: 28.4 MEQ/L (ref 21–27)
HGB BLD-MCNC: 12.7 G/DL
METHGB MFR BLD: 0.2 % SAT (ref 0.4–1.5)
OXYHGB MFR BLDA: 83.6 % (ref 92–100)
PCO2 BLDA: 41 MM HG (ref 35–45)
PH BLDA: 7.46 [PH] (ref 7.35–7.45)
PO2 BLDA: 53 MM HG (ref 80–100)

## 2023-06-30 PROCEDURE — 85018 HEMOGLOBIN: CPT

## 2023-06-30 PROCEDURE — 82803 BLOOD GASES ANY COMBINATION: CPT

## 2023-06-30 PROCEDURE — 36415 COLL VENOUS BLD VENIPUNCTURE: CPT

## 2023-06-30 PROCEDURE — 36600 WITHDRAWAL OF ARTERIAL BLOOD: CPT

## 2023-06-30 PROCEDURE — 78815 PET IMAGE W/CT SKULL-THIGH: CPT | Performed by: NURSE PRACTITIONER

## 2023-06-30 PROCEDURE — 82375 ASSAY CARBOXYHB QUANT: CPT

## 2023-06-30 PROCEDURE — 83050 HGB METHEMOGLOBIN QUAN: CPT

## 2023-06-30 PROCEDURE — 82962 GLUCOSE BLOOD TEST: CPT

## 2023-07-07 ENCOUNTER — OFFICE VISIT (OUTPATIENT)
Facility: CLINIC | Age: 78
End: 2023-07-07
Payer: COMMERCIAL

## 2023-07-07 VITALS
SYSTOLIC BLOOD PRESSURE: 140 MMHG | WEIGHT: 242 LBS | DIASTOLIC BLOOD PRESSURE: 70 MMHG | BODY MASS INDEX: 42.88 KG/M2 | RESPIRATION RATE: 16 BRPM | OXYGEN SATURATION: 91 % | HEART RATE: 73 BPM | HEIGHT: 63 IN

## 2023-07-07 DIAGNOSIS — Z72.0 TOBACCO ABUSE: ICD-10-CM

## 2023-07-07 DIAGNOSIS — R91.1 PULMONARY NODULE: Primary | ICD-10-CM

## 2023-07-07 DIAGNOSIS — R06.09 DOE (DYSPNEA ON EXERTION): ICD-10-CM

## 2023-07-07 DIAGNOSIS — G47.33 OSA (OBSTRUCTIVE SLEEP APNEA): ICD-10-CM

## 2023-07-07 DIAGNOSIS — J96.11 CHRONIC RESPIRATORY FAILURE WITH HYPOXIA (HCC): ICD-10-CM

## 2023-07-07 NOTE — PATIENT INSTRUCTIONS
Obtain CT chest in 3 months and return to review results  I will call you after the 6 minute walk test  Please contact office at [phone] with any decline in respiratory status, questions or concerns

## 2023-07-12 NOTE — TELEPHONE ENCOUNTER
Refill request.     METFORMIN HCL 1000 MG Oral Tab    [place] [id number] #[id number] - [place] [facility]  [address], [phone], [address] [facility] [place] [address] Phone: [phone] Fax: [phone]

## 2023-07-12 NOTE — TELEPHONE ENCOUNTER
metFORMIN HCl 1000 MG Oral Tab         Sig: Take 1 tablet (1,000 mg total) by mouth 2 (two) times daily with meals.     Disp: 180 tablet    Refills: 0    Start: 7/12/2023    Class: Normal    Non-formulary    Last ordered: 3 years ago by Fer Fernandez MD     Diabetic Medication Protocol [id number]/12/2023 02:57 PM   Protocol Details HgBA1C procedure resulted in past 6 months    Last HgBA1C < 7.5    Microalbumin procedure in past 12 months or taking ACE/ARB    Appointment in past 6 or next 3 months      To be filled at: [facility] [address] [facility], [address] [place], [phone], [phone]

## 2023-07-20 ENCOUNTER — TELEPHONE (OUTPATIENT)
Facility: CLINIC | Age: 78
End: 2023-07-20

## 2023-07-20 NOTE — TELEPHONE ENCOUNTER
Usage 06/20/2023 - 07/19/2023  Usage days 30/30 days (100%)  >= 4 hours 8 days (27%)  < 4 hours 22 days (73%)  Usage hours 102 hours 6 minutes  Average usage (total days) 3 hours 24 minutes  Average usage (days used) 3 hours 24 minutes  Median usage (days used) 3 hours 6 minutes  Total used hours (value since last reset - 07/19/2023) 1,592 hours  AirSense 10 AutoSet  Serial number [id number]  Mode CPAP  Set pressure 13 cmH2O  EPR Fulltime  EPR level 3  Therapy  Leaks - L/min Median: 58.9 95th percentile: 100.4 Maximum: 109.8  Events per hour AI: 1.0 HI: 0.0 AHI: 1.0  Apnea Index Central: 0.0 Obstructive: 0.0 Unknown: 0.9  RERA Index 0.1  Cheyne-Greenwood respiration (average duration per night) 0 minutes (0%)

## 2023-07-20 NOTE — TELEPHONE ENCOUNTER
----- Message from CARRI Henry sent at 2023  4:57 PM CDT -----  Regardin month f/u  Please check her download in 4 weeks; thvan

## 2023-07-25 NOTE — PROGRESS NOTES
Usage 04/26/2023 - 07/24/2023  Usage days 83/90 days (92%)  >= 4 hours 30 days (33%)  < 4 hours 53 days (59%)  Usage hours 289 hours 0 minutes  Average usage (total days) 3 hours 13 minutes  Average usage (days used) 3 hours 29 minutes  Median usage (days used) 3 hours 5 minutes  Total used hours (value since last reset - 07/24/2023) 1,602 hours  AirSense 10 AutoSet  Serial number [id number]  Mode CPAP  Set pressure 13 cmH2O  EPR Fulltime  EPR level 3  Therapy  Leaks - L/min Median: 63.6 95th percentile: 105.4 Maximum: 113.6  Events per hour AI: 0.7 HI: 0.0 AHI: 0.7  Apnea Index Central: 0.0 Obstructive: 0.0 Unknown: 0.6  RERA Index 0.1  Cheyne-Greenwood respiration (average duration per night) 0 minutes (0%)  [facility]  Mask - airfit f20 medium ffm

## 2023-07-26 ENCOUNTER — OFFICE VISIT (OUTPATIENT)
Facility: CLINIC | Age: 78
End: 2023-07-26
Payer: COMMERCIAL

## 2023-07-26 ENCOUNTER — TELEPHONE (OUTPATIENT)
Facility: CLINIC | Age: 78
End: 2023-07-26

## 2023-07-26 VITALS
WEIGHT: 243 LBS | HEIGHT: 63 IN | OXYGEN SATURATION: 92 % | BODY MASS INDEX: 43.05 KG/M2 | SYSTOLIC BLOOD PRESSURE: 106 MMHG | HEART RATE: 70 BPM | DIASTOLIC BLOOD PRESSURE: 68 MMHG | RESPIRATION RATE: 18 BRPM

## 2023-07-26 DIAGNOSIS — G47.33 OSA (OBSTRUCTIVE SLEEP APNEA): ICD-10-CM

## 2023-07-26 DIAGNOSIS — G47.19 DAYTIME HYPERSOMNOLENCE: Primary | ICD-10-CM

## 2023-07-26 DIAGNOSIS — E66.01 MORBID OBESITY WITH BMI OF 40.0-44.9, ADULT (HCC): Chronic | ICD-10-CM

## 2023-07-26 DIAGNOSIS — R09.02 HYPOXEMIA: Primary | ICD-10-CM

## 2023-07-26 PROCEDURE — 99204 OFFICE O/P NEW MOD 45 MIN: CPT | Performed by: INTERNAL MEDICINE

## 2023-07-26 PROCEDURE — 3074F SYST BP LT 130 MM HG: CPT | Performed by: INTERNAL MEDICINE

## 2023-07-26 PROCEDURE — 3008F BODY MASS INDEX DOCD: CPT | Performed by: INTERNAL MEDICINE

## 2023-07-26 PROCEDURE — 1159F MED LIST DOCD IN RCRD: CPT | Performed by: INTERNAL MEDICINE

## 2023-07-26 PROCEDURE — 1160F RVW MEDS BY RX/DR IN RCRD: CPT | Performed by: INTERNAL MEDICINE

## 2023-07-26 PROCEDURE — 3078F DIAST BP <80 MM HG: CPT | Performed by: INTERNAL MEDICINE

## 2023-07-26 RX ORDER — TRIAMCINOLONE ACETONIDE 55 UG/1
2 SPRAY, METERED NASAL DAILY
Qty: 1 EACH | Refills: 3 | Status: SHIPPED | OUTPATIENT
Start: 2023-07-26 | End: 2023-08-25

## 2023-07-31 ENCOUNTER — HOSPITAL ENCOUNTER (OUTPATIENT)
Dept: CT IMAGING | Age: 78
Discharge: HOME | End: 2023-07-31
Attending: NURSE PRACTITIONER
Payer: MEDICARE

## 2023-07-31 DIAGNOSIS — R91.1 PULMONARY NODULE: ICD-10-CM

## 2023-07-31 PROCEDURE — 71250 CT THORAX DX C-: CPT | Performed by: NURSE PRACTITIONER

## 2023-08-01 ENCOUNTER — TELEPHONE (OUTPATIENT)
Dept: INTERNAL MEDICINE CLINIC | Facility: CLINIC | Age: 78
End: 2023-08-01

## 2023-08-01 DIAGNOSIS — R20.2 TINGLING OF BOTH FEET: Primary | ICD-10-CM

## 2023-08-01 NOTE — TELEPHONE ENCOUNTER
Spoke with Sami Gatica from [facility] in regards to pts EMG order expiring on 23    Sami Gatica informed me that pt cancelled her EMG appt for today 23 due to testing positive for covid, pt was informed she can keep her appt but pt was not comfortable going in    Sami Gatica is requesting a new order for EMG since pts current order is about to  before seeing her next    [facility] [place] Scheduling callback #: [phone]

## 2023-08-02 ENCOUNTER — ORDER TRANSCRIPTION (OUTPATIENT)
Dept: ADMINISTRATIVE | Facility: HOSPITAL | Age: 78
End: 2023-08-02

## 2023-08-02 DIAGNOSIS — R20.2 TINGLING OF BOTH FEET: Primary | ICD-10-CM

## 2023-08-02 NOTE — TELEPHONE ENCOUNTER
New EMG order placed. Spoke to Madison in central scheduling, informed her of new order.  Madison will call the pt to schedule the test.

## 2023-08-14 ENCOUNTER — OFFICE VISIT (OUTPATIENT)
Dept: ORTHOPEDICS CLINIC | Facility: CLINIC | Age: 78
End: 2023-08-14
Payer: COMMERCIAL

## 2023-08-14 VITALS — WEIGHT: 243 LBS | BODY MASS INDEX: 43.05 KG/M2 | HEIGHT: 63 IN

## 2023-08-14 DIAGNOSIS — M65.332 TRIGGER MIDDLE FINGER OF LEFT HAND: Primary | ICD-10-CM

## 2023-08-14 PROCEDURE — 1125F AMNT PAIN NOTED PAIN PRSNT: CPT | Performed by: PHYSICIAN ASSISTANT

## 2023-08-14 PROCEDURE — 3008F BODY MASS INDEX DOCD: CPT | Performed by: PHYSICIAN ASSISTANT

## 2023-08-14 PROCEDURE — 1159F MED LIST DOCD IN RCRD: CPT | Performed by: PHYSICIAN ASSISTANT

## 2023-08-14 PROCEDURE — 99213 OFFICE O/P EST LOW 20 MIN: CPT | Performed by: PHYSICIAN ASSISTANT

## 2023-08-14 PROCEDURE — 1160F RVW MEDS BY RX/DR IN RCRD: CPT | Performed by: PHYSICIAN ASSISTANT

## 2023-08-14 RX ORDER — BETAMETHASONE SODIUM PHOSPHATE AND BETAMETHASONE ACETATE 3; 3 MG/ML; MG/ML
6 INJECTION, SUSPENSION INTRA-ARTICULAR; INTRALESIONAL; INTRAMUSCULAR; SOFT TISSUE ONCE
Status: COMPLETED | OUTPATIENT
Start: 2023-08-14 | End: 2023-08-14

## 2023-08-14 RX ADMIN — BETAMETHASONE SODIUM PHOSPHATE AND BETAMETHASONE ACETATE 6 MG: 3; 3 INJECTION, SUSPENSION INTRA-ARTICULAR; INTRALESIONAL; INTRAMUSCULAR; SOFT TISSUE at 11:25:00

## 2023-08-30 RX ORDER — ALENDRONATE SODIUM 70 MG/1
70 TABLET ORAL
Qty: 12 TABLET | Refills: 3 | Status: SHIPPED | OUTPATIENT
Start: 2023-08-30

## 2023-09-05 RX ORDER — TORSEMIDE 20 MG/1
TABLET ORAL
Qty: 90 TABLET | Refills: 0 | Status: SHIPPED | OUTPATIENT
Start: 2023-09-05

## 2023-09-05 RX ORDER — TORSEMIDE 20 MG/1
TABLET ORAL
Qty: 180 TABLET | Refills: 0 | OUTPATIENT
Start: 2023-09-05

## 2023-09-05 RX ORDER — POTASSIUM CHLORIDE 750 MG/1
10 TABLET, FILM COATED, EXTENDED RELEASE ORAL DAILY
Qty: 90 TABLET | Refills: 1 | Status: SHIPPED | OUTPATIENT
Start: 2023-09-05

## 2023-09-05 NOTE — TELEPHONE ENCOUNTER
TORSEMIDE 20MG TABLETS          Will file in chart as: TORSEMIDE 20 MG Oral Tab    Sig: TAKE 1 TABLET(20 MG) BY MOUTH TWICE DAILY    Disp: 180 tablet    Refills: 0 (Pharmacy requested: Not specified)    Start: 9/5/2023    Class: Normal    Non-formulary    Last ordered: 3 months ago by DURAN Hurd Last refill: 6/5/2023    Rx #: [id number]    Hypertension Medications Protocol Haskhc2409/05/2023 10:20 AM   Protocol Details CMP or BMP in past 12 months    Last serum creatinine< 2.0    Appointment in past 6 or next 3 months        LOV 4/24/23  RTC 6 months  Filled 6/5/23 180 tabs 0 refills   Last labs 4/24/23  Future Appointments   Date Time Provider Marline Vidal   10/6/2023 11:00 AM CARRI Young  [facility] [place]   10/26/2023 10:00 AM Trip Chavez MD [facility] [place]   11/21/2023 10:30 AM DO ES [facility] AT [facility]

## 2023-09-05 NOTE — TELEPHONE ENCOUNTER
NO PROTOCOL    Name from pharmacy: POTASSIUM CL 10MEQ ER TABLETS         Will file in chart as: POTASSIUM CHLORIDE ER 10 MEQ Oral Tab CR    Sig: TAKE 1 TABLET(10 MEQ) BY MOUTH DAILY    Disp: 90 tablet    Refills: 1 (Pharmacy requested: Not specified)    Start: 9/5/2023    Class: Normal    Non-formulary    Last ordered: 4 months ago by DURAN Houston Last refill: 7/11/2023    Rx #: [id number]       To be filled at: [place] [address] #[id number] [facility], [address], [place] AT [address] [place] [address], [phone], [phone]     LOV: 04/24/23 w/ LL   RTC: 10/24/23   RECENT LABS: 04/24/23   FOV: No FOV

## 2023-09-05 NOTE — TELEPHONE ENCOUNTER
PROTOCOL PASSED   Name from pharmacy: METFORMIN 1000MG TABLETS         Will file in chart as: METFORMIN HCL 1000 MG Oral Tab    Sig: TAKE 1 TABLET(1000 MG) BY MOUTH TWICE DAILY WITH MEALS    Disp: 180 tablet    Refills: 0 (Pharmacy requested: Not specified)    Start: 9/5/2023    Class: Normal    Non-formulary    Last ordered: 1 month ago by Dave Burroughs MD Last refill: 7/13/2023    Rx #: [id number]    Diabetic Medication Protocol Xwqrjc1809/05/2023 05:46 AM   Protocol Details HgBA1C procedure resulted in past 6 months    Last HgBA1C < 7.5    Microalbumin procedure in past 12 months or taking ACE/ARB    Appointment in past 6 or next 3 months      To be filled at: [place] [address] [facility][id number] [facility], [address], [facility] AT [address] [place] [address], [phone], [phone]     LOV: 04/24/23 w/ LL  RTC: 10/24/23  RECENT LABS: 04/24/23   FOV: NO FOV

## 2023-09-25 ENCOUNTER — TELEPHONE (OUTPATIENT)
Dept: INTERNAL MEDICINE CLINIC | Facility: CLINIC | Age: 78
End: 2023-09-25

## 2023-09-25 NOTE — TELEPHONE ENCOUNTER
she is to set up a follow-up appointment specially since he needs diabetic foot wear and documentation is needed the patient to bring in all meds

## 2023-09-25 NOTE — TELEPHONE ENCOUNTER
Future Appointments   Date Time Provider Marline Bradfordi   10/3/2023 12:45 PM Sarath Potter MD [facility] [place]   10/6/2023 11:00 AM CARRI Delatorre  [facility] [place]   10/17/2023 10:15 AM EM[id number]   10/17/2023 10:30 AM Tim Alonso MD [facility]   10/26/2023 10:00 AM Latanya Ramirez MD [facility] [place]   11/21/2023 10:30 AM Evens Marr DO [facility]

## 2023-09-25 NOTE — TELEPHONE ENCOUNTER
[address] [facility], would you please assist pt with scheduling an appt, per VM?  Ty!

## 2023-09-26 ENCOUNTER — TELEPHONE (OUTPATIENT)
Dept: INTERNAL MEDICINE CLINIC | Facility: CLINIC | Age: 78
End: 2023-09-26

## 2023-09-26 ENCOUNTER — TELEPHONE (OUTPATIENT)
Facility: CLINIC | Age: 78
End: 2023-09-26

## 2023-09-26 NOTE — TELEPHONE ENCOUNTER
Pt unsure when to complete next CT. Spoke to [facility] for directions. Pt aware and wcb to pt in the next 1-2 days with decision. Pt agrees to plan and will hold or resched appt for 10// accordingly.

## 2023-09-26 NOTE — TELEPHONE ENCOUNTER
Diabetic footwear form completed and faxed to [facility]   Cofnriamtion received   Sent to scan and copy placed in accordion

## 2023-10-03 ENCOUNTER — LAB ENCOUNTER (OUTPATIENT)
Dept: LAB | Age: 78
End: 2023-10-03
Attending: INTERNAL MEDICINE
Payer: MEDICARE

## 2023-10-03 ENCOUNTER — OFFICE VISIT (OUTPATIENT)
Dept: INTERNAL MEDICINE CLINIC | Facility: CLINIC | Age: 78
End: 2023-10-03
Payer: COMMERCIAL

## 2023-10-03 VITALS
HEIGHT: 63 IN | TEMPERATURE: 98 F | RESPIRATION RATE: 18 BRPM | HEART RATE: 78 BPM | SYSTOLIC BLOOD PRESSURE: 140 MMHG | WEIGHT: 246.81 LBS | OXYGEN SATURATION: 95 % | DIASTOLIC BLOOD PRESSURE: 70 MMHG | BODY MASS INDEX: 43.73 KG/M2

## 2023-10-03 DIAGNOSIS — R80.9 CONTROLLED TYPE 2 DIABETES MELLITUS WITH MICROALBUMINURIA, WITH LONG-TERM CURRENT USE OF INSULIN (HCC): ICD-10-CM

## 2023-10-03 DIAGNOSIS — Z79.4 CONTROLLED TYPE 2 DIABETES MELLITUS WITH MICROALBUMINURIA, WITH LONG-TERM CURRENT USE OF INSULIN (HCC): ICD-10-CM

## 2023-10-03 DIAGNOSIS — R20.2 TINGLING OF BOTH FEET: Primary | ICD-10-CM

## 2023-10-03 DIAGNOSIS — E11.29 CONTROLLED TYPE 2 DIABETES MELLITUS WITH MICROALBUMINURIA, WITH LONG-TERM CURRENT USE OF INSULIN (HCC): ICD-10-CM

## 2023-10-03 LAB
ALBUMIN SERPL-MCNC: 3.1 G/DL (ref 3.4–5)
ALBUMIN/GLOB SERPL: 0.7 {RATIO} (ref 1–2)
ALP LIVER SERPL-CCNC: 62 U/L
ALT SERPL-CCNC: 26 U/L
ANION GAP SERPL CALC-SCNC: 6 MMOL/L (ref 0–18)
AST SERPL-CCNC: 18 U/L (ref 15–37)
BILIRUB SERPL-MCNC: 0.2 MG/DL (ref 0.1–2)
BUN BLD-MCNC: 13 MG/DL (ref 7–18)
CALCIUM BLD-MCNC: 8.8 MG/DL (ref 8.5–10.1)
CHLORIDE SERPL-SCNC: 106 MMOL/L (ref 98–112)
CHOLEST SERPL-MCNC: 213 MG/DL (ref ?–200)
CO2 SERPL-SCNC: 28 MMOL/L (ref 21–32)
CREAT BLD-MCNC: 1.06 MG/DL
EGFRCR SERPLBLD CKD-EPI 2021: 54 ML/MIN/1.73M2 (ref 60–?)
EST. AVERAGE GLUCOSE BLD GHB EST-MCNC: 186 MG/DL (ref 68–126)
FASTING PATIENT LIPID ANSWER: YES
FASTING STATUS PATIENT QL REPORTED: YES
GLOBULIN PLAS-MCNC: 4.3 G/DL (ref 2.8–4.4)
GLUCOSE BLD-MCNC: 134 MG/DL (ref 70–99)
HBA1C MFR BLD: 8.1 % (ref ?–5.7)
HDLC SERPL-MCNC: 34 MG/DL (ref 40–59)
LDLC SERPL CALC-MCNC: 133 MG/DL (ref ?–100)
NONHDLC SERPL-MCNC: 179 MG/DL (ref ?–130)
OSMOLALITY SERPL CALC.SUM OF ELEC: 292 MOSM/KG (ref 275–295)
POTASSIUM SERPL-SCNC: 3.8 MMOL/L (ref 3.5–5.1)
PROT SERPL-MCNC: 7.4 G/DL (ref 6.4–8.2)
SODIUM SERPL-SCNC: 140 MMOL/L (ref 136–145)
TRIGL SERPL-MCNC: 258 MG/DL (ref 30–149)
VLDLC SERPL CALC-MCNC: 48 MG/DL (ref 0–30)

## 2023-10-03 PROCEDURE — 99213 OFFICE O/P EST LOW 20 MIN: CPT | Performed by: INTERNAL MEDICINE

## 2023-10-03 PROCEDURE — 3008F BODY MASS INDEX DOCD: CPT | Performed by: INTERNAL MEDICINE

## 2023-10-03 PROCEDURE — 80061 LIPID PANEL: CPT

## 2023-10-03 PROCEDURE — 3077F SYST BP >= 140 MM HG: CPT | Performed by: INTERNAL MEDICINE

## 2023-10-03 PROCEDURE — 80053 COMPREHEN METABOLIC PANEL: CPT

## 2023-10-03 PROCEDURE — 1159F MED LIST DOCD IN RCRD: CPT | Performed by: INTERNAL MEDICINE

## 2023-10-03 PROCEDURE — 36415 COLL VENOUS BLD VENIPUNCTURE: CPT

## 2023-10-03 PROCEDURE — 83036 HEMOGLOBIN GLYCOSYLATED A1C: CPT

## 2023-10-03 PROCEDURE — 3078F DIAST BP <80 MM HG: CPT | Performed by: INTERNAL MEDICINE

## 2023-10-03 PROCEDURE — 1160F RVW MEDS BY RX/DR IN RCRD: CPT | Performed by: INTERNAL MEDICINE

## 2023-10-03 RX ORDER — ATORVASTATIN CALCIUM 20 MG/1
20 TABLET, FILM COATED ORAL NIGHTLY
Qty: 90 TABLET | Refills: 0 | Status: SHIPPED | OUTPATIENT
Start: 2023-10-03

## 2023-10-03 RX ORDER — ALENDRONATE SODIUM 70 MG/1
70 TABLET ORAL
Qty: 12 TABLET | Refills: 3 | Status: SHIPPED | OUTPATIENT
Start: 2023-10-03

## 2023-10-03 RX ORDER — SACUBITRIL AND VALSARTAN 97; 103 MG/1; MG/1
1 TABLET, FILM COATED ORAL 2 TIMES DAILY
Qty: 180 TABLET | Refills: 0 | Status: SHIPPED | OUTPATIENT
Start: 2023-10-03

## 2023-10-03 RX ORDER — GABAPENTIN 100 MG/1
200 CAPSULE ORAL NIGHTLY
Qty: 180 CAPSULE | Refills: 1 | Status: SHIPPED | OUTPATIENT
Start: 2023-10-03

## 2023-10-05 NOTE — PROGRESS NOTES
Abnormal -Sugar numbers are high so is the cholesterol. Patient to see me for follow-up after EMG nerve conduction is done.   Patient to bring all meds in

## 2023-10-09 ENCOUNTER — OFFICE VISIT (OUTPATIENT)
Facility: CLINIC | Age: 78
End: 2023-10-09
Payer: COMMERCIAL

## 2023-10-09 VITALS
HEART RATE: 81 BPM | OXYGEN SATURATION: 90 % | WEIGHT: 245 LBS | BODY MASS INDEX: 43.41 KG/M2 | RESPIRATION RATE: 16 BRPM | DIASTOLIC BLOOD PRESSURE: 68 MMHG | HEIGHT: 63 IN | SYSTOLIC BLOOD PRESSURE: 126 MMHG

## 2023-10-09 DIAGNOSIS — R91.8 PULMONARY NODULES: Primary | ICD-10-CM

## 2023-10-09 DIAGNOSIS — R06.09 DOE (DYSPNEA ON EXERTION): ICD-10-CM

## 2023-10-09 DIAGNOSIS — R09.02 HYPOXEMIA: ICD-10-CM

## 2023-10-09 DIAGNOSIS — Z72.0 TOBACCO ABUSE: ICD-10-CM

## 2023-10-09 DIAGNOSIS — G47.33 OSA (OBSTRUCTIVE SLEEP APNEA): ICD-10-CM

## 2023-10-09 PROCEDURE — 3008F BODY MASS INDEX DOCD: CPT | Performed by: NURSE PRACTITIONER

## 2023-10-09 PROCEDURE — 3074F SYST BP LT 130 MM HG: CPT | Performed by: NURSE PRACTITIONER

## 2023-10-09 PROCEDURE — 3078F DIAST BP <80 MM HG: CPT | Performed by: NURSE PRACTITIONER

## 2023-10-09 PROCEDURE — 99214 OFFICE O/P EST MOD 30 MIN: CPT | Performed by: NURSE PRACTITIONER

## 2023-10-09 PROCEDURE — 1159F MED LIST DOCD IN RCRD: CPT | Performed by: NURSE PRACTITIONER

## 2023-10-09 PROCEDURE — 1160F RVW MEDS BY RX/DR IN RCRD: CPT | Performed by: NURSE PRACTITIONER

## 2023-10-09 NOTE — PATIENT INSTRUCTIONS
Check with cardiology office-get echo in 11/2023 as noted and see cardiologist in 12/2023  Get the 6 minute walk test as soon as able  Meet with Ena Hartman for fitting for mask and reschedule appointment with Dr Cecy Schumacher for week of 11/15/23  CT chest the end of October and we will talk on the phone regarding results  Followup in May with 6 month CT chest 1 week before if CT chest end of October is ok  Please contact office at [phone] with any decline in respiratory status, questions or concerns

## 2023-10-16 ENCOUNTER — OFFICE VISIT (OUTPATIENT)
Dept: ORTHOPEDICS CLINIC | Facility: CLINIC | Age: 78
End: 2023-10-16
Payer: COMMERCIAL

## 2023-10-16 ENCOUNTER — TELEPHONE (OUTPATIENT)
Dept: ORTHOPEDICS CLINIC | Facility: CLINIC | Age: 78
End: 2023-10-16

## 2023-10-16 VITALS — WEIGHT: 245 LBS | BODY MASS INDEX: 43.41 KG/M2 | HEIGHT: 63 IN

## 2023-10-16 DIAGNOSIS — M65.332 TRIGGER MIDDLE FINGER OF LEFT HAND: Primary | ICD-10-CM

## 2023-10-16 PROCEDURE — 99214 OFFICE O/P EST MOD 30 MIN: CPT | Performed by: PHYSICIAN ASSISTANT

## 2023-10-16 PROCEDURE — 3008F BODY MASS INDEX DOCD: CPT | Performed by: PHYSICIAN ASSISTANT

## 2023-10-16 PROCEDURE — 1160F RVW MEDS BY RX/DR IN RCRD: CPT | Performed by: PHYSICIAN ASSISTANT

## 2023-10-16 PROCEDURE — 1125F AMNT PAIN NOTED PAIN PRSNT: CPT | Performed by: PHYSICIAN ASSISTANT

## 2023-10-16 PROCEDURE — 1159F MED LIST DOCD IN RCRD: CPT | Performed by: PHYSICIAN ASSISTANT

## 2023-10-16 NOTE — TELEPHONE ENCOUNTER
Date of Surgery: 24       Post Op Appt: 24 @ 0920    Case ID: [id number]    Notes:         [address] [place] SHEET   Name: Lewis Swift  MRN: [id number]   : 1945     Surgical Date:    23   Surgical Consent:    A1 pulley release of left middle finger   Diagnosis:     (M65.332) Trigger middle finger of left hand  (primary encounter diagnosis)    Procedure Codes:    Trigger Finger Release (20604)   Disposition:    Outpatient   Operative Time:    15 mins   Antibiotics:    Ancef 2g   Anesthesia Type:    Local, PIV Insertion, LR 20mL/hr   Clearance:     NONE   Equipment:    TFR  OCTR (Local Only): Lead Hand, [place] Blade, 1% lidocaine w/ epi + 0.5% marcaine (off the field), 4-0 nylon, Bacitracin, Adaptic   Assistant:    Cynthia Perez Assistant: Beatriz Bloom PA-C   Follow Up:    12-14 days post op with Chayo Bowles   Pain Medication:    Norco 325-5mg   Therapy:    [facility] 3-5 days after

## 2023-10-16 NOTE — PROGRESS NOTES
SURGICAL BOOKING SHEET   Name: Ciro Morales  MRN: [id number]   : 1945    Surgical Date:   23   Surgical Consent:   A1 pulley release of left middle finger   Diagnosis:    (G88.687) Trigger middle finger of left hand  (primary encounter diagnosis)    Procedure Codes:   Trigger Finger Release (06394)   Disposition:   Outpatient   Operative Time:   15 mins   Antibiotics:   Ancef 2g   Anesthesia Type:   Local, PIV Insertion, LR 20mL/hr   Clearance:    NONE   Equipment:   TFR  OCTR (Local Only): Lead Hand, [place] Blade, 1% lidocaine w/ epi + 0.5% marcaine (off the field), 4-0 nylon, Bacitracin, Adaptic   Assistant:   Nilo Castorena Assistant: Philip Franco PA-C   Follow Up:   12-14 days post op with Jacqulin Sauce   Pain Medication:   Norco 325-5mg   Therapy:   [facility] 3-5 days after

## 2023-10-17 ENCOUNTER — OFFICE VISIT (OUTPATIENT)
Facility: LOCATION | Age: 78
End: 2023-10-17
Payer: COMMERCIAL

## 2023-10-17 DIAGNOSIS — H93.293 ABNORMAL AUDITORY PERCEPTION, BILATERAL: Primary | ICD-10-CM

## 2023-10-17 DIAGNOSIS — H60.93 OTITIS EXTERNA OF BOTH EARS, UNSPECIFIED CHRONICITY, UNSPECIFIED TYPE: Primary | ICD-10-CM

## 2023-10-17 PROCEDURE — 1159F MED LIST DOCD IN RCRD: CPT | Performed by: AUDIOLOGIST

## 2023-10-17 PROCEDURE — 92567 TYMPANOMETRY: CPT | Performed by: AUDIOLOGIST

## 2023-10-17 PROCEDURE — 99204 OFFICE O/P NEW MOD 45 MIN: CPT | Performed by: OTOLARYNGOLOGY

## 2023-10-17 PROCEDURE — 1160F RVW MEDS BY RX/DR IN RCRD: CPT | Performed by: OTOLARYNGOLOGY

## 2023-10-17 PROCEDURE — 92557 COMPREHENSIVE HEARING TEST: CPT | Performed by: AUDIOLOGIST

## 2023-10-17 PROCEDURE — 1159F MED LIST DOCD IN RCRD: CPT | Performed by: OTOLARYNGOLOGY

## 2023-10-17 RX ORDER — FLUOCINOLONE ACETONIDE 0.11 MG/ML
3 OIL AURICULAR (OTIC) 3 TIMES DAILY
Qty: 20 ML | Refills: 0 | Status: SHIPPED | OUTPATIENT
Start: 2023-10-17 | End: 2023-10-24

## 2023-10-17 NOTE — PROGRESS NOTES
Danica Green is a 70year old female. Patient presents with:  Ear Problem    HPI:   82-year-old black female with complaint of itchy ears. She denies otorrhea otalgia vertigo tinnitus. She seems to have pretty good hearing for age. She is a diabetic. Current Outpatient Medications   Medication Sig Dispense Refill    alendronate 70 MG Oral Tab Take 1 tablet (70 mg total) by mouth every 7 days. 12 tablet 3    sacubitril-valsartan (ENTRESTO)  MG Oral Tab Take 1 tablet by mouth 2 (two) times daily. 180 tablet 0    gabapentin 100 MG Oral Cap Take 2 capsules (200 mg total) by mouth nightly. TAKE AT BEDTIME 180 capsule 1    atorvastatin 20 MG Oral Tab Take 1 tablet (20 mg total) by mouth nightly. 90 tablet 0    Potassium Chloride ER 10 MEQ Oral Tab CR Take 1 tablet (10 mEq total) by mouth daily. 90 tablet 1    METFORMIN HCL 1000 MG Oral Tab TAKE 1 TABLET(1000 MG) BY MOUTH TWICE DAILY WITH MEALS 180 tablet 0    torsemide 20 MG Oral Tab TAKE 1 TABLET(20 MG) BY MOUTH TWICE DAILY 90 tablet 0    OZEMPIC, 0.25 OR 0.5 MG/DOSE, 2 MG/3ML Subcutaneous Solution Pen-injector INJECT 0.5MG SUBCUTANEOUS UNDER THE SKIN ONCE A WEEK 9 mL 1    HYDRALAZINE 100 MG Oral Tab TAKE 1 TABLET(100 MG) BY MOUTH TWICE DAILY 180 tablet 1    varenicline 1 MG Oral Tab After finishing the 0.5mg dosing, then start this medication. take 1 tablet (1MG)  by ORAL route 2 times every day with a glass of waterafter meals 60 tablet 0    ALBUTEROL 108 (90 Base) MCG/ACT Inhalation Aero Soln INHALE 1 PUFF INTO THE LUNGS EVERY 6 HOURS AS NEEDED FOR WHEEZING 8.5 g 0    fluticasone furoate-vilanterol (BREO ELLIPTA) 100-25 MCG/ACT Inhalation Aerosol Powder, Breath Activated Inhale 1 puff into the lungs daily. 1 each 11    albuterol (2.5 MG/3ML) 0.083% Inhalation Nebu Soln Take 3 mL (2.5 mg total) by nebulization 3 (three) times daily. 300 mL 0    Insulin Pen Needle (PEN NEEDLES) 32G X 4 MM Does not apply Misc 1 each every 7 days.  30 each 0    Insulin Pen Needle (PEN NEEDLES) 32G X 4 MM Does not apply Misc 1 each every 7 days. 30 each 5    BD PEN NEEDLE NAHUM 2ND GEN 32G X 4 MM Does not apply Misc USE DAILY 100 each 0    PROBENECID 500 MG Oral Tab TAKE 1 TABLET(500 MG) BY MOUTH TWICE DAILY 180 tablet 0    Respiratory Therapy Supplies (NEBULIZER) Does not apply Device Use as directed 1 each 0    colchicine 0.6 MG Oral Tab colchicine 0.6 mg tablet   TAKE 2 TABS (1.2mg) at first sign of glare up, then 1 tab (0.6mg) Q6H PRN if needed      triamcinolone 0.1 % External Cream Apply topically 2 (two) times daily as needed. 60 g 1    CARVEDILOL 25 MG Oral Tab TAKE 1 TABLET BY MOUTH TWICE DAILY AS DIRECTED 180 tablet 0    ASPIRIN EC LOW DOSE 81 MG Oral Tab EC Take 1 tablet (81 mg total) by mouth daily. 0      Past Medical History:   Diagnosis Date    AIN (acute interstitial nephritis)     Cataracts, bilateral 1/12/2018    Congestive heart disease ([facility] [address]     Diabetes ([facility] [address]     Essential hypertension     Hyperlipidemia     Obesity     ALEXANDRA (obstructive sleep apnea) 1/15/2020 [facility]    AHI 34 REM AHI 85 [address] [place] 59%     Sleep apnea     Tobacco abuse 1/12/2018      Social History:  Social History     Socioeconomic History    Marital status:     Occupational History    Occupation: Hospital    Tobacco Use    Smoking status: Every Day     Packs/day: 1.00     Years: 50.00     Additional pack years: 0.00     Total pack years: 50.00     Types: Cigarettes     Start date: 1/10/1974     Passive exposure: Past    Smokeless tobacco: Former     Quit date: 2016    Tobacco comments:     Smokes 2 cigarettes daily currently    Vaping Use    Vaping Use: Never used   Substance and Sexual Activity    Alcohol use: No    Drug use: No    Sexual activity: Not Currently     Partners: Female   Other Topics Concern    Caffeine Concern Yes    Exercise No    Special Diet No      Past Surgical History:   Procedure Laterality Date    COLONOSCOPY  2012    [facility]    COLONOSCOPY 05/18/2018    diverticulosis - repeat 10 years - Dr. Bruna Armendariz  2005    left knee    TOTAL KNEE REPLACEMENT      Left knee          REVIEW OF SYSTEMS:   GENERAL HEALTH: feels well otherwise  GENERAL : denies fever, chills, sweats, weight loss, weight gain  SKIN: denies any unusual skin lesions or rashes  RESPIRATORY: denies shortness of breath with exertion  NEURO: denies headaches    EXAM:   There were no vitals taken for this visit. System Pertinent findings Details   Constitutional  Overall appearance - Normal.   Psychiatric  Orientation - Oriented to time, place, person & situation. Appropriate mood and affect. Head/Face  Facial features -- Normal. Skull - Normal.   Eyes  Pupils equal ,round ,react to light and accomidate   Ears  External Ear Right: Normal, Left: Normal. Canal - Right: Normal, Left: Normal. TM - Right: Normal void of wax left: Normal devoid of wax   Nose  External Nose, Normal, Septum -midline nasal Vault, clear,Turbinates - Right: Normal left: Normal normal   Mouth/Throat  Lips/teeth/gums - Normal. Tonsils -1+. Oropharynx - Normal.   Neck Exam  Inspection - Normal. Palpation - Normal. Parotid gland - Normal. Thyroid gland -normal   Neurological  Cranial nerves - Cranial nerves II through XII grossly intact. Nasopharynx   Normal        Lymph Detail  Submental. Submandibular. Anterior cervical. Posterior cervical. Supraclavicular. Normal audiogram and tympanograms bilaterally today    ASSESSMENT AND PLAN:   1. Otitis externa of both ears, unspecified chronicity, unspecified type  Derm otic eardrops use as directed      The patient indicates understanding of these issues and agrees to the plan.       Ab Shanks MD  10/17/2023  11:39 AM

## 2023-10-17 NOTE — PROGRESS NOTES
Maria D Oneal was seen for an audiometric evaluation and tympanogram today. Referred back to physician.     Deidre Mcneill AuD

## 2023-10-18 NOTE — TELEPHONE ENCOUNTER
Called and spoke with Quique Soto in regards to her upcoming surgery. I did go over the surgical protocol and post op appt with her as well. She states understanding with no questions or concerns.

## 2023-10-19 ENCOUNTER — NURSE ONLY (OUTPATIENT)
Facility: CLINIC | Age: 78
End: 2023-10-19
Payer: COMMERCIAL

## 2023-10-19 VITALS — WEIGHT: 243 LBS | BODY MASS INDEX: 43.05 KG/M2 | HEIGHT: 63 IN

## 2023-10-19 DIAGNOSIS — R09.02 HYPOXEMIA: Primary | ICD-10-CM

## 2023-10-19 PROCEDURE — 94618 PULMONARY STRESS TESTING: CPT | Performed by: NURSE PRACTITIONER

## 2023-10-19 PROCEDURE — 3008F BODY MASS INDEX DOCD: CPT | Performed by: NURSE PRACTITIONER

## 2023-10-19 NOTE — PROGRESS NOTES
OXYGEN CLINIC ASSESSMENT    Date: 10/19/2023 Physician: Bandar SY   Diagnosis: DYSPNEA Technician: Merlin Knife., RT     Patient: Charlee Roque MRN: [id number] : 1945     Height: 5' 3\" (1.6 m) Weight: 243 lb Age: 70year old         BP HR SpO2 RR WINSTON DIST  (FT) TIME Reason Stopped   RA         REST 126/68   88   93     20     1     N/A         N/A         N/A           RA       PEAK  EXERCISE 134/78 98 78   28   3   200   2.5 MIN   O2 Desaturation           O2 FLOW LPM  BP HR SpO2 RR WINSTON DIST  (FT) TIME Reason Stopped   4 LPM   REST     84   99     20     0.5                             4 LPM PEAK  EXERCISE   94 86   24   3   450   4 MIN   O2 Desaturation           O2 FLOW LPM  BP HR SpO2 RR WINST[place] DIST  (FT) TIME Reason Stopped   6 LPM REST   82 99   20   0.5                     6 LPM PEAK  EXERCISE   96 92   24   3   200   2 MIN   Study Ended  Leg Weakness           FINDINGS  0 LPM required to maintain a saturation of 93 % at rest   6 LPM required to maintain a saturation of 92 % with exertion     LPM Pulse dose to maintain a saturation of   % with exertion   Patient will need 0 LPM at rest and 6 LPM with exertion. During the titration with 6 lpm oxygen,  pt had to quit the testing since she could not go further due to leg weakness.

## 2023-10-23 ENCOUNTER — TELEPHONE (OUTPATIENT)
Dept: INTERNAL MEDICINE CLINIC | Facility: CLINIC | Age: 78
End: 2023-10-23

## 2023-10-23 NOTE — TELEPHONE ENCOUNTER
VM - pt's surgery for pulley release of left middle finger is not until 1/5/24, she has had 3 injections with #3 not helping. Please advise if medication can be changed for short-term or add something to help pt sleep? Ty!     Gabapentin 100 MG (take 2 capsules at bedtime)

## 2023-10-23 NOTE — TELEPHONE ENCOUNTER
She should ask Dr. Jeremy Eli for alternative relief. Or if he cannot prescribe her any medicines.   As far as I am concerned she is already on gabapentin 200 mg I do not want to increase the medicines too much

## 2023-10-26 ENCOUNTER — OFFICE VISIT (OUTPATIENT)
Dept: INTERNAL MEDICINE CLINIC | Facility: CLINIC | Age: 78
End: 2023-10-26

## 2023-10-26 VITALS
RESPIRATION RATE: 18 BRPM | WEIGHT: 243 LBS | HEIGHT: 63 IN | SYSTOLIC BLOOD PRESSURE: 150 MMHG | DIASTOLIC BLOOD PRESSURE: 72 MMHG | HEART RATE: 76 BPM | OXYGEN SATURATION: 88 % | BODY MASS INDEX: 43.05 KG/M2

## 2023-10-26 DIAGNOSIS — M65.30 TRIGGER FINGER OF LEFT HAND, UNSPECIFIED FINGER: Primary | ICD-10-CM

## 2023-10-26 PROCEDURE — 99213 OFFICE O/P EST LOW 20 MIN: CPT | Performed by: INTERNAL MEDICINE

## 2023-10-26 PROCEDURE — 1160F RVW MEDS BY RX/DR IN RCRD: CPT | Performed by: INTERNAL MEDICINE

## 2023-10-26 PROCEDURE — 3008F BODY MASS INDEX DOCD: CPT | Performed by: INTERNAL MEDICINE

## 2023-10-26 PROCEDURE — 1159F MED LIST DOCD IN RCRD: CPT | Performed by: INTERNAL MEDICINE

## 2023-10-26 PROCEDURE — 3078F DIAST BP <80 MM HG: CPT | Performed by: INTERNAL MEDICINE

## 2023-10-26 PROCEDURE — 3077F SYST BP >= 140 MM HG: CPT | Performed by: INTERNAL MEDICINE

## 2023-10-26 RX ORDER — TORSEMIDE 20 MG/1
20 TABLET ORAL 2 TIMES DAILY
Qty: 90 TABLET | Refills: 0 | Status: SHIPPED | OUTPATIENT
Start: 2023-10-26

## 2023-10-26 RX ORDER — TRAMADOL HYDROCHLORIDE 50 MG/1
50 TABLET ORAL EVERY 6 HOURS PRN
Qty: 30 TABLET | Refills: 0 | Status: SHIPPED | OUTPATIENT
Start: 2023-10-26 | End: 2023-11-25

## 2023-10-26 RX ORDER — PROBENECID 500 MG/1
500 TABLET, FILM COATED ORAL 2 TIMES DAILY
Qty: 180 TABLET | Refills: 0 | Status: SHIPPED | OUTPATIENT
Start: 2023-10-26

## 2023-10-26 NOTE — PATIENT INSTRUCTIONS
Patient unable to get a surgical date tillearly January. Inform patient is very little I can do.   I informed her to talk to her insurance company and her congressman and update them of the dilemma as they are facing

## 2023-10-27 RX ORDER — TORSEMIDE 20 MG/1
20 TABLET ORAL 2 TIMES DAILY
Qty: 180 TABLET | Refills: 0 | OUTPATIENT
Start: 2023-10-27

## 2023-11-01 ENCOUNTER — HOSPITAL ENCOUNTER (OUTPATIENT)
Dept: CT IMAGING | Age: 78
Discharge: HOME | End: 2023-11-01
Attending: NURSE PRACTITIONER
Payer: MEDICARE

## 2023-11-01 DIAGNOSIS — R91.8 PULMONARY NODULES: ICD-10-CM

## 2023-11-01 PROCEDURE — 71250 CT THORAX DX C-: CPT | Performed by: NURSE PRACTITIONER

## 2023-11-07 ENCOUNTER — VIRTUAL PHONE E/M (OUTPATIENT)
Facility: CLINIC | Age: 78
End: 2023-11-07
Payer: COMMERCIAL

## 2023-11-07 DIAGNOSIS — R91.8 PULMONARY NODULES: ICD-10-CM

## 2023-11-07 DIAGNOSIS — Z72.0 TOBACCO ABUSE: ICD-10-CM

## 2023-11-07 DIAGNOSIS — R09.02 HYPOXEMIA: Primary | ICD-10-CM

## 2023-11-07 DIAGNOSIS — R06.09 DOE (DYSPNEA ON EXERTION): ICD-10-CM

## 2023-11-07 DIAGNOSIS — G47.33 OSA (OBSTRUCTIVE SLEEP APNEA): ICD-10-CM

## 2023-11-07 PROCEDURE — 99214 OFFICE O/P EST MOD 30 MIN: CPT | Performed by: NURSE PRACTITIONER

## 2023-11-07 PROCEDURE — 1159F MED LIST DOCD IN RCRD: CPT | Performed by: NURSE PRACTITIONER

## 2023-11-07 PROCEDURE — 1160F RVW MEDS BY RX/DR IN RCRD: CPT | Performed by: NURSE PRACTITIONER

## 2023-11-07 NOTE — PROGRESS NOTES
[facility] General Pulmonary Progress Note    History of Present Illness:  Cruz Richards is a 70year old female with significant PMHx ALEXANDRA,  CHF, Chronic respiratory failure who presents today for follow up to discuss 6MWT and CT chest results. Overall feels well despite SOB with minimal exertion. Unable to pull oxygen tanks due to balance issues therefore not bringing portable tanks to activities/appts outside the house. No new complaints. Previously 10/2023  Seen by Dr Binh Infante for ALEXANDRA; using CPAP nightly for 8 hrs but download shows average 4.45 minutes; large leak and machine turning off. Previously 7/2023  presents for follow - up to review PET scan results. Continues with dyspnea with exertion/walking. Checks oxygen saturation at rest which is always > 90%. Has not checked O2 sats with walking. Has oxygen tanks at home but to heavy to carry. Previously 6/21/23   presents for followup and CT review. Reports breathing is 60% better; less SOB with exertion. Overall feels well. Previously Dr Silverman Begun [id number]  78year old female active smoker (20+ pack years) with significant PMH of DM, CHF, pulmonary HTN, chronic hypoxic respiratory failure, obesity, ALEXANDRA who presents today for follow up of dyspnea. She denies new concerns today. Still with KYLE. Thinks slightly better after using breo, not using albuterol. No cough. Does note nasal dryness while using the oxygen and CPAP. Has been using oxygen at 4L instead of prescribed 2L at rest and 6L on exertion. Still smoking - interested in cessation therapies     3/8/2023 Previously  The patient had previously followed with [facility] for dyspnea and attributed to CHF, pulmonary HTN and obesity. She had previously trialed albuterol mdi and notes some slight improvement. Denies any cough, phlegm, wheeze. No chest pain/pressure, pleurisy. Has been trying to lose weight, down ~5 lbs over the last few months. Denies any hx of asthma, bronchitis, COPD. Active smoker - 1ppd, started in 46s, now smoking 2 cigs/day with coffee  Retired, previously in sales, not aware of any significant exposures to chemicals, fumes, asbestos or TB. Born in [place], moved to [place] age 7 and resided here since  No pets. Past Medical History:   Past Medical History:   Diagnosis Date    AIN (acute interstitial nephritis)     Cataracts, bilateral 1/12/2018    Congestive heart disease ([address]     Diabetes ([facility]     Essential hypertension     Hyperlipidemia     Obesity     ALEXANDRA (obstructive sleep apnea) 1/15/2020 [facility]    AHI 34 REM AHI [address] 59%     Sleep apnea     Tobacco abuse 1/12/2018        Past Surgical History:   Past Surgical History:   Procedure Laterality Date    COLONOSCOPY  2012    [facility]    COLONOSCOPY  05/18/2018    diverticulosis - repeat 8 years - Dr. Kathy Jamison  2005    left knee    TOTAL KNEE REPLACEMENT      Left knee          Family Medical History:   Family History   Problem Relation Age of Onset    Diabetes Mother     Other (Lung Cancer) Mother     Hypertension Mother     COPD Sister     Breast Cancer Maternal Aunt 61        Social History:   Social History     Socioeconomic History    Marital status:       Spouse name: Not on file    Number of children: Not on file    Years of education: Not on file    Highest education level: Not on file   Occupational History    Occupation: Hospital    Tobacco Use    Smoking status: Every Day     Packs/day: 1.00     Years: 50.00     Additional pack years: 0.00     Total pack years: 50.00     Types: Cigarettes     Start date: 1/10/1974     Passive exposure: Past    Smokeless tobacco: Former     Quit date: 2016    Tobacco comments:     Smokes 2 cigarettes daily currently    Vaping Use    Vaping Use: Never used   Substance and Sexual Activity    Alcohol use: No    Drug use: No    Sexual activity: Not Currently     Partners: Female   Other Topics Concern Caffeine Concern Yes    Exercise No    Seat Belt Not Asked    Special Diet No    Stress Concern Not Asked    Weight Concern Not Asked   Social History Narrative    Not on file     Social Determinants of Health     Financial Resource Strain: Not on file   Food Insecurity: Not on file   Transportation Needs: Not on file   Physical Activity: Not on file   Stress: Not on file   Social Connections: Not on file   Housing Stability: Not on file        Medications:   Current Outpatient Medications   Medication Sig Dispense Refill    torsemide 20 MG Oral Tab Take 1 tablet (20 mg total) by mouth 2 (two) times daily. 90 tablet 0    probenecid 500 MG Oral Tab Take 1 tablet (500 mg total) by mouth 2 (two) times daily. 180 tablet 0    traMADol 50 MG Oral Tab Take 1 tablet (50 mg total) by mouth every 6 (six) hours as needed for Pain. 30 tablet 0    alendronate 70 MG Oral Tab Take 1 tablet (70 mg total) by mouth every 7 days. 12 tablet 3    sacubitril-valsartan (ENTRESTO)  MG Oral Tab Take 1 tablet by mouth 2 (two) times daily. 180 tablet 0    gabapentin 100 MG Oral Cap Take 2 capsules (200 mg total) by mouth nightly. TAKE AT BEDTIME 180 capsule 1    atorvastatin 20 MG Oral Tab Take 1 tablet (20 mg total) by mouth nightly. 90 tablet 0    Potassium Chloride ER 10 MEQ Oral Tab CR Take 1 tablet (10 mEq total) by mouth daily. 90 tablet 1    METFORMIN HCL 1000 MG Oral Tab TAKE 1 TABLET(1000 MG) BY MOUTH TWICE DAILY WITH MEALS 180 tablet 0    OZEMPIC, 0.25 OR 0.5 MG/DOSE, 2 MG/3ML Subcutaneous Solution Pen-injector INJECT 0.5MG SUBCUTANEOUS UNDER THE SKIN ONCE A WEEK 9 mL 1    HYDRALAZINE 100 MG Oral Tab TAKE 1 TABLET(100 MG) BY MOUTH TWICE DAILY 180 tablet 1    varenicline 1 MG Oral Tab After finishing the 0.5mg dosing, then start this medication.  take 1 tablet (1MG)  by ORAL route 2 times every day with a glass of waterafter meals 60 tablet 0    ALBUTEROL 108 (90 Base) MCG/ACT Inhalation Aero Soln INHALE 1 PUFF INTO THE LUNGS EVERY 6 HOURS AS NEEDED FOR WHEEZING 8.5 g 0    fluticasone furoate-vilanterol (BREO ELLIPTA) 100-25 MCG/ACT Inhalation Aerosol Powder, Breath Activated Inhale 1 puff into the lungs daily. 1 each 11    albuterol (2.5 MG/3ML) 0.083% Inhalation Nebu Soln Take 3 mL (2.5 mg total) by nebulization 3 (three) times daily. 300 mL 0    Insulin Pen Needle (PEN NEEDLES) 32G X 4 MM Does not apply Misc 1 each every 7 days. 30 each 0    Insulin Pen Needle (PEN NEEDLES) 32G X 4 MM Does not apply Misc 1 each every 7 days. 30 each 5    BD PEN NEEDLE NAHUM 2ND GEN 32G X 4 MM Does not apply Misc USE DAILY 100 each 0    Respiratory Therapy Supplies (NEBULIZER) Does not apply Device Use as directed 1 each 0    colchicine 0.6 MG Oral Tab colchicine 0.6 mg tablet   TAKE 2 TABS (1.2mg) at first sign of glare up, then 1 tab (0.6mg) Q6H PRN if needed      triamcinolone 0.1 % External Cream Apply topically 2 (two) times daily as needed. 60 g 1    CARVEDILOL 25 MG Oral Tab TAKE 1 TABLET BY MOUTH TWICE DAILY AS DIRECTED 180 tablet 0    ASPIRIN EC LOW DOSE 81 MG Oral Tab EC Take 1 tablet (81 mg total) by mouth daily. 0       Review of Systems: Review of Systems   Constitutional:  Negative for activity change, appetite change, chills, diaphoresis, fever and unexpected weight change. HENT:  Negative for congestion, postnasal drip, rhinorrhea, sinus pressure, sinus pain, sneezing, sore throat, trouble swallowing and voice change. Respiratory:  Positive for shortness of breath. Negative for apnea, cough, choking, chest tightness, wheezing and stridor. Cardiovascular:  Negative for chest pain, palpitations and leg swelling. Musculoskeletal:  Negative for arthralgias. Skin:  Negative for pallor and rash. Allergic/Immunologic: Negative for immunocompromised state. Neurological:  Negative for dizziness and headaches. Hematological:  Negative for adenopathy. Results:  Personally reviewed    WBC: 7.8, done on 4/24/2023.   HGB: 12.8, done on 4/24/2023. PLT: 298, done on 4/24/2023. Glucose: 134, done on 10/3/2023. Cr: 1.06, done on 10/3/2023. GFR(AA): 53, done on 4/1/2022. GFR (non-AA): 46, done on 4/1/2022. CA: 8.8, done on 10/3/2023. Na: 140, done on 10/3/2023. K: 3.8, done on 10/3/2023. Cl: 106, done on 10/3/2023. CO2: 28, done on 10/3/2023. Last ALB was 3.1% done on 10/3/2023. CT CHEST LD NO CAD(CPT=71250)    Result Date: 11/1/2023  CONCLUSION:  1. Stable pulmonary nodules as described above. The largest 1 cm opacity in the right upper lobe was present on CT from 2019 but previously measured 0.7 x 0.5 cm. Follow-up CT chest in 1 year is recommended document stability 2. No acute pulmonary findings. LOCATION:  [facility]   Dictated by (CST): Graham Mccullough MD on 11/01/2023 at 9:22 AM     Finalized by (CST): Graham Mccullough MD on 11/01/2023 at 9:33 AM         Assessment/Plan:  #1. Dyspnea on exertion  Likely multifactorial due to CHF, morbid obesity, obesity hypoventilation, pulmonary hypertension, possible COPD  Active smoker - see below  3/2023 6MWT with 2L at rest, 6L on exertion. She does not qualify for POC  4/2023 PFTs with no obstruction, restriction, but severely reduced DLCO concerning for pulmonary HTN  Continue with breo 100 daily and albuterol PRN  Echo previously demonstrated pulmonary HTN  Check HRCT to rule out ILD  6/2023 Breathing is 60% better; continue current treatment  CT chest personally reviewed with Dr Scooby Wynn; no evidence of significant ILD however 2 nodules in the RUL have increased in size; report above. Recommend to have PET scan and return to review results  Pt has not followed up with cardiology in 'a while'. Recommended to see cardiologist  7/2023 Unchanged. Scheduled for 6MWT next week and making appt for cardiology today. 10/2023 Unchanged, continue Breo daily and albuterol prn. Stop smoking. Have 6MWT and obtain lighter POC tank if possible.   Have echo and see cardiology as scheduled  11/2023 6MWT shows pt ok at rest on RA but still needs 6L with activity. Discussed at length with pt as well as daughter Obi Teixeira as to the importance of using oxygen with exertion at home and outside the home. Continue current treatment     #2. Pulmonary nodules  -see above; 2 enlarging RUL nodules compared to 2/2022  DDx includes infection, inflammation and malignancy  plan for PET scan and return to review results  7/2023 PET scan shows SUV of 1.3 in the more prominent RUL nodule; no other areas of activity noted. Findings more consistent with benign process however will check CT chest in 3 months per guidelines  10/2023 3 month CT chest performed earlier than recommended; the subpleural RUL nodule has decreased in size and and the inferiorly RUL nodule measuring 9mm remains stable. As the timing of this CT was earlier than recommended will obtain a 3 month CT chest from the last CT 10/2023 and call with results of the imaging and 6MWT  11/2023 CT chest shows stability; recommend CT chest in 6 months        #3. Chronic hypoxic respiratory failure  Multifactorial due to CHF, pulmonary HTN, morbid obesity, obesity hypoventilation, possible COPD  Previous o2 walk with [facility] 2022: RA at rest, 3L on exertion  3/2023 6MWT with 2L at rest, 6L on exertion. She does not qualify for POC  4/2023 PFTs with no obstruction, restriction, but severely reduced DLCO concerning for pulmonary HTN  Continue with breo 100 daily and albuterol PRN  Check HRCT to rule out ILD  Check ABG to assess for hypercapnia  She continues to use and benefit from supplemental oxygenation. Advised to use as prescribed  Agreeable to pulmonary rehab  6/2023 CT report above; has not had ABG as recommended; will attempt to get when gets the PET scan.  Continue current treatment  Pt unable to carry oxygen tanks; will recheck 6MWT now that breathing is better with inhaler Breo  7/2023 Scheduled for 6MWT next week and plan for lighter wt POC if possible. Continue current treatment for now  10/2023 Using oxygen at home as needed for SOB but does not use portable oxygen. Will have 6MWT and discuss next steps  11/2023 6MWT shows pt ok at rest on RA but still needs 6L with activity. Discussed at       length with pt as well as daughter Niles Wylie as to the importance of using oxygen with       exertion at home and outside the home. Continue current treatment with Breo     #4. Tobacco abuse  Active smoker - 1ppd, started in 46s, now smoking 2 cigs/day with coffee  Smoking cessation counseling provided x 5 minutes and reviewed options including changes to habits as well as chantix/wellbutrin. She is now interested in chantix and NRT. Will send orders   6/2023 Down to 2 cigarettes in the morning with coffee. Talked at length above smoking cessation techniques; pt will work towards complete smoking cessation. 7/2023 Continues to work on quitting smoking  11/2023 Continues to work on quitting  Lung Cancer Counseling and Shared Decision Making Session in an Asymptomatic Smoker/Former Smoker   Brittney Watters is a 70year old female without current symptoms of lung cancer. History   Smoking Status    Every Day    Packs/day: 1.00    Years: 50.00    Types: Cigarettes    Start date: 1/10/1974   Smokeless Tobacco    Former    Quit date: 2016        She received information on the importance of adherence to annual lung cancer Low Dose CT (LDCT) screening, the impact of her comorbidities and her ability or willingness to undergo diagnosis and treatment. she qualifies for low dose CT lung cancer screening, however due to routine CT chest monitoring nodules, low dose CT lung cancer screening should be postponed until above is remedied.     We counseled the importance of maintaining cigarette smoking abstinence if she is a former smoker and the importance of smoking cessation if she is a current smoker and we discussed and furnished information about tobacco cessation interventions. #5. ALEXANDRA  Pt reports waking up and taking mask off; will work to keep mask on and ask for new masks   No c/o increased pressure or not getting enough air  Using and benefiting from CPAP therapy  Follow-up with new download in 4 weeks  7/2023 Scheduled to see Dr Deanna Dhillon later this month  10/2023 Download pulled as pt coming in for followup  ALEXANDRA treatment: Positive airway pressure  Positive airway pressure type: CPAP   Pressure setting: 10  Provider: Aleksandra  Mask: full face mask   Positive airway pressure machine: emoteShare Air Sense 10 Autoset   Compliance(mask on time): 4.45 hours/day (pt states it is on for 8hrs); percent of days used: 100%  Compliance interval (number of days): 90/90 days; percent of days >= to 4 hs: 64%  Mask leak: 95th % 102.7; AHI: 2.3  Discussed results of download with patient; pt reports using for 8 hours at least nightly  Recommend to tighten straps and get refitted by Lucinda Boykin  Rescheduled followup with Dr Deanna Dhillon in 5 weeks for 30 day download recheck  11/2023 Aware of appt to see CARRI Winters  [facility]monary   11/7/2023    This visit is conducted using Telemedicine with live, interactive video and audio. Patient has been referred to the [facility] website at [email or web address] to review the yearly Consent to Treat document. Patient understands and accepts financial responsibility for any deductible, co-insurance and/or co-pays associated with this service.

## 2023-11-10 ENCOUNTER — TELEPHONE (OUTPATIENT)
Dept: INTERNAL MEDICINE CLINIC | Facility: CLINIC | Age: 78
End: 2023-11-10

## 2023-11-10 NOTE — TELEPHONE ENCOUNTER
Patient called in asking if she should keep her appointment for 11/14 with VM, since patient was just seen on 10/26. Please advise.

## 2023-11-20 NOTE — PROGRESS NOTES
Lindsey Morgan  2023 - 2023  : 1945  Age: 70 years  [address], [address]  Phone: [phone]  Compliance Report  Usage 2023 - 2023  Usage days 90/90 days (100%)  >= 4 hours 69 days (77%)  < 4 hours 21 days (23%)  Usage hours 516 hours 55 minutes  Average usage (total days) 5 hours 45 minutes  Average usage (days used) 5 hours 45 minutes  Median usage (days used) 5 hours 41 minutes  Total used hours (value since last reset - 2023) 2,261 hours  AirSense 10 AutoSet  Serial number [id number]  Mode CPAP  Set pressure 10 cmH2O  EPR Fulltime  EPR level 2  Therapy  Leaks - L/min Median: 59.1 95th percentile: 97.9 Maximum: 110.2  Events per hour AI: 2.6 HI: 0.3 AHI: 2.9  Apnea Index Central: 0.0 Obstructive: 0.3 Unknown: 2.3  RERA Index 0.1  Cheyne-Greenwood respiration (average duration per night) 1 minutes (1%)

## 2023-11-21 ENCOUNTER — OFFICE VISIT (OUTPATIENT)
Dept: ELECTROPHYSIOLOGY | Facility: HOSPITAL | Age: 78
End: 2023-11-21
Attending: INTERNAL MEDICINE
Payer: MEDICARE

## 2023-11-21 ENCOUNTER — OFFICE VISIT (OUTPATIENT)
Facility: CLINIC | Age: 78
End: 2023-11-21
Payer: COMMERCIAL

## 2023-11-21 VITALS
RESPIRATION RATE: 16 BRPM | BODY MASS INDEX: 42.88 KG/M2 | HEIGHT: 63 IN | SYSTOLIC BLOOD PRESSURE: 122 MMHG | DIASTOLIC BLOOD PRESSURE: 64 MMHG | OXYGEN SATURATION: 91 % | HEART RATE: 74 BPM | TEMPERATURE: 97 F | WEIGHT: 242 LBS

## 2023-11-21 DIAGNOSIS — R20.2 TINGLING OF BOTH FEET: ICD-10-CM

## 2023-11-21 DIAGNOSIS — I27.20 PULMONARY HTN (HCC): Chronic | ICD-10-CM

## 2023-11-21 DIAGNOSIS — J96.11 CHRONIC HYPOXEMIC RESPIRATORY FAILURE (HCC): ICD-10-CM

## 2023-11-21 DIAGNOSIS — R09.02 HYPOXEMIA: ICD-10-CM

## 2023-11-21 DIAGNOSIS — G47.33 OSA ON CPAP: Primary | Chronic | ICD-10-CM

## 2023-11-21 DIAGNOSIS — J96.11 CHRONIC RESPIRATORY FAILURE WITH HYPOXIA (HCC): ICD-10-CM

## 2023-11-21 PROCEDURE — 3074F SYST BP LT 130 MM HG: CPT | Performed by: INTERNAL MEDICINE

## 2023-11-21 PROCEDURE — 95910 NRV CNDJ TEST 7-8 STUDIES: CPT | Performed by: OTHER

## 2023-11-21 PROCEDURE — 3008F BODY MASS INDEX DOCD: CPT | Performed by: INTERNAL MEDICINE

## 2023-11-21 PROCEDURE — 95885 MUSC TST DONE W/NERV TST LIM: CPT | Performed by: OTHER

## 2023-11-21 PROCEDURE — 95886 MUSC TEST DONE W/N TEST COMP: CPT | Performed by: OTHER

## 2023-11-21 PROCEDURE — 1160F RVW MEDS BY RX/DR IN RCRD: CPT | Performed by: INTERNAL MEDICINE

## 2023-11-21 PROCEDURE — 99214 OFFICE O/P EST MOD 30 MIN: CPT | Performed by: INTERNAL MEDICINE

## 2023-11-21 PROCEDURE — 3078F DIAST BP <80 MM HG: CPT | Performed by: INTERNAL MEDICINE

## 2023-11-21 PROCEDURE — 1159F MED LIST DOCD IN RCRD: CPT | Performed by: INTERNAL MEDICINE

## 2023-11-22 PROBLEM — R20.2 TINGLING OF BOTH FEET: Status: ACTIVE | Noted: 2023-11-22

## 2023-11-22 NOTE — PROCEDURES
[facility]  Nerve Conduction & EMG Report                      [place], [address]   EMG department   [address], [address]  [phone]          Patient name: Lewis Swift  YOB: 1945  Referring provider: Dr. Kelvin Bolivar  Reason for study: Evaluate for polyneuropathy  Brief history: 70year old female several year history of tingling and numbness in the feet      Sensory and motor nerve conduction studies, and needle EMG:  Please see separate sheet for waveforms and quantitative nerve conduction data, as well as for tabulated form of electromyographic data. Summary:   1. The right sural sensory response was normal.  Left sural sensory response was also within normal range. A sural to radial sensory amplitude ratio was performed, and it was then 0.4.  2.  The right radial sensory response was normal.  3.  The bilateral peroneal motor responses were normal.  4.  The left tibial motor response had very low amplitude. The right tibial motor response was normal.  5.  Needle EMG using a concentric needle was performed on selected muscles of the bilateral lower extremities. All muscles tested were normal.    Conclusion:  1. Although the sural sensory amplitudes were normal, the sural to radial sensory amplitude ratio was low. 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 2.  There is no evidence of a lumbar radiculopathy or mononeuropathy in the lower extremity.       Norbert Mckeon, DO  Neurology and Neuromuscular medicine  [facility]

## 2023-11-28 ENCOUNTER — TELEPHONE (OUTPATIENT)
Dept: INTERNAL MEDICINE CLINIC | Facility: CLINIC | Age: 78
End: 2023-11-28

## 2023-11-28 NOTE — TELEPHONE ENCOUNTER
Signed statement of certifying physician for therapeutic footwear faxed to [facility]   Confirmation received   Sent to scan and copy placed in accordion

## 2023-12-01 ENCOUNTER — TELEPHONE (OUTPATIENT)
Facility: CLINIC | Age: 78
End: 2023-12-01

## 2023-12-01 DIAGNOSIS — R09.02 HYPOXEMIA: ICD-10-CM

## 2023-12-01 DIAGNOSIS — J84.9 ILD (INTERSTITIAL LUNG DISEASE) (HCC): ICD-10-CM

## 2023-12-01 DIAGNOSIS — G47.33 OSA ON CPAP: Primary | ICD-10-CM

## 2023-12-01 DIAGNOSIS — R91.1 PULMONARY NODULE: ICD-10-CM

## 2023-12-04 RX ORDER — HYDRALAZINE HYDROCHLORIDE 100 MG/1
100 TABLET, FILM COATED ORAL 2 TIMES DAILY
Qty: 180 TABLET | Refills: 1 | Status: SHIPPED | OUTPATIENT
Start: 2023-12-04

## 2023-12-06 NOTE — TELEPHONE ENCOUNTER
No protocol   Probenecid 500mg     LOV: 10/26/23   RTC: none on file   Filled: 10/26/23 #180 0 refills   Labs: 10/3/23   Future Appointments   Date Time Provider Marline Vidal   1/18/2024  9:20 AM DURAN Kong [facility]   5/13/2024  9:30 AM Flor Pena MD  [facility]   5/20/2024  9:30 AM CARRI Castellon  [facility]   11/25/2024 10:30 AM Milly Everett MD [facility]

## 2023-12-07 RX ORDER — PROBENECID 500 MG/1
500 TABLET, FILM COATED ORAL 2 TIMES DAILY
Qty: 180 TABLET | Refills: 0 | Status: SHIPPED | OUTPATIENT
Start: 2023-12-07

## 2023-12-18 RX ORDER — TORSEMIDE 20 MG/1
20 TABLET ORAL 2 TIMES DAILY
Qty: 180 TABLET | Refills: 0 | Status: SHIPPED | OUTPATIENT
Start: 2023-12-18

## 2023-12-27 ENCOUNTER — TELEPHONE (OUTPATIENT)
Dept: INTERNAL MEDICINE CLINIC | Facility: CLINIC | Age: 78
End: 2023-12-27

## 2023-12-27 NOTE — TELEPHONE ENCOUNTER
Patient is calling to let us know that her pharmacy is out of ozempic and it is on back order. Explained that we usually have patient's call pharmacies in their area and then let us know who has it in stock and send it there.    Pt states she wants to know what Dr. Bell wants her to do about that?

## 2024-01-03 RX ORDER — ATORVASTATIN CALCIUM 20 MG/1
20 TABLET, FILM COATED ORAL NIGHTLY
Qty: 90 TABLET | Refills: 0 | Status: SHIPPED | OUTPATIENT
Start: 2024-01-03

## 2024-01-03 RX ORDER — POTASSIUM CHLORIDE 750 MG/1
10 TABLET, FILM COATED, EXTENDED RELEASE ORAL DAILY
Qty: 90 TABLET | Refills: 1 | Status: SHIPPED | OUTPATIENT
Start: 2024-01-03

## 2024-01-03 RX ORDER — TORSEMIDE 20 MG/1
20 TABLET ORAL 2 TIMES DAILY
Qty: 180 TABLET | Refills: 0 | Status: SHIPPED | OUTPATIENT
Start: 2024-01-03

## 2024-01-03 NOTE — TELEPHONE ENCOUNTER
LOV: 10/26/23   RTC: none   Labs: 10/3/23   Future Appointments   Date Time Provider Department Center   1/18/2024  9:20 AM Jacquie Daniel PA [facility]   5/13/2024  9:30 AM Tony Vaughn MD  [facility]   5/20/2024  9:30 AM Araceli De Dios APRN  [facility]   11/25/2024 10:30 AM Blair Everett MD [facility]

## 2024-01-03 NOTE — TELEPHONE ENCOUNTER
LOV: 10/26/2023 with Dr. Bell  RTC: no follow-up on file  Last Relevant Labs: 10/3/2023  Filled: 9/5/2023    #90 with 1 refill    Future Appointments   Date Time Provider Department Center   1/18/2024  9:20 AM Jacquie Daniel PA [facility]   5/13/2024  9:30 AM Tony Vaughn MD  [facility]   5/20/2024  9:30 AM Araceli De Dios APRN  [facility]   11/25/2024 10:30 AM Blair Everett MD [facility]

## 2024-01-05 ENCOUNTER — HOSPITAL ENCOUNTER (OUTPATIENT)
Facility: HOSPITAL | Age: 79
Setting detail: HOSPITAL OUTPATIENT SURGERY
Discharge: HOME | End: 2024-01-05
Attending: ORTHOPAEDIC SURGERY | Admitting: ORTHOPAEDIC SURGERY
Payer: MEDICARE

## 2024-01-05 ENCOUNTER — TELEPHONE (OUTPATIENT)
Dept: ORTHOPEDICS CLINIC | Facility: CLINIC | Age: 79
End: 2024-01-05

## 2024-01-05 VITALS
BODY MASS INDEX: 43.41 KG/M2 | OXYGEN SATURATION: 93 % | RESPIRATION RATE: 18 BRPM | HEIGHT: 63 IN | TEMPERATURE: 97 F | SYSTOLIC BLOOD PRESSURE: 151 MMHG | HEART RATE: 81 BPM | DIASTOLIC BLOOD PRESSURE: 72 MMHG | WEIGHT: 245 LBS

## 2024-01-05 DIAGNOSIS — M65.332 TRIGGER MIDDLE FINGER OF LEFT HAND: Primary | ICD-10-CM

## 2024-01-05 PROCEDURE — 0LN80ZZ RELEASE LEFT HAND TENDON, OPEN APPROACH: ICD-10-PCS | Performed by: ORTHOPAEDIC SURGERY

## 2024-01-05 NOTE — DISCHARGE INSTRUCTIONS
Dressing: Keep the dressing on until postop day 5. Keep dressing covered and dry while showering. No baths or submerging incision in water. Keep incision covered with band aid until follow up.    Weight Bearing: No lifting greater than 1 pounds.    Activity: Resume your normal activities of daily living within the 1 pound lifting restriction.    Pain: Ice and elevate extremity to minimize swelling.  Take acetaminophen and ibuprofen for pain.      Follow-up: Call for follow-up appointment if you do not have one.

## 2024-01-05 NOTE — H&P
Clinic Note [facility]     Assessment/Plan:  78 year old       Left middle finger trigger finger-status post 3 injections.  The last injection did not help.  At this point I recommend surgery.  We discussed A1 pulley release of the left middle finger.  She elected to proceed.  She voiced understanding of risks and benefits as well as recovery time and expected outcomes.  We get her on the schedule in January.  All of her questions were answered.      Physical Exam:    Ht 5' 3\" (1.6 m)   Wt 245 lb (111.1 kg)   BMI 43.40 kg/m²     Constitutional: NAD. AOx3. Well-developed and Well-nourished.   Psychiatric: Normal mood/ affect/ behavior. Judgment and thought content normal.     Left upper Extremity:   Inspection: Skin Intact. No skin lesions. No gross deformity.   Palpation:  (+) TTP over A1 pulley.   Motion: Elbow: normal bilateral symmetric ext/flex  Wrist: normal bilateral symmetric ext/flex/sup/pro  Finger: full composite fist   Special Tests:  (+) Active triggering. (-) PIPJ contracture. Normally sensate digit. Normally perfused digit.       CC: Triggering of left middle finger    HPI: 78 year old left-hand-dominant female presents with triggering of left middle. It started 2 months ago. It has failed to improve/resolve. Pain level is severe at night. Pain is described as cramping and burning. Patient has tried nothing.     (+) pain at A1 Pulley  (+) active triggering    Interval history: Patient states her left middle finger trigger finger has returned.  Last injection did not help significantly    Past Medical History:   Diagnosis Date    AIN (acute interstitial nephritis)     Back problem     Cataracts, bilateral 01/12/2018    Congestive heart disease (HCC)     Diabetes (HCC)     Diverticulosis of large intestine     Essential hypertension     Gout     High blood pressure     High cholesterol     Hyperlipidemia     Neuropathy     feet    Obesity     ALEXANDRA (obstructive sleep apnea) 1/15/2020 [facility]     AHI 34 REM AHI 85 Sao2 Andrew 59%     Sleep apnea     Tobacco abuse 01/12/2018    Visual impairment      Past Surgical History:   Procedure Laterality Date    COLONOSCOPY  2012    [facility]    COLONOSCOPY  05/18/2018    diverticulosis - repeat 10 years - Dr. Montague    COLONOSCOPY      KNEE REPLACEMENT SURGERY  2005    left knee    TOTAL KNEE REPLACEMENT      Left knee      No current outpatient medications on file.     Allergies   Allergen Reactions    Allopurinol RASH     Family History   Problem Relation Age of Onset    Diabetes Mother     Other (Lung Cancer) Mother     Hypertension Mother     COPD Sister     Breast Cancer Maternal Aunt 60     Social History     Occupational History    Occupation: Hospital    Tobacco Use    Smoking status: Every Day     Packs/day: 1.00     Years: 50.00     Additional pack years: 0.00     Total pack years: 50.00     Types: Cigarettes     Start date: 1/10/1974     Passive exposure: Past    Smokeless tobacco: Former     Quit date: 2016    Tobacco comments:     Smokes 2 cigarettes daily currently    Vaping Use    Vaping Use: Never used   Substance and Sexual Activity    Alcohol use: No    Drug use: No    Sexual activity: Not Currently     Partners: Female        Review of Systems (negative unless bolded):  General: fevers, chills, fatigure  CV:  chest pain, palpitations, leg swelling  Msk: bodyaches, neck pain, neck stiffness  Skin: rashes, open wounds, nonhealing ulcers  Hem: bleeds easily, bruise easily, immunocompromised  Neuro: dizziness, light headedness, headaches  Psych: anxious, depressed, anger issues    Jacquie Daniel PA-C  Hand, Wrist, & Elbow Surgery  Physician Assistant to Dr. Jake Serna  [facility]  [address] [place] [address], [address], [email or web address]  [email or web address]  t: [phone]  f: [phone]

## 2024-01-06 NOTE — OPERATIVE REPORT
Operative Note    Patient Name: Anette Frazier    1/5/2024    Preoperative Diagnosis:     Trigger middle finger of left hand [M65.332]    Postoperative Diagnosis:     Trigger middle finger of left hand [M65.332]    Surgeon(s) and Role:     * Jake Serna MD - Primary     Assistant: PA: Jacquie Daniel PA    A PA was needed for the successful completion of this case. She was essential for the proper positioning of patient, manipulation of instruments, proper exposure, manipulation of soft tissue, and wound closure.    Procedures:     Release of the A1 pulley of left middle finger (CPT 60133)    Antibiotics: None    Surgical Findings: tenosynovitis, mild fraying of FDS    Anesthesia: Local    Complications: None    Condition: Stable    Estimated Blood Loss: 1mL      Indications: 78 year old female with triggering of the above digit that failed non-surgical management. I discussed with the patient the surgical risks which include but not limited to infection, neurovascular bundle injury, and persistent triggering, any of which could require additional surgery.  The patient also understood the expected postoperative recovery timeline.  Patient wished to proceed with surgery having understood the risks and expected recovery timeline.      Procedure Details:    The patient was met in the preoperative holding area where consent was verified, digit was marked with surgeon's initial, and the H&P was updated.  Patient was brought to the operating room on a transport cart.  Patient was then transferred onto the operating room table with a left arm table.  Bony prominences were padded.  A upper arm tourniquet was placed.  Patient was prepped and draped in the usual sterile fashion.  I injected 5 mL mixture of 1% lidocaine and 0.5% bupivacaine with epinephrine over the A1 pulley of the digit.  The arm was elevated and exsanguinated using an Esmarch bandage.  Tourniquet was raised to 250 mmHg.   A longitudinal incision  through the dermis was made over the A1 pulley for the left long finger using a 15 blade.  Adson's and Romero scissors was used to dissect through the subcutaneous tissue onto the A1 olvin.  Ragnell retractors were used to protect the radial and ulnar digital neurovascular bundles.  The entirety of the A1 pulley was identified and incised using a 15 blade.  Romero scissors was used to spread the leaflets of the incised A1 pulley.  Patient was then asked to flex and extend the digit confirming that no triggering was noted. The tourniquet was then lowered and bipolar cautery was used to achieve hemostasis.        Closure:  4-0 nylon was used to reapproximate the skin edges in a simple interrupted fashion.    Dressing/Splint:  Bacitracin, adaptic, 4 x 4 sterile gauze, and 2 inch ace wrap was loosely wrapped to hold the sterile dressing in place.    Post Operative:  Patient was taken to PACU for further recovery and discharge home.    Jake Serna MD  Hand, Wrist, & Elbow Surgery  [facility]  [address] [place] [address], [address], [email or web address]  [email or web address]  t: [phone]  f: [phone]

## 2024-01-08 ENCOUNTER — TELEPHONE (OUTPATIENT)
Dept: ORTHOPEDICS CLINIC | Facility: CLINIC | Age: 79
End: 2024-01-08

## 2024-01-08 NOTE — TELEPHONE ENCOUNTER
Future Appointments   Date Time Provider Department Center   1/18/2024  9:20 AM Jacquie Daniel PA [facility]       Patient called and told by Dr. Serna to have her stitched taken out within 5 days. Please advise. Thanks.    Patient may be reached at [phone]

## 2024-01-08 NOTE — TELEPHONE ENCOUNTER
Per Dr. Serna: her surgical dressing can come off 5 days after surgery, but her appointment for 1/18/24 is the appropriate time for her to come in and have her sutures removed if everything looks good. Please inform the patient. Thanks

## 2024-01-08 NOTE — TELEPHONE ENCOUNTER
Per Jacquie she can be DB with another PO on 1/22. Please block the slot she was in for 1/18 and any other slots that day. Thanks!

## 2024-01-09 ENCOUNTER — TELEPHONE (OUTPATIENT)
Dept: ORTHOPEDICS CLINIC | Facility: CLINIC | Age: 79
End: 2024-01-09

## 2024-01-09 DIAGNOSIS — M65.332 TRIGGER MIDDLE FINGER OF LEFT HAND: Primary | ICD-10-CM

## 2024-01-09 NOTE — TELEPHONE ENCOUNTER
[facility] nurse is seeing pt for monitoring of VS etc, states pt has no means to go to outpatient OT requesting a HH order for OT.  Pended.  Does pt have to wait until the stiches are removed to begin OT?  Please advise, thank you?

## 2024-01-09 NOTE — TELEPHONE ENCOUNTER
Flavia from [facility] called requesting for at home therapy for patient.  Order can be faxed to [phone]  Flavia can be reached at [phone]

## 2024-01-09 NOTE — TELEPHONE ENCOUNTER
Scheduled MAS with patient for 3/11    Protocol failed     Metformin 1000mg     LOV: 10/26/23   RTC: no follow ups on file   Filled: 9/5/23 # 180 0 refills   Labs: 10/3/23   Future Appointments   Date Time Provider Department Center   1/22/2024  9:20 AM Jacquie Daniel PA [id number]   3/11/2024 10:00 AM Jonel Bell MD [facility]   5/13/2024  9:30 AM Tony Vaughn MD  [facility]   5/20/2024  9:30 AM Araceli De Dios APRN  [facility] [place]   11/25/2024 10:30 AM Blair Everett MD [facility] [place]

## 2024-01-10 NOTE — TELEPHONE ENCOUNTER
Called Flavia and reverified fax number.  She states they are having trouble with the fax machine and gave me an alternative number.     Previous fax number: [phone]   New fax number: [phone]    Failed fax noted.

## 2024-01-10 NOTE — TELEPHONE ENCOUNTER
Called Flavia and let her know order was faxed and patient does not need to wait for sutures to be removed to start OT.  No further questions asked.

## 2024-01-10 NOTE — TELEPHONE ENCOUNTER
Jacquie Daniel PA   to [facility]   EK    1/10/24 11:53 AM  Entered. Patient does not need to wait for sutures to come out. She should start OT asap

## 2024-01-15 ENCOUNTER — TELEPHONE (OUTPATIENT)
Dept: ORTHOPEDICS CLINIC | Facility: CLINIC | Age: 79
End: 2024-01-15

## 2024-01-15 NOTE — TELEPHONE ENCOUNTER
Francie is calling from [facility], wanting to know if she can continue therapy with patient while she has sutures.

## 2024-01-16 ENCOUNTER — TELEPHONE (OUTPATIENT)
Dept: PHYSICAL THERAPY | Facility: HOSPITAL | Age: 79
End: 2024-01-16

## 2024-01-16 NOTE — TELEPHONE ENCOUNTER
Called kristine and Patient Anette and let them know that we do recommend starting therapy now prior to the sutures coming out.  They verbalized understanding.     Jacquie Daniel PA   to [facility]   EK    1/10/24 11:53 AM  Entered. Patient does not need to wait for sutures to come out. She should start OT asap

## 2024-01-22 ENCOUNTER — OFFICE VISIT (OUTPATIENT)
Dept: ORTHOPEDICS CLINIC | Facility: CLINIC | Age: 79
End: 2024-01-22
Payer: COMMERCIAL

## 2024-01-22 VITALS — WEIGHT: 245 LBS | BODY MASS INDEX: 43.41 KG/M2 | HEIGHT: 63 IN

## 2024-01-22 DIAGNOSIS — M65.332 TRIGGER MIDDLE FINGER OF LEFT HAND: Primary | ICD-10-CM

## 2024-01-22 PROCEDURE — 1159F MED LIST DOCD IN RCRD: CPT | Performed by: PHYSICIAN ASSISTANT

## 2024-01-22 PROCEDURE — 99024 POSTOP FOLLOW-UP VISIT: CPT | Performed by: PHYSICIAN ASSISTANT

## 2024-01-22 PROCEDURE — 3008F BODY MASS INDEX DOCD: CPT | Performed by: PHYSICIAN ASSISTANT

## 2024-01-22 PROCEDURE — 1125F AMNT PAIN NOTED PAIN PRSNT: CPT | Performed by: PHYSICIAN ASSISTANT

## 2024-01-22 PROCEDURE — 1160F RVW MEDS BY RX/DR IN RCRD: CPT | Performed by: PHYSICIAN ASSISTANT

## 2024-01-22 NOTE — TELEPHONE ENCOUNTER
Spoke to pt, informed her to stop taking the metformin. Scheduled f/u appt for 2/8/24 at 12:45 PM. Pt will bring all of her medications to this visit.     Left previously scheduled appt for 3/11/24 at 10:00 AM in place, pt to discuss with VM if this appt is still needed.

## 2024-01-22 NOTE — PROGRESS NOTES
Clinic Note [facility]     Assessment/Plan:  78 year old       Left middle finger A1 pulley release 1/5/2024-removed her stitches today.  She will progress to daily activities as tolerated.  She will work on active and passive motion and continue with home therapy until she is close to functionally normal.  All questions were answered.      Physical Exam:    Ht 5' 3\" (1.6 m)   Wt 245 lb (111.1 kg)   BMI 43.40 kg/m²     Constitutional: NAD. AOx3. Well-developed and Well-nourished.   Psychiatric: Normal mood/ affect/ behavior. Judgment and thought content normal.     Left upper Extremity:   Inspection:  incisions healing well with no signs of infection   Palpation:  (+) TTP over the incision but mild   Motion: Elbow: normal bilateral symmetric ext/flex  Wrist: normal bilateral symmetric ext/flex/sup/pro  Finger: full composite fist, 1 cm tip to palm with the middle finger           CC: Triggering of left middle finger    HPI: 78 year old left-hand-dominant female presents with triggering of left middle. It started 2 months ago. It has failed to improve/resolve. Pain level is severe at night. Pain is described as cramping and burning. Patient has tried nothing.     (+) pain at A1 Pulley  (+) active triggering    Interval history: Patient underwent A1 pulley release and her postop pain is slowly improving.  She is been working on range of motion and having home health help her with that.    Past Medical History:   Diagnosis Date    AIN (acute interstitial nephritis)     Back problem     Cataracts, bilateral 01/12/2018    Congestive heart disease (HCC)     Diabetes (HCC)     Diverticulosis of large intestine     Essential hypertension     Gout     High blood pressure     High cholesterol     Hyperlipidemia     Neuropathy     feet    Obesity     ALEXANDRA (obstructive sleep apnea) 1/15/2020 [facility]    AHI 34 REM AHI 85 Sao2 Andrew 59%     Sleep apnea     Tobacco abuse 01/12/2018    Visual impairment      Past Surgical  History:   Procedure Laterality Date    COLONOSCOPY  2012    [facility]    COLONOSCOPY  05/18/2018    diverticulosis - repeat 10 years - Dr. Montague    COLONOSCOPY      KNEE REPLACEMENT SURGERY  2005    left knee    TOTAL KNEE REPLACEMENT      Left knee      Current Outpatient Medications   Medication Sig Dispense Refill    metFORMIN HCl 1000 MG Oral Tab Take 1 tablet (1,000 mg total) by mouth 2 (two) times daily with meals. 180 tablet 0    ATORVASTATIN 20 MG Oral Tab TAKE 1 TABLET(20 MG) BY MOUTH EVERY NIGHT 90 tablet 0    torsemide 20 MG Oral Tab TAKE 1 TABLET(20 MG) BY MOUTH TWICE DAILY 180 tablet 0    Potassium Chloride ER 10 MEQ Oral Tab CR Take 1 tablet (10 mEq total) by mouth daily. 90 tablet 1    probenecid 500 MG Oral Tab Take 1 tablet (500 mg total) by mouth 2 (two) times daily. 180 tablet 0    hydrALAZINE 100 MG Oral Tab Take 1 tablet (100 mg total) by mouth 2 (two) times daily. 180 tablet 1    alendronate 70 MG Oral Tab Take 1 tablet (70 mg total) by mouth every 7 days. 12 tablet 3    sacubitril-valsartan (ENTRESTO)  MG Oral Tab Take 1 tablet by mouth 2 (two) times daily. 180 tablet 0    gabapentin 100 MG Oral Cap Take 2 capsules (200 mg total) by mouth nightly. TAKE AT BEDTIME 180 capsule 1    OZEMPIC, 0.25 OR 0.5 MG/DOSE, 2 MG/3ML Subcutaneous Solution Pen-injector INJECT 0.5MG SUBCUTANEOUS UNDER THE SKIN ONCE A WEEK (Patient taking differently: 0.5 mg once a week. Takes on Sundays) 9 mL 1    ALBUTEROL 108 (90 Base) MCG/ACT Inhalation Aero Soln INHALE 1 PUFF INTO THE LUNGS EVERY 6 HOURS AS NEEDED FOR WHEEZING 8.5 g 0    fluticasone furoate-vilanterol (BREO ELLIPTA) 100-25 MCG/ACT Inhalation Aerosol Powder, Breath Activated Inhale 1 puff into the lungs daily. 1 each 11    albuterol (2.5 MG/3ML) 0.083% Inhalation Nebu Soln Take 3 mL (2.5 mg total) by nebulization 3 (three) times daily. 300 mL 0    Insulin Pen Needle (PEN NEEDLES) 32G X 4 MM Does not apply Misc 1 each every 7 days. 30 each  0    Insulin Pen Needle (PEN NEEDLES) 32G X 4 MM Does not apply Misc 1 each every 7 days. 30 each 5    BD PEN NEEDLE NAHUM 2ND GEN 32G X 4 MM Does not apply Misc USE DAILY 100 each 0    Respiratory Therapy Supplies (NEBULIZER) Does not apply Device Use as directed 1 each 0    colchicine 0.6 MG Oral Tab colchicine 0.6 mg tablet   TAKE 2 TABS (1.2mg) at first sign of glare up, then 1 tab (0.6mg) Q6H PRN if needed      triamcinolone 0.1 % External Cream Apply topically 2 (two) times daily as needed. 60 g 1    CARVEDILOL 25 MG Oral Tab TAKE 1 TABLET BY MOUTH TWICE DAILY AS DIRECTED 180 tablet 0    ASPIRIN EC LOW DOSE 81 MG Oral Tab EC Take 1 tablet (81 mg total) by mouth daily.  0     Allergies   Allergen Reactions    Allopurinol RASH     Family History   Problem Relation Age of Onset    Diabetes Mother     Other (Lung Cancer) Mother     Hypertension Mother     COPD Sister     Breast Cancer Maternal Aunt 60     Social History     Occupational History    Occupation: [employer]    Tobacco Use    Smoking status: Every Day     Packs/day: 1.00     Years: 50.00     Additional pack years: 0.00     Total pack years: 50.00     Types: Cigarettes     Start date: 1/10/1974     Passive exposure: Past    Smokeless tobacco: Former     Quit date: 2016    Tobacco comments:     Smokes 2 cigarettes daily currently    Vaping Use    Vaping Use: Never used   Substance and Sexual Activity    Alcohol use: No    Drug use: No    Sexual activity: Not Currently     Partners: Female        Review of Systems (negative unless bolded):  General: fevers, chills, fatigure  CV:  chest pain, palpitations, leg swelling  Msk: bodyaches, neck pain, neck stiffness  Skin: rashes, open wounds, nonhealing ulcers  Hem: bleeds easily, bruise easily, immunocompromised  Neuro: dizziness, light headedness, headaches  Psych: anxious, depressed, anger issues    Jacquie Daniel PA-C  Hand, Wrist, & Elbow Surgery  Physician Assistant to Dr. Jake Serna  [facility]  [address] [place] [address], [address], [email or web address]  [email or web address]  t: [phone]  f: [phone]     Statement Selected

## 2024-02-05 RX ORDER — CARVEDILOL 25 MG/1
25 TABLET ORAL 2 TIMES DAILY
Qty: 180 TABLET | Refills: 0 | Status: SHIPPED | OUTPATIENT
Start: 2024-02-05

## 2024-02-05 NOTE — TELEPHONE ENCOUNTER
Protocol passed     Carvedilol 25mg     LOV: 10/26/23   RTC: no follow ups on file   Labs: 10/3/23   Filled: 10/30/23 # 90   Future Appointments   Date Time Provider Department Center   2/8/2024 12:15 PM [facility]   2/8/2024 12:45 PM Jonel Bell MD [facility] [place]   3/11/2024 10:00 AM Jonel Bell MD [facility] [place]   5/13/2024  9:30 AM Tony Vaughn MD  [facility] [place]   5/20/2024  9:30 AM Araceli De Dios APRN  [facility] [place]   11/25/2024 10:30 AM Blair Everett MD [facility] [place]

## 2024-02-07 ENCOUNTER — TELEPHONE (OUTPATIENT)
Facility: CLINIC | Age: 79
End: 2024-02-07

## 2024-02-07 RX ORDER — FLUTICASONE FUROATE AND VILANTEROL 100; 25 UG/1; UG/1
1 POWDER RESPIRATORY (INHALATION) DAILY
Qty: 180 EACH | Refills: 0 | Status: SHIPPED | OUTPATIENT
Start: 2024-02-07

## 2024-02-07 RX ORDER — FLUTICASONE FUROATE AND VILANTEROL 100; 25 UG/1; UG/1
1 POWDER RESPIRATORY (INHALATION) DAILY
Qty: 60 EACH | Refills: 0 | Status: SHIPPED | OUTPATIENT
Start: 2024-02-07 | End: 2024-02-07

## 2024-02-07 NOTE — TELEPHONE ENCOUNTER
Per [facility] patient is requesting a 90 day supply of the Breo Ellipta 100.    Per Dr. Vaughn ok to send

## 2024-02-08 ENCOUNTER — OFFICE VISIT (OUTPATIENT)
Dept: INTERNAL MEDICINE CLINIC | Facility: CLINIC | Age: 79
End: 2024-02-08
Payer: COMMERCIAL

## 2024-02-08 ENCOUNTER — LAB ENCOUNTER (OUTPATIENT)
Dept: LAB | Age: 79
End: 2024-02-08
Attending: INTERNAL MEDICINE
Payer: MEDICARE

## 2024-02-08 VITALS
TEMPERATURE: 98 F | HEART RATE: 88 BPM | OXYGEN SATURATION: 93 % | DIASTOLIC BLOOD PRESSURE: 90 MMHG | BODY MASS INDEX: 42.11 KG/M2 | HEIGHT: 63 IN | RESPIRATION RATE: 18 BRPM | SYSTOLIC BLOOD PRESSURE: 160 MMHG | WEIGHT: 237.63 LBS

## 2024-02-08 DIAGNOSIS — Z79.4 CONTROLLED TYPE 2 DIABETES MELLITUS WITH MICROALBUMINURIA, WITH LONG-TERM CURRENT USE OF INSULIN (HCC): Primary | ICD-10-CM

## 2024-02-08 DIAGNOSIS — R06.09 DYSPNEA ON EXERTION: ICD-10-CM

## 2024-02-08 DIAGNOSIS — E11.9 DIABETES MELLITUS (HCC): Primary | ICD-10-CM

## 2024-02-08 DIAGNOSIS — I10 ESSENTIAL HYPERTENSION, MALIGNANT: ICD-10-CM

## 2024-02-08 DIAGNOSIS — R80.9 CONTROLLED TYPE 2 DIABETES MELLITUS WITH MICROALBUMINURIA, WITH LONG-TERM CURRENT USE OF INSULIN (HCC): Primary | ICD-10-CM

## 2024-02-08 DIAGNOSIS — J44.9 VANISHING LUNG (HCC): ICD-10-CM

## 2024-02-08 DIAGNOSIS — E11.29 CONTROLLED TYPE 2 DIABETES MELLITUS WITH MICROALBUMINURIA, WITH LONG-TERM CURRENT USE OF INSULIN (HCC): Primary | ICD-10-CM

## 2024-02-08 DIAGNOSIS — Z00.00 BLOOD TESTS FOR ROUTINE GENERAL PHYSICAL EXAMINATION: ICD-10-CM

## 2024-02-08 DIAGNOSIS — R53.83 FATIGUE: ICD-10-CM

## 2024-02-08 PROBLEM — J41.0 SMOKERS' COUGH (HCC): Chronic | Status: ACTIVE | Noted: 2024-02-08

## 2024-02-08 PROBLEM — M81.0 AGE-RELATED OSTEOPOROSIS WITHOUT CURRENT PATHOLOGICAL FRACTURE: Chronic | Status: ACTIVE | Noted: 2022-09-08

## 2024-02-08 LAB
ANION GAP SERPL CALC-SCNC: 4 MMOL/L (ref 0–18)
BUN BLD-MCNC: 12 MG/DL (ref 9–23)
CALCIUM BLD-MCNC: 9.2 MG/DL (ref 8.5–10.1)
CHLORIDE SERPL-SCNC: 105 MMOL/L (ref 98–112)
CO2 SERPL-SCNC: 30 MMOL/L (ref 21–32)
CREAT BLD-MCNC: 1.12 MG/DL
EGFRCR SERPLBLD CKD-EPI 2021: 51 ML/MIN/1.73M2 (ref 60–?)
FASTING STATUS PATIENT QL REPORTED: NO
GLUCOSE BLD-MCNC: 135 MG/DL (ref 70–99)
OSMOLALITY SERPL CALC.SUM OF ELEC: 290 MOSM/KG (ref 275–295)
POTASSIUM SERPL-SCNC: 3.7 MMOL/L (ref 3.5–5.1)
SODIUM SERPL-SCNC: 139 MMOL/L (ref 136–145)

## 2024-02-08 PROCEDURE — 3080F DIAST BP >= 90 MM HG: CPT | Performed by: INTERNAL MEDICINE

## 2024-02-08 PROCEDURE — 3008F BODY MASS INDEX DOCD: CPT | Performed by: INTERNAL MEDICINE

## 2024-02-08 PROCEDURE — 80048 BASIC METABOLIC PNL TOTAL CA: CPT

## 2024-02-08 PROCEDURE — 36415 COLL VENOUS BLD VENIPUNCTURE: CPT

## 2024-02-08 PROCEDURE — 1159F MED LIST DOCD IN RCRD: CPT | Performed by: INTERNAL MEDICINE

## 2024-02-08 PROCEDURE — 3077F SYST BP >= 140 MM HG: CPT | Performed by: INTERNAL MEDICINE

## 2024-02-08 PROCEDURE — 99213 OFFICE O/P EST LOW 20 MIN: CPT | Performed by: INTERNAL MEDICINE

## 2024-02-08 RX ORDER — SEMAGLUTIDE 0.68 MG/ML
0.5 INJECTION, SOLUTION SUBCUTANEOUS WEEKLY
Qty: 9 ML | Refills: 0 | Status: SHIPPED | OUTPATIENT
Start: 2024-02-08 | End: 2024-05-08

## 2024-02-08 RX ORDER — PROBENECID 500 MG/1
500 TABLET, FILM COATED ORAL 2 TIMES DAILY
Qty: 180 TABLET | Refills: 0 | Status: SHIPPED | OUTPATIENT
Start: 2024-02-08

## 2024-02-08 RX ORDER — SEMAGLUTIDE 0.68 MG/ML
INJECTION, SOLUTION SUBCUTANEOUS
Qty: 9 ML | Refills: 1 | Status: CANCELLED | OUTPATIENT
Start: 2024-02-08

## 2024-02-08 RX ORDER — SPIRONOLACTONE 25 MG/1
25 TABLET ORAL DAILY
COMMUNITY
Start: 2023-12-01

## 2024-02-08 NOTE — PROGRESS NOTES
Anette Frazier  1945 is a 78 year old female.    Chief Complaint   Patient presents with    Medication Follow-Up       HPI:   Patient here for verification of medicines she was asked to discontinue the metformin in view of her diminished ejection fraction patient is a little bit confused  Current Outpatient Medications   Medication Sig Dispense Refill    spironolactone 25 MG Oral Tab Take 1 tablet (25 mg total) by mouth daily.      probenecid 500 MG Oral Tab Take 1 tablet (500 mg total) by mouth 2 (two) times daily. 180 tablet 0    semaglutide (OZEMPIC, 0.25 OR 0.5 MG/DOSE,) 2 MG/3ML Subcutaneous Solution Pen-injector Inject 0.5 mg into the skin once a week. 9 mL 0    fluticasone furoate-vilanterol (BREO ELLIPTA) 100-25 MCG/ACT Inhalation Aerosol Powder, Breath Activated Inhale 1 puff into the lungs daily. 180 each 0    carvedilol 25 MG Oral Tab Take 1 tablet (25 mg total) by mouth 2 (two) times daily. 180 tablet 0    ATORVASTATIN 20 MG Oral Tab TAKE 1 TABLET(20 MG) BY MOUTH EVERY NIGHT 90 tablet 0    torsemide 20 MG Oral Tab TAKE 1 TABLET(20 MG) BY MOUTH TWICE DAILY 180 tablet 0    Potassium Chloride ER 10 MEQ Oral Tab CR Take 1 tablet (10 mEq total) by mouth daily. 90 tablet 1    hydrALAZINE 100 MG Oral Tab Take 1 tablet (100 mg total) by mouth 2 (two) times daily. 180 tablet 1    alendronate 70 MG Oral Tab Take 1 tablet (70 mg total) by mouth every 7 days. 12 tablet 3    sacubitril-valsartan (ENTRESTO)  MG Oral Tab Take 1 tablet by mouth 2 (two) times daily. 180 tablet 0    gabapentin 100 MG Oral Cap Take 2 capsules (200 mg total) by mouth nightly. TAKE AT BEDTIME 180 capsule 1    ALBUTEROL 108 (90 Base) MCG/ACT Inhalation Aero Soln INHALE 1 PUFF INTO THE LUNGS EVERY 6 HOURS AS NEEDED FOR WHEEZING 8.5 g 0    albuterol (2.5 MG/3ML) 0.083% Inhalation Nebu Soln Take 3 mL (2.5 mg total) by nebulization 3 (three) times daily. 300 mL 0    Insulin Pen Needle (PEN NEEDLES) 32G X 4 MM Does not apply Misc 1  each every 7 days. 30 each 0    Insulin Pen Needle (PEN NEEDLES) 32G X 4 MM Does not apply Misc 1 each every 7 days. 30 each 5    BD PEN NEEDLE NAHUM 2ND GEN 32G X 4 MM Does not apply Misc USE DAILY 100 each 0    Respiratory Therapy Supplies (NEBULIZER) Does not apply Device Use as directed 1 each 0    triamcinolone 0.1 % External Cream Apply topically 2 (two) times daily as needed. 60 g 1    ASPIRIN EC LOW DOSE 81 MG Oral Tab EC Take 1 tablet (81 mg total) by mouth daily.  0      Past Medical History:   Diagnosis Date    AIN (acute interstitial nephritis)     Back problem     Cataracts, bilateral 01/12/2018    Congestive heart disease (HCC)     Diabetes (HCC)     Diverticulosis of large intestine     Essential hypertension     Gout     High blood pressure     High cholesterol     Hyperlipidemia     Neuropathy     feet    Obesity     ALEXANDRA (obstructive sleep apnea) 1/15/2020 [facility]    AHI 34 REM AHI 85 Sao2 Andrew 59%     Sleep apnea     Tobacco abuse 01/12/2018    Visual impairment       Social History:  Social History     Socioeconomic History    Marital status:    Occupational History    Occupation: Hospital    Tobacco Use    Smoking status: Every Day     Packs/day: 1.00     Years: 50.00     Additional pack years: 0.00     Total pack years: 50.00     Types: Cigarettes     Start date: 1/10/1974     Passive exposure: Past    Smokeless tobacco: Former     Quit date: 2016    Tobacco comments:     Smokes 2 cigarettes daily currently    Vaping Use    Vaping Use: Never used   Substance and Sexual Activity    Alcohol use: No    Drug use: No    Sexual activity: Not Currently     Partners: Female   Other Topics Concern    Caffeine Concern Yes    Exercise No    Special Diet No        REVIEW OF SYSTEMS:   na        EXAM:   /90   Pulse 88   Temp 98 °F (36.7 °C) (Temporal)   Resp 18   Ht 5' 3\" (1.6 m)   Wt 237 lb 9.6 oz (107.8 kg)   SpO2 93%   BMI 42.09 kg/m²     na      ASSESSMENT AND PLAN:    Anette was seen today for medication follow-up.    Diagnoses and all orders for this visit:    Controlled type 2 diabetes mellitus with microalbuminuria, with long-term current use of insulin (HCC)  -     Hemoglobin A1C; Future    Blood tests for routine general physical examination  -     Assay, Thyroid Stim Hormone; Future  -     T4 (Thyroxine Total); Future  -     Lipid Panel; Future  -     Comp Metabolic Panel (14); Future  -     CBC With Differential With Platelet; Future  -     Urinalysis, Routine; Future  -     MICROALBUMIN, RANDOM URINE; Future  -     Uric Acid; Future  -     Vitamin D; Future  -     B12 AND FOLATE; Future    Other orders  -     probenecid 500 MG Oral Tab; Take 1 tablet (500 mg total) by mouth 2 (two) times daily.  -     semaglutide (OZEMPIC, 0.25 OR 0.5 MG/DOSE,) 2 MG/3ML Subcutaneous Solution Pen-injector; Inject 0.5 mg into the skin once a week.    Meds verified and updated.  Total time spent 20 minutes.    Patient Instructions   Proceed with blood work   The patient indicates understanding of these issues and agrees to the plan.  The patient is asked to Return in about 2 weeks (around 2/22/2024), or cpx.  .      Jonel Bell MD

## 2024-02-19 ENCOUNTER — LAB ENCOUNTER (OUTPATIENT)
Dept: LAB | Age: 79
End: 2024-02-19
Attending: INTERNAL MEDICINE
Payer: MEDICARE

## 2024-02-19 DIAGNOSIS — R80.9 CONTROLLED TYPE 2 DIABETES MELLITUS WITH MICROALBUMINURIA, WITH LONG-TERM CURRENT USE OF INSULIN (HCC): ICD-10-CM

## 2024-02-19 DIAGNOSIS — E11.29 CONTROLLED TYPE 2 DIABETES MELLITUS WITH MICROALBUMINURIA, WITH LONG-TERM CURRENT USE OF INSULIN (HCC): ICD-10-CM

## 2024-02-19 DIAGNOSIS — Z00.00 BLOOD TESTS FOR ROUTINE GENERAL PHYSICAL EXAMINATION: ICD-10-CM

## 2024-02-19 DIAGNOSIS — Z79.4 CONTROLLED TYPE 2 DIABETES MELLITUS WITH MICROALBUMINURIA, WITH LONG-TERM CURRENT USE OF INSULIN (HCC): ICD-10-CM

## 2024-02-19 LAB
ALBUMIN SERPL-MCNC: 3.4 G/DL (ref 3.4–5)
ALBUMIN/GLOB SERPL: 0.8 {RATIO} (ref 1–2)
ALP LIVER SERPL-CCNC: 73 U/L
ALT SERPL-CCNC: 14 U/L
ANION GAP SERPL CALC-SCNC: 2 MMOL/L (ref 0–18)
AST SERPL-CCNC: 17 U/L (ref 15–37)
BASOPHILS # BLD AUTO: 0.04 X10(3) UL (ref 0–0.2)
BASOPHILS NFR BLD AUTO: 0.6 %
BILIRUB SERPL-MCNC: 0.4 MG/DL (ref 0.1–2)
BILIRUB UR QL STRIP.AUTO: NEGATIVE
BUN BLD-MCNC: 14 MG/DL (ref 9–23)
CALCIUM BLD-MCNC: 8.9 MG/DL (ref 8.5–10.1)
CHLORIDE SERPL-SCNC: 107 MMOL/L (ref 98–112)
CHOLEST SERPL-MCNC: 182 MG/DL (ref ?–200)
CLARITY UR REFRACT.AUTO: CLEAR
CO2 SERPL-SCNC: 28 MMOL/L (ref 21–32)
CREAT BLD-MCNC: 1.04 MG/DL
CREAT UR-SCNC: 34 MG/DL
EGFRCR SERPLBLD CKD-EPI 2021: 56 ML/MIN/1.73M2 (ref 60–?)
EOSINOPHIL # BLD AUTO: 0.14 X10(3) UL (ref 0–0.7)
EOSINOPHIL NFR BLD AUTO: 2 %
ERYTHROCYTE [DISTWIDTH] IN BLOOD BY AUTOMATED COUNT: 14.8 %
EST. AVERAGE GLUCOSE BLD GHB EST-MCNC: 160 MG/DL (ref 68–126)
FASTING PATIENT LIPID ANSWER: YES
FASTING STATUS PATIENT QL REPORTED: YES
FOLATE SERPL-MCNC: 11.8 NG/ML (ref 8.7–?)
GLOBULIN PLAS-MCNC: 4.3 G/DL (ref 2.8–4.4)
GLUCOSE BLD-MCNC: 148 MG/DL (ref 70–99)
GLUCOSE UR STRIP.AUTO-MCNC: NORMAL MG/DL
HBA1C MFR BLD: 7.2 % (ref ?–5.7)
HCT VFR BLD AUTO: 42 %
HDLC SERPL-MCNC: 34 MG/DL (ref 40–59)
HGB BLD-MCNC: 13.3 G/DL
IMM GRANULOCYTES # BLD AUTO: 0.03 X10(3) UL (ref 0–1)
IMM GRANULOCYTES NFR BLD: 0.4 %
KETONES UR STRIP.AUTO-MCNC: NEGATIVE MG/DL
LDLC SERPL CALC-MCNC: 102 MG/DL (ref ?–100)
LEUKOCYTE ESTERASE UR QL STRIP.AUTO: 75
LYMPHOCYTES # BLD AUTO: 1.94 X10(3) UL (ref 1–4)
LYMPHOCYTES NFR BLD AUTO: 27.1 %
MCH RBC QN AUTO: 29.7 PG (ref 26–34)
MCHC RBC AUTO-ENTMCNC: 31.7 G/DL (ref 31–37)
MCV RBC AUTO: 93.8 FL
MICROALBUMIN UR-MCNC: 13.3 MG/DL
MICROALBUMIN/CREAT 24H UR-RTO: 391.2 UG/MG (ref ?–30)
MONOCYTES # BLD AUTO: 0.56 X10(3) UL (ref 0.1–1)
MONOCYTES NFR BLD AUTO: 7.8 %
NEUTROPHILS # BLD AUTO: 4.44 X10 (3) UL (ref 1.5–7.7)
NEUTROPHILS # BLD AUTO: 4.44 X10(3) UL (ref 1.5–7.7)
NEUTROPHILS NFR BLD AUTO: 62.1 %
NITRITE UR QL STRIP.AUTO: NEGATIVE
NONHDLC SERPL-MCNC: 148 MG/DL (ref ?–130)
OSMOLALITY SERPL CALC.SUM OF ELEC: 287 MOSM/KG (ref 275–295)
PH UR STRIP.AUTO: 6.5 [PH] (ref 5–8)
PLATELET # BLD AUTO: 330 10(3)UL (ref 150–450)
POTASSIUM SERPL-SCNC: 4.1 MMOL/L (ref 3.5–5.1)
PROT SERPL-MCNC: 7.7 G/DL (ref 6.4–8.2)
RBC # BLD AUTO: 4.48 X10(6)UL
RBC UR QL AUTO: NEGATIVE
SODIUM SERPL-SCNC: 137 MMOL/L (ref 136–145)
SP GR UR STRIP.AUTO: 1.01 (ref 1–1.03)
T4 SERPL-MCNC: 6.7 UG/DL
TRIGL SERPL-MCNC: 272 MG/DL (ref 30–149)
TSI SER-ACNC: 2.05 MIU/ML (ref 0.36–3.74)
URATE SERPL-MCNC: 6 MG/DL
UROBILINOGEN UR STRIP.AUTO-MCNC: NORMAL MG/DL
VIT B12 SERPL-MCNC: 458 PG/ML (ref 193–986)
VIT D+METAB SERPL-MCNC: 27.7 NG/ML (ref 30–100)
VLDLC SERPL CALC-MCNC: 46 MG/DL (ref 0–30)
WBC # BLD AUTO: 7.2 X10(3) UL (ref 4–11)

## 2024-02-19 PROCEDURE — 36415 COLL VENOUS BLD VENIPUNCTURE: CPT

## 2024-02-19 PROCEDURE — 84550 ASSAY OF BLOOD/URIC ACID: CPT

## 2024-02-19 PROCEDURE — 80053 COMPREHEN METABOLIC PANEL: CPT

## 2024-02-19 PROCEDURE — 84443 ASSAY THYROID STIM HORMONE: CPT

## 2024-02-19 PROCEDURE — 80061 LIPID PANEL: CPT

## 2024-02-19 PROCEDURE — 83036 HEMOGLOBIN GLYCOSYLATED A1C: CPT

## 2024-02-19 PROCEDURE — 82043 UR ALBUMIN QUANTITATIVE: CPT

## 2024-02-19 PROCEDURE — 82746 ASSAY OF FOLIC ACID SERUM: CPT

## 2024-02-19 PROCEDURE — 85025 COMPLETE CBC W/AUTO DIFF WBC: CPT

## 2024-02-19 PROCEDURE — 84436 ASSAY OF TOTAL THYROXINE: CPT

## 2024-02-19 PROCEDURE — 82306 VITAMIN D 25 HYDROXY: CPT

## 2024-02-19 PROCEDURE — 82607 VITAMIN B-12: CPT

## 2024-02-19 PROCEDURE — 82570 ASSAY OF URINE CREATININE: CPT

## 2024-02-19 PROCEDURE — 81001 URINALYSIS AUTO W/SCOPE: CPT

## 2024-03-02 ENCOUNTER — MED REC SCAN ONLY (OUTPATIENT)
Dept: INTERNAL MEDICINE CLINIC | Facility: CLINIC | Age: 79
End: 2024-03-02

## 2024-03-11 ENCOUNTER — OFFICE VISIT (OUTPATIENT)
Dept: INTERNAL MEDICINE CLINIC | Facility: CLINIC | Age: 79
End: 2024-03-11
Payer: COMMERCIAL

## 2024-03-11 VITALS
HEART RATE: 70 BPM | TEMPERATURE: 97 F | DIASTOLIC BLOOD PRESSURE: 84 MMHG | OXYGEN SATURATION: 96 % | HEIGHT: 63 IN | RESPIRATION RATE: 22 BRPM | WEIGHT: 242.38 LBS | SYSTOLIC BLOOD PRESSURE: 146 MMHG | BODY MASS INDEX: 42.95 KG/M2

## 2024-03-11 DIAGNOSIS — I44.7 LEFT BUNDLE BRANCH BLOCK: Chronic | ICD-10-CM

## 2024-03-11 DIAGNOSIS — M1A.0720 IDIOPATHIC CHRONIC GOUT OF LEFT ANKLE WITHOUT TOPHUS: Chronic | ICD-10-CM

## 2024-03-11 DIAGNOSIS — N18.31 STAGE 3A CHRONIC KIDNEY DISEASE (HCC): ICD-10-CM

## 2024-03-11 DIAGNOSIS — J96.11 CHRONIC RESPIRATORY FAILURE WITH HYPOXIA (HCC): Chronic | ICD-10-CM

## 2024-03-11 DIAGNOSIS — Z79.4 CONTROLLED TYPE 2 DIABETES MELLITUS WITH MICROALBUMINURIA, WITH LONG-TERM CURRENT USE OF INSULIN (HCC): Chronic | ICD-10-CM

## 2024-03-11 DIAGNOSIS — I50.20 HFREF (HEART FAILURE WITH REDUCED EJECTION FRACTION) (HCC): ICD-10-CM

## 2024-03-11 DIAGNOSIS — E11.69 HYPERLIPIDEMIA ASSOCIATED WITH TYPE 2 DIABETES MELLITUS (HCC): Chronic | ICD-10-CM

## 2024-03-11 DIAGNOSIS — E11.29 CONTROLLED TYPE 2 DIABETES MELLITUS WITH MICROALBUMINURIA, WITH LONG-TERM CURRENT USE OF INSULIN (HCC): Chronic | ICD-10-CM

## 2024-03-11 DIAGNOSIS — J41.0 SMOKERS' COUGH (HCC): Chronic | ICD-10-CM

## 2024-03-11 DIAGNOSIS — I15.2 HYPERTENSION ASSOCIATED WITH TYPE 2 DIABETES MELLITUS (HCC): Chronic | ICD-10-CM

## 2024-03-11 DIAGNOSIS — E66.01 MORBID OBESITY WITH BMI OF 40.0-44.9, ADULT (HCC): Chronic | ICD-10-CM

## 2024-03-11 DIAGNOSIS — R20.2 TINGLING OF BOTH FEET: ICD-10-CM

## 2024-03-11 DIAGNOSIS — I42.8 NON-ISCHEMIC CARDIOMYOPATHY (HCC): Chronic | ICD-10-CM

## 2024-03-11 DIAGNOSIS — I27.20 PULMONARY HTN (HCC): Chronic | ICD-10-CM

## 2024-03-11 DIAGNOSIS — R80.9 CONTROLLED TYPE 2 DIABETES MELLITUS WITH MICROALBUMINURIA, WITH LONG-TERM CURRENT USE OF INSULIN (HCC): Chronic | ICD-10-CM

## 2024-03-11 DIAGNOSIS — E11.59 HYPERTENSION ASSOCIATED WITH TYPE 2 DIABETES MELLITUS (HCC): Chronic | ICD-10-CM

## 2024-03-11 DIAGNOSIS — E53.8 FOLIC ACID DEFICIENCY: Chronic | ICD-10-CM

## 2024-03-11 DIAGNOSIS — Z00.00 ROUTINE GENERAL MEDICAL EXAMINATION AT A HEALTH CARE FACILITY: Primary | ICD-10-CM

## 2024-03-11 DIAGNOSIS — G47.33 OSA ON CPAP: Chronic | ICD-10-CM

## 2024-03-11 DIAGNOSIS — M81.0 AGE-RELATED OSTEOPOROSIS WITHOUT CURRENT PATHOLOGICAL FRACTURE: Chronic | ICD-10-CM

## 2024-03-11 DIAGNOSIS — E78.5 HYPERLIPIDEMIA ASSOCIATED WITH TYPE 2 DIABETES MELLITUS (HCC): Chronic | ICD-10-CM

## 2024-03-11 RX ORDER — POLYETHYLENE GLYCOL 3350 17 G/17G
17 POWDER, FOR SOLUTION ORAL DAILY
Qty: 90 PACKET | Refills: 3 | Status: SHIPPED | OUTPATIENT
Start: 2024-03-11 | End: 2025-03-11

## 2024-03-11 NOTE — PROGRESS NOTES
REASON FOR VISIT:    Anette Frazier is a 78 year old female who presents for a MA Supervisit.    female     Patient Care Team: Patient Care Team:  Jonel Bell MD as PCP - General (Internal Medicine)  Arnaldo Ortega, PT as Physical Therapist  Jake Serna MD (SURGERY, ORTHOPEDIC)    Patient Active Problem List   Diagnosis    Morbid obesity with BMI of 40.0-44.9, adult (HCC)    Pulmonary HTN (HCC)    ALEXANDRA on CPAP    Hyperlipidemia associated with type 2 diabetes mellitus (HCC)    Non-ischemic cardiomyopathy (HCC)    Chronic idiopathic gout of left ankle    Stage 3a chronic kidney disease (HCC)    Controlled type 2 diabetes mellitus with microalbuminuria, with long-term current use of insulin (HCC)    HFrEF (heart failure with reduced ejection fraction) ([facility])    Left bundle branch block    Folic acid deficiency    Age-related osteoporosis without current pathological fracture    Hypertension associated with type 2 diabetes mellitus (HCC)    Chronic respiratory failure with hypoxia (HCC)    Tingling of both feet    Smokers' cough (HCC)     Current Outpatient Medications   Medication Sig Dispense Refill    Polyethylene Glycol 3350 (MIRALAX) 17 g Oral Powd Pack Take 17 g by mouth daily. 90 packet 3    spironolactone 25 MG Oral Tab Take 1 tablet (25 mg total) by mouth daily.      probenecid 500 MG Oral Tab Take 1 tablet (500 mg total) by mouth 2 (two) times daily. 180 tablet 0    semaglutide (OZEMPIC, 0.25 OR 0.5 MG/DOSE,) 2 MG/3ML Subcutaneous Solution Pen-injector Inject 0.5 mg into the skin once a week. 9 mL 0    fluticasone furoate-vilanterol (BREO ELLIPTA) 100-25 MCG/ACT Inhalation Aerosol Powder, Breath Activated Inhale 1 puff into the lungs daily. 180 each 0    carvedilol 25 MG Oral Tab Take 1 tablet (25 mg total) by mouth 2 (two) times daily. 180 tablet 0    ATORVASTATIN 20 MG Oral Tab TAKE 1 TABLET(20 MG) BY MOUTH EVERY NIGHT 90 tablet 0    torsemide 20 MG Oral Tab TAKE 1 TABLET(20 MG) BY MOUTH TWICE  DAILY 180 tablet 0    Potassium Chloride ER 10 MEQ Oral Tab CR Take 1 tablet (10 mEq total) by mouth daily. 90 tablet 1    hydrALAZINE 100 MG Oral Tab Take 1 tablet (100 mg total) by mouth 2 (two) times daily. 180 tablet 1    alendronate 70 MG Oral Tab Take 1 tablet (70 mg total) by mouth every 7 days. 12 tablet 3    sacubitril-valsartan (ENTRESTO)  MG Oral Tab Take 1 tablet by mouth 2 (two) times daily. 180 tablet 0    gabapentin 100 MG Oral Cap Take 2 capsules (200 mg total) by mouth nightly. TAKE AT BEDTIME 180 capsule 1    ALBUTEROL 108 (90 Base) MCG/ACT Inhalation Aero Soln INHALE 1 PUFF INTO THE LUNGS EVERY 6 HOURS AS NEEDED FOR WHEEZING 8.5 g 0    albuterol (2.5 MG/3ML) 0.083% Inhalation Nebu Soln Take 3 mL (2.5 mg total) by nebulization 3 (three) times daily. 300 mL 0    Insulin Pen Needle (PEN NEEDLES) 32G X 4 MM Does not apply Misc 1 each every 7 days. 30 each 0    Insulin Pen Needle (PEN NEEDLES) 32G X 4 MM Does not apply Misc 1 each every 7 days. 30 each 5    BD PEN NEEDLE NAHUM 2ND GEN 32G X 4 MM Does not apply Misc USE DAILY 100 each 0    Respiratory Therapy Supplies (NEBULIZER) Does not apply Device Use as directed 1 each 0    triamcinolone 0.1 % External Cream Apply topically 2 (two) times daily as needed. 60 g 1    ASPIRIN EC LOW DOSE 81 MG Oral Tab EC Take 1 tablet (81 mg total) by mouth daily.  0           Latest Ref Rng & Units 2/19/2024     7:35 AM 2/8/2024    11:35 AM 10/3/2023    12:57 PM 4/24/2023    10:40 AM 12/21/2022     1:01 PM 8/12/2022     9:12 AM 4/1/2022     1:07 PM   Glucose and HbA1c   Glucose 70 - 99 mg/dL 148  135  134  95  129  79  179          Latest Ref Rng & Units 2/19/2024     7:35 AM 10/3/2023    12:57 PM 4/24/2023    10:40 AM 12/21/2022     1:01 PM 8/12/2022     9:12 AM 8/24/2021    11:25 AM 1/7/2021     8:13 AM   Cholesterol   Total Cholesterol <200 mg/dL 182  213  108  122  116  149  130    Triglycerides 30 - 149 mg/dL 272  258  144  160  143  165  104    HDL 40 - 59  mg/dL 34  34  34  43  34  33  39    LDL <100 mg/dL 102  133  49  52  57  87  70          Latest Ref Rng & Units 2/19/2024     7:35 AM 2/8/2024    11:35 AM 10/3/2023    12:57 PM 4/24/2023    10:40 AM 12/21/2022     1:01 PM 8/12/2022     9:12 AM 4/1/2022     1:07 PM   BUN and Cr   BUN 9 - 23 mg/dL 14  12  13  13  16  17  17    Creatinine 0.55 - 1.02 mg/dL 1.04  1.12  1.06  0.83  1.26  1.07  1.18          Latest Ref Rng & Units 2/19/2024     7:35 AM 10/3/2023    12:57 PM 4/24/2023    10:40 AM 4/1/2022     1:07 PM 2/7/2022    11:52 AM 1/7/2021     8:13 AM 12/17/2019     7:57 AM   AST and ALT   AST (SGOT) 15 - 37 U/L 17  18  9  13  12  8  9    ALT (SGPT) 13 - 56 U/L 14  26  18  15  15  14  13          Latest Ref Rng & Units 2/19/2024     7:35 AM 4/24/2023    10:40 AM 12/21/2022     1:01 PM 8/12/2022     9:12 AM 2/16/2021    11:03 AM 1/7/2021     8:13 AM 4/2/2019     9:05 AM   TSH and Free T4   TSH 0.358 - 3.740 mIU/mL 2.050  1.440  1.410  1.440  1.830  1.410  1.840         General Health      In the past six months, have you lost more than 10 pounds without trying?: 2 - No  Has your appetite been poor?: No  Type of Diet: Balanced  How does the patient maintain a good energy level?: Daily Walks  How would you describe your daily physical activity?: Light  How would you describe your current health state?: Fair  How do you maintain positive mental well-being?: Visiting Friends;Visiting Family  Have you had any immunizations at another office such as Influenza, Hepatitis B, Tetanus, or Pneumococcal?: No     Functional Ability     Bathing or Showering: Able without help  Toileting: Able without help  Dressing: Able without help  Eating: Able without help  Driving: Does not drive  Preparing your meals: Able without help  Managing money/bills: Able without help  Taking medications as prescribed: Able without help  Are you able to afford your medications?: Yes  Hearing Problems?: No     Functional Status     Hearing Problems?:  No  Vision Problems? : No  Difficulty walking?: Yes  Difficulty dressing or bathing?: No  Problems with daily activities? : No  Memory Problems?: No      Fall/Risk Assessment                 Depression Screening (PHQ-2/PHQ-9): Over the LAST 2 WEEKS            Advance Directives     Do you have a healthcare power of ?: Yes  Do you have a living will?: Yes     Cognitive Assessment     What day of the week is this?: Correct  What month is it?: Correct  What year is it?: Correct  Recall \"Ball\": Correct  Recall \"Flag\": Correct  Recall \"Tree\": Correct         PREVENTATIVE SERVICES   INDICATIONS AND SCHEDULE Internal Lab or Procedure   Diabetes Screening     HbgA1C   Annually HEMOGLOBIN A1C (%)   Date Value   01/21/2019 6.0 (A)     HgbA1C (%)   Date Value   02/19/2024 7.2 (H)       Fasting Blood Sugar (FSB)Annually Glucose   Date Value   02/19/2024 148 mg/dL (H)   10/28/2016 82 MG/DL      Cardiovascular Disease Screening    LDL Annually LDL Cholesterol Calc (mg/dL)   Date Value   09/19/2016 64     LDL Cholesterol (mg/dL)   Date Value   02/19/2024 102 (H)       EKG - w/ Initial Preventative Physical Exam only, or if medically necessary cardio   Colorectal Cancer Screening     Colonoscopy Screen every 10 years Health Maintenance   Topic Date Due    Colorectal Cancer Screening  Discontinued       Flex Sigmoidoscopy Screen every 5 years No results found for this or any previous visit.    Fecal Occult Blood Annually No results found for: \"FOB\", \"OCCULTSTOOL\"   Glaucoma Screening     Ophthalmology Visit Annually yes   Immunizations     Zoster (Not covered by Medicare Part B) No orders found for this or any previous visit.     SPECIFIC DISEASE MONITORING Internal Lab or Procedure     Annual Monitoring of Persistent Medications  (ACE/ARB, Digoxin, Diuretics)        Potassium  Annually Potassium (mmol/L)   Date Value   02/19/2024 4.1       Creatinine  Annually Creatinine (mg/dL)   Date Value   02/19/2024 1.04 (H)        Digoxin Serum Conc  Annually No results found for: \"DIGOXIN\"     Diabetes     HgbA1C  Annually HEMOGLOBIN A1C (%)   Date Value   01/21/2019 6.0 (A)     HgbA1C (%)   Date Value   02/19/2024 7.2 (H)       Creat/alb ratio  Annually Creatinine (mg/dL)   Date Value   02/19/2024 1.04 (H)       LDL  Annually LDL Cholesterol Calc (mg/dL)   Date Value   09/19/2016 64     LDL Cholesterol (mg/dL)   Date Value   02/19/2024 102 (H)       Dilated Eye exam  Annually     6/6/2023     2:11 PM   Data entered on:   Last Dilated Eye Exam 6/6/2023              ALLERGIES:     Allergies   Allergen Reactions    Allopurinol RASH     MEDICAL INFORMATION:   Past Medical History:   Diagnosis Date    AIN (acute interstitial nephritis)     Back problem     Cataracts, bilateral 01/12/2018    Congestive heart disease (HCC)     Diabetes (HCC)     Diverticulosis of large intestine     Essential hypertension     Gout     High blood pressure     High cholesterol     Hyperlipidemia     Neuropathy     feet    Obesity     ALEXANDRA (obstructive sleep apnea) 1/15/2020 [facility]    AHI 34 REM AHI 85 Sao2 Andrew 59%     Sleep apnea     Tobacco abuse 01/12/2018    Visual impairment       Past Surgical History:   Procedure Laterality Date    COLONOSCOPY  2012    [facility]    COLONOSCOPY  05/18/2018    diverticulosis - repeat 10 years - Dr. Montague    COLONOSCOPY      KNEE REPLACEMENT SURGERY  2005    left knee    TOTAL KNEE REPLACEMENT      Left knee       Family History   Problem Relation Age of Onset    Diabetes Mother     Other (Lung Cancer) Mother     Hypertension Mother     COPD Sister     Breast Cancer Maternal Aunt 60     Immunization History      Immunization History  Administered            Date(s) Administered    Covid-19 Vaccine Pfizer 30 mcg/0.3 ml                          02/10/2021  03/03/2021  10/23/2021      Covid-19 Vaccine Pfizer Bivalent 30mcg/0.3mL                          10/06/2022      DTAP                  01/25/2012      FLU VAC High  Dose 65 YRS & Older PRSV Free (21943)                          09/15/2011  10/27/2012  10/07/2015                            10/05/2017  09/25/2020  09/28/2021                            10/06/2022  09/18/2023      FLU VAC QIV SPLIT 3 YRS AND OLDER (07629)                          09/25/2020      FLUAD High Dose 65 yr and older (14881)                          10/11/2018      Fluzone Vaccine Medicare ()                          09/15/2011  10/27/2012  12/19/2014                            10/07/2015  02/19/2017  10/04/2017                            10/04/2019  09/28/2021      HIGH DOSE FLU 65 YRS AND OLDER PRSV FREE SINGLE D (37925) FLU CLINIC                          10/05/2017      Influenza             12/29/2013      Pfizer Covid-19 Vaccine 30mcg/0.3ml 12yrs+ (2143-6895)                          09/18/2023      Pneumococcal (Prevnar 13)                          05/04/2018      Pneumovax 23          10/03/2008  10/15/2011  02/17/2020      TDAP                  05/06/2023      Zoster Vaccine Recombinant Adjuvanted (Shingrix)                          05/06/2023 07/14/2023        SOCIAL HISTORY:   Social History     Socioeconomic History    Marital status:    Occupational History    Occupation: Hospital    Tobacco Use    Smoking status: Every Day     Packs/day: 1.00     Years: 50.00     Additional pack years: 0.00     Total pack years: 50.00     Types: Cigarettes     Start date: 1/10/1974     Passive exposure: Past    Smokeless tobacco: Former     Quit date: 2016    Tobacco comments:     Smokes 2 cigarettes daily currently    Vaping Use    Vaping Use: Never used   Substance and Sexual Activity    Alcohol use: No    Drug use: No    Sexual activity: Not Currently     Partners: Female   Other Topics Concern    Caffeine Concern Yes    Exercise No    Special Diet No        REVIEW OF SYSTEMS:     General/Constitutional:   General able to do usual activities, good exercise tolerance, good general  state of health, no fatigue, no fever, no weakness, no weight loss or gain .   HEENT/Neck:   Head no headache, no dizziness, no lightheadedness. Eyes does see an eye MD, no redness, no drainage. Ears no earaches, no fullness, normal hearing, no tinnitus. Nose and Sinuses no colds, no discharge, no hay fever, no sinus trouble. Mouth and Pharynx no sore throats, no hoarseness. Neck no lumps, no goiter, no neck stiffness or pain.   Endocrine:   Diabetes yes  patient unsure about her medicines thyroid disorder none.   Respiratory:   Patient denies chest pain, cough, KYLE (dyspnea on exertion), chest congestion, blood-tinged sputum,wheezing. Breathing normal pattern .  History of sleep apnea on CPAP-sees pulm md Dr Vaughn continues to smoke 2 cigarettes a day  Cardiovascular:   Patient denies chest pain, shortness of breath/rheumatic fever/murmur/dizzziness  Leg edema none. Orthopnea none. Palpitations none. PND (paroxsymal nocturnal dyspnea) none.  Sees Dr. Carmen Thomas history of cardiomyopathy -high cholesterol and hypertension  Gastrointestinal:   Patient denies abdominal pain, blood in stool, constipation, diarrhea, difficulty swallowing, change in stools, nausea, vomiting no weight changes noted no heart burn noted.  Occasional constipation  Hematology:   Patient denies abnormal bleeding, easy bleeding, easy bruising. Enlarged lymph nodes none.   Women Only:   Patient denies breast pain.axillary nodes . Breast lumps or discharge none. Mammogram up-to-date no  Genitourinary:   Patient denies difficulty urinating, frequent urination, hematuria. Dysuria none. Nocturia None. Urinary frequency no. Urinary incontinence no.   Musculoskeletal:   Arthritis No. Back pain no. Joint pain no. Joint stiffness no. Muscle weakness none.   Peripheral Vascular:   General no varicosities, no claudication.   Dermatologic:   Rash no.   Neurologic:   Patient denies dizziness, fainting, loss of consciousness, weakness. Memory loss none.  Tingling/numbness lower extremities worked up unremarkable had an EMG nerve conduction.  Trouble with balance none.   Psychiatric: none     EXAM:   /84 (BP Location: Left arm, Patient Position: Sitting, Cuff Size: adult)   Pulse 70   Temp 97.3 °F (36.3 °C) (Temporal)   Resp 22   Ht 5' 3\" (1.6 m)   Wt 242 lb 6.4 oz (110 kg)   SpO2 96%   BMI 42.94 kg/m²    >   BP Readings from Last 3 Encounters:   03/11/24 146/84   02/08/24 160/90   01/05/24 151/72          GENERAL:   Build: normal .   General Appearance: alert and oriented, pleasant.   HEENT:   Ear canals: normal.   EOM: within normal limit-conjunctiva normal.   Head: normocephalic.   Nasal septum: midline.   Nose: unremarkable.   Oral cavity: normal.  Narrow oropharynx  Pupils: normal, bilaterally .   Sclera: normal.   Turbinates: normal.   NECK:   Carotid bruit: none.   Cervical lymph nodes: unremarkable.   JVD: none.   Range of Motion: normal.   Thyroid: unremarkable.   HEART:   Clicks: absent .   Heart sounds: normal.   Murmurs: none.   Rhythm: regular.   BREASTS:   Appearance: symmetrical.   tenderness none.   Axilla: unremarkable.   Breast Mass: no palpable masses.   General: unremarkable.   Nipple Abnormality: none.   Skin Change: none.   LUNGS:   Auscultation: clear .   Chest Shape: normal .   Percussion: normal.   Rales: no .   Respiratory effort: normal .   Rhonchi: no.   Wheezes: no.   ABDOMEN:   General: normal.   Hernia: absent.   Inguinal nodes: none.   Liver, Spleen: non-enlarged.   Rebound tenderness: absent.   Tenderness: absent .   EXTREMITIES:   Clubbing: none.   Cyanosis: absent .   Edema: none.   Pulses: present, bilateral.   Tremors: no.   Varicose veins: not present.   MUSCULOSKELETAL:   Cervical spines: normal.   L-S spines: normal.   Lower extremity joints: normal.  Moderate OA both knees  Upper extremity joints: normal.  Heberden and Wilman nodes  NEUROLOGICAL:   Babinski: negative/all reflexes are normal.   Cerebellar Testing  grossly/intact: yes.   Cranial Nerves: CN's II-XII grossly intact.   Gait: normal.   Mental Status: Alert & oriented x 3.   Motor: power-normal/tone -normal/co-ordination normal/wasting -none/involuntary movements -none.   Reflexes: normal.   Sensory: normal sensation to all modalities.   LYMPHATICS:   Groin: no adenopathy .   Inguinal: no adenopathy.   Supraclavicular: none.   DERMATOLOGY:   Rash: no.  Dystrophic nails noted     DIABETIC FOOT EXAM BILATERAL  INSPECTION dystrophic nails noted  CIRCULATION normal  MONOFILAMENT normal        ASSESSMENT AND OTHER RELEVANT CHRONIC CONDITIONS:   Anette Frazier is a 78 year old female who presents for a Medicare Assessment.     PLAN SUMMARY:   Diagnoses and all orders for this visit:    Routine general medical examination at a health care facility  -     [facility] KIKO 2D+3D SCREENING BILAT (CPT=77067/75134); Future    Age-related osteoporosis without current pathological fracture  -     Vitamin D; Future  -     XR DEXA BONE DENSITOMETRY (CPT=77080); Future    Idiopathic chronic gout of left ankle without tophus stable    Chronic respiratory failure with hypoxia ([facility]) stable managed by pulmonary    Controlled type 2 diabetes mellitus with microalbuminuria, with long-term current use of insulin ([facility]) stable  -     Hemoglobin A1C; Future    Folic acid deficiency stable    HFrEF (heart failure with reduced ejection fraction) ([facility]) stable managed by cardiology    Hyperlipidemia associated with type 2 diabetes mellitus ([facility]) all labs discussed with patient stable  -     Lipid Panel; Future    Hypertension associated with type 2 diabetes mellitus ([facility]) stable  -     Basic Metabolic Panel (8); Future    Left bundle branch block per cardiology    Morbid obesity with BMI of 40.0-44.9, adult ([facility]) stable    Non-ischemic cardiomyopathy ([facility]) managed by cardiology    ALEXANDRA on CPAP stable managed by pulmonary    Pulmonary HTN ([facility]) pulmonary    Smokers' cough ([facility]) smoking counseling  done    Stage 3a chronic kidney disease (HCC) stable    Tingling of both feet stable    Other orders  -     Polyethylene Glycol 3350 (MIRALAX) 17 g Oral Powd Pack; Take 17 g by mouth daily.       The patient indicates understanding of these issues and agrees to the plan.  The patient is asked to return in 6 months for reassessment  Diet counseling perfomed    SUGGESTED VACCINATIONS - Influenza, Pneumococcal, Zoster, Tetanus   Influenza: No recommendations at this time  Pneumonia: No recommendations at this time

## 2024-03-14 ENCOUNTER — TELEPHONE (OUTPATIENT)
Dept: INTERNAL MEDICINE CLINIC | Facility: CLINIC | Age: 79
End: 2024-03-14

## 2024-03-14 NOTE — TELEPHONE ENCOUNTER
Incoming fax from [facility] with progress notes and statement of certifying physician for therapeutic footwear to be reviewed and signed   Placed in covering provider SV in-basket

## 2024-03-21 NOTE — TELEPHONE ENCOUNTER
FYI-    [facility] called in regards to not receiving our fax back in regards to pts Diabetic Shoes    I did inform that we faxed it and received a confirmation fax as well    Please advise, [facility] will be re-faxing over Diabetic shoe form

## 2024-04-02 NOTE — TELEPHONE ENCOUNTER
Name from pharmacy: GABAPENTIN 100MG CAPSULES         Will file in chart as: GABAPENTIN 100 MG Oral Cap    Sig: TAKE 2 CAPSULES(200 MG) BY MOUTH EVERY NIGHT AT BEDTIME    Disp: 180 capsule    Refills: 1 (Pharmacy requested: Not specified)    Start: 3/31/2024    Class: Normal    Non-formulary    Last ordered: 6 months ago (10/3/2023) by Jonel Bell MD    Last refill: 1/3/2024    Rx #: [id number]    Neurology Medications Jaente6903/31/2024 05:50 AM   Protocol Details In person appointment or virtual visit in the past 6 mos or appointment in next 3 mos

## 2024-04-03 RX ORDER — GABAPENTIN 100 MG/1
200 CAPSULE ORAL NIGHTLY
Qty: 180 CAPSULE | Refills: 1 | Status: SHIPPED | OUTPATIENT
Start: 2024-04-03

## 2024-04-03 RX ORDER — TORSEMIDE 20 MG/1
20 TABLET ORAL 2 TIMES DAILY
Qty: 180 TABLET | Refills: 0 | Status: SHIPPED | OUTPATIENT
Start: 2024-04-03

## 2024-04-09 RX ORDER — SEMAGLUTIDE 0.68 MG/ML
0.5 INJECTION, SOLUTION SUBCUTANEOUS WEEKLY
Qty: 9 ML | Refills: 0 | Status: SHIPPED | OUTPATIENT
Start: 2024-04-09

## 2024-04-09 RX ORDER — SACUBITRIL AND VALSARTAN 97; 103 MG/1; MG/1
1 TABLET, FILM COATED ORAL 2 TIMES DAILY
Qty: 180 TABLET | Refills: 0 | OUTPATIENT
Start: 2024-04-09

## 2024-04-09 NOTE — TELEPHONE ENCOUNTER
Name from pharmacy: ENTRESTO 97-103MG TABLETS         Will file in chart as: ENTRESTO  MG Oral Tab    Sig: TAKE 1 TABLET BY MOUTH TWICE DAILY    Disp: 180 tablet    Refills: 0 (Pharmacy requested: Not specified)    Start: 4/8/2024    Class: Normal    Non-formulary    Last ordered: 6 months ago (10/3/2023) by Jonel Bell MD    Last refill: 1/6/2024    Rx #: [id number]        Name from pharmacy: OZEMPIC 0.25 OR 0.5MG/[id number] 3ML         Will file in chart as: OZEMPIC, 0.25 OR 0.5 MG/DOSE, 2 MG/3ML Subcutaneous Solution Pen-injector    Sig: INJECT 0.5 MG UNDER THE SKIN ONCE A WEEK    Disp: 9 mL    Refills: 0 (Pharmacy requested: Not specified)    Start: 4/8/2024    Class: Normal    Non-formulary    Last ordered: 2 months ago (2/8/2024) by Jonel Bell MD    Last refill: 2/8/2024    Rx #: [id number]    Diabetes Medication Protocol [id number]/08/2024 11:59 AM   Protocol Details Last A1C < 7.5 and within past 6 months    In person appointment or virtual visit in the past 6 mos or appointment in next 3 mos    Microalbumin procedure in past 12 months or taking ACE/ARB    EGFRCR or GFRAA > 50    GFR in the past 12 months      To be filled at: [id number] [place] [address] [address] AT [facility] [address], [phone], [phone]

## 2024-04-11 ENCOUNTER — TELEPHONE (OUTPATIENT)
Dept: INTERNAL MEDICINE CLINIC | Facility: CLINIC | Age: 79
End: 2024-04-11

## 2024-04-11 RX ORDER — ATORVASTATIN CALCIUM 20 MG/1
20 TABLET, FILM COATED ORAL NIGHTLY
Qty: 90 TABLET | Refills: 3 | Status: SHIPPED | OUTPATIENT
Start: 2024-04-11

## 2024-04-23 RX ORDER — SACUBITRIL AND VALSARTAN 97; 103 MG/1; MG/1
1 TABLET, FILM COATED ORAL 2 TIMES DAILY
Qty: 180 TABLET | Refills: 0 | OUTPATIENT
Start: 2024-04-23

## 2024-04-25 NOTE — TELEPHONE ENCOUNTER
Requested Renewals     albuterol 108 (90 Base) MCG/ACT Inhalation Aero Soln         Sig: Inhale 1 puff into the lungs every 6 (six) hours as needed for Wheezing.    Disp: 8.5 g    Refills: 0    Start: 4/24/2024    Class: Normal    Non-formulary    Last ordered: 1 year ago (4/13/2023) by DURAN Rivera    Asthma & COPD Medication Protocol [id number]/24/2024 10:25 AM   Protocol Details Asthma Action Score greater than or equal to 20    AAP/ACT given in last 12 months    Appointment in past 6 or next 3 months      To be filled at: [id number] [place], [place] - [facility] [address] AT [facility] [address], [phone], [phone]           LOV: 03/11/2024  RTC: 6 months  Last Relevant Labs: 02/19/2024

## 2024-04-26 RX ORDER — ALBUTEROL SULFATE 90 UG/1
1 AEROSOL, METERED RESPIRATORY (INHALATION) EVERY 6 HOURS PRN
Qty: 8.5 G | Refills: 0 | Status: SHIPPED | OUTPATIENT
Start: 2024-04-26

## 2024-05-02 RX ORDER — CARVEDILOL 25 MG/1
25 TABLET ORAL 2 TIMES DAILY
Qty: 180 TABLET | Refills: 1 | Status: SHIPPED | OUTPATIENT
Start: 2024-05-02

## 2024-05-02 NOTE — TELEPHONE ENCOUNTER
LOV: 3/11/2024 with Dr. Bell  RTC: 6 months  Last Relevant Labs: 2/19/2024  Filled: 2/5/2024    #180 with 0 refills    Future Appointments   Date Time Provider Department Center   5/13/2024  9:30 AM Tony Vaughn MD  [facility]   5/20/2024  9:30 AM Araceli De Dios APRN  [facility]   11/25/2024 10:30 AM Blair Everett MD [facility]

## 2024-05-03 ENCOUNTER — MED REC SCAN ONLY (OUTPATIENT)
Dept: INTERNAL MEDICINE CLINIC | Facility: CLINIC | Age: 79
End: 2024-05-03

## 2024-05-13 ENCOUNTER — TELEPHONE (OUTPATIENT)
Facility: CLINIC | Age: 79
End: 2024-05-13

## 2024-05-13 ENCOUNTER — OFFICE VISIT (OUTPATIENT)
Facility: CLINIC | Age: 79
End: 2024-05-13
Payer: COMMERCIAL

## 2024-05-13 VITALS
DIASTOLIC BLOOD PRESSURE: 60 MMHG | BODY MASS INDEX: 42.17 KG/M2 | OXYGEN SATURATION: 91 % | HEIGHT: 63 IN | WEIGHT: 238 LBS | RESPIRATION RATE: 16 BRPM | HEART RATE: 83 BPM | SYSTOLIC BLOOD PRESSURE: 100 MMHG

## 2024-05-13 DIAGNOSIS — I27.20 PULMONARY HTN (HCC): ICD-10-CM

## 2024-05-13 DIAGNOSIS — R06.09 DOE (DYSPNEA ON EXERTION): ICD-10-CM

## 2024-05-13 DIAGNOSIS — Z72.0 TOBACCO ABUSE: ICD-10-CM

## 2024-05-13 DIAGNOSIS — J96.11 CHRONIC RESPIRATORY FAILURE WITH HYPOXIA (HCC): Primary | ICD-10-CM

## 2024-05-13 DIAGNOSIS — R91.8 PULMONARY NODULES: ICD-10-CM

## 2024-05-13 DIAGNOSIS — J43.2 CENTRILOBULAR EMPHYSEMA (HCC): ICD-10-CM

## 2024-05-13 PROCEDURE — 99214 OFFICE O/P EST MOD 30 MIN: CPT | Performed by: INTERNAL MEDICINE

## 2024-05-13 PROCEDURE — 3008F BODY MASS INDEX DOCD: CPT | Performed by: INTERNAL MEDICINE

## 2024-05-13 PROCEDURE — 1160F RVW MEDS BY RX/DR IN RCRD: CPT | Performed by: INTERNAL MEDICINE

## 2024-05-13 PROCEDURE — 1159F MED LIST DOCD IN RCRD: CPT | Performed by: INTERNAL MEDICINE

## 2024-05-13 PROCEDURE — 3078F DIAST BP <80 MM HG: CPT | Performed by: INTERNAL MEDICINE

## 2024-05-13 PROCEDURE — 3074F SYST BP LT 130 MM HG: CPT | Performed by: INTERNAL MEDICINE

## 2024-05-13 PROCEDURE — 99406 BEHAV CHNG SMOKING 3-10 MIN: CPT | Performed by: INTERNAL MEDICINE

## 2024-05-13 RX ORDER — VARENICLINE TARTRATE 0.5 MG/1
TABLET, FILM COATED ORAL
Qty: 11 TABLET | Refills: 0 | Status: SHIPPED | OUTPATIENT
Start: 2024-05-13

## 2024-05-13 RX ORDER — VARENICLINE TARTRATE 1 MG/1
TABLET, FILM COATED ORAL
Qty: 60 TABLET | Refills: 0 | Status: SHIPPED | OUTPATIENT
Start: 2024-05-13

## 2024-05-13 RX ORDER — FLUTICASONE FUROATE, UMECLIDINIUM BROMIDE AND VILANTEROL TRIFENATATE 100; 62.5; 25 UG/1; UG/1; UG/1
1 POWDER RESPIRATORY (INHALATION) DAILY
Qty: 3 EACH | Refills: 3 | Status: SHIPPED | OUTPATIENT
Start: 2024-05-13

## 2024-05-13 NOTE — TELEPHONE ENCOUNTER
Per Dr. Vaughn:    Please send order for pulm rehab for pulm HTN to [place]     Pulm rehab order initiated. Per order pt has to have a PFT done within a year. PFT done 4/27/2023. Cardiopulmonary rehab @ [facility] called to verify. Awaiting call.

## 2024-05-13 NOTE — PROGRESS NOTES
[facility] General Pulmonary Progress Note    History of Present Illness:  Anette Frazier is a 78 year old female active smoker (20+ pack years) with significant PMH of DM, CHF, pulmonary HTN, chronic hypoxic respiratory failure, obesity, ALEXANDRA who presents today for follow up. Since last visit she feels her breathing has slowly worsened. Using breo daily with some relief. No cough, phlegm, chest pain/pressure.  Following with Dr. Jiang - per his note echo with EF 35% and to have repeat soon  Still smoking 2-3 cigs/day, wants to quit    Nov 2023 previously KERRIE De Dios  Anette Frazier is a 78 year old female with significant PMHx ALEXANDRA,  CHF, Chronic respiratory failure who presents today for follow up to discuss 6MWT and CT chest results. Overall feels well despite SOB with minimal exertion. Unable to pull oxygen tanks due to balance issues therefore not bringing portable tanks to activities/appts outside the house. No new complaints.    Previously 10/2023  Seen by Dr Everett for ALEXANDRA; using CPAP nightly for 8 hrs but download shows average 4.45 minutes; large leak and machine turning off.      Previously 7/2023  presents for follow - up to review PET scan results. Continues with dyspnea with exertion/walking. Checks oxygen saturation at rest which is always > 90%. Has not checked O2 sats with walking. Has oxygen tanks at home but to heavy to carry.     Previously 6/21/23   presents for followup and CT review. Reports breathing is 60% better; less SOB with exertion. Overall feels well.      Previously Dr Vaughn 5/2023  77 year old female active smoker (20+ pack years) with significant PMH of DM, CHF, pulmonary HTN, chronic hypoxic respiratory failure, obesity, ALEXANDRA who presents today for follow up of dyspnea. She denies new concerns today.  Still with KYLE. Thinks slightly better after using breo, not using albuterol.  No cough.  Does note nasal dryness while using the oxygen and CPAP.   Has been using oxygen at 4L instead of  prescribed 2L at rest and 6L on exertion.   Still smoking - interested in cessation therapies     3/8/2023 Previously  The patient had previously followed with [facility] for dyspnea and attributed to CHF, pulmonary HTN and obesity. She had previously trialed albuterol mdi and notes some slight improvement. Denies any cough, phlegm, wheeze. No chest pain/pressure, pleurisy. Has been trying to lose weight, down ~5 lbs over the last few months.   Denies any hx of asthma, bronchitis, COPD.      Active smoker - 1ppd, started in 50s, now smoking 2 cigs/day with coffee  Retired, previously in sales, not aware of any significant exposures to chemicals, fumes, asbestos or TB.   Born in [place], moved to [place] at [place] 11 and resided here since  No pets.    Past Medical History:   Past Medical History:    AIN (acute interstitial nephritis)    Back problem    Cataracts, bilateral    Congestive heart disease (HCC)    Diabetes (HCC)    Diverticulosis of large intestine    Essential hypertension    Gout    High blood pressure    High cholesterol    Hyperlipidemia    Neuropathy    feet    Obesity    ALEXANDRA (obstructive sleep apnea)    AHI 34 REM AHI 85 [place] 59%     Sleep apnea    Tobacco abuse    Visual impairment        Past Surgical History:   Past Surgical History:   Procedure Laterality Date    Colonoscopy  2012    [facility]    Colonoscopy  05/18/2018    diverticulosis - repeat 10 years - Dr. Montague    Colonoscopy      Knee replacement surgery  2005    left knee    Total knee replacement      Left knee        Family Medical History:   Family History   Problem Relation Age of Onset    Diabetes Mother     Other (Lung Cancer) Mother     Hypertension Mother     COPD Sister     Breast Cancer Maternal Aunt 60        Social History:   Social History     Socioeconomic History    Marital status:      Spouse name: Not on file    Number of children: Not on file    Years of education: Not on file    Highest  education level: Not on file   Occupational History    Occupation: [employer]    Tobacco Use    Smoking status: Every Day     Current packs/day: 1.00     Average packs/day: 1 pack/day for 50.3 years (50.3 ttl pk-yrs)     Types: Cigarettes     Start date: 1/10/1974     Passive exposure: Past    Smokeless tobacco: Former     Quit date: 2016    Tobacco comments:     Smokes 2 cigarettes daily currently    Vaping Use    Vaping status: Never Used   Substance and Sexual Activity    Alcohol use: No    Drug use: No    Sexual activity: Not Currently     Partners: Female   Other Topics Concern    Caffeine Concern Yes    Exercise No    Seat Belt Not Asked    Special Diet No    Stress Concern Not Asked    Weight Concern Not Asked   Social History Narrative    Not on file     Social Determinants of Health     Financial Resource Strain: Not on file   Food Insecurity: Not on file   Transportation Needs: Not on file   Physical Activity: Inactive (1/5/2021)    Received from [facility], [facility]    Exercise Vital Sign     Days of Exercise per Week: 0 days     Minutes of Exercise per Session: 0 min   Stress: Not on file   Social Connections: Not on file   Housing Stability: Not on file        Medications:   Current Outpatient Medications   Medication Sig Dispense Refill    varenicline 0.5 MG Oral Tab take 1 tablet (0.5MG)  by oral route  every day for 3 days with a glass of waterafter meals, then twice a day in the morning and in the eveningfor 4 days. 11 tablet 0    varenicline 1 MG Oral Tab take 1 tablet (1MG)  by ORAL route 2 times every day with a glass of waterafter meals 60 tablet 0    fluticasone-umeclidin-vilant (TRELEGY ELLIPTA) 100-62.5-25 MCG/ACT Inhalation Aerosol Powder, Breath Activated Inhale 1 puff into the lungs daily. 3 each 3    carvedilol 25 MG Oral Tab Take 1 tablet (25 mg total) by mouth 2 (two) times daily. 180 tablet 1    albuterol 108 (90 Base) MCG/ACT Inhalation Aero Soln  Inhale 1 puff into the lungs every 6 (six) hours as needed for Wheezing. 8.5 g 0    atorvastatin 20 MG Oral Tab Take 1 tablet (20 mg total) by mouth nightly. 90 tablet 3    OZEMPIC, 0.25 OR 0.5 MG/DOSE, 2 MG/3ML Subcutaneous Solution Pen-injector INJECT 0.5 MG UNDER THE SKIN ONCE A WEEK 9 mL 0    gabapentin 100 MG Oral Cap Take 2 capsules (200 mg total) by mouth nightly. 180 capsule 1    torsemide 20 MG Oral Tab Take 1 tablet (20 mg total) by mouth 2 (two) times daily. 180 tablet 0    Polyethylene Glycol 3350 (MIRALAX) 17 g Oral Powd Pack Take 17 g by mouth daily. 90 packet 3    spironolactone 25 MG Oral Tab Take 1 tablet (25 mg total) by mouth daily.      probenecid 500 MG Oral Tab Take 1 tablet (500 mg total) by mouth 2 (two) times daily. 180 tablet 0    Potassium Chloride ER 10 MEQ Oral Tab CR Take 1 tablet (10 mEq total) by mouth daily. 90 tablet 1    hydrALAZINE 100 MG Oral Tab Take 1 tablet (100 mg total) by mouth 2 (two) times daily. 180 tablet 1    alendronate 70 MG Oral Tab Take 1 tablet (70 mg total) by mouth every 7 days. 12 tablet 3    sacubitril-valsartan (ENTRESTO)  MG Oral Tab Take 1 tablet by mouth 2 (two) times daily. 180 tablet 0    albuterol (2.5 MG/3ML) 0.083% Inhalation Nebu Soln Take 3 mL (2.5 mg total) by nebulization 3 (three) times daily. 300 mL 0    Insulin Pen Needle (PEN NEEDLES) 32G X 4 MM Does not apply Misc 1 each every 7 days. 30 each 0    Insulin Pen Needle (PEN NEEDLES) 32G X 4 MM Does not apply Misc 1 each every 7 days. 30 each 5    BD PEN NEEDLE NAHUM 2ND GEN 32G X 4 MM Does not apply Misc USE DAILY 100 each 0    Respiratory Therapy Supplies (NEBULIZER) Does not apply Device Use as directed 1 each 0    triamcinolone 0.1 % External Cream Apply topically 2 (two) times daily as needed. 60 g 1    ASPIRIN EC LOW DOSE 81 MG Oral Tab EC Take 1 tablet (81 mg total) by mouth daily.  0       Review of Systems: Review of Systems   Constitutional: Negative.    HENT: Negative.      Respiratory:  Positive for shortness of breath. Negative for wheezing and stridor.    Cardiovascular: Negative.    All other systems reviewed and are negative.       Physical Exam:  /60 (BP Location: Left arm, Patient Position: Sitting, Cuff Size: adult)   Pulse 83   Resp 16   Ht 5' 3\" (1.6 m)   Wt 238 lb (108 kg)   SpO2 91%   BMI 42.16 kg/m²      Constitutional: alert, cooperative. No acute distress.  HEENT: Head NC/AT. Mask in place  Cardio: Regular rate and rhythm. Normal S1 and S2. No murmurs.   Respiratory: Thorax symmetrical with no labored breathing. Clear to ausculation bilaterally with symmetrical breath sounds. No wheezing, rhonchi, rales, or crackles.   GI: NABS. Abd soft, non-tender.  Extremities: No clubbing or cyanosis. No BLE edema.    Neurologic: A&Ox3. No gross motor deficits.  Skin: Warm, dry  Psych: Calm, cooperative. Pleasant affect.    Results:  Personally reviewed    WBC: 7.2, done on 2/19/2024.  HGB: 13.3, done on 2/19/2024.  PLT: 330, done on 2/19/2024.     Glucose: 148, done on 2/19/2024.  Cr: 1.04, done on 2/19/2024.  GFR(AA): 53, done on 4/1/2022.  GFR (non-AA): 46, done on 4/1/2022.  CA: 8.9, done on 2/19/2024.  Na: 137, done on 2/19/2024.  K: 4.1, done on 2/19/2024.  Cl: 107, done on 2/19/2024.  CO2: 28, done on 2/19/2024.  Last ALB was 3.4% done on 2/19/2024.     No results found.     Assessment/Plan:  #1. Dyspnea on exertion  multifactorial due to CHF, morbid obesity, obesity hypoventilation, pulmonary hypertension, possible COPD  Active smoker - see below  3/2023 6MWT with 2L at rest, 6L on exertion. She does not qualify for POC  4/2023 PFTs with no obstruction, restriction, but severely reduced DLCO concerning for pulmonary HTN  Echo previously demonstrated pulmonary HTN  5/2023 CT chest no evidence of significant ILD however 2 nodules in the RUL have increased in size  6/2023 PET/CT with low grade uptake  7/2023 CT chest with either stable or decrease nodules  11/2023  CT chest stable  Reviewed pulm rehab - she is agreeable and to go to [place]  Change from breo to trelegy     #2. Pulmonary nodules  5/2023 CT chest no evidence of significant ILD however 2 nodules in the RUL have increased in size  6/2023 PET/CT with low grade uptake  7/2023 CT chest with either stable or decrease nodules  11/2023 CT chest stable  Plan repeat CT chest in may 2024; if stable then repeat CT chest in one year     #3. Chronic hypoxic respiratory failure  Multifactorial due to CHF, pulmonary HTN, morbid obesity, obesity hypoventilation, possible COPD  Previous o2 walk with [facility] 2022: RA at rest, 3L on exertion  3/2023 6MWT with 2L at rest, 6L on exertion. She does not qualify for POC  4/2023 PFTs with no obstruction, no restriction, but severely reduced DLCO concerning for pulmonary HTN  No ILD on HRCT  Agreeable to pulmonary rehab  Inhalers as above    #4. Tobacco abuse  Active smoker - 1ppd, started in 50s, now smoking 2 cigs/day with coffee  Smoking cessation counseling provided x 5 minutes and reviewed options including changes to habits as well as chantix/wellbutrin. She is now interested in chantix and NRT. Will send orders   Lung Cancer Counseling and Shared Decision Making Session in an Asymptomatic Smoker/Former Smoker   Anette Frazier is a 78 year old female without current symptoms of lung cancer.  History   Smoking Status    Every Day    Packs/day: 1.00    Years: 50.00    Types: Cigarettes    Start date: 1/10/1974   Smokeless Tobacco    Former    Quit date: 2016        She received information on the importance of adherence to annual lung cancer Low Dose CT (LDCT) screening, the impact of her comorbidities and her ability or willingness to undergo diagnosis and treatment. she qualifies for low dose CT lung cancer screening, however due to ongoing evaluation for lung nodules, low dose CT lung cancer screening should be postponed until she completes CT chest.  We counseled the importance  of maintaining cigarette smoking abstinence if she is a former smoker and the importance of smoking cessation if she is a current smoker and we discussed and furnished information about tobacco cessation interventions.       #5. ALEXANDRA  Follows with Dr. Karlos Vaughn MD  5/13/2024

## 2024-05-13 NOTE — PATIENT INSTRUCTIONS
Obtain a CT chest scan in the next few weeks to check the lung spot. Call central scheduling at [phone] to schedule the CT scan   Stop the breo inhaler. Start trelegy inhaler - one puff once a day. Rinse your mouth out after using inhaler  Continue to use albuterol 2 puffs every 6 hours as needed for your breathing or cough  I will send an order for pulmonary rehab to [place]  Follow up in October/November 2024  Call/message with questions/concerns

## 2024-05-13 NOTE — TELEPHONE ENCOUNTER
Per Essence from [facility], no need for PFT. Order to be send over to [phone] for MD signature.

## 2024-05-20 ENCOUNTER — HOSPITAL ENCOUNTER (OUTPATIENT)
Dept: MAMMOGRAPHY | Age: 79
Discharge: HOME | End: 2024-05-20
Attending: INTERNAL MEDICINE

## 2024-05-20 DIAGNOSIS — Z00.00 ROUTINE GENERAL MEDICAL EXAMINATION AT A HEALTH CARE FACILITY: ICD-10-CM

## 2024-05-20 PROCEDURE — 77063 BREAST TOMOSYNTHESIS BI: CPT | Performed by: INTERNAL MEDICINE

## 2024-05-20 PROCEDURE — 77067 SCR MAMMO BI INCL CAD: CPT | Performed by: INTERNAL MEDICINE

## 2024-05-20 RX ORDER — PROBENECID 500 MG/1
500 TABLET, FILM COATED ORAL 2 TIMES DAILY
Qty: 180 TABLET | Refills: 0 | Status: SHIPPED | OUTPATIENT
Start: 2024-05-20

## 2024-05-30 ENCOUNTER — HOSPITAL ENCOUNTER (OUTPATIENT)
Dept: CT IMAGING | Age: 79
Discharge: HOME | End: 2024-05-30
Attending: INTERNAL MEDICINE
Payer: MEDICARE

## 2024-05-30 DIAGNOSIS — R91.8 PULMONARY NODULES: ICD-10-CM

## 2024-05-30 PROCEDURE — 71250 CT THORAX DX C-: CPT | Performed by: INTERNAL MEDICINE

## 2024-05-30 RX ORDER — HYDRALAZINE HYDROCHLORIDE 100 MG/1
100 TABLET, FILM COATED ORAL 2 TIMES DAILY
Qty: 180 TABLET | Refills: 1 | Status: SHIPPED | OUTPATIENT
Start: 2024-05-30

## 2024-06-04 ENCOUNTER — TELEPHONE (OUTPATIENT)
Facility: CLINIC | Age: 79
End: 2024-06-04

## 2024-06-04 DIAGNOSIS — R91.8 LUNG NODULE, MULTIPLE: Primary | ICD-10-CM

## 2024-06-04 NOTE — TELEPHONE ENCOUNTER
Called/reviewed CT imaging showing nodules are stable to Nov 2023.     Given stability over 6 months, will plan next scan in one year (may 2025)    Tony Vaughn MD

## 2024-06-13 RX ORDER — ALBUTEROL SULFATE 90 UG/1
1 AEROSOL, METERED RESPIRATORY (INHALATION) EVERY 6 HOURS PRN
Qty: 8.5 G | Refills: 0 | Status: SHIPPED | OUTPATIENT
Start: 2024-06-13

## 2024-06-13 NOTE — TELEPHONE ENCOUNTER
Protocol failed     Albuterol 108(90 Base) mcg/act     LOV: 3/11/24   RTC: 6 months   Filled: 4/26/24 # 8.5g 0 refills   Labs: 2/19/24   Future Appointments   Date Time Provider Department Center   8/28/2024 11:00 AM WDR DEXA 1 WDR DEXA [facility]   10/14/2024  9:00 AM Tony Vaughn MD  [facility]   11/25/2024 10:30 AM Blair Everett MD [facility]

## 2024-07-14 RX ORDER — GABAPENTIN 100 MG/1
200 CAPSULE ORAL NIGHTLY
Qty: 180 CAPSULE | Refills: 1 | Status: SHIPPED | OUTPATIENT
Start: 2024-07-14

## 2024-07-15 RX ORDER — TORSEMIDE 20 MG/1
20 TABLET ORAL 2 TIMES DAILY
Qty: 180 TABLET | Refills: 0 | Status: SHIPPED | OUTPATIENT
Start: 2024-07-15

## 2024-07-20 NOTE — TELEPHONE ENCOUNTER
LOV: 3/11/24   RTC: 6 months   Labs: 2/19/24   Future Appointments   Date Time Provider Department Center   8/28/2024 11:00 AM WDR DEXA MARIAJOSE1 [facility]   10/14/2024  9:00 AM Tony Vaughn MD  [facility]   11/25/2024 10:30 AM Blair Everett MD [facility]

## 2024-07-22 RX ORDER — ALBUTEROL SULFATE 90 UG/1
1 AEROSOL, METERED RESPIRATORY (INHALATION) EVERY 6 HOURS PRN
Qty: 8.5 G | Refills: 0 | Status: SHIPPED | OUTPATIENT
Start: 2024-07-22

## 2024-07-22 RX ORDER — SEMAGLUTIDE 0.68 MG/ML
INJECTION, SOLUTION SUBCUTANEOUS
Qty: 9 ML | Refills: 0 | Status: SHIPPED | OUTPATIENT
Start: 2024-07-22

## 2024-07-30 RX ORDER — ALENDRONATE SODIUM 70 MG/1
70 TABLET ORAL
Qty: 12 TABLET | Refills: 3 | Status: SHIPPED | OUTPATIENT
Start: 2024-07-30

## 2024-07-30 NOTE — TELEPHONE ENCOUNTER
Alendronate 70mg     LOV: 3/11/24  RTC: 6 months   Filled: 10/3/23 #12 3 refills  Labs: 2/19/24   Last dexa 8/27/22   Future Appointments   Date Time Provider Department Center   8/28/2024 11:00 AM WDR DEXA RM1 WDR DEXA [facility]   10/14/2024  9:00 AM Tony Vaughn MD  [facility]   11/25/2024 10:30 AM Blair Everett MD [facility]

## 2024-08-19 RX ORDER — PROBENECID 500 MG/1
500 TABLET, FILM COATED ORAL 2 TIMES DAILY
Qty: 180 TABLET | Refills: 0 | Status: SHIPPED | OUTPATIENT
Start: 2024-08-19

## 2024-08-27 ENCOUNTER — TELEPHONE (OUTPATIENT)
Dept: INTERNAL MEDICINE CLINIC | Facility: CLINIC | Age: 79
End: 2024-08-27

## 2024-09-10 RX ORDER — ALBUTEROL SULFATE 90 UG/1
1 AEROSOL, METERED RESPIRATORY (INHALATION) EVERY 6 HOURS PRN
Qty: 8.5 G | Refills: 0 | Status: SHIPPED | OUTPATIENT
Start: 2024-09-10

## 2024-09-10 NOTE — TELEPHONE ENCOUNTER
LOV: 3/11/24   RTC: 6 months  Filled: 7/22/24 #8.5g 0 refills  Labs: 2/19/24   Future Appointments   Date Time Provider Department Center   10/14/2024  9:00 AM Tony Vaughn MD [facility]   11/25/2024 10:30 AM Blair Everett MD [facility]

## 2024-09-16 ENCOUNTER — LAB ENCOUNTER (OUTPATIENT)
Dept: LAB | Age: 79
End: 2024-09-16
Attending: INTERNAL MEDICINE
Payer: MEDICARE

## 2024-09-16 DIAGNOSIS — Z79.4 CONTROLLED TYPE 2 DIABETES MELLITUS WITH MICROALBUMINURIA, WITH LONG-TERM CURRENT USE OF INSULIN (HCC): Chronic | ICD-10-CM

## 2024-09-16 DIAGNOSIS — E78.5 DYSLIPIDEMIA: Primary | ICD-10-CM

## 2024-09-16 DIAGNOSIS — I50.40: ICD-10-CM

## 2024-09-16 DIAGNOSIS — I15.2 HYPERTENSION ASSOCIATED WITH TYPE 2 DIABETES MELLITUS (HCC): Chronic | ICD-10-CM

## 2024-09-16 DIAGNOSIS — R06.09 DOE (DYSPNEA ON EXERTION): ICD-10-CM

## 2024-09-16 DIAGNOSIS — E11.29 CONTROLLED TYPE 2 DIABETES MELLITUS WITH MICROALBUMINURIA, WITH LONG-TERM CURRENT USE OF INSULIN (HCC): Chronic | ICD-10-CM

## 2024-09-16 DIAGNOSIS — E78.5 HYPERLIPIDEMIA ASSOCIATED WITH TYPE 2 DIABETES MELLITUS (HCC): Chronic | ICD-10-CM

## 2024-09-16 DIAGNOSIS — E11.59 HYPERTENSION ASSOCIATED WITH TYPE 2 DIABETES MELLITUS (HCC): Chronic | ICD-10-CM

## 2024-09-16 DIAGNOSIS — R80.9 CONTROLLED TYPE 2 DIABETES MELLITUS WITH MICROALBUMINURIA, WITH LONG-TERM CURRENT USE OF INSULIN (HCC): Chronic | ICD-10-CM

## 2024-09-16 DIAGNOSIS — R07.89 CHEST WALL PAIN: ICD-10-CM

## 2024-09-16 DIAGNOSIS — M81.0 AGE-RELATED OSTEOPOROSIS WITHOUT CURRENT PATHOLOGICAL FRACTURE: ICD-10-CM

## 2024-09-16 DIAGNOSIS — E11.69 HYPERLIPIDEMIA ASSOCIATED WITH TYPE 2 DIABETES MELLITUS (HCC): Chronic | ICD-10-CM

## 2024-09-16 LAB
ALBUMIN SERPL-MCNC: 4.2 G/DL (ref 3.2–4.8)
ALBUMIN/GLOB SERPL: 1.3 {RATIO} (ref 1–2)
ALP LIVER SERPL-CCNC: 82 U/L
ALT SERPL-CCNC: 12 U/L
ANION GAP SERPL CALC-SCNC: 12 MMOL/L (ref 0–18)
AST SERPL-CCNC: 15 U/L (ref ?–34)
BILIRUB SERPL-MCNC: 0.3 MG/DL (ref 0.2–1.1)
BUN BLD-MCNC: 15 MG/DL (ref 9–23)
CALCIUM BLD-MCNC: 9.2 MG/DL (ref 8.7–10.4)
CHLORIDE SERPL-SCNC: 104 MMOL/L (ref 98–112)
CHOLEST SERPL-MCNC: 133 MG/DL (ref ?–200)
CO2 SERPL-SCNC: 24 MMOL/L (ref 21–32)
CREAT BLD-MCNC: 1.22 MG/DL
EGFRCR SERPLBLD CKD-EPI 2021: 46 ML/MIN/1.73M2 (ref 60–?)
EST. AVERAGE GLUCOSE BLD GHB EST-MCNC: 169 MG/DL (ref 68–126)
FASTING PATIENT LIPID ANSWER: YES
FASTING STATUS PATIENT QL REPORTED: YES
GLOBULIN PLAS-MCNC: 3.2 G/DL (ref 2–3.5)
GLUCOSE BLD-MCNC: 114 MG/DL (ref 70–99)
HBA1C MFR BLD: 7.5 % (ref ?–5.7)
HDLC SERPL-MCNC: 28 MG/DL (ref 40–59)
LDLC SERPL CALC-MCNC: 76 MG/DL (ref ?–100)
NONHDLC SERPL-MCNC: 105 MG/DL (ref ?–130)
OSMOLALITY SERPL CALC.SUM OF ELEC: 292 MOSM/KG (ref 275–295)
POTASSIUM SERPL-SCNC: 3.5 MMOL/L (ref 3.5–5.1)
PROT SERPL-MCNC: 7.4 G/DL (ref 5.7–8.2)
SODIUM SERPL-SCNC: 140 MMOL/L (ref 136–145)
TRIGL SERPL-MCNC: 164 MG/DL (ref 30–149)
VIT D+METAB SERPL-MCNC: 40.1 NG/ML (ref 30–100)
VLDLC SERPL CALC-MCNC: 26 MG/DL (ref 0–30)

## 2024-09-16 PROCEDURE — 80061 LIPID PANEL: CPT

## 2024-09-16 PROCEDURE — 80053 COMPREHEN METABOLIC PANEL: CPT

## 2024-09-16 PROCEDURE — 83036 HEMOGLOBIN GLYCOSYLATED A1C: CPT

## 2024-09-16 PROCEDURE — 82306 VITAMIN D 25 HYDROXY: CPT

## 2024-09-16 PROCEDURE — 36415 COLL VENOUS BLD VENIPUNCTURE: CPT

## 2024-09-24 ENCOUNTER — OFFICE VISIT (OUTPATIENT)
Dept: INTERNAL MEDICINE CLINIC | Facility: CLINIC | Age: 79
End: 2024-09-24
Payer: COMMERCIAL

## 2024-09-24 VITALS
HEIGHT: 63 IN | OXYGEN SATURATION: 93 % | DIASTOLIC BLOOD PRESSURE: 63 MMHG | WEIGHT: 238.38 LBS | BODY MASS INDEX: 42.24 KG/M2 | HEART RATE: 80 BPM | TEMPERATURE: 97 F | SYSTOLIC BLOOD PRESSURE: 130 MMHG

## 2024-09-24 DIAGNOSIS — R23.8 SKIN IRRITATION: ICD-10-CM

## 2024-09-24 PROCEDURE — 99215 OFFICE O/P EST HI 40 MIN: CPT | Performed by: INTERNAL MEDICINE

## 2024-09-24 PROCEDURE — 3075F SYST BP GE 130 - 139MM HG: CPT | Performed by: INTERNAL MEDICINE

## 2024-09-24 PROCEDURE — 3008F BODY MASS INDEX DOCD: CPT | Performed by: INTERNAL MEDICINE

## 2024-09-24 PROCEDURE — 3078F DIAST BP <80 MM HG: CPT | Performed by: INTERNAL MEDICINE

## 2024-09-24 PROCEDURE — 1159F MED LIST DOCD IN RCRD: CPT | Performed by: INTERNAL MEDICINE

## 2024-09-24 PROCEDURE — 1160F RVW MEDS BY RX/DR IN RCRD: CPT | Performed by: INTERNAL MEDICINE

## 2024-09-24 RX ORDER — ALBUTEROL SULFATE 0.83 MG/ML
2.5 SOLUTION RESPIRATORY (INHALATION) 3 TIMES DAILY
Qty: 300 ML | Refills: 1 | Status: SHIPPED | OUTPATIENT
Start: 2024-09-24

## 2024-09-24 RX ORDER — TRIAMCINOLONE ACETONIDE 1 MG/G
CREAM TOPICAL 2 TIMES DAILY PRN
Qty: 60 G | Refills: 1 | Status: SHIPPED | OUTPATIENT
Start: 2024-09-24

## 2024-09-24 NOTE — PATIENT INSTRUCTIONS
- HOLD ozempic for one month. Instead, take jardiance  - Weight Loss Advice :  A) Eat plant based diet and avoid ultra processed and fast foods.  B) Your portions should be a dinner plate. 1/2 should be vegetables, 1/4 lean protein (chicken, fish, turkey. Tofu), and 1/4 can be carbohydrates.   C)  Your main drink should be water rather than soda or alcohol or sweet coffees  D). Please try to exercise ( brisk walking or jogging) at least 30 minutes five times/week; and do muscle strengthening exercises ( lifting the weight, doing push ups or yoga)  twice a week    E). It is very important to get 7-9 hours of sleep per night

## 2024-09-24 NOTE — PROGRESS NOTES
Subjective:   Anette Frazier is a 79 year old female who presents for Itching and GI Problem     History/Other:    Chief Complaint Reviewed and Verified  No Further Nursing Notes to   Review  Tobacco Reviewed  Allergies Reviewed  Medications Reviewed    Medical History Reviewed  Surgical History Reviewed  OB Status Reviewed    Family History Reviewed  Social History Reviewed       The patient is 79-year-old female with history of diabetes mellitus, obesity, smoking was started on Ozempic 3 months ago.  The patient feels abdominal distention and gassy feeling.  The patient complains of constipation and takes MiraLAX to help.  According to the patient, weight is similar to that of 3 months ago.  Tobacco:  Social History     Tobacco Use   Smoking Status Every Day    Current packs/day: 1.00    Average packs/day: 1 pack/day for 50.7 years (50.7 ttl pk-yrs)    Types: Cigarettes    Start date: 1/10/1974    Passive exposure: Past   Smokeless Tobacco Former    Quit date: 2016   Tobacco Comments    Smokes 2 cigarettes every morning with coffee.      E-Cigarettes/Vaping       Questions Responses    E-Cigarette Use Never User           Tobacco cessation counseling for 3-10 minutes (add E/M code #68367).      Current Outpatient Medications   Medication Sig Dispense Refill    ALBUTEROL 108 (90 Base) MCG/ACT Inhalation Aero Soln INHALE 1 PUFF INTO THE LUNGS EVERY 6 HOURS AS NEEDED FOR WHEEZING 8.5 g 0    PROBENECID 500 MG Oral Tab TAKE 1 TABLET(500 MG) BY MOUTH TWICE DAILY 180 tablet 0    ALENDRONATE 70 MG Oral Tab TAKE 1 TABLET(70 MG) BY MOUTH EVERY 7 DAYS 12 tablet 3    OZEMPIC, 0.25 OR 0.5 MG/DOSE, 2 MG/3ML Subcutaneous Solution Pen-injector INJECT 0.5 MG UNDER THE SKIN ONCE A WEEK AS DIRECTED 9 mL 0    TORSEMIDE 20 MG Oral Tab TAKE 1 TABLET(20 MG) BY MOUTH TWICE DAILY 180 tablet 0    GABAPENTIN 100 MG Oral Cap TAKE 2 CAPSULES(200 MG) BY MOUTH EVERY NIGHT 180 capsule 1    HYDRALAZINE 100 MG Oral Tab TAKE 1  TABLET(100 MG) BY MOUTH TWICE DAILY 180 tablet 1    fluticasone-umeclidin-vilant (TRELEGY ELLIPTA) 100-62.5-25 MCG/ACT Inhalation Aerosol Powder, Breath Activated Inhale 1 puff into the lungs daily. 3 each 3    carvedilol 25 MG Oral Tab Take 1 tablet (25 mg total) by mouth 2 (two) times daily. 180 tablet 1    atorvastatin 20 MG Oral Tab Take 1 tablet (20 mg total) by mouth nightly. 90 tablet 3    Polyethylene Glycol 3350 (MIRALAX) 17 g Oral Powd Pack Take 17 g by mouth daily. 90 packet 3    spironolactone 25 MG Oral Tab Take 1 tablet (25 mg total) by mouth daily.      Potassium Chloride ER 10 MEQ Oral Tab CR Take 1 tablet (10 mEq total) by mouth daily. 90 tablet 1    sacubitril-valsartan (ENTRESTO)  MG Oral Tab Take 1 tablet by mouth 2 (two) times daily. 180 tablet 0    albuterol (2.5 MG/3ML) 0.083% Inhalation Nebu Soln Take 3 mL (2.5 mg total) by nebulization 3 (three) times daily. 300 mL 0    Insulin Pen Needle (PEN NEEDLES) 32G X 4 MM Does not apply Misc 1 each every 7 days. 30 each 0    Insulin Pen Needle (PEN NEEDLES) 32G X 4 MM Does not apply Misc 1 each every 7 days. 30 each 5    BD PEN NEEDLE NAHUM 2ND GEN 32G X 4 MM Does not apply Misc USE DAILY 100 each 0    Respiratory Therapy Supplies (NEBULIZER) Does not apply Device Use as directed 1 each 0    triamcinolone 0.1 % External Cream Apply topically 2 (two) times daily as needed. 60 g 1    ASPIRIN EC LOW DOSE 81 MG Oral Tab EC Take 1 tablet (81 mg total) by mouth daily.  0         Review of Systems:  Pertinent items are noted in HPI.  A comprehensive review of systems was negative.      Objective:   /63 (BP Location: Left arm, Patient Position: Sitting, Cuff Size: large)   Pulse 80   Temp 97.2 °F (36.2 °C) (Temporal)   Ht 5' 3\" (1.6 m)   Wt 238 lb 6.4 oz (108.1 kg)   SpO2 93%   Breastfeeding No   BMI 42.23 kg/m²  Estimated body mass index is 42.23 kg/m² as calculated from the following:    Height as of this encounter: 5' 3\" (1.6 m).     Weight as of this encounter: 238 lb 6.4 oz (108.1 kg).  General appearance: alert, appears stated age, and cooperative  Head: Normocephalic, without obvious abnormality, atraumatic  Eyes: conjunctivae/corneas clear. PERRL, EOM's intact. Fundi benign.  Lungs: clear to auscultation bilaterally  Heart: S1, S2 normal, no murmur, click, rub or gallop, regular rate and rhythm  Abdomen: soft, non-tender; bowel sounds normal; no masses,  no organomegaly  Extremities: extremities normal, atraumatic, no cyanosis or edema    Wt Readings from Last 6 Encounters:   09/24/24 238 lb 6.4 oz (108.1 kg)   05/13/24 238 lb (108 kg)   03/11/24 242 lb 6.4 oz (110 kg)   02/08/24 237 lb 9.6 oz (107.8 kg)   01/22/24 245 lb (111.1 kg)   12/06/23 245 lb (111.1 kg)        Assessment & Plan:   1. Skin irritation  -Likely due to dryness of the skin and neurodermatitis.  Triamcinolone as needed.  Avoid warm baths for prolonged period of time.  2.  Dyspeptic symptoms likely the side effect of Ozempic.  The patient would like to hold it for a month and see what happens.  Meanwhile the cardiology services have initiated the patient on Jardiance.  The patient does not know what the doses.  Will call us with the exact dose.  3.  Class III obesity: Discussed about healthy plate designed by [facility].  Also discussed about the strategies to spend the energy.  That could be exercise induced thrombogenesis-EAT, and none exercise induced thrombogenesis and NEAT.  4.  HFrEF ( 35-40% in 2022): Continue GDMT including Jardiance  5.  CKD 3: I have good glycemic control and Jardiance  6.  Smoking: Motivational interview was conducted for cessation of smoking.  Nicotine gum has been prescribed.    I also discussed with the recent labs done by [facility].      No follow-ups on file.    Dat Herman MD, 9/24/2024, 1:02 PM

## 2024-09-30 RX ORDER — SEMAGLUTIDE 0.68 MG/ML
INJECTION, SOLUTION SUBCUTANEOUS
Qty: 9 ML | Refills: 0 | Status: SHIPPED | OUTPATIENT
Start: 2024-09-30

## 2024-10-07 ENCOUNTER — MED REC SCAN ONLY (OUTPATIENT)
Facility: CLINIC | Age: 79
End: 2024-10-07

## 2024-10-07 RX ORDER — CARVEDILOL 25 MG/1
25 TABLET ORAL 2 TIMES DAILY
Qty: 180 TABLET | Refills: 1 | Status: SHIPPED | OUTPATIENT
Start: 2024-10-07

## 2024-10-07 NOTE — TELEPHONE ENCOUNTER
Protocol failed     Carvedilol 25mg     LOV: 9/24/24  RTC: none noted  Filled: 5/2/24 #180 1 refill   Labs: 9/16/24   Future Appointments   Date Time Provider Department Center   10/12/2024  2:30 PM [facility] CT 1 [facility]   11/6/2024  8:00 AM Tony Vaughn MD [id number]   11/25/2024 10:30 AM Blair Everett MD [facility]

## 2024-10-12 ENCOUNTER — HOSPITAL ENCOUNTER (OUTPATIENT)
Dept: CT IMAGING | Age: 79
Discharge: HOME | End: 2024-10-12
Attending: INTERNAL MEDICINE
Payer: MEDICARE

## 2024-10-12 DIAGNOSIS — R91.8 LUNG NODULE, MULTIPLE: ICD-10-CM

## 2024-10-12 PROCEDURE — 71250 CT THORAX DX C-: CPT | Performed by: INTERNAL MEDICINE

## 2024-10-16 ENCOUNTER — OFFICE VISIT (OUTPATIENT)
Dept: INTERNAL MEDICINE CLINIC | Facility: CLINIC | Age: 79
End: 2024-10-16
Payer: COMMERCIAL

## 2024-10-16 VITALS
BODY MASS INDEX: 42 KG/M2 | DIASTOLIC BLOOD PRESSURE: 64 MMHG | SYSTOLIC BLOOD PRESSURE: 126 MMHG | TEMPERATURE: 98 F | WEIGHT: 234.38 LBS | HEART RATE: 77 BPM | OXYGEN SATURATION: 92 %

## 2024-10-16 DIAGNOSIS — N89.8 ITCHING IN THE VAGINAL AREA: Primary | ICD-10-CM

## 2024-10-16 DIAGNOSIS — B37.0 THRUSH: ICD-10-CM

## 2024-10-16 PROCEDURE — 3078F DIAST BP <80 MM HG: CPT | Performed by: INTERNAL MEDICINE

## 2024-10-16 PROCEDURE — 1160F RVW MEDS BY RX/DR IN RCRD: CPT | Performed by: INTERNAL MEDICINE

## 2024-10-16 PROCEDURE — 99214 OFFICE O/P EST MOD 30 MIN: CPT | Performed by: INTERNAL MEDICINE

## 2024-10-16 PROCEDURE — 1159F MED LIST DOCD IN RCRD: CPT | Performed by: INTERNAL MEDICINE

## 2024-10-16 PROCEDURE — 81514 NFCT DS BV&VAGINITIS DNA ALG: CPT | Performed by: INTERNAL MEDICINE

## 2024-10-16 PROCEDURE — 3074F SYST BP LT 130 MM HG: CPT | Performed by: INTERNAL MEDICINE

## 2024-10-16 RX ORDER — NYSTATIN 100000 [USP'U]/ML
5 SUSPENSION ORAL 4 TIMES DAILY
Qty: 140 ML | Refills: 0 | Status: SHIPPED | OUTPATIENT
Start: 2024-10-16 | End: 2024-10-23

## 2024-10-16 NOTE — PROGRESS NOTES
Subjective:   Anette Frazier is a 79 year old female  who presents for Vaginal Problem (Severe itching ) and Throat Problem (Severe dry throat specially at night )     Pt of Dr. Bell who presents for an acute visit.     Vaginal/vulva itching- no discharge. No sexual encounters.     Also with dry mouth.   Pt with respiratory disease- follows with pulm. No acute symptoms.   Does use inhalers    History/Other:    Chief Complaint Reviewed and Verified  Nursing Notes Reviewed and   Verified  Medications Reviewed         Current Outpatient Medications   Medication Sig Dispense Refill    nystatin 366846 UNIT/ML Mouth/Throat Suspension Take 5 mL (500,000 Units total) by mouth 4 (four) times daily for 7 days. 500,000 units 4 times daily; swish in the mouth and retain for as long as possible (several minutes) before swallowing. 140 mL 0    clotrimazole 2 % Vaginal Cream Place 1 Application vaginally 2 (two) times daily as needed (vaginal itching). Can apply to external area of vaginal/vulva area 21 g 0    CARVEDILOL 25 MG Oral Tab TAKE 1 TABLET(25 MG) BY MOUTH TWICE DAILY 180 tablet 1    albuterol (2.5 MG/3ML) 0.083% Inhalation Nebu Soln Take 3 mL (2.5 mg total) by nebulization 3 (three) times daily. 300 mL 1    triamcinolone 0.1 % External Cream Apply topically 2 (two) times daily as needed. 60 g 1    nicotine polacrilex 2 MG Mouth/Throat Gum Take 1 each (2 mg total) by mouth as needed for Smoking cessation. 90 each 2    ALBUTEROL 108 (90 Base) MCG/ACT Inhalation Aero Soln INHALE 1 PUFF INTO THE LUNGS EVERY 6 HOURS AS NEEDED FOR WHEEZING 8.5 g 0    PROBENECID 500 MG Oral Tab TAKE 1 TABLET(500 MG) BY MOUTH TWICE DAILY 180 tablet 0    ALENDRONATE 70 MG Oral Tab TAKE 1 TABLET(70 MG) BY MOUTH EVERY 7 DAYS 12 tablet 3    TORSEMIDE 20 MG Oral Tab TAKE 1 TABLET(20 MG) BY MOUTH TWICE DAILY 180 tablet 0    GABAPENTIN 100 MG Oral Cap TAKE 2 CAPSULES(200 MG) BY MOUTH EVERY NIGHT 180 capsule 1    HYDRALAZINE 100 MG Oral Tab TAKE  1 TABLET(100 MG) BY MOUTH TWICE DAILY 180 tablet 1    fluticasone-umeclidin-vilant (TRELEGY ELLIPTA) 100-62.5-25 MCG/ACT Inhalation Aerosol Powder, Breath Activated Inhale 1 puff into the lungs daily. 3 each 3    atorvastatin 20 MG Oral Tab Take 1 tablet (20 mg total) by mouth nightly. 90 tablet 3    Polyethylene Glycol 3350 (MIRALAX) 17 g Oral Powd Pack Take 17 g by mouth daily. 90 packet 3    spironolactone 25 MG Oral Tab Take 1 tablet (25 mg total) by mouth daily.      Potassium Chloride ER 10 MEQ Oral Tab CR Take 1 tablet (10 mEq total) by mouth daily. 90 tablet 1    sacubitril-valsartan (ENTRESTO)  MG Oral Tab Take 1 tablet by mouth 2 (two) times daily. 180 tablet 0    Respiratory Therapy Supplies (NEBULIZER) Does not apply Device Use as directed 1 each 0    ASPIRIN EC LOW DOSE 81 MG Oral Tab EC Take 1 tablet (81 mg total) by mouth daily.  0       Review of Systems:  Pertinent items are noted in HPI.  A comprehensive 10 point review of systems was completed.  Pertinent positives and negatives noted in the the HPI.        Objective:   /64 (BP Location: Left arm, Patient Position: Sitting, Cuff Size: large)   Pulse 77   Temp 97.8 °F (36.6 °C) (Temporal)   Wt 234 lb 6.4 oz (106.3 kg)   SpO2 92%   BMI 41.52 kg/m²  Estimated body mass index is 41.52 kg/m² as calculated from the following:    Height as of 9/24/24: 5' 3\" (1.6 m).    Weight as of this encounter: 234 lb 6.4 oz (106.3 kg).  PHYSICAL EXAM:   General: no acute distress   Respiratory: no increased work of breathing; no crackles or wheezing   Cardiovascular: RRR; S1, S2;   Neurological: awake, alert, oriented x3; CNII-XII grossly intact;  Behavioral/Psych: euthymic; appropriate affect   : no external lesions or discharge     Assessment & Plan:   Anette was seen today for vaginal problem and throat problem.    Diagnoses and all orders for this visit:    Itching in the vaginal area  -     Vaginitis Vaginosis PCR Panel  -     clotrimazole 2 %  Vaginal Cream;  2 (two) times daily as needed (vaginal itching). Can apply to external area of vaginal/vulva area      Thrush  -     nystatin [id number] UNIT/ML Mouth/Throat Suspension; Take 5 mL (500,000 Units total) by mouth 4 (four) times daily for 7 days. 500,000 units 4 times daily; swish in the mouth and retain for as long as possible (several minutes) before swallowing.                  Sandi Cullen MD

## 2024-10-17 LAB
BV BACTERIA DNA VAG QL NAA+PROBE: NEGATIVE
C GLABRATA DNA VAG QL NAA+PROBE: NEGATIVE
C KRUSEI DNA VAG QL NAA+PROBE: NEGATIVE
CANDIDA DNA VAG QL NAA+PROBE: POSITIVE
T VAGINALIS DNA VAG QL NAA+PROBE: NEGATIVE

## 2024-10-21 RX ORDER — ALBUTEROL SULFATE 90 UG/1
1 INHALANT RESPIRATORY (INHALATION) EVERY 6 HOURS PRN
Qty: 8.5 G | Refills: 0 | Status: SHIPPED | OUTPATIENT
Start: 2024-10-21

## 2024-10-21 NOTE — TELEPHONE ENCOUNTER
Albuterol 108 (90 base) mcg/act     LOV: 3/11/24   RTC: 6 months   Filled: 9/10/24 #8.5g   Future Appointments   Date Time Provider Department Center   11/6/2024 12:00 PM Shawna Pena APRN [id number]   11/25/2024 10:30 AM Blair Everett MD [facility]

## 2024-11-06 ENCOUNTER — OFFICE VISIT (OUTPATIENT)
Age: 79
End: 2024-11-06
Payer: COMMERCIAL

## 2024-11-06 VITALS
BODY MASS INDEX: 41.46 KG/M2 | WEIGHT: 234 LBS | HEIGHT: 63 IN | RESPIRATION RATE: 16 BRPM | DIASTOLIC BLOOD PRESSURE: 70 MMHG | OXYGEN SATURATION: 95 % | SYSTOLIC BLOOD PRESSURE: 128 MMHG | HEART RATE: 69 BPM

## 2024-11-06 DIAGNOSIS — R91.8 LUNG NODULE, MULTIPLE: Primary | ICD-10-CM

## 2024-11-06 DIAGNOSIS — R06.09 DOE (DYSPNEA ON EXERTION): ICD-10-CM

## 2024-11-06 DIAGNOSIS — Z72.0 TOBACCO ABUSE: ICD-10-CM

## 2024-11-06 PROCEDURE — 3078F DIAST BP <80 MM HG: CPT

## 2024-11-06 PROCEDURE — 1160F RVW MEDS BY RX/DR IN RCRD: CPT

## 2024-11-06 PROCEDURE — 3008F BODY MASS INDEX DOCD: CPT

## 2024-11-06 PROCEDURE — 1159F MED LIST DOCD IN RCRD: CPT

## 2024-11-06 PROCEDURE — 3074F SYST BP LT 130 MM HG: CPT

## 2024-11-06 PROCEDURE — 99214 OFFICE O/P EST MOD 30 MIN: CPT

## 2024-11-06 PROCEDURE — 99406 BEHAV CHNG SMOKING 3-10 MIN: CPT

## 2024-11-06 RX ORDER — NICOTINE 21 MG/24HR
PATCH, TRANSDERMAL 24 HOURS TRANSDERMAL
Qty: 14 PATCH | Refills: 0 | Status: SHIPPED | OUTPATIENT
Start: 2024-11-06

## 2024-11-06 NOTE — PATIENT INSTRUCTIONS
Call office with any questions or concerns  Call office if develop any new or worsening respiratory symptoms.   Start trelegy inhaler - one puff once a day. Rinse your mouth out after using inhaler   Continue to use albuterol inhaler every 4 hours as needed for shortness of breath  Quit smoking- try nicotine patches  Call central scheduling at [phone] to schedule the CT scan  in 6 months, May 2025 and follow up in office  How to Quit Smoking  Smoking is a hard habit to break. About half of all people who have ever smoked have been able to quit. Most people who still smoke want to quit. Here are some of the best ways to stop smoking.  Keep in mind the health benefits of quitting  The health benefits of quitting start right away. They keep improving the longer you go without smoking. Knowing this can help inspire you to stay on track. These benefits occur at any age. If you are 17 or 70, quitting is a good choice. Some of the health benefits after your last cigarette include:  · 20 minutes: Your blood pressure and pulse return to normal.  · 8 hours: Your oxygen levels return to normal.  · 2 days: Your ability to smell and taste start to improve as damaged nerves regrow.  · 2 to 3 weeks: Your circulation and lung function improve.  · 1 to 9 months: Your coughing, congestion, and shortness of breath decrease. Your tiredness decreases.  · 1 year: Your risk of heart attack decreases by half.  · 5 years: Your risk of lung cancer decreases by half. Your risk of stroke becomes the same as a nonsmoker’s.  Go cold turkey  Most former smokers quit cold turkey. This means stopping all at once. Trying to cut back slowly often doesn't work as well. This may be because it continues the habit of smoking. Also, you may inhale more smoke while smoking fewer cigarettes. This leads to the same amount of nicotine in your body.  Get support  Support programs can be a big help, especially for heavy smokers. These groups offer  lectures, ways to change behavior, and peer support. Here are some ways to find a support program:  · Free national quitline 800-QUIT-NOW (892-791-1342)  · Hospital quit-smoking programs  · American Lung Association 480-305-8442  · American Cancer Society 006-658-7744  Support at home is important too. Family and friends can offer praise and reassurance. If the smoker in your life finds it hard to quit, encourage them to keep trying.  Try over-the-counter medicine  Nicotine replacement therapy may make it easier to quit. Some aids are available without a prescription. These include a nicotine patch, gum, and lozenges. But it is best to use these under the care of your healthcare provider. The skin patch gives a steady supply of nicotine. Nicotine gum and lozenges give short-time doses of low levels of nicotine. Both methods reduce the craving for cigarettes. If you have nausea, vomiting, dizziness, weakness, or a fast heartbeat, stop using these products. See your healthcare provider.  Ask about prescription medicine  After reviewing your smoking patterns and past attempts to quit, your doctor may offer a prescription medicine such as bupropion, varenicline, a nicotine inhaler, or nasal spray. Each has advantages and side effects. Your doctor can review these with you.  Keep trying  Most smokers make many attempts at quitting before they are successful. It’s important not to give up.

## 2024-11-06 NOTE — PROGRESS NOTES
[facility] [place] [facility] Pulmonary Progress Note    History of Present Illness:  Anette Frazier is a 79 year old female who presents today for follow up of 6 month CT scan. Overall feels okay. Using Trelegy as needed and albuterol as needed for SOB. Reports cough, SOB.   Denies wheezing, fevers, chills. Still smoking about a half a pack a day.   Up to date on flu, covid, and RSV vaccines for the year.     Educated on how to use Trelegy- once a day.     Previously 5/2024 Dr Vaughn  Anette Frazier is a 78 year old female active smoker (20+ pack years) with significant PMH of DM, CHF, pulmonary HTN, chronic hypoxic respiratory failure, obesity, ALEXANDRA who presents today for follow up. Since last visit she feels her breathing has slowly worsened. Using breo daily with some relief. No cough, phlegm, chest pain/pressure.  Following with Dr. Jiang - per his note echo with EF 35% and to have repeat soon  Still smoking 2-3 cigs/day, wants to quit     Nov 2023 previously KERRIE De Dios  Anette Frazier is a 78 year old female with significant PMHx ALEXANDRA,  CHF, Chronic respiratory failure who presents today for follow up to discuss 6MWT and CT chest results. Overall feels well despite SOB with minimal exertion. Unable to pull oxygen tanks due to balance issues therefore not bringing portable tanks to activities/appts outside the house. No new complaints.     Previously 10/2023  Seen by Dr Everett for ALEXANDRA; using CPAP nightly for 8 hrs but download shows average 4.45 minutes; large leak and machine turning off.      Previously 7/2023  presents for follow - up to review PET scan results. Continues with dyspnea with exertion/walking. Checks oxygen saturation at rest which is always > 90%. Has not checked O2 sats with walking. Has oxygen tanks at home but to heavy to carry.     Previously 6/21/23   presents for followup and CT review. Reports breathing is 60% better; less SOB with exertion. Overall feels well.      Previously Dr Vaughn 5/2023  77 year  old female active smoker (20+ pack years) with significant PMH of DM, CHF, pulmonary HTN, chronic hypoxic respiratory failure, obesity, ALEXANDRA who presents today for follow up of dyspnea. She denies new concerns today.  Still with KYLE. Thinks slightly better after using breo, not using albuterol.  No cough.  Does note nasal dryness while using the oxygen and CPAP.   Has been using oxygen at 4L instead of prescribed 2L at rest and 6L on exertion.   Still smoking - interested in cessation therapies     3/8/2023 Previously  The patient had previously followed with [facility] for dyspnea and attributed to CHF, pulmonary HTN and obesity. She had previously trialed albuterol mdi and notes some slight improvement. Denies any cough, phlegm, wheeze. No chest pain/pressure, pleurisy. Has been trying to lose weight, down ~5 lbs over the last few months.   Denies any hx of asthma, bronchitis, COPD.      Active smoker - 1ppd, started in 50s, now smoking 2 cigs/day with coffee  Retired, previously in sales, not aware of any significant exposures to chemicals, fumes, asbestos or TB.   Born in [place], moved to [place] at age 11 and resided here since. No pets     Past Medical History:   Past Medical History:    AIN (acute interstitial nephritis)    Back problem    Cataracts, bilateral    Congestive heart disease (HCC)    Diabetes (HCC)    Diverticulosis of large intestine    Essential hypertension    Gout    High blood pressure    High cholesterol    Hyperlipidemia    Neuropathy    feet    Obesity    ALEXANDRA (obstructive sleep apnea)    AHI 34 REM AHI 85 [place] [place] 59%     Sleep apnea    Tobacco abuse    Visual impairment        Past Surgical History:   Past Surgical History:   Procedure Laterality Date    Colonoscopy  2012    [facility]    Colonoscopy  05/18/2018    diverticulosis - repeat 10 years - Dr. Montague    Colonoscopy      Knee replacement surgery  2005    left knee    Total knee replacement      Left knee         Family Medical History:   Family History   Problem Relation Age of Onset    Diabetes Mother     Other (Lung Cancer) Mother     Hypertension Mother     COPD Sister     Breast Cancer Maternal Aunt 60        Social History:   Social History     Socioeconomic History    Marital status:      Spouse name: Not on file    Number of children: Not on file    Years of education: Not on file    Highest education level: Not on file   Occupational History    Occupation: [employer]    Tobacco Use    Smoking status: Every Day     Current packs/day: 0.50     Average packs/day: 1 pack/day for 50.8 years (50.6 ttl pk-yrs)     Types: Cigarettes     Start date: 1/10/1974     Passive exposure: Past    Smokeless tobacco: Former     Quit date: 2016    Tobacco comments:     Smokes 2 cigarettes every morning with coffee.    Vaping Use    Vaping status: Never Used   Substance and Sexual Activity    Alcohol use: No    Drug use: No    Sexual activity: Not Currently     Partners: Female   Other Topics Concern    Caffeine Concern Yes     Comment: 2 cups of coffee every morning.    Exercise No    Seat Belt Not Asked    Special Diet No    Stress Concern Not Asked    Weight Concern Not Asked   Social History Narrative    Not on file     Social Drivers of Health     Financial Resource Strain: Not on file   Food Insecurity: Not on file   Transportation Needs: Not on file   Physical Activity: Inactive (1/5/2021)    Received from [email or web address], [email or web address]    Exercise Vital Sign     Days of Exercise per Week: 0 days     Minutes of Exercise per Session: 0 min   Stress: Not on file   Social Connections: Not on file   Housing Stability: Not on file        Medications:   Current Outpatient Medications   Medication Sig Dispense Refill    ALBUTEROL 108 (90 Base) MCG/ACT Inhalation Aero Soln INHALE 1 PUFF INTO THE LUNGS EVERY 6 HOURS AS NEEDED FOR WHEEZING 8.5 g 0    clotrimazole 2 % Vaginal Cream Place 1 Application  vaginally 2 (two) times daily as needed (vaginal itching). Can apply to external area of vaginal/vulva area 21 g 0    CARVEDILOL 25 MG Oral Tab TAKE 1 TABLET(25 MG) BY MOUTH TWICE DAILY 180 tablet 1    albuterol (2.5 MG/3ML) 0.083% Inhalation Nebu Soln Take 3 mL (2.5 mg total) by nebulization 3 (three) times daily. 300 mL 1    triamcinolone 0.1 % External Cream Apply topically 2 (two) times daily as needed. 60 g 1    nicotine polacrilex 2 MG Mouth/Throat Gum Take 1 each (2 mg total) by mouth as needed for Smoking cessation. 90 each 2    PROBENECID 500 MG Oral Tab TAKE 1 TABLET(500 MG) BY MOUTH TWICE DAILY 180 tablet 0    ALENDRONATE 70 MG Oral Tab TAKE 1 TABLET(70 MG) BY MOUTH EVERY 7 DAYS 12 tablet 3    TORSEMIDE 20 MG Oral Tab TAKE 1 TABLET(20 MG) BY MOUTH TWICE DAILY 180 tablet 0    GABAPENTIN 100 MG Oral Cap TAKE 2 CAPSULES(200 MG) BY MOUTH EVERY NIGHT 180 capsule 1    HYDRALAZINE 100 MG Oral Tab TAKE 1 TABLET(100 MG) BY MOUTH TWICE DAILY 180 tablet 1    fluticasone-umeclidin-vilant (TRELEGY ELLIPTA) 100-62.5-25 MCG/ACT Inhalation Aerosol Powder, Breath Activated Inhale 1 puff into the lungs daily. 3 each 3    atorvastatin 20 MG Oral Tab Take 1 tablet (20 mg total) by mouth nightly. 90 tablet 3    Polyethylene Glycol 3350 (MIRALAX) 17 g Oral Powd Pack Take 17 g by mouth daily. 90 packet 3    spironolactone 25 MG Oral Tab Take 1 tablet (25 mg total) by mouth daily.      Potassium Chloride ER 10 MEQ Oral Tab CR Take 1 tablet (10 mEq total) by mouth daily. 90 tablet 1    sacubitril-valsartan (ENTRESTO)  MG Oral Tab Take 1 tablet by mouth 2 (two) times daily. 180 tablet 0    Respiratory Therapy Supplies (NEBULIZER) Does not apply Device Use as directed 1 each 0    ASPIRIN EC LOW DOSE 81 MG Oral Tab EC Take 1 tablet (81 mg total) by mouth daily.  0       Review of Systems: Review of Systems   Constitutional: Negative.  Negative for fatigue and fever.   HENT: Negative.     Respiratory:  Positive for cough and  shortness of breath.    Gastrointestinal: Negative.         Physical Exam:  /70 (BP Location: Right arm, Patient Position: Sitting, Cuff Size: adult)   Pulse 69   Resp 16   Ht 5' 3\" (1.6 m)   Wt 234 lb (106.1 kg)   SpO2 95%   BMI 41.45 kg/m²      Constitutional: alert, cooperative. No acute distress.  HEENT: Head NC/AT.   Cardio: Regular rate and rhythm. Normal S1 and S2. No murmurs.   Respiratory: Thorax symmetrical with no labored breathing. Clear but diminished to ausculation bilaterally with symmetrical breath sounds. No wheezing, rhonchi, rales, or crackles.   Extremities: No clubbing or cyanosis. No BLE edema.    Neurologic: A&Ox3. No gross motor deficits.  Skin: Warm, dry  Psych: Calm, cooperative. Pleasant affect.    Results:  Personally reviewed    WBC: 7.2, done on 2/19/2024.  HGB: 13.3, done on 2/19/2024.  PLT: 330, done on 2/19/2024.     Glucose: 114, done on 9/16/2024.  Cr: 1.22, done on 9/16/2024.  Last eGFR was 46 on 9/16/2024.  CA: 9.2, done on 9/16/2024.  Na: 140, done on 9/16/2024.  K: 3.5, done on 9/16/2024.  Cl: 104, done on 9/16/2024.  CO2: 24, done on 9/16/2024.  Last ALB was 4.2% done on 9/16/2024.     CT CHEST LD NO CAD(CPT=71250)    Result Date: 10/13/2024  CONCLUSION:  While the spiculated semi solid nodule within the right upper lobe along the minor fissure has not significantly changed since the most recent study, the nodule is clearly larger than studies from 2 years ago.  Therefore the possibility of a  low-grade malignancy is still within the differential that includes inflammatory scarring.  Continued low-dose chest CT follow-up in 3-6 months versus PET scan is recommended.  LOCATION:  [place]   Dictated by (CST): Jose A Knutson MD on 10/13/2024 at 11:37 AM     Finalized by (CST): Jose A Knutson MD on 10/13/2024 at 11:47 AM         Assessment/Plan:  #1. Dyspnea on exertion  multifactorial due to CHF, morbid obesity, obesity hypoventilation, pulmonary hypertension, possible  COPD  Active smoker - see below  3/2023 6MWT with 2L at rest, 6L on exertion. She does not qualify for POC  4/2023 PFTs with no obstruction, restriction, but severely reduced DLCO concerning for pulmonary HTN  Echo previously demonstrated pulmonary HTN  5/2023 CT chest no evidence of significant ILD however 2 nodules in the RUL have increased in size  6/2023 PET/CT with low grade uptake  7/2023 CT chest with either stable or decrease nodules  11/2023 CT chest stable  5/2024 & 10/2024 CT Chest; see results below   Continue Trelegy and albuterol PRN     #2. Pulmonary nodules  5/2023 CT chest no evidence of significant ILD however 2 nodules in the RUL have increased in size  6/2023 PET/CT with low grade uptake  7/2023 CT chest with either stable or decrease nodules  11/2023 CT chest stable  5/2024 CT Chest Stable; While this is stable in size compared to 11/01/2023, this previously measured 8 mm in average diameter on 02/07/2022.   10/2024 Ct Chest While the spiculated semi solid nodule within the right upper lobe along the minor fissure has not significantly changed since the most recent study, the nodule is clearly larger than studies from 2 years ago.  Therefore the possibility of a low-grade malignancy is still within the differential that includes inflammatory scarring   Discussed CT results in detail with patient, all questions answered; reviewed results with Dr Vaughn, see recommendation below  Recommend follow up CT Chest in 6 months, May 2025     #3. Chronic hypoxic respiratory failure  Multifactorial due to CHF, pulmonary HTN, morbid obesity, obesity hypoventilation, possible COPD  Previous o2 walk with [facility] 2022: RA at rest, 3L on exertion  3/2023 6MWT with 2L at rest, 6L on exertion. She does not qualify for POC  4/2023 PFTs with no obstruction, no restriction, but severely reduced DLCO concerning for pulmonary HTN  No ILD on HRCT  Inhalers as above     #4. Tobacco abuse  Active smoker - 1ppd, started in  50s, now smoking 2 cigs/day with coffee  Smoking cessation counseling provided x 5 minutes and reviewed options including changes to habits as well as chantix/wellbutrin. She is now interested in Nicotine patches. Will send orders   Lung Cancer Counseling and Shared Decision Making Session in an Asymptomatic Smoker/Former Smoker   Anette Frazier is a 79 year old female without current symptoms of lung cancer.  History   Smoking Status    Every Day    Types: Cigarettes   Smokeless Tobacco    Former    Quit date: 2016     She received information on the importance of adherence to annual lung cancer Low Dose CT (LDCT) screening, the impact of her comorbidities and her ability or willingness to undergo diagnosis and treatment. she is in agreement and an order will be placed for CT LUNG LD SCREENING(CPT=71271).    We counseled the importance of maintaining cigarette smoking abstinence if she is a former smoker and the importance of smoking cessation if she is a current smoker and we discussed and furnished information about tobacco cessation interventions.     Shawna Pena [facility]  Pulmonary Medicine   11/6/2024

## 2024-11-07 ENCOUNTER — TELEPHONE (OUTPATIENT)
Dept: INTERNAL MEDICINE CLINIC | Facility: CLINIC | Age: 79
End: 2024-11-07

## 2024-11-07 DIAGNOSIS — N18.31 STAGE 3A CHRONIC KIDNEY DISEASE (HCC): ICD-10-CM

## 2024-11-07 DIAGNOSIS — J96.11 CHRONIC RESPIRATORY FAILURE WITH HYPOXIA (HCC): Primary | ICD-10-CM

## 2024-11-07 DIAGNOSIS — R06.09 DOE (DYSPNEA ON EXERTION): ICD-10-CM

## 2024-11-07 NOTE — TELEPHONE ENCOUNTER
Patient returned call from Nurse, informed her of message left, she states that she would rather the order go through the DME company. Please advise.

## 2024-11-07 NOTE — TELEPHONE ENCOUNTER
Pt called asking if VM can place a new order for a new nebulizer as hers is no longer working properly.

## 2024-11-07 NOTE — TELEPHONE ENCOUNTER
VM: Pended DME for nebulizer, please sign if agree.     Nebulizer and supplies previously ordered through [employer]:  [employer] [facility] - Durable Medical Equipment [address] [place] [phone]         LMTCB for pt to call back if she'd rather go the route of sending order to [facility] and pay about $40. Will go forward with DME referral unless she calls back.

## 2024-11-15 ENCOUNTER — OFFICE VISIT (OUTPATIENT)
Dept: INTERNAL MEDICINE CLINIC | Facility: CLINIC | Age: 79
End: 2024-11-15
Payer: COMMERCIAL

## 2024-11-15 VITALS
BODY MASS INDEX: 40.4 KG/M2 | HEIGHT: 63 IN | OXYGEN SATURATION: 97 % | DIASTOLIC BLOOD PRESSURE: 70 MMHG | WEIGHT: 228 LBS | HEART RATE: 66 BPM | TEMPERATURE: 98 F | SYSTOLIC BLOOD PRESSURE: 156 MMHG

## 2024-11-15 DIAGNOSIS — R10.13 DYSPEPSIA: Primary | ICD-10-CM

## 2024-11-15 PROCEDURE — 1159F MED LIST DOCD IN RCRD: CPT | Performed by: INTERNAL MEDICINE

## 2024-11-15 PROCEDURE — 3008F BODY MASS INDEX DOCD: CPT | Performed by: INTERNAL MEDICINE

## 2024-11-15 PROCEDURE — 99214 OFFICE O/P EST MOD 30 MIN: CPT | Performed by: INTERNAL MEDICINE

## 2024-11-15 PROCEDURE — 3078F DIAST BP <80 MM HG: CPT | Performed by: INTERNAL MEDICINE

## 2024-11-15 PROCEDURE — 1160F RVW MEDS BY RX/DR IN RCRD: CPT | Performed by: INTERNAL MEDICINE

## 2024-11-15 PROCEDURE — 3077F SYST BP >= 140 MM HG: CPT | Performed by: INTERNAL MEDICINE

## 2024-11-15 RX ORDER — NYSTATIN 100000 [USP'U]/ML
5 SUSPENSION ORAL 4 TIMES DAILY
Qty: 140 ML | Refills: 0 | Status: SHIPPED | OUTPATIENT
Start: 2024-11-15 | End: 2024-11-22

## 2024-11-15 RX ORDER — PANTOPRAZOLE SODIUM 40 MG/1
40 TABLET, DELAYED RELEASE ORAL
Qty: 30 TABLET | Refills: 0 | Status: SHIPPED | OUTPATIENT
Start: 2024-11-15 | End: 2024-12-15

## 2024-11-15 NOTE — PROGRESS NOTES
Subjective:   Anette Frazier is a 79 year old female who presents for Gastro-esophageal Reflux   For last few weeks the patient has been feeling discomfort in the upper abdomen with acid reflux.    She also feels soreness in the mouth.    Currently smoking less.    History/Other:    Chief Complaint Reviewed and Verified  No Further Nursing Notes to   Review  Tobacco Reviewed  Allergies Reviewed  Medications Reviewed    Medical History Reviewed  Surgical History Reviewed  OB Status Reviewed    Family History Reviewed  Social History Reviewed         Tobacco:  Social History     Tobacco Use   Smoking Status Every Day    Current packs/day: 0.50    Average packs/day: 1 pack/day for 50.8 years (50.6 ttl pk-yrs)    Types: Cigarettes    Start date: 1/10/1974    Passive exposure: Past   Smokeless Tobacco Former    Quit date: 2016   Tobacco Comments    Smokes 2 cigarettes every morning with coffee.      E-Cigarettes/Vaping       Questions Responses    E-Cigarette Use Never User           Tobacco cessation counseling for 3-10 minutes (add E/M code #69010).      Current Outpatient Medications   Medication Sig Dispense Refill    ALBUTEROL 108 (90 Base) MCG/ACT Inhalation Aero Soln INHALE 1 PUFF INTO THE LUNGS EVERY 6 HOURS AS NEEDED FOR WHEEZING 8.5 g 0    clotrimazole 2 % Vaginal Cream Place 1 Application vaginally 2 (two) times daily as needed (vaginal itching). Can apply to external area of vaginal/vulva area 21 g 0    CARVEDILOL 25 MG Oral Tab TAKE 1 TABLET(25 MG) BY MOUTH TWICE DAILY 180 tablet 1    albuterol (2.5 MG/3ML) 0.083% Inhalation Nebu Soln Take 3 mL (2.5 mg total) by nebulization 3 (three) times daily. 300 mL 1    triamcinolone 0.1 % External Cream Apply topically 2 (two) times daily as needed. 60 g 1    PROBENECID 500 MG Oral Tab TAKE 1 TABLET(500 MG) BY MOUTH TWICE DAILY 180 tablet 0    ALENDRONATE 70 MG Oral Tab TAKE 1 TABLET(70 MG) BY MOUTH EVERY 7 DAYS 12 tablet 3    TORSEMIDE 20 MG Oral Tab TAKE  1 TABLET(20 MG) BY MOUTH TWICE DAILY 180 tablet 0    GABAPENTIN 100 MG Oral Cap TAKE 2 CAPSULES(200 MG) BY MOUTH EVERY NIGHT 180 capsule 1    HYDRALAZINE 100 MG Oral Tab TAKE 1 TABLET(100 MG) BY MOUTH TWICE DAILY 180 tablet 1    fluticasone-umeclidin-vilant (TRELEGY ELLIPTA) 100-62.5-25 MCG/ACT Inhalation Aerosol Powder, Breath Activated Inhale 1 puff into the lungs daily. 3 each 3    atorvastatin 20 MG Oral Tab Take 1 tablet (20 mg total) by mouth nightly. 90 tablet 3    spironolactone 25 MG Oral Tab Take 1 tablet (25 mg total) by mouth daily.      Potassium Chloride ER 10 MEQ Oral Tab CR Take 1 tablet (10 mEq total) by mouth daily. 90 tablet 1    sacubitril-valsartan (ENTRESTO)  MG Oral Tab Take 1 tablet by mouth 2 (two) times daily. 180 tablet 0    ASPIRIN EC LOW DOSE 81 MG Oral Tab EC Take 1 tablet (81 mg total) by mouth daily.  0    nicotine 14 MG/24HR Transdermal Patch 24 Hr apply 1 patch (14mg) daily for 2 weeks, then apply 1 patch (7mg) daily for 2 weeks (Patient not taking: Reported on 11/15/2024) 14 patch 0    nicotine 7 MG/24HR Transdermal Patch 24 Hr then apply 1 patch (14mg) daily for 2 weeks, then apply 1 patch (7mg) daily (Patient not taking: Reported on 11/15/2024) 14 patch 1    nicotine polacrilex 2 MG Mouth/Throat Gum Take 1 each (2 mg total) by mouth as needed for Smoking cessation. (Patient not taking: Reported on 11/15/2024) 90 each 2    Polyethylene Glycol 3350 (MIRALAX) 17 g Oral Powd Pack Take 17 g by mouth daily. (Patient not taking: Reported on 11/15/2024) 90 packet 3    Respiratory Therapy Supplies (NEBULIZER) Does not apply Device Use as directed 1 each 0         Review of Systems:  Pertinent items are noted in HPI.  A comprehensive review of systems was negative.      Objective:   /70 (BP Location: Right arm, Patient Position: Sitting, Cuff Size: large)   Pulse 66   Temp 97.8 °F (36.6 °C) (Temporal)   Ht 5' 3\" (1.6 m)   Wt 228 lb (103.4 kg)   SpO2 97%   BMI 40.39 kg/m²   Estimated body mass index is 40.39 kg/m² as calculated from the following:    Height as of this encounter: 5' 3\" (1.6 m).    Weight as of this encounter: 228 lb (103.4 kg).    Wt Readings from Last 6 Encounters:   11/15/24 228 lb (103.4 kg)   11/06/24 234 lb (106.1 kg)   10/16/24 234 lb 6.4 oz (106.3 kg)   09/24/24 238 lb 6.4 oz (108.1 kg)   05/13/24 238 lb (108 kg)   03/11/24 242 lb 6.4 oz (110 kg)      General appearance: alert, appears stated age, and cooperative.  Obese looking.  Nose: Nares normal. Septum midline. Mucosa normal. No drainage or sinus tenderness.  Throat: Oropharyngeal thrush.  Lungs: clear to auscultation bilaterally  Heart: S1, S2 normal, no murmur, click, rub or gallop, regular rate and rhythm  Abdomen: soft, non-tender; bowel sounds normal; no masses,  no organomegaly  Extremities: extremities normal, atraumatic, no cyanosis or edema  Skin: Skin color, texture, turgor normal. No rashes or lesions  Lymph nodes: Cervical, supraclavicular, and axillary nodes normal.      Assessment & Plan:     1.GERD: New onset without red flag symptoms.  PPI is ordered.  Referral to GI is made for potential EGD.  The patient appears hesitant to go to for follow-up.  I mentioned that we need to rule out peptic ulcer disease or cancer of the stomach.  If symptoms persist, we will do Helicobacter pylori test.    2.  Oropharyngeal thrush in a patient with artificial dentures-nystatin oral solution was prescribed.    3.Hypertension: Today the blood pressure is recorded high.  Will need to follow-up.  For now continue same medications.  Low-salt diet.  BP Readings from Last 3 Encounters:   11/15/24 156/70   11/06/24 128/70   10/16/24 126/64       4. Class III obesity with diabetes mellitus and last A1c was 7.5.-Continue with metformin and Jardiance.  Lifestyle modification was discussed.      5. Smoking: The patient is smoking less ,  just a cigarette with the morning coffee.    6. Preventative medicine: The patient  already had a flu shot.    Follow-up in 1 month.      No follow-ups on file.    Dat Herman MD, 11/15/2024, 1:18 PM

## 2024-11-15 NOTE — PATIENT INSTRUCTIONS
Weight Loss Advice :  A) Eat plant based diet and avoid ultra processed and fast foods.  B) Your portions should be a dinner plate. 1/2 should be vegetables, 1/4 lean protein (chicken, fish, turkey. Tofu), and 1/4 can be carbohydrates.   C)  Your main drink should be water rather than soda or alcohol or sweet coffees  D). Please try to exercise ( brisk walking or jogging) at least 30 minutes five times/week; and do muscle strengthening exercises ( lifting the weight, doing push ups or yoga)  twice a week    E). It is very important to get 7-9 hours of sleep per night      The 5 Ds for smoking cessation are:    Distract yourself: When you want to smoke, do something else.  Drink water: Have a glass of water when you’re used to having a cigarette.  Delay: Tell yourself, “I’m not going to smoke for the next five minutes.”  Deep breaths: Taking slow, deep breaths can help you relax when feeling stressed.  Discuss: Talk to someone about your cravings    Please consider calling the following number. 8-464-QUIT-NOW is the national portal to a network of state quitlines. Quitlines offer evidence-based support--like counseling, referrals to local programs, and free medication--to people who want to quit tobacco.

## 2024-11-25 RX ORDER — PROBENECID 500 MG/1
500 TABLET, FILM COATED ORAL 2 TIMES DAILY
Qty: 180 TABLET | Refills: 0 | Status: SHIPPED | OUTPATIENT
Start: 2024-11-25

## 2024-11-25 RX ORDER — HYDRALAZINE HYDROCHLORIDE 100 MG/1
100 TABLET, FILM COATED ORAL 2 TIMES DAILY
Qty: 180 TABLET | Refills: 0 | Status: SHIPPED | OUTPATIENT
Start: 2024-11-25

## 2024-11-25 NOTE — TELEPHONE ENCOUNTER
LOV: 11/15/24  RTC: 1 month  Hydralazine 100mg filled 5/30/24 #180 1 refill   Probenecid 500mg filled 8/19/24 #180   Labs: 9/16/24   Future Appointments   Date Time Provider Department Center   11/29/2024  8:30 AM Cesia Vinson PA-C [facility] [place]   1/14/2025  1:20 PM Jb Jaimes MD [facility]   5/16/2025  1:00 PM Shawna Pena APRN [facility] [id number] [facility] [place]

## 2024-12-09 RX ORDER — ALBUTEROL SULFATE 90 UG/1
1 INHALANT RESPIRATORY (INHALATION) EVERY 6 HOURS PRN
Qty: 8.5 G | Refills: 0 | Status: SHIPPED | OUTPATIENT
Start: 2024-12-09

## 2024-12-23 ENCOUNTER — HOSPITAL ENCOUNTER (OUTPATIENT)
Dept: LAB | Facility: HOSPITAL | Age: 79
Discharge: HOME | End: 2024-12-23
Attending: NURSE PRACTITIONER
Payer: MEDICARE

## 2024-12-23 LAB
ANION GAP SERPL CALC-SCNC: 10 MMOL/L (ref 0–18)
BUN BLD-MCNC: 14 MG/DL (ref 9–23)
CALCIUM BLD-MCNC: 9.2 MG/DL (ref 8.7–10.4)
CHLORIDE SERPL-SCNC: 94 MMOL/L (ref 98–112)
CO2 SERPL-SCNC: 26 MMOL/L (ref 21–32)
CREAT BLD-MCNC: 1.61 MG/DL
EGFRCR SERPLBLD CKD-EPI 2021: 33 ML/MIN/1.73M2 (ref 60–?)
GLUCOSE BLD-MCNC: 678 MG/DL (ref 70–99)
NT-PROBNP SERPL-MCNC: 151 PG/ML (ref ?–450)
OSMOLALITY SERPL CALC.SUM OF ELEC: 303 MOSM/KG (ref 275–295)
POTASSIUM SERPL-SCNC: 3.6 MMOL/L (ref 3.5–5.1)
SODIUM SERPL-SCNC: 130 MMOL/L (ref 136–145)

## 2024-12-23 PROCEDURE — 83880 ASSAY OF NATRIURETIC PEPTIDE: CPT | Performed by: NURSE PRACTITIONER

## 2024-12-23 PROCEDURE — 80048 BASIC METABOLIC PNL TOTAL CA: CPT | Performed by: NURSE PRACTITIONER

## 2024-12-23 PROCEDURE — 36415 COLL VENOUS BLD VENIPUNCTURE: CPT | Performed by: NURSE PRACTITIONER

## 2024-12-30 ENCOUNTER — TELEPHONE (OUTPATIENT)
Dept: INTERNAL MEDICINE CLINIC | Facility: CLINIC | Age: 79
End: 2024-12-30

## 2024-12-30 NOTE — TELEPHONE ENCOUNTER
Spoke with patient to discuss symptoms of hyperglycemia (excessive thirst, hunger, urination, dry mouth, blurred vision). Pt was on Ozempic, but was transitioned to Jardiance (by cardiology). If pt has any questions regarding that medication, to reach out to Cardiology ([facility]). Pt's blood sugar was 127 this morning. This RN educated patient on checking blood sugars before eating and taking medication (Jardiance and Lantus and Humalog) as directed. If pt is to experience symptoms as well as high blood sugars, to go back to ER. Pt v/u.

## 2024-12-30 NOTE — TELEPHONE ENCOUNTER
Patient called with concerns of her Blood pressure , And  sugars levels . Patient was recently seen on 12/23/2 at the ED due to High blood pressure. patient is schedule to see Dr HILL . however patients wants to speak to a nurse concerning if  if blood sugars rise again what steps should she take prior to up coming appt ?    Phone [phone]      Future Appointments   Date Time Provider Department Center   1/9/2025  9:00 AM Dat Herman MD [facility] [place]   1/14/2025  1:20 PM Jb Jaimes MD [facility]   5/16/2025  1:00 PM Shawna Pena APRN [facility] [id number] [facility] [place]

## 2025-01-03 ENCOUNTER — TELEPHONE (OUTPATIENT)
Dept: INTERNAL MEDICINE CLINIC | Facility: CLINIC | Age: 80
End: 2025-01-03

## 2025-01-03 DIAGNOSIS — R80.9 CONTROLLED TYPE 2 DIABETES MELLITUS WITH MICROALBUMINURIA, WITH LONG-TERM CURRENT USE OF INSULIN (HCC): Primary | ICD-10-CM

## 2025-01-03 DIAGNOSIS — Z79.4 CONTROLLED TYPE 2 DIABETES MELLITUS WITH MICROALBUMINURIA, WITH LONG-TERM CURRENT USE OF INSULIN (HCC): Primary | ICD-10-CM

## 2025-01-03 DIAGNOSIS — E11.29 CONTROLLED TYPE 2 DIABETES MELLITUS WITH MICROALBUMINURIA, WITH LONG-TERM CURRENT USE OF INSULIN (HCC): Primary | ICD-10-CM

## 2025-01-06 RX ORDER — GABAPENTIN 100 MG/1
200 CAPSULE ORAL NIGHTLY
Qty: 180 CAPSULE | Refills: 1 | Status: SHIPPED | OUTPATIENT
Start: 2025-01-06

## 2025-01-06 NOTE — TELEPHONE ENCOUNTER
Name from pharmacy: GABAPENTIN 100MG CAPSULES         Will file in chart as: GABAPENTIN 100 MG Oral Cap    Sig: TAKE 2 CAPSULES(200 MG) BY MOUTH EVERY NIGHT    Disp: 180 capsule    Refills: 1 (Pharmacy requested: Not specified)    Start: 1/4/2025    Class: Normal    Non-formulary    Last ordered: 5 months ago (7/14/2024) by Jonel Bell MD    Last refill: 10/5/2024    Rx #: [id number]    Neurology Medications Omhomb1501/04/2025 05:47 AM   Protocol Details In person appointment or virtual visit in the past 6 mos or appointment in next 3 mos      To be filled at: [id number] [place], [place] - [address] AT [facility] [address], [phone], [phone]

## 2025-01-09 ENCOUNTER — OFFICE VISIT (OUTPATIENT)
Dept: INTERNAL MEDICINE CLINIC | Facility: CLINIC | Age: 80
End: 2025-01-09
Payer: COMMERCIAL

## 2025-01-09 VITALS
SYSTOLIC BLOOD PRESSURE: 133 MMHG | OXYGEN SATURATION: 98 % | HEART RATE: 66 BPM | DIASTOLIC BLOOD PRESSURE: 74 MMHG | HEIGHT: 63 IN | WEIGHT: 221.19 LBS | TEMPERATURE: 97 F | BODY MASS INDEX: 39.19 KG/M2

## 2025-01-09 DIAGNOSIS — H25.9 AGE-RELATED CATARACT OF BOTH EYES, UNSPECIFIED AGE-RELATED CATARACT TYPE: ICD-10-CM

## 2025-01-09 DIAGNOSIS — E13.9 DIABETES 1.5, MANAGED AS TYPE 1 (HCC): Primary | ICD-10-CM

## 2025-01-09 RX ORDER — CARVEDILOL 12.5 MG/1
12.5 TABLET ORAL 2 TIMES DAILY WITH MEALS
Qty: 180 TABLET | Refills: 3 | Status: SHIPPED | OUTPATIENT
Start: 2025-01-09 | End: 2026-01-04

## 2025-01-09 RX ORDER — ALBUTEROL SULFATE 90 UG/1
2 INHALANT RESPIRATORY (INHALATION) EVERY 6 HOURS PRN
Qty: 1 EACH | Refills: 3 | Status: SHIPPED | OUTPATIENT
Start: 2025-01-09 | End: 2026-01-04

## 2025-01-09 RX ORDER — INSULIN LISPRO 100 [IU]/ML
11 INJECTION, SOLUTION INTRAVENOUS; SUBCUTANEOUS
COMMUNITY

## 2025-01-09 RX ORDER — LANCETS 33 GAUGE
1 EACH MISCELLANEOUS 3 TIMES DAILY
Qty: 180 EACH | Refills: 11 | Status: SHIPPED | OUTPATIENT
Start: 2025-01-09 | End: 2026-01-09

## 2025-01-09 RX ORDER — FLUTICASONE FUROATE, UMECLIDINIUM BROMIDE AND VILANTEROL TRIFENATATE 100; 62.5; 25 UG/1; UG/1; UG/1
1 POWDER RESPIRATORY (INHALATION) DAILY
Qty: 3 EACH | Refills: 3 | Status: SHIPPED | OUTPATIENT
Start: 2025-01-09

## 2025-01-09 RX ORDER — INSULIN GLARGINE 100 [IU]/ML
35 INJECTION, SOLUTION SUBCUTANEOUS DAILY
COMMUNITY

## 2025-01-09 RX ORDER — SYRINGE-NEEDLE,INSULIN,0.5 ML 31 GX5/16"
1 SYRINGE, EMPTY DISPOSABLE MISCELLANEOUS AS DIRECTED
COMMUNITY
Start: 2024-12-26

## 2025-01-09 NOTE — PROGRESS NOTES
Subjective:   Anette Frazier is a 79 year old female who presents for Follow - Up after hospital admission due to  hyperosmolar hyperglycemic state.  Patient was in the hospital for 3 days.  Insulin has been initiated.  Note that the patient used to take insulin years ago.  Patient denies taking steroids prior to hospital admission.    The soreness in the mouth has now resolved.  She would like to get albuterol refilled.    Currently smoking less, but not totally stopped.    History/Other:    Chief Complaint Reviewed and Verified  No Further Nursing Notes to   Review  Tobacco Reviewed  Allergies Reviewed  Medications Reviewed    Medical History Reviewed  Surgical History Reviewed  OB Status Reviewed    Family History Reviewed  Social History Reviewed         Tobacco:  Social History     Tobacco Use   Smoking Status Every Day    Current packs/day: 0.50    Average packs/day: 1 pack/day for 50.8 years (50.6 ttl pk-yrs)    Types: Cigarettes    Start date: 1/10/1974    Passive exposure: Past   Smokeless Tobacco Former    Quit date: 2016   Tobacco Comments    Smokes 2 cigarettes every morning with coffee.      E-Cigarettes/Vaping       Questions Responses    E-Cigarette Use Never User           Tobacco cessation counseling for 3-10 minutes (add E/M code #53263).      Current Outpatient Medications   Medication Sig Dispense Refill    GABAPENTIN 100 MG Oral Cap TAKE 2 CAPSULES(200 MG) BY MOUTH EVERY NIGHT 180 capsule 1    ALBUTEROL 108 (90 Base) MCG/ACT Inhalation Aero Soln INHALE 1 PUFF INTO THE LUNGS EVERY 6 HOURS AS NEEDED FOR WHEEZING 8.5 g 0    HYDRALAZINE 100 MG Oral Tab TAKE 1 TABLET(100 MG) BY MOUTH TWICE DAILY 180 tablet 0    PROBENECID 500 MG Oral Tab TAKE 1 TABLET(500 MG) BY MOUTH TWICE DAILY 180 tablet 0    clotrimazole 2 % Vaginal Cream Place 1 Application vaginally 2 (two) times daily as needed (vaginal itching). Can apply to external area of vaginal/vulva area 21 g 0    CARVEDILOL 25 MG Oral Tab  TAKE 1 TABLET(25 MG) BY MOUTH TWICE DAILY 180 tablet 1    albuterol (2.5 MG/3ML) 0.083% Inhalation Nebu Soln Take 3 mL (2.5 mg total) by nebulization 3 (three) times daily. 300 mL 1    triamcinolone 0.1 % External Cream Apply topically 2 (two) times daily as needed. 60 g 1    ALENDRONATE 70 MG Oral Tab TAKE 1 TABLET(70 MG) BY MOUTH EVERY 7 DAYS 12 tablet 3    TORSEMIDE 20 MG Oral Tab TAKE 1 TABLET(20 MG) BY MOUTH TWICE DAILY 180 tablet 0    fluticasone-umeclidin-vilant (TRELEGY ELLIPTA) 100-62.5-25 MCG/ACT Inhalation Aerosol Powder, Breath Activated Inhale 1 puff into the lungs daily. 3 each 3    atorvastatin 20 MG Oral Tab Take 1 tablet (20 mg total) by mouth nightly. 90 tablet 3    spironolactone 25 MG Oral Tab Take 1 tablet (25 mg total) by mouth daily.      Potassium Chloride ER 10 MEQ Oral Tab CR Take 1 tablet (10 mEq total) by mouth daily. 90 tablet 1    sacubitril-valsartan (ENTRESTO)  MG Oral Tab Take 1 tablet by mouth 2 (two) times daily. 180 tablet 0    Respiratory Therapy Supplies (NEBULIZER) Does not apply Device Use as directed 1 each 0    ASPIRIN EC LOW DOSE 81 MG Oral Tab EC Take 1 tablet (81 mg total) by mouth daily.  0    nicotine 14 MG/24HR Transdermal Patch 24 Hr apply 1 patch (14mg) daily for 2 weeks, then apply 1 patch (7mg) daily for 2 weeks (Patient not taking: Reported on 1/9/2025) 14 patch 0    nicotine 7 MG/24HR Transdermal Patch 24 Hr then apply 1 patch (14mg) daily for 2 weeks, then apply 1 patch (7mg) daily (Patient not taking: Reported on 1/9/2025) 14 patch 1    Polyethylene Glycol 3350 (MIRALAX) 17 g Oral Powd Pack Take 17 g by mouth daily. (Patient not taking: Reported on 1/9/2025) 90 packet 3         Review of Systems:  Pertinent items are noted in HPI.  A comprehensive review of systems was negative.      Objective:   /74 (BP Location: Left arm, Patient Position: Sitting, Cuff Size: adult)   Pulse 66   Temp 97.2 °F (36.2 °C) (Temporal)   Ht 5' 3\" (1.6 m)   Wt 221 lb  3.2 oz (100.3 kg)   SpO2 98%   BMI 39.18 kg/m²  Estimated body mass index is 39.18 kg/m² as calculated from the following:    Height as of this encounter: 5' 3\" (1.6 m).    Weight as of this encounter: 221 lb 3.2 oz (100.3 kg).    Wt Readings from Last 6 Encounters:   01/09/25 221 lb 3.2 oz (100.3 kg)   11/15/24 228 lb (103.4 kg)   11/06/24 234 lb (106.1 kg)   10/16/24 234 lb 6.4 oz (106.3 kg)   09/24/24 238 lb 6.4 oz (108.1 kg)   05/13/24 238 lb (108 kg)      General appearance: alert, appears stated age, and cooperative.  Obese looking.  Bilateral cataracts.  Nose: Nares normal. Septum midline. Mucosa normal. No drainage or sinus tenderness.  Throat: Oropharyngeal thrush is resolved.  Lungs: clear to auscultation bilaterally  Heart: S1, S2 normal, no murmur, click, rub or gallop, regular rate and rhythm  Abdomen: soft, non-tender; bowel sounds normal; no masses,  no organomegaly  Extremities: extremities normal, atraumatic, no cyanosis or edema  Skin: Skin color, texture, turgor normal. No rashes or lesions  Lymph nodes: Cervical, supraclavicular, and axillary nodes normal.      Assessment & Plan:     1.Diabetes mellitus 1.5 -recent HHS with 3 days on hospital admission.  Currently the patient is on Lantus 35 units at bedtime and lispro 11 units 3 times a day.  Her average blood sugar is around 150s.  She did not have any hypoglycemic episodes.  I patient about dangers of hypoglycemia and strategies to resolve it by taking sugar and juice.    Discussed about diabetic diet.  Referral to diabetic educator is done.    2.  Blurry vision, likely due to bilateral cataracts -referral to ophthalmology is made.    3.  HFrEF-currently compensated.  Continue with GDMT    4.  COPD: Continue Trelegy and albuterol as needed continue.  The patient will inform us conducted against smoking.  Can take nicotine patch.    5.  Oropharyngeal thrush : resolved    6.Hypertension: well controlled  BP Readings from Last 3 Encounters:    01/09/25 133/74   11/15/24 156/70   11/06/24 128/70       7. Class III obesity :    8. Smoking: The patient is smoking less ,  just a cigarette with the morning coffee. Motivational interview was conducted against smoking.    9. Preventative medicine: up to date with flu shot, pneumonia vaccine, zoster and RSV.    Follow-up in 1 month.    Dat Herman MD

## 2025-01-10 RX ORDER — PROBENECID 500 MG/1
500 TABLET, FILM COATED ORAL 2 TIMES DAILY
Qty: 180 TABLET | Refills: 0 | Status: SHIPPED | OUTPATIENT
Start: 2025-01-10

## 2025-01-10 NOTE — TELEPHONE ENCOUNTER
No protocol     LOV: 1/9/25  RTC: 1 month  Filled: 11/25/24 #180   Labs: 12/23/24  Future Appointments   Date Time Provider Department Center   1/14/2025  1:20 PM Jb Jaimes MD [facility]   1/20/2025  9:00 AM Audrey Mcdowell RD [id number]   2/7/2025  1:00 PM Dat Herman MD [facility] [place]   5/16/2025  1:00 PM Shawna Pena APRN [facility] [id number] [facility] [place]

## 2025-01-13 ENCOUNTER — TELEPHONE (OUTPATIENT)
Dept: INTERNAL MEDICINE CLINIC | Facility: CLINIC | Age: 80
End: 2025-01-13

## 2025-01-13 RX ORDER — BLOOD SUGAR DIAGNOSTIC
STRIP MISCELLANEOUS
Qty: 100 STRIP | Refills: 1 | Status: SHIPPED | OUTPATIENT
Start: 2025-01-13 | End: 2026-01-13

## 2025-01-13 NOTE — TELEPHONE ENCOUNTER
I spoke with the patient  apologized for the delay  we will speak with the provider and have him send the prescription as soon as possible.

## 2025-01-13 NOTE — TELEPHONE ENCOUNTER
Pt called upset stating she had lancets sent over on 1/9   Missing rx for test strips     Please advise

## 2025-01-15 ENCOUNTER — DIABETIC EDUCATION (OUTPATIENT)
Facility: CLINIC | Age: 80
End: 2025-01-15
Payer: COMMERCIAL

## 2025-01-15 DIAGNOSIS — E13.9 DIABETES 1.5, MANAGED AS TYPE 1 (HCC): Primary | ICD-10-CM

## 2025-01-15 PROCEDURE — G0108 DIAB MANAGE TRN  PER INDIV: HCPCS | Performed by: DIETITIAN, REGISTERED

## 2025-01-15 NOTE — PROGRESS NOTES
Anette Frazier  (8/28/1945) attended Diabetes Education.    Referring Provider: Dat Herman         Date: 1/15/2025  Start time: 3:00pm End time: 4:00pm  _______________________________________________     Ms. Frazier is a 79 year old female who presents today with type II diabetes.    Reason for visit: food, highs and lows and keep normal     What has your diabetes journey been? 15-20    Prev diab edu? no      Assessment:     Wt Readings from Last 6 Encounters:   01/09/25 221 lb 3.2 oz   11/15/24 228 lb   11/06/24 234 lb   10/16/24 234 lb 6.4 oz   09/24/24 238 lb 6.4 oz   05/13/24 238 lb            Lab Results   Component Value Date    A1C 7.5 (H) 09/16/2024    A1C 7.2 (H) 02/19/2024    A1C 8.1 (H) 10/03/2023    A1C 6.8 (H) 04/24/2023    A1C 6.5 (H) 12/21/2022        Medical History:   Past Medical History:    AIN (acute interstitial nephritis)    Back problem    Cataracts, bilateral    Congestive heart disease (HCC)    Diabetes (HCC)    Diverticulosis of large intestine    Essential hypertension    Gout    High blood pressure    High cholesterol    Hyperlipidemia    Neuropathy    feet    Obesity    ALEXANDRA (obstructive sleep apnea)    AHI 34 REM AHI 85 [place] 59%     Sleep apnea    Tobacco abuse    Visual impairment         Medications:     Current DM management:     Oral:  metformin 500 BID, jardiance      Insulin:  lantus 35 (evening) , lispro 11 units      Action/Side Effects/Precautions/Increasing Dosage: reviewed  How to use pen:reviewed  Storage/Disposal: Unopened pens remain refrigerated; once opened, can remain at room temp for 28 days. Need to dispose of needles in sharps container.  Travel Guidelines: Pack insulin & supplies in carry on luggage only;pack medication with label from pharmacy for travel        Injection sites: Abdomen  Lipodystrophy: No    Taking correctly: Yes    Monitoring Blood Glucose:     Currently checking BG: Yes,  1 times/day (fasting)  Reviewed Glucose Logs: Yes    BG results:  per patient report sometimes after meals dizzie 170 or higher  FB-150 mg/dl      Highest-185- low 57- night 8pm, skipped a meal morning before eating  Sweats, eyes blurry with low blood sugar; we reviewed rule of 15      Pattern of hypoglycemia  Pattern of hyperglycemia          Healthy Eating & Exercise:     Diet History:  Usually eats 2 meals/day and 1  snacks/day.      TYPICAL  FOOD  NOTES   Breakfast  turkey sausage links x2, egg and whole toast and milk,          Lunch  lettuce, tomato and cucumber salad, croissant, ranch          Dinner  skips   Sf jello and peach         Snacks  ritz crackers and pb          Beverages  water, coffee no sugar, green tea          Eating out   used to eat out [employer]           Instructed on basic diet guidelines including aiming for 3 meals/day, balancing each meal with variety of nutrients, using plate method as a model for meals - goal of 1/2 plate non-starchy vegetables, 1/4 plate lean proteins, 1/4 plate carbohydrates and modest portions of fruit, yogurt, milk if desired.    We reviewed impact of carbohydrates, fiber, protein, and fat on glucose levels and trends. We discussed balancing meals and snacks with a combination of carbohydrates, proteins, and fiber to help stabilize glucose levels.     We discussed aiming for consistent meal times/carb amounts can help us determine if medications are correct. Reviewed carbohydrate counting, goals for meals (45 grams per meal) and snacks (15 grams per snack). Reviewed that carb counting consistently for a week or so can help regain control over glucose.    Carbohydrate counting dyan recommendations provided. Carb \"Cheat Sheet\" provided for quick references.     Physical Activity:  Currently exercising: Nolimited due to mobility       Sleep Patterns: good  Stress: good    Lifestyle & Healthy Coping:     Discussed healthy coping options, resources available, and diabetes distress. Discussed signs of diabetes distress and  actions to take at that time. Discussed ways to prevent diabetes distress.       Education:     Diabetes Overview  Reviewed diabetes pathophysiology.  Discussed current A1c result and goals.   Discussed benefits of blood glucose control and blood glucose targets.  Reviewed signs, symptoms, prevention, and treatment of hyperglycemia and hypoglycemia.    Healthy Eating  Reviewed basic nutrition concepts for diabetes management.  Reviewed food groups and impact on blood glucose levels.  Discussed MyPlate Method of balancing diet.  Reviewed carbohydrate counting and label reading.    Being Active  Benefits and effect of activity on blood glucose discussed.  Reviewed when to call MD and benefits of goal setting.    Monitoring  Reinforced glucose monitoring schedules: once daily  Discussed use of personal CGM and benefits.    Taking Medication  Reviewed medication use, action, timing, and side effects.   Injectables: Site selection, rotation, action, timing reviewed.     Reducing Risk  Reviewed overview of complications including: renal protection and foot care    Healthy Coping  Family involvement/support encouraged  Depression and diabetes reviewed.      Goals:     Practice healthful eating patterns, emphasizing a variety of nutrient dense foods in appropriate portion sizes.    Monitor carbohydrate intake with the MyPlate method.  Take medications as prescribed.  Practice carbohydrate counting and label reading.  Increase or continue physical activity of at least 150 mins, over at least 3 days a week or per doctors recommendations.   Improve glycemic control working towards an A1c of 7.0% or less  Checking blood glucose with glucose meter as prescribed by provider, 1 times per day (fasting).      Blood Glucose Goals  Blood sugar goals: less than 130 mg/dl in the morning (fasting) and less than 180 mg/dl 2 hours after meals.  Keep glucose tabs or fast acting carbohydrates with you for treatment of lows; for blood glucose  levels less than 70 mg/dl, take 15 grams of fast acting carbohydrates (3-4 glucose tabs, 4 ounces of juice, or as discussed). Retest in 15 min. If not above 70 mg/dl, retreat.    Recommendations:     Patient goals:  I would recommend taking your lispro before your meals  Great job limiting the soda  I would recommend rotating your insulin injection locations  If you want to use a sliding scale to lower your blood sugars here is what you can do. Do not take multiple insulin doses within 3 hours of each other.   If below 170 give 0 extra units  If 171 - 220 give 1 extra unit  If 221 - 271 give 2 extra units  If 272 - 320 give 3 extra units  Over 320 call our office    Continue to use the Plate Method as a model for your meals:  1/2 of your plate is non-starchy vegetables, 1/4 of your plate is lean protein, 1/4 of your plate is starchy or carbohydrate foods     Check your blood sugars one time per day. Switch off when you check between these two times:  1.) Fasting (before you eat breakfast)  2.) 2 hours after you start eating a meal (vary the meal you test after between breakfast, lunch, and dinner)     Blood glucose guidelines/goals:     ADA (American Diabetes Association):  A1c:  7% or less  Fasting Blood Glucose (before you eat breakfast):    2 Hours After a Meal:  Less than 180     Your doctor may give more specific goals for you.     Recommend follow up with diabetes educator in 3 months. Bring your glucose log book with you to your visits.     Follow up:   Future Appointments   Date Time Provider Department Center   2/7/2025  1:00 PM Dat Herman MD [facility] [place]   2/24/2025  8:15 AM Jenn Jackson APRN [facility]   3/26/2025  2:00 PM Audrey Mcdowell RD [facility]   5/16/2025  1:00 PM Shawna Pena APRN [facility] [id number] [place]         Patient verbalized understanding and has no further questions at this time.    Audrey Mcdowell RD, , LD, CDCES

## 2025-01-15 NOTE — PATIENT INSTRUCTIONS
It was great meeting with you today!   I would recommend taking your lispro before your meals  Great job limiting the soda  I would recommend rotating your insulin injection locations  If you want to use a sliding scale to lower your blood sugars here is what you can do. Do not take multiple insulin doses within 3 hours of each other.   If below 170 give 0 extra units  If 171 - 220 give 1 extra unit  If 221 - 271 give 2 extra units  If 272 - 320 give 3 extra units  Over 320 call our office     Insulin, whether in pen or vial form, needs to kept in the refrigerator. Insulin is stable at room temperature for 28 days. Keep your insulin at room temperature while you are actively using it. Once that pen/vial runs out, remove another from the fridge to get to room temperature.     Injecting cold insulin can be uncomfortable.    Here are some other tips for insulin:  - Do not store your insulin near extreme heat or extreme cold.  - Never store insulin in the freezer, direct sunlight, or in the glove compartment of a car.  - Check the expiration date before using, and don't use any insulin beyond its expiration date.  - Examine the insulin closely to make sure the insulin looks normal before you dose.  - Use a new syringe or pen needle for each injection.  - Clean your skin off with an alcohol pad before each injection to prevent infections.    _______________________    INJECTING WITH PEN:    1) Wash your hands before beginning and the wipe the injection site with an alcohol swab or soap and water.    2) Make sure you have the correct type of insulin and a pen needle.    3) Inspect insulin for any abnormalities and wipe the rubber tip clean with an alcohol swab.    4) Attach a new needle with every injection. Do NOT leave a needle on your pen.    5) Prime your insulin pen by shooting 2 units into the air (NOT YOU). If you do not see insulin come out of the needle, try again. If after the second priming you do not see  insulin, get a new needle.    6) Dial the pen up from 0 units to the prescribed dose.    7) Insert the needle into your skin and push the button all the way in. Count to 10 while holding until the window reads 0. Withdraw the needle.    8) Remove pen needle and dispose in appropriate location. Recap pen with cap and store.    _____________________    WHERE TO INJECT:        Be sure to rotate where you are injecting. This ensures that you are getting the full insulin dose and it is being absorbed properly.        Blood Glucose Goals  Less than 130 mg/dl in the morning  Less than 180 mg/dl 2 hours after meals.  (You and your doctor might have discussed more specific goals)    Treating low blood sugars and high blood sugars  Lows: Use the rule of 15. If feeling shaky, sweaty, weak or dizzie, check blood sugars. If below 70,eat or drink 15 grams of fast acting carbohydrates. This includes juice, hard candy like starburst skittles or smarties, honey, rasins or glucose tabs. Wait 15 minutes and check blood sugars again. If low treat again. If over 70 and going to eat within an hour, do nothing. If over 70 and it will be over an hour until you eat again eat something with a protein.  Highs: Less than 250, drink water and move your body. If over 250 for more than 3 hours or experiencing  nausea, vomiting, stomach pain, confusion or stomach pain call our office at [phone] or go to the ER.     Audrey Mcdowell, RD, , LD, CDCES(she/her/hers)  Diabetes Educator  [facility]  [email or web address]  [phone] phone  [phone] Fax

## 2025-01-17 DIAGNOSIS — E11.65 TYPE 2 DIABETES MELLITUS WITH HYPERGLYCEMIA, WITH LONG-TERM CURRENT USE OF INSULIN (HCC): Primary | ICD-10-CM

## 2025-01-17 DIAGNOSIS — Z79.4 TYPE 2 DIABETES MELLITUS WITH HYPERGLYCEMIA, WITH LONG-TERM CURRENT USE OF INSULIN (HCC): Primary | ICD-10-CM

## 2025-01-17 NOTE — TELEPHONE ENCOUNTER
LOV: 1/9/2025 with Dr. Herman  RTC: 1 month  Last Relevant Labs: December 2024    Future Appointments   Date Time Provider Department Center   1/20/2025  9:00 AM Jenn Jackson APRN [facility]   2/7/2025  1:00 PM Dat Herman MD [facility] [place]   3/26/2025  2:00 PM Audrey Mcdowell RD [facility]   5/16/2025  1:00 PM Shawna Pena APRN [id number] [place]

## 2025-01-18 RX ORDER — INSULIN GLARGINE 100 [IU]/ML
35 INJECTION, SOLUTION SUBCUTANEOUS DAILY
Qty: 30 ML | Refills: 1 | Status: SHIPPED | OUTPATIENT
Start: 2025-01-18 | End: 2025-01-20

## 2025-01-20 ENCOUNTER — OFFICE VISIT (OUTPATIENT)
Age: 80
End: 2025-01-20
Payer: COMMERCIAL

## 2025-01-20 VITALS
HEART RATE: 73 BPM | WEIGHT: 217.63 LBS | OXYGEN SATURATION: 94 % | HEIGHT: 63 IN | DIASTOLIC BLOOD PRESSURE: 58 MMHG | BODY MASS INDEX: 38.56 KG/M2 | RESPIRATION RATE: 18 BRPM | SYSTOLIC BLOOD PRESSURE: 120 MMHG

## 2025-01-20 DIAGNOSIS — E11.69 HYPERLIPIDEMIA ASSOCIATED WITH TYPE 2 DIABETES MELLITUS (HCC): Primary | Chronic | ICD-10-CM

## 2025-01-20 DIAGNOSIS — E78.5 HYPERLIPIDEMIA ASSOCIATED WITH TYPE 2 DIABETES MELLITUS (HCC): Primary | Chronic | ICD-10-CM

## 2025-01-20 DIAGNOSIS — Z79.4 TYPE 2 DIABETES MELLITUS WITH HYPERGLYCEMIA, WITH LONG-TERM CURRENT USE OF INSULIN (HCC): ICD-10-CM

## 2025-01-20 DIAGNOSIS — E11.65 TYPE 2 DIABETES MELLITUS WITH HYPERGLYCEMIA, WITH LONG-TERM CURRENT USE OF INSULIN (HCC): ICD-10-CM

## 2025-01-20 LAB
CREAT UR-SCNC: 24.8 MG/DL
GLUCOSE BLOOD: 158
HEMOGLOBIN A1C: 12.9 % (ref 4.3–5.6)
MICROALBUMIN UR-MCNC: <0.3 MG/DL
TEST STRIP EXPIRATION DATE: NORMAL DATE
TEST STRIP LOT #: NORMAL NUMERIC

## 2025-01-20 PROCEDURE — 82570 ASSAY OF URINE CREATININE: CPT

## 2025-01-20 PROCEDURE — 82043 UR ALBUMIN QUANTITATIVE: CPT

## 2025-01-20 RX ORDER — INSULIN GLARGINE 100 [IU]/ML
35 INJECTION, SOLUTION SUBCUTANEOUS DAILY
Qty: 12 ML | Refills: 0 | Status: SHIPPED | OUTPATIENT
Start: 2025-01-20

## 2025-01-20 RX ORDER — HYDROCHLOROTHIAZIDE 12.5 MG/1
1 CAPSULE ORAL
Qty: 6 EACH | Refills: 0 | Status: SHIPPED | OUTPATIENT
Start: 2025-01-20

## 2025-01-20 RX ORDER — GLUCAGON INJECTION, SOLUTION 1 MG/.2ML
1 INJECTION, SOLUTION SUBCUTANEOUS ONCE AS NEEDED
Qty: 0.2 ML | Refills: 0 | Status: SHIPPED | OUTPATIENT
Start: 2025-01-20 | End: 2025-01-20

## 2025-01-20 RX ORDER — BLOOD SUGAR DIAGNOSTIC
STRIP MISCELLANEOUS
Qty: 300 STRIP | Refills: 1 | Status: SHIPPED | OUTPATIENT
Start: 2025-01-20

## 2025-01-20 RX ORDER — PEN NEEDLE, DIABETIC 32GX 5/32"
NEEDLE, DISPOSABLE MISCELLANEOUS
Qty: 200 EACH | Refills: 2 | Status: SHIPPED | OUTPATIENT
Start: 2025-01-20

## 2025-01-20 RX ORDER — INSULIN LISPRO 100 [IU]/ML
INJECTION, SOLUTION INTRAVENOUS; SUBCUTANEOUS
Qty: 15 ML | Refills: 0 | Status: SHIPPED | OUTPATIENT
Start: 2025-01-20

## 2025-01-20 NOTE — PATIENT INSTRUCTIONS
Lantus 35 units daily    Lispro   11 units with meals  <200 mg/dl - no addition  201 - 250 mg/dl - + 1 unit  251 - 300 - + 2 units  201 -350 mg/dl - + 3 units  > 351 mg/dl = + 4 units  Call for 2 episodes of < 80 mg/dl  or  >280 mg/dl in a span 1 week    Continue Jardiance 10 daily  Metformin 500 twice a day    Follow up plan:  With Jenn Jackson APRN:   See me after 1 month        Updated/current diabetes medication instructions:  Diabetic Medications               LANTUS 100 UNIT/ML Subcutaneous Solution (Taking) Inject 35 Units into the skin daily.    insulin lispro 100 UNIT/ML Injection Solution (Taking) Inject 11 Units into the skin 3 (three) times daily before meals.    empagliflozin (JARDIANCE) 10 MG Oral Tab (Taking) Take 1 tablet (10 mg total) by mouth daily.    metFORMIN 500 MG Oral Tab (Taking) Take 1 tablet (500 mg total) by mouth 2 (two) times daily with meals.            Office phone number: [phone]; phones are open Monday-Friday 8:00-4:00  Thank you for visiting our office. We look forward to working with you to reach your health goals. As a reminder, if you need refills, please request early so there is enough time to process the request. We ask that you provide at least 5 days' notice before a refill is due, so there is time to send a request to pharmacy, process the refill, and ensure there are no other problems with obtaining the medication (backorders, prior authorization paperwork, etc). Routine refills will not be addressed on weekends, so please submit these requests during the week.    American Diabetes Association recommends blood sugar targets:      Fasting blood sugar (before breakfast) Target:    (ideally less than 110)  2 hours after eating less than 180 (ideally less than  150 )   A1c goal <7.0%     Call for blood sugars  greater than 180 more than 2 times in a week  Time in Range goal is higher than 70% if using a continuous glucose monitor (Dexcom or Kyle).  Time in Range  can be found within the Dexcom G7 dyan, on Clarity if using Dexcom G6, or on LibreView if using Kyle.     Continue with lifestyle modification and healthier choices for meal.  Do regular activity/exercise. Continue with the strengthening exercises with physical therapy.  Frequent BG monitoring, at most 3 x a day, on random time either before meals or 2 hours after a meal.  Strengthen gut health, can have yogurt, sauerkraut, probiotics but mindful of sugar/carb content.   Include in your routine, stress releasing activities like yoga, swimming, stretching as tolerated that will help with glucose control.     HOW TO TREAT LOW BLOOD SUGAR (Hypoglycemia)  Low blood sugar= Less than 70, or if you start to have symptoms (below)  Symptoms: Shaking or trembling, fast heart rate, extreme hunger, sweating, confusion/difficulty concentrating, dizziness.    How to treat a low blood sugar if you are able to eat/drink: The Rule of 15  If you are using continuous glucose monitor that says you are low, but you do not have any symptoms, verify on fingerstick that your blood sugar is actually low before treating.   Eat 15 grams of carbs (see examples below)  Check your blood sugar after 15 minutes. If it’s still below your target range, have another serving.   Repeat these steps until it’s in your target range. Once it’s in range, if you're nervous about your sugar going low again, have a protein source (ie, a spoonful of peanut butter).   If you have a CGM you want to look for how your arrow has changed. If you arrow is pointed up or sideways after 15 min, give your CGM more time OR check with a finger stick. Try not to eat more food until at least 15 min after the first BG check - otherwise you risk having a rebound high.  If you are experiencing symptoms and you are unable to check your blood glucose for any reason, treat the hypoglycemia.  If someone has a low blood sugar and is unconscious: Don’t hesitate to call 911. If someone  is unconscious and glucagon is not available or someone does not know how to use it, call 911 immediately.    To treat a low, I recommend you carry with you easy, pre-portioned treatment for low blood sugars that are 15G of carbs:   - Children sized squeeze pouch applesauce (high fiber + carbs help prevent too high of a spike)  - Small children's sized juicebox- 15g carb --> 4oz juice box  - Glucose tablets from [employer], you can find them near diabetes supplies --> Note, you will need to eat 3-4 tablets to get to 15g of carbs  - Children sized fruit snack pack- look for one with 15 grams of total carbohydrate  - Choice of how to treat your low is important. Complex carbs, or foods that contain fats along with carbs (like chocolate) can slow the absorption of glucose and should NOT be used to treat an emergency low

## 2025-01-20 NOTE — PROGRESS NOTES
Anette Frazier is a 79 year old female who presenting today to establish for diabetes management.   Primary care physician: Jonel Bell MD  Today A1c 12.9% (last 7.5%)  In office POC: 158 (fasting)    Anette is a pleasant, 79 yr old, appears stated age is here in diabetes ctr for the first time accompanied by caregiver. Referred by PCP for management of hyperglycemia.   Had T2 DM for > 20 yrs. On Metformin for a long time and been on and off insulin due to improved A1c.   Earlier part of 2024, insulin was changed to Ozempic. A1c was ~ 7% at that time w/ Ozempic and Metformin. However, had difficulty having regular BM without laxatives for 8 months. Ozempic was then changed to Jardiance.  In December, routine blood test ordered for generalized weakness, showed BG at 678 mg/dl, hence admitted in the hospital for further management. On discharge, pt was placed back to basal/bolus insulin, Metformin and Jardiance.  Seen diabetes educator on 1/15/25.    Diabetes History:  Type 2 ~ 2003  Patient has had hospitalizations for blood sugar issues and Recent 12/23/24, had HHS (hyperosmolar hyperglycemic state)   denies any history of pancreatitis    Previous DM therapies:  Insulin - changed to insulin  Ozempic - took for 8 months, stopped in October d/t constipation    Current DM Regimen:    Jardiance 10 daily  Metformin 500 mg twice a day  Lantus 35 units daily  Lispro three times a day 11 units for meal -> does fixed 11 units for each, if not eating, skips Lispro  < 170 give 0 extra units  If 171 - 220 give 1 extra unit  If 221 - 271 give 2 extra units  If 272 - 320 give 3 extra units  Over 320 call our office      HGBA1C:    Lab Results   Component Value Date    A1C 12.9 (A) 01/20/2025    A1C 7.5 (H) 09/16/2024    A1C 7.2 (H) 02/19/2024     (H) 09/16/2024      SMBG patterns: 3-4x meter   Testing BG x  4 daily, did not bring meter  Meter or log book today: no  Average: 120 -160 mg/dl  Lowest: 57 after skipping a  meal  Highest: 180-190 mg/dl    Hx of COPD, CHF  Nicotine/cigarette use - advised to use Nicotine patch.Not started. Able to limit to 2 cigarettes with coffee in the morning (from a pack yrs ago)  ALEXANDRA - Uses CPAP nightly  DM associated symptoms:  Polyuria, polyphagia, polydipsia: no  Paresthesias: no   Blurred vision: yes Referred to [facility] for this yr by Dr. Herman    Reports that she has cataract  Hypoglycemia: Yes after skipping meals    DM Complications:    Microvascular:   Neuropathy: Yes on medical record; Per pt Gabapentin 200 mg hs is for gout.  Retinopathy: no last retinal exam 3/2024 - new Eye MD referral provided by Dr Herman 1/9/2025  Nephropathy: yes last done 2/2024, urine sent today, last eGFR 33 9/2024    Macrovascular  PVD: no  CAD: no HFrEF (35-40% 2022)    Seeing Cardio Dr. Jiang every 6 months  Stroke/CVA: no    Modifying factors:  Medication adherence: Yes  Nutrition Limits carbs, black coffee   Exercise: Uses wheelchair for mobility in long distances, not very active  Recent steroids, illness or infections: Yes, had recent HHS in 12/2024    Family History:  (+) DM - mother  (-) Autoimmune dse, pancreatic, thyroid, or any endocrine cancers    Allergies[1]  Past Medical History:    AIN (acute interstitial nephritis)    Back problem    Cataracts, bilateral    Congestive heart disease (HCC)    Diabetes (HCC)    Diverticulosis of large intestine    Essential hypertension    Gout    High blood pressure    High cholesterol    Hyperlipidemia    Neuropathy    feet    Obesity    ALEXANDRA (obstructive sleep apnea)    AHI 34 REM AHI 85 [place] [place] 59%     Sleep apnea    Tobacco abuse    Visual impairment     Past Surgical History:   Procedure Laterality Date    Colonoscopy  2012    [facility]    Colonoscopy  05/18/2018    diverticulosis - repeat 10 years - Dr. Montague    Colonoscopy      Knee replacement surgery  2005    left knee    Total knee replacement      Left knee      Social History      Socioeconomic History    Marital status:    Occupational History    Occupation: Hospital    Tobacco Use    Smoking status: Every Day     Current packs/day: 0.50     Average packs/day: 1 pack/day for 51.0 years (50.7 ttl pk-yrs)     Types: Cigarettes     Start date: 1/10/1974     Passive exposure: Past    Smokeless tobacco: Former     Quit date: 2016    Tobacco comments:     Smokes 2 cigarettes every morning with coffee.    Vaping Use    Vaping status: Never Used   Substance and Sexual Activity    Alcohol use: No    Drug use: No    Sexual activity: Not Currently     Partners: Female   Other Topics Concern    Caffeine Concern Yes     Comment: 2 cups of coffee every morning.    Exercise No    Special Diet No     Social Drivers of Health     Food Insecurity: Low Risk  (12/24/2024)    Received from [facility]     Within the past 12 months, the food you bought just didn't last and you didn't have money to get more.: 3     Within the past 12 months, you worried that your food would run out before you got money to buy more.: 3   Transportation Needs: Not At Risk (12/24/2024)    Received from [facility] Transportation Needs     In the past 12 months, has lack of reliable transportation kept you from medical appointments, meetings, work or from getting things needed for daily living?: No   Physical Activity: Inactive (1/5/2021)    [facility]    Exercise Vital Sign     Days of Exercise per Week: 0 days     Minutes of Exercise per Session: 0 min   Housing Stability: Not At Risk (12/24/2024)    Received from [facility] Housing     What is your living situation today?: I have a steady place to live     Think about the place you live. Do you have problems with any of the following?: None of the above     Family History   Problem Relation Age of Onset    Diabetes Mother     Other (Lung Cancer) Mother     Hypertension  Mother     COPD Sister     Breast Cancer Maternal Aunt 60     Current Outpatient Medications   Medication Sig Dispense Refill    Continuous Glucose Sensor (FREESTYLE TRUMAN 3 PLUS SENSOR) Does not apply Misc 1 each by Other route every 14 (fourteen) days. 6 each 0    insulin glargine (LANTUS SOLOSTAR) 100 UNIT/ML Subcutaneous Solution Pen-injector Inject 35 Units into the skin daily. 12 mL 0    Insulin Lispro, 1 Unit Dial, (HUMALOG KWIKPEN) 100 UNIT/ML Subcutaneous Solution Pen-injector 40 units daily as directed in divided doses based on BG readings. 15 mL 0    BD PEN NEEDLE NAHUM 2ND GEN 32G X 4 MM Does not apply Misc 4 injections daily 200 each 2    Glucose Blood (ONETOUCH VERIO) In Vitro Strip Check 4 times per day for gestational diabetes 300 strip 1    glucagon (GVOKE HYPOPEN 1-PACK) 1 MG/0.2ML Subcutaneous injection Inject 0.2 mL (1 mg total) into the skin once as needed for Low blood glucose. 0.2 mL 0    Glucose Blood (ONETOUCH VERIO) In Vitro Strip Use as directed. 100 strip 1    TRUEPLUS INSULIN SYRINGE 31G X 5/16\" 0.5 ML Does not apply Misc 1 each As Directed.      empagliflozin (JARDIANCE) 10 MG Oral Tab Take 1 tablet (10 mg total) by mouth daily. 90 tablet 3    OneTouch Delica Lancets 33G Does not apply Misc 1 Lancet by Finger stick route in the morning, at noon, and at bedtime. 180 each 11    metFORMIN 500 MG Oral Tab Take 1 tablet (500 mg total) by mouth 2 (two) times daily with meals. 180 tablet 3    PROBENECID 500 MG Oral Tab TAKE 1 TABLET(500 MG) BY MOUTH TWICE DAILY 180 tablet 0    carvedilol 12.5 MG Oral Tab Take 1 tablet (12.5 mg total) by mouth 2 (two) times daily with meals. 180 tablet 3    fluticasone-umeclidin-vilant (TRELEGY ELLIPTA) 100-62.5-25 MCG/ACT Inhalation Aerosol Powder, Breath Activated Inhale 1 puff into the lungs daily. 3 each 3    albuterol 108 (90 Base) MCG/ACT Inhalation Aero Soln Inhale 2 puffs into the lungs every 6 (six) hours as needed for Wheezing. 1 each 3    GABAPENTIN  100 MG Oral Cap TAKE 2 CAPSULES(200 MG) BY MOUTH EVERY NIGHT 180 capsule 1    ALBUTEROL 108 (90 Base) MCG/ACT Inhalation Aero Soln INHALE 1 PUFF INTO THE LUNGS EVERY 6 HOURS AS NEEDED FOR WHEEZING 8.5 g 0    HYDRALAZINE 100 MG Oral Tab TAKE 1 TABLET(100 MG) BY MOUTH TWICE DAILY 180 tablet 0    nicotine 14 MG/24HR Transdermal Patch 24 Hr apply 1 patch (14mg) daily for 2 weeks, then apply 1 patch (7mg) daily for 2 weeks (Patient not taking: Reported on 1/9/2025) 14 patch 0    nicotine 7 MG/24HR Transdermal Patch 24 Hr then apply 1 patch (14mg) daily for 2 weeks, then apply 1 patch (7mg) daily (Patient not taking: Reported on 1/9/2025) 14 patch 1    clotrimazole 2 % Vaginal Cream Place 1 Application vaginally 2 (two) times daily as needed (vaginal itching). Can apply to external area of vaginal/vulva area 21 g 0    albuterol (2.5 MG/3ML) 0.083% Inhalation Nebu Soln Take 3 mL (2.5 mg total) by nebulization 3 (three) times daily. 300 mL 1    triamcinolone 0.1 % External Cream Apply topically 2 (two) times daily as needed. 60 g 1    ALENDRONATE 70 MG Oral Tab TAKE 1 TABLET(70 MG) BY MOUTH EVERY 7 DAYS 12 tablet 3    TORSEMIDE 20 MG Oral Tab TAKE 1 TABLET(20 MG) BY MOUTH TWICE DAILY 180 tablet 0    atorvastatin 20 MG Oral Tab Take 1 tablet (20 mg total) by mouth nightly. 90 tablet 3    Polyethylene Glycol 3350 (MIRALAX) 17 g Oral Powd Pack Take 17 g by mouth daily. (Patient not taking: Reported on 1/9/2025) 90 packet 3    spironolactone 25 MG Oral Tab Take 1 tablet (25 mg total) by mouth daily.      Potassium Chloride ER 10 MEQ Oral Tab CR Take 1 tablet (10 mEq total) by mouth daily. 90 tablet 1    sacubitril-valsartan (ENTRESTO)  MG Oral Tab Take 1 tablet by mouth 2 (two) times daily. 180 tablet 0    Respiratory Therapy Supplies (NEBULIZER) Does not apply Device Use as directed 1 each 0    ASPIRIN EC LOW DOSE 81 MG Oral Tab EC Take 1 tablet (81 mg total) by mouth daily.  0     Review of Systems   LUNGS: denies  shortness of breath   CARDIOVASCULAR: denies chest pain  GI: denies abdominal pain, nausea or diarrhea   : denies dysuria  MUSCULOSKELETAL: denies pain    Physical exam:  /58   Pulse 73   Resp 18   Ht 5' 3\" (1.6 m)   Wt 217 lb 9.6 oz (98.7 kg)   SpO2 94%   BMI 38.55 kg/m²   Body mass index is 38.55 kg/m².    Physical Exam   Vitals reviewed.  Constitutional: Normal appearance   Cardiovascular: Normal rate , rhythm   Pulmonary/Chest: Effort normal  Neurological: Alert and oriented .   Psychiatric: Normal mood and affect.   No bipedal edema    Diabetes foot exam:   Good foot hygiene.   Bilateral barefoot skin diabetic exam: normal.  Visualized feet and the appearance : normal.  Bilateral monofilament/sensation of both feet is normal.   (+) Diminished bilateral monofilament/sensation on both heels  Vibration to dorsum to the first toe perceived.   Bilateral 2+ pedal pulse pedal pulse exam    (+) Thickened nails on some toes    Assessment/Plan:  Hyperlipidemia  Last Lipid panel - 9/2024 LDL 76, Trig 164, HDL 28  Continue Atorvastatin   Discussed ADA recommendation  Discussed possible adjustment of Statin, not at goal per ADA guidelines  We will defer for next visit - plan to repeat panel    Hypertension  Well-controlled  On Entresto, Spironolactone, Carvedilol and Hydralazine  CPM  Low salt, limit saturated fats    Cigarette smoking - discussed for 5 minutes effect of smoking to cardiovascular health and benefits of not smoking    Type 2 diabetes mellitus with long-term insulin use (HCC)    Weight: 217 lbs today  Wt Readings from Last 3 Encounters:   01/20/25 217 lb 9.6 oz (98.7 kg)   01/09/25 221 lb 3.2 oz (100.3 kg)   11/15/24 228 lb (103.4 kg)       A1C :   Lab Results   Component Value Date     (H) 09/16/2024    A1C 12.9 (A) 01/20/2025     Recommendation:    Diabetes control is poor. Needs ongoing management and evaluation. Discussed diabetes is a chronic disease and requires monitoring of  ongoing medication, risk for complications  and continued glucose/A1c monitoring.     Continue   Lantus 35 units daily   Jardiance 10 daily    Metformin 500 twice a day       Lispro   11 units with meals   <200 mg/dl - no addition  201 - 250 mg/dl - + 1 unit  251 - 300 - + 2 units  201 -350 mg/dl - + 3 units  > 351 mg/dl = + 4 units    Call for 2 episodes of < 80 mg/dl  or  >280 mg/dl in a span 1 week  Continue checking 3-4 x/day, provided glc log on when to check BG and advised to reach out for further assistance on current management if had episodes of lows/highs as mentioned above    Discussed CGM - prefers Kyle   Rx provided -  Discussed importance of having education on CGM  Advised to set up another appointment w/ RD upon receipt of CGM    Consider restarting GLP-1 -> not interested in restarting, wants to wait until next appointment  Discussed mechanism, side effects and cardioprotective, renoprotective mechanism and importance in weight management    Discussed how to treat hypoglycemia -   Rule of 15  Rx for Gvoke provided - will discuss more on next visit    -Uncontrolled  -Discussed importance of glycemic control to prevent complications of diabetes  -Discussed complications of diabetes include retinopathy, neuropathy, nephropathy and cardiovascular disease  -Discussed ABCs of DM  -Discussed importance of SBGM  -Discussed importance of low CHO diet, recommend 45gm per meal or 135gm per day  -Provided blood glucose log book    -Discussed the A1C test, what the value reflects and the goal for the patient.   Discussed w patient glucose targets (Fasting < 130 and post prandial <180 ) and complications associated with hyperglycemia and uncontrolled DM (on AVS)     Continue with lifestyle modifications due to positive impact on diabetes/blood sugars/health (portion control, physical activity, weight loss)      The patient is asked to return in 1 m but recommended to contact DM clinic sooner if questions or  concerns.    The patient indicates understanding of these issues and agrees to the plan.    Orders Placed This Encounter    ASSAY QUANTITATIVE, GLUCOSE     Order Specific Question:   Release to patient     Answer:   Immediate    Microalb/Creat Ratio, Random Urine     Standing Status:   Future     Number of Occurrences:   1     Standing Expiration Date:   2026     Order Specific Question:   Release to patient     Answer:   Immediate    POC Hemoglobin A1C     Order Specific Question:   Release to patient     Answer:   Immediate    Continuous Glucose Sensor (FREESTYLE TRUMAN 3 PLUS SENSOR) Does not apply Misc     Si each by Other route every 14 (fourteen) days.     Dispense:  6 each     Refill:  0    insulin glargine (LANTUS SOLOSTAR) 100 UNIT/ML Subcutaneous Solution Pen-injector     Sig: Inject 35 Units into the skin daily.     Dispense:  12 mL     Refill:  0     May substitute per insurance preference    Insulin Lispro, 1 Unit Dial, (HUMALOG KWIKPEN) 100 UNIT/ML Subcutaneous Solution Pen-injector     Si units daily as directed in divided doses based on BG readings.     Dispense:  15 mL     Refill:  0     May substitute per insurance coverage    BD PEN NEEDLE NAHUM 2ND GEN 32G X 4 MM Does not apply Misc     Si injections daily     Dispense:  200 each     Refill:  2    Glucose Blood (ONETOUCH VERIO) In Vitro Strip     Sig: Check 4 times per day for gestational diabetes     Dispense:  300 strip     Refill:  1     Check 4 times per day for gestational diabetes. May substitute if not on insurance formulary    glucagon (GVOKE HYPOPEN 1-PACK) 1 MG/0.2ML Subcutaneous injection     Sig: Inject 0.2 mL (1 mg total) into the skin once as needed for Low blood glucose.     Dispense:  0.2 mL     Refill:  0       Diabetes complications & risks surveillance:   A1C/Blood pressure: as reported above   Nephropathy screening: urine sent today last UACR 2024 continue ace /arb rx.   LIPID screenin2024 . Atorvastatin  20 mg last filled  statin rx.   Last dilated eye exam: Last Dilated Eye Exam: 03/02/24   Exam shows retinopathy? Eye Exam shows Diabetic Retinopathy?: No  Date of last PHQ-2 depression screen: done 1/9/2025  Last diabetic foot exam: Last Foot Exam: 01/20/25   see Podiatry every 6 months    Spent 50 min obtaining patient history, evaluating patient, reviewing blood glucose trends, discussing treatment options, lifestyle modifications and completing documentation -this time does not including sensor interpretation time if applicable  The risks and benefits of my recommendations, as well as other treatment options were discussed with the patient today. questions were also answered to the best of my knowledge.      Note to patient: The 21 Century Cures Act makes medical notes like these available to patients in the interest of transparency. However, be advised this is a medical document. It is intended as peer to peer communication. It is written in medical language and may contain abbreviations or verbiage that are unfamiliar. It may appear blunt or direct. Medical documents are intended to carry relevant information, facts as evident, and the clinical opinion of the practitioner.             [1]   Allergies  Allergen Reactions    Allopurinol RASH

## 2025-01-21 ENCOUNTER — TELEPHONE (OUTPATIENT)
Dept: INTERNAL MEDICINE CLINIC | Facility: CLINIC | Age: 80
End: 2025-01-21

## 2025-01-21 NOTE — TELEPHONE ENCOUNTER
Incoming fax from [facility] [facility] requesting a Continuous Glucose Monitoring & Supplies Order Form completed, signed and faxed back.    Faxed back to [facility], received fax confirmation and then sent one copy to scan and one in  purple folder pod #2.

## 2025-01-22 ENCOUNTER — TELEPHONE (OUTPATIENT)
Facility: CLINIC | Age: 80
End: 2025-01-22

## 2025-01-22 DIAGNOSIS — Z79.4 TYPE 2 DIABETES MELLITUS WITH HYPERGLYCEMIA, WITH LONG-TERM CURRENT USE OF INSULIN (HCC): Primary | ICD-10-CM

## 2025-01-22 DIAGNOSIS — E11.65 TYPE 2 DIABETES MELLITUS WITH HYPERGLYCEMIA, WITH LONG-TERM CURRENT USE OF INSULIN (HCC): Primary | ICD-10-CM

## 2025-01-22 RX ORDER — SEMAGLUTIDE 0.68 MG/ML
INJECTION, SOLUTION SUBCUTANEOUS
Qty: 9 ML | Refills: 0 | Status: SHIPPED | OUTPATIENT
Start: 2025-01-22 | End: 2025-04-16

## 2025-01-22 NOTE — TELEPHONE ENCOUNTER
Last OV 1/20/2025  Called to update of BG trends  1/21/2025   172 mg/dl - fasting - dosed 11 units  147 and 98 mg/dl - preL/D - no bolus dosed  1/22/2025  135 mg/dl - dosed 11 units  10:00 AM - 79 mg/dl    Recommendation:  Continue Lantus at 35 units daily  Decrease Humalog (meal-time insulin) as follows   <150 mg/dl - no insulin  150 - 200 mg/dl - 6 units with meals   201 - 250 mg/dl - + 1 unit  251 - 300 - + 2 units  301 -350 mg/dl - + 3 units  > 351 mg/dl = + 4 units    See Audrey Mcdowell RD for CGM placement/education, Gvoke indication and Ozempic start.     Also, pt had Ozempic for 8 months previously. Needs review of medication and review of hypoglycemia treatment and insulin doses.

## 2025-01-29 RX ORDER — ALBUTEROL SULFATE 90 UG/1
1 INHALANT RESPIRATORY (INHALATION) EVERY 6 HOURS PRN
Qty: 8.5 G | Refills: 0 | Status: SHIPPED | OUTPATIENT
Start: 2025-01-29

## 2025-01-29 NOTE — TELEPHONE ENCOUNTER
Name from pharmacy: ALBUTEROL HFA INH (200 PUFFS) 8.5GM         Will file in chart as: ALBUTEROL 108 (90 Base) MCG/ACT Inhalation Aero Soln    Sig: INHALE 1 PUFF INTO THE LUNGS EVERY 6 HOURS AS NEEDED FOR WHEEZING    Disp: 8.5 g    Refills: 0 (Pharmacy requested: Not specified)    Start: 1/29/2025    Class: Normal    Non-formulary    Last ordered: 1 month ago (12/9/2024) by Jonel Bell MD    Last refill: 12/9/2024    Rx #: [id number]    Asthma & COPD Medication Protocol [id number]/29/2025 10:20 AM   Protocol Details ACT Score greater than or equal to 20    ACT recorded in the last 12 months    Appointment in past 6 or next 3 months    Medication is active on med list      To be filled at: [id number] [place], [place] - [address] AT [facility] [address], [phone], [phone]

## 2025-01-30 RX ORDER — TORSEMIDE 20 MG/1
20 TABLET ORAL 2 TIMES DAILY
Qty: 180 TABLET | Refills: 0 | Status: SHIPPED | OUTPATIENT
Start: 2025-01-30

## 2025-01-30 NOTE — TELEPHONE ENCOUNTER
Protocol failed     LOV: 1/9/25   RTC: 1 month   Filled: 7/15/24 #180   Labs: 9/16/24   Future Appointments   Date Time Provider Department Center   1/31/2025  1:00 PM Audrey Mcdowell RD [facility]   2/7/2025  1:00 PM Dat Herman MD [facility] [place]   2/24/2025  8:15 AM Jenn Jackson APRN [facility]   3/26/2025  2:00 PM Audrey Mcdowell RD [facility]   5/16/2025  1:00 PM Shawna Pena APRN [id number] [place]

## 2025-01-31 ENCOUNTER — TELEPHONE (OUTPATIENT)
Dept: ENDOCRINOLOGY CLINIC | Facility: CLINIC | Age: 80
End: 2025-01-31

## 2025-01-31 ENCOUNTER — DIABETIC EDUCATION (OUTPATIENT)
Facility: CLINIC | Age: 80
End: 2025-01-31
Payer: COMMERCIAL

## 2025-01-31 ENCOUNTER — OFFICE VISIT (OUTPATIENT)
Dept: INTERNAL MEDICINE CLINIC | Facility: CLINIC | Age: 80
End: 2025-01-31
Payer: COMMERCIAL

## 2025-01-31 VITALS
TEMPERATURE: 97 F | WEIGHT: 219 LBS | DIASTOLIC BLOOD PRESSURE: 64 MMHG | OXYGEN SATURATION: 96 % | BODY MASS INDEX: 39 KG/M2 | SYSTOLIC BLOOD PRESSURE: 134 MMHG | HEART RATE: 80 BPM

## 2025-01-31 DIAGNOSIS — R09.81 NASAL CONGESTION: Primary | ICD-10-CM

## 2025-01-31 DIAGNOSIS — E11.65 TYPE 2 DIABETES MELLITUS WITH HYPERGLYCEMIA, WITH LONG-TERM CURRENT USE OF INSULIN (HCC): Primary | ICD-10-CM

## 2025-01-31 DIAGNOSIS — Z79.4 TYPE 2 DIABETES MELLITUS WITH HYPERGLYCEMIA, WITH LONG-TERM CURRENT USE OF INSULIN (HCC): Primary | ICD-10-CM

## 2025-01-31 PROCEDURE — 1159F MED LIST DOCD IN RCRD: CPT | Performed by: INTERNAL MEDICINE

## 2025-01-31 PROCEDURE — G0108 DIAB MANAGE TRN  PER INDIV: HCPCS | Performed by: DIETITIAN, REGISTERED

## 2025-01-31 PROCEDURE — 3078F DIAST BP <80 MM HG: CPT | Performed by: INTERNAL MEDICINE

## 2025-01-31 PROCEDURE — 99213 OFFICE O/P EST LOW 20 MIN: CPT | Performed by: INTERNAL MEDICINE

## 2025-01-31 PROCEDURE — 1160F RVW MEDS BY RX/DR IN RCRD: CPT | Performed by: INTERNAL MEDICINE

## 2025-01-31 PROCEDURE — 3075F SYST BP GE 130 - 139MM HG: CPT | Performed by: INTERNAL MEDICINE

## 2025-01-31 RX ORDER — INSULIN GLARGINE-YFGN 100 [IU]/ML
35 INJECTION, SOLUTION SUBCUTANEOUS DAILY
COMMUNITY
Start: 2025-01-20

## 2025-01-31 RX ORDER — METOPROLOL TARTRATE 50 MG
50 TABLET ORAL 2 TIMES DAILY
COMMUNITY

## 2025-01-31 RX ORDER — SIMVASTATIN 40 MG
1 TABLET ORAL NIGHTLY
COMMUNITY

## 2025-01-31 RX ORDER — PANTOPRAZOLE SODIUM 40 MG/1
40 TABLET, DELAYED RELEASE ORAL
COMMUNITY

## 2025-01-31 RX ORDER — BLOOD-GLUCOSE METER
1 EACH MISCELLANEOUS AS DIRECTED
COMMUNITY
Start: 2024-12-26

## 2025-01-31 RX ORDER — SEMAGLUTIDE 0.68 MG/ML
INJECTION, SOLUTION SUBCUTANEOUS
COMMUNITY
Start: 2024-09-30

## 2025-01-31 RX ORDER — LATANOPROST 50 UG/ML
1 SOLUTION/ DROPS OPHTHALMIC NIGHTLY
COMMUNITY

## 2025-01-31 RX ORDER — AZELASTINE 1 MG/ML
1 SPRAY, METERED NASAL 2 TIMES DAILY
Qty: 30 ML | Refills: 1 | Status: SHIPPED | OUTPATIENT
Start: 2025-01-31

## 2025-01-31 RX ORDER — AZITHROMYCIN 250 MG/1
TABLET, FILM COATED ORAL
Qty: 6 TABLET | Refills: 0 | Status: SHIPPED | OUTPATIENT
Start: 2025-01-31 | End: 2025-02-05

## 2025-01-31 RX ORDER — GLUCAGON INJECTION, SOLUTION 1 MG/.2ML
INJECTION, SOLUTION SUBCUTANEOUS
COMMUNITY
Start: 2025-01-21

## 2025-01-31 NOTE — PATIENT INSTRUCTIONS
- Start prescription nasal spray, azelastine.  Use twice daily (morning and evening)  - Start antibiotics (azithromycin) if your symptoms are not better by next week  - Call or go to the emergency department if you have worsening shortness of breath, fevers/chill/sweats.    It was a pleasure seeing you in the clinic today.  Thank you for choosing the [facility] office for your healthcare needs. Please call at [phone] with any questions or concerns.    Katelynn Dawson MD

## 2025-01-31 NOTE — PROGRESS NOTES
Anette Frazier  (1945) attended Diabetes Education.    Referring Provider: Jenn Jackson         Date: 2025  Start time: 1:00pm End time: 1:35pm  _______________________________________________     Ms. Frazier is a 79 year old female who presents today with type II diabetes.    Reason for visit: cgm, ozempic, gvoke, insulin change     What has your diabetes journey been? Diagnosed 15-20 years ago    Prev diab edu? yes      Assessment:     Wt Readings from Last 6 Encounters:   25 217 lb 9.6 oz   25 221 lb 3.2 oz   11/15/24 228 lb   24 234 lb   10/16/24 234 lb 6.4 oz   24 238 lb 6.4 oz            Lab Results   Component Value Date    A1C 12.9 (A) 2025    A1C 7.5 (H) 2024    A1C 7.2 (H) 2024    A1C 8.1 (H) 10/03/2023    A1C 6.8 (H) 2023        Medical History:   Past Medical History:    AIN (acute interstitial nephritis)    Back problem    Cataracts, bilateral    Congestive heart disease (HCC)    Diabetes (HCC)    Diverticulosis of large intestine    Essential hypertension    Gout    High blood pressure    High cholesterol    Hyperlipidemia    Neuropathy    feet    Obesity    ALEXANDRA (obstructive sleep apnea)    AHI 34 REM AHI 85 [place] 59%     Sleep apnea    Tobacco abuse    Visual impairment         Medications:     Current DM management:     Oral:  metformin 500 BID, jardiance 10 mg      Insulin:  lantus 35 (evening) , Humalog sliding scale  <150 mg/dl - no insulin  150 - 200 mg/dl - 6 units with meals   201 - 250 mg/dl 7 units  251 - 300 8 units  301 -350 mg/dl 9 units  > 351 mg/dl 10 units              Injection sites: Abdomen  Lipodystrophy: No    Taking correctly: Yes    Monitoring Blood Glucose:     Currently checking BG: Yes,  1 times/day (fasting)  Reviewed Glucose Logs: Yes    BG results: per patient report sometimes after meals dizzie 170 or higher  FB-150 mg/dl      Highest-185- low 57- night 8pm, skipped a meal morning before  eating  Sweats, eyes blurry with low blood sugar; we reviewed rule of 15    2 low blood sugars since last visit- 65 and 30. She felt sweaty and had blurry vision. Treated with glass of orange juice. We Reviewed how to use Gvoke.         Healthy Eating & Exercise:     Diet History:  Usually eats 2 meals/day and 1  snacks/day.      TYPICAL  FOOD  NOTES   Breakfast  turkey sausage links x2, egg and whole toast and milk,          Lunch  lettuce, tomato and cucumber salad, croissant, ranch          Dinner  skips   Sf jello and peach         Snacks  ritz crackers and pb          Beverages  water, coffee no sugar, green tea          Eating out   used to eat out [employer]           Instructed on basic diet guidelines including aiming for 3 meals/day, balancing each meal with variety of nutrients, using plate method as a model for meals - goal of 1/2 plate non-starchy vegetables, 1/4 plate lean proteins, 1/4 plate carbohydrates and modest portions of fruit, yogurt, milk if desired.    We reviewed impact of carbohydrates, fiber, protein, and fat on glucose levels and trends. We discussed balancing meals and snacks with a combination of carbohydrates, proteins, and fiber to help stabilize glucose levels.     We discussed aiming for consistent meal times/carb amounts can help us determine if medications are correct. Reviewed carbohydrate counting, goals for meals (45 grams per meal) and snacks (15 grams per snack). Reviewed that carb counting consistently for a week or so can help regain control over glucose.    Carbohydrate counting dyan recommendations provided. Carb \"Cheat Sheet\" provided for quick references.     Physical Activity:  Currently exercising: Nolimited due to mobility       Sleep Patterns: good  Stress: good    Lifestyle & Healthy Coping:     Discussed healthy coping options, resources available, and diabetes distress. Discussed signs of diabetes distress and actions to take at that time. Discussed ways to  prevent diabetes distress.       Education:     Diabetes Overview  Reviewed diabetes pathophysiology.  Discussed current A1c result and goals.   Discussed benefits of blood glucose control and blood glucose targets.  Reviewed signs, symptoms, prevention, and treatment of hyperglycemia and hypoglycemia.    Healthy Eating  Reviewed basic nutrition concepts for diabetes management.  Reviewed food groups and impact on blood glucose levels.  Discussed MyPlate Method of balancing diet.  Reviewed carbohydrate counting and label reading.    Being Active  Benefits and effect of activity on blood glucose discussed.  Reviewed when to call MD and benefits of goal setting.    Monitoring  Reinforced glucose monitoring schedules: once daily  Discussed use of personal CGM and benefits.    Taking Medication  Reviewed medication use, action, timing, and side effects.   Injectables: Site selection, rotation, action, timing reviewed.     Reducing Risk  Reviewed overview of complications including: renal protection and foot care    Healthy Coping  Family involvement/support encouraged  Depression and diabetes reviewed.      Goals:     Practice healthful eating patterns, emphasizing a variety of nutrient dense foods in appropriate portion sizes.    Monitor carbohydrate intake with the MyPlate method.  Take medications as prescribed.  Practice carbohydrate counting and label reading.  Increase or continue physical activity of at least 150 mins, over at least 3 days a week or per doctors recommendations.   Improve glycemic control working towards an A1c of 7.0% or less  Checking blood glucose with glucose meter as prescribed by provider, 1 times per day (fasting).      Blood Glucose Goals  Blood sugar goals: less than 130 mg/dl in the morning (fasting) and less than 180 mg/dl 2 hours after meals.  Keep glucose tabs or fast acting carbohydrates with you for treatment of lows; for blood glucose levels less than 70 mg/dl, take 15 grams of  fast acting carbohydrates (3-4 glucose tabs, 4 ounces of juice, or as discussed). Retest in 15 min. If not above 70 mg/dl, retreat.    Recommendations:     Patient goals:  I would recommend taking your lispro before your meals  Great job limiting the soda  I would recommend rotating your insulin injection locations  If you want to use a sliding scale to lower your blood sugars here is what you can do. Do not take multiple insulin doses within 3 hours of each other.   If below 170 give 0 extra units  If 171 - 220 give 1 extra unit  If 221 - 271 give 2 extra units  If 272 - 320 give 3 extra units  Over 320 call our office    Continue to use the Plate Method as a model for your meals:  1/2 of your plate is non-starchy vegetables, 1/4 of your plate is lean protein, 1/4 of your plate is starchy or carbohydrate foods     Check your blood sugars one time per day. Switch off when you check between these two times:  1.) Fasting (before you eat breakfast)  2.) 2 hours after you start eating a meal (vary the meal you test after between breakfast, lunch, and dinner)     Blood glucose guidelines/goals:     ADA (American Diabetes Association):  A1c:  7% or less  Fasting Blood Glucose (before you eat breakfast):    2 Hours After a Meal:  Less than 180     Your doctor may give more specific goals for you.     Recommend follow up with diabetes educator in 3 months. Bring your glucose log book with you to your visits.     Follow up:   Future Appointments   Date Time Provider Department Center   2/7/2025  1:00 PM Dat Herman MD [facility] [place]   2/24/2025  8:15 AM Jenn Jackson APRN [facility]   3/26/2025  2:00 PM Audrey Mcdowell RD [facility]   5/16/2025  1:00 PM Shawna Pena APRN [id number] [place]         Patient verbalized understanding and has no further questions at this time.    Audrey Mcdowell RD, , LD, CDCES

## 2025-01-31 NOTE — PATIENT INSTRUCTIONS
It was great meeting with you today! I am so grateful we were able to meet and talk about your diabetes!  I will send a suzanne reader to the pharmacy for you. If you do not have it within a week reach out to us. If you have questions about starting it, let us know.  Always take humalog 10-15 minutes before meals  Blood sugar   Units of insulin  Less than 150 mg/dl  no insulin  150 - 200 mg/dl         6 units before eating  201 - 250 mg/dl   7 units before eating  251 - 300    8 units before eating  301 -350 mg/dl   9 units before eating  > 351 mg/dl    10 units before eating and call our office   If you start this new insulin dosing and are still having low blood sugars call our office  Gvoke is for emergency low blood sugars, call 911 after using  We reviewed ozempic      Blood Glucose Goals  Between  mg/dl in the morning  Less than 180 mg/dl 2 hours after meals.  (You and your doctor might have discussed more specific goals)    Treating low blood sugars and high blood sugars  Always check your blood sugar if you feel any symptoms of a low blood sugar.      If you have a low blood sugar (less than 70 mg/dL) follow the \"Rule of 15\" to treat it:  1.) Take 15 grams of a quick acting carbohydrate (1 tablespoon of honey, 3-4 glucose tablets, 1/2 cup fruit juice, 1/2 can regular soda, or to-go pouch of applesauce). Only eat ONE 15g SERVING of a quick acting carbohydrate.  2.) Then check your blood sugar again after 15 minutes of drinking or eating the quick acting carbohydrate to be sure your blood sugar is above 70 mg/dL.   3.) If your blood sugar is still less than 70 mg/dL, repeat treatment of 15 grams of a quick acting carbohydrate (1 tablespoon of honey, 3-4 glucose tablets, 1/2 cup fruit juice, 1/2 can regular soda, or to-go pouch  of applesauce).  4.) If your next meal is more than one hour away, eat a small snack. Try to have some protein and complex carbohydrate (peanut butter crackers, pretzels and cheese,  etc).   5.) If you are not sure what caused your low blood sugar, call your healthcare provider.  6.) Always check your blood sugar before you drive.     _______________________________________________       To treat a low, we recommend you carry with you easy, pre-portioned treatment for low blood sugars that are 15G of carbs:   - Children sized squeeze pouch applesauce (high fiber + carbs help prevent too high of a spike)  - Small children's sized juicebox- 15g carb --> 4oz juice box  - Glucose tablets from [employer], you can find them near diabetes supplies --> Note, you will need to eat 3-4 tablets to get to 15g of carbs  - Children sized fruit snack pack- look for one with 15 grams of total carbohydrate     AVOID complex carbs, or foods that contain fats along with carbs (like chocolate) can slow the absorption of glucose and should NOT be used to treat an emergency low.    You are doing amazing! Keep up the great work!    Audrey Mcdowell, RD, , LD, CDCES(she/her/hers)  Diabetes Educator  [facility]  [email or web address]  [phone] phone  [phone] Fax

## 2025-01-31 NOTE — PROGRESS NOTES
Anette Frazier is a 79 year old female.   HPI:   Patient presents with acute upper respiratory symptoms for the past month.  Patient of Dr. Bell.    Has been dealing with nasal irritation, sneezing.  No sinus pain/pressure, no ear pain/pressure.  No acute shortness of breath - chronic issues with dyspnea.  No fevers/chills/sweats.      Past medical, family, surgical and social history were reviewed as listed in the chart, and are unchanged from previous visit on 1/9/2025  REVIEW OF SYSTEMS:   GENERAL/ const: no fevers/chills, no unintentional weight loss  EYES:no vision problems  HEENT: nasal congestion/URI symptoms; denies sinus pain or sinus tenderness  LUNGS: denies acute shortness of breath   CARDIOVASCULAR: denies chest pain  GI: denies nausea/emesis/ abdominal pain diarrhea constipation  : denies dysuria   ALLERGY: Allergies[1]  PAST HISTORY:     Current Outpatient Medications:     semaglutide (OZEMPIC, 0.25 OR 0.5 MG/DOSE,) 2 MG/3ML Subcutaneous Solution Pen-injector, INJECT 0.5 MG UNDER THE SKIN ONCE A WEEK AS DIRECTED, Disp: , Rfl:     Blood Glucose Monitoring Suppl (ONETOUCH VERIO FLEX SYSTEM) w/Device Does not apply Kit, Take 1 Bottle by mouth As Directed., Disp: , Rfl:     GVOKE HYPOPEN 1-PACK 1 MG/0.2ML Subcutaneous SUBQ injection, INJECT 1MG SUBCUTANEOUSLY ONCE AS NEEDED FOR LOW BLOOD GLUCOSE, Disp: , Rfl:     Insulin Glargine-yfgn 100 UNIT/ML Subcutaneous Solution Pen-injector, Inject 35 Units/day into the skin daily., Disp: , Rfl:     Glucose Blood In Vitro Strip, 1 each As Directed., Disp: , Rfl:     empagliflozin (JARDIANCE) 10 MG Oral Tab, Take 1 tablet (10 mg total) by mouth daily., Disp: , Rfl:     TORSEMIDE 20 MG Oral Tab, TAKE 1 TABLET(20 MG) BY MOUTH TWICE DAILY, Disp: 180 tablet, Rfl: 0    ALBUTEROL 108 (90 Base) MCG/ACT Inhalation Aero Soln, INHALE 1 PUFF INTO THE LUNGS EVERY 6 HOURS AS NEEDED FOR WHEEZING, Disp: 8.5 g, Rfl: 0    semaglutide (OZEMPIC, 0.25 OR 0.5 MG/DOSE,) 2 MG/3ML  Subcutaneous Solution Pen-injector, Inject 0.25 mg into the skin once a week for 28 days, THEN 0.5 mg once a week., Disp: 9 mL, Rfl: 0    Continuous Glucose Sensor (FREESTYLE TRUMAN 3 PLUS SENSOR) Does not apply Misc, 1 each by Other route every 14 (fourteen) days., Disp: 6 each, Rfl: 0    insulin glargine (LANTUS SOLOSTAR) 100 UNIT/ML Subcutaneous Solution Pen-injector, Inject 35 Units into the skin daily., Disp: 12 mL, Rfl: 0    Insulin Lispro, 1 Unit Dial, (HUMALOG KWIKPEN) 100 UNIT/ML Subcutaneous Solution Pen-injector, 40 units daily as directed in divided doses based on BG readings., Disp: 15 mL, Rfl: 0    BD PEN NEEDLE NAHUM 2ND GEN 32G X 4 MM Does not apply Misc, 4 injections daily, Disp: 200 each, Rfl: 2    Glucose Blood (ONETOUCH VERIO) In Vitro Strip, Check 4 times per day for gestational diabetes, Disp: 300 strip, Rfl: 1    Glucose Blood (ONETOUCH VERIO) In Vitro Strip, Use as directed., Disp: 100 strip, Rfl: 1    PROBENECID 500 MG Oral Tab, TAKE 1 TABLET(500 MG) BY MOUTH TWICE DAILY, Disp: 180 tablet, Rfl: 0    TRUEPLUS INSULIN SYRINGE 31G X 5/16\" 0.5 ML Does not apply Misc, 1 each As Directed., Disp: , Rfl:     carvedilol 12.5 MG Oral Tab, Take 1 tablet (12.5 mg total) by mouth 2 (two) times daily with meals., Disp: 180 tablet, Rfl: 3    empagliflozin (JARDIANCE) 10 MG Oral Tab, Take 1 tablet (10 mg total) by mouth daily., Disp: 90 tablet, Rfl: 3    OneTouch Delica Lancets 33G Does not apply Misc, 1 Lancet by Finger stick route in the morning, at noon, and at bedtime., Disp: 180 each, Rfl: 11    fluticasone-umeclidin-vilant (TRELEGY ELLIPTA) 100-62.5-25 MCG/ACT Inhalation Aerosol Powder, Breath Activated, Inhale 1 puff into the lungs daily., Disp: 3 each, Rfl: 3    albuterol 108 (90 Base) MCG/ACT Inhalation Aero Soln, Inhale 2 puffs into the lungs every 6 (six) hours as needed for Wheezing., Disp: 1 each, Rfl: 3    metFORMIN 500 MG Oral Tab, Take 1 tablet (500 mg total) by mouth 2 (two) times daily with  meals., Disp: 180 tablet, Rfl: 3    GABAPENTIN 100 MG Oral Cap, TAKE 2 CAPSULES(200 MG) BY MOUTH EVERY NIGHT, Disp: 180 capsule, Rfl: 1    HYDRALAZINE 100 MG Oral Tab, TAKE 1 TABLET(100 MG) BY MOUTH TWICE DAILY, Disp: 180 tablet, Rfl: 0    clotrimazole 2 % Vaginal Cream, Place 1 Application vaginally 2 (two) times daily as needed (vaginal itching). Can apply to external area of vaginal/vulva area, Disp: 21 g, Rfl: 0    albuterol (2.5 MG/3ML) 0.083% Inhalation Nebu Soln, Take 3 mL (2.5 mg total) by nebulization 3 (three) times daily., Disp: 300 mL, Rfl: 1    triamcinolone 0.1 % External Cream, Apply topically 2 (two) times daily as needed., Disp: 60 g, Rfl: 1    ALENDRONATE 70 MG Oral Tab, TAKE 1 TABLET(70 MG) BY MOUTH EVERY 7 DAYS, Disp: 12 tablet, Rfl: 3    atorvastatin 20 MG Oral Tab, Take 1 tablet (20 mg total) by mouth nightly., Disp: 90 tablet, Rfl: 3    Polyethylene Glycol 3350 (MIRALAX) 17 g Oral Powd Pack, Take 17 g by mouth daily., Disp: 90 packet, Rfl: 3    spironolactone 25 MG Oral Tab, Take 1 tablet (25 mg total) by mouth daily., Disp: , Rfl:     Potassium Chloride ER 10 MEQ Oral Tab CR, Take 1 tablet (10 mEq total) by mouth daily., Disp: 90 tablet, Rfl: 1    sacubitril-valsartan (ENTRESTO)  MG Oral Tab, Take 1 tablet by mouth 2 (two) times daily., Disp: 180 tablet, Rfl: 0    Respiratory Therapy Supplies (NEBULIZER) Does not apply Device, Use as directed, Disp: 1 each, Rfl: 0    ASPIRIN EC LOW DOSE 81 MG Oral Tab EC, Take 1 tablet (81 mg total) by mouth daily., Disp: , Rfl: 0    latanoprost 0.005 % Ophthalmic Solution, Apply 1 drop to eye nightly., Disp: , Rfl:     metoprolol tartrate 50 MG Oral Tab, Take 1 tablet (50 mg total) by mouth 2 (two) times daily., Disp: , Rfl:     pantoprazole 40 MG Oral Tab EC, Take 1 tablet (40 mg total) by mouth., Disp: , Rfl:     simvastatin 40 MG Oral Tab, Take 1 tablet (40 mg total) by mouth nightly., Disp: , Rfl:   Medical:  has a past medical history of AIN  (acute interstitial nephritis), Back problem, Cataracts, bilateral (01/12/2018), Congestive heart disease (HCC), Diabetes (HCC), Diverticulosis of large intestine, Essential hypertension, Gout, High blood pressure, High cholesterol, Hyperlipidemia, Neuropathy, Obesity, ALEXANDRA (obstructive sleep apnea) (1/15/2020 [facility]), Sleep apnea, Tobacco abuse (01/12/2018), and Visual impairment.  Surgical:  has a past surgical history that includes knee replacement surgery (2005); colonoscopy (2012); colonoscopy (05/18/2018); total knee replacement; and colonoscopy.  Family: family history includes Breast Cancer (age of onset: 60) in her maternal aunt; COPD in her sister; Diabetes in her mother; Hypertension in her mother; Lung Cancer in her mother.  Social:  reports that she has been smoking cigarettes. She started smoking about 51 years ago. She has a 50.7 pack-year smoking history. She has been exposed to tobacco smoke. She quit smokeless tobacco use about 9 years ago. She reports that she does not drink alcohol and does not use drugs.  Wt Readings from Last 6 Encounters:   01/31/25 219 lb (99.3 kg)   01/20/25 217 lb 9.6 oz (98.7 kg)   01/09/25 221 lb 3.2 oz (100.3 kg)   11/15/24 228 lb (103.4 kg)   11/06/24 234 lb (106.1 kg)   10/16/24 234 lb 6.4 oz (106.3 kg)     EXAM:   /64 (BP Location: Left arm, Patient Position: Sitting, Cuff Size: adult)   Pulse 80   Temp 97.4 °F (36.3 °C) (Temporal)   Wt 219 lb (99.3 kg)   SpO2 96%   Breastfeeding No   BMI 38.79 kg/m²   GENERAL: Alert and oriented, well developed, well nourished,in no apparent distress  HEENT: atraumatic, PERRLA, EOMI, normal lid and conjunctiva, no sinus tenderness, normal ear canals/tympanic membranes, normal pharynx  NECK: supple, no jvd, no thyromegaly  LUNGS: clear to auscultation bilaterally, no wheezing/rubs  CARDIO: RRR without murmurs.  No clubbing, cyanosis or edema.  GI: soft non tender nondistended no hepatosplenomegaly, bowel sounds  throughout  PSYCH: pleasant, appropriate mood and affect  ASSESSMENT AND PLAN:   1. Nasal congestion  Patient with nasal congestion/irritation for past month.  No sinus pain/pressure, normal tympanic membranes/pharynx today.  Does have multiple comorbidities.  Will start on azelastine nasal spray.  Advised to start azithromycin if symptoms not better through the weekend.  May consider chest x-ray if symptoms persist.  - azithromycin 250 MG Oral Tab; Take 2 tablets (500 mg total) by mouth daily for 1 day, THEN 1 tablet (250 mg total) daily for 4 days.  Dispense: 6 tablet; Refill: 0  - azelastine 0.1 % Nasal Solution; 1 spray by Nasal route 2 (two) times daily.  Dispense: 30 mL; Refill: 1    Patient Care Team:  Jonel Bell MD as PCP - General (Internal Medicine)  Arnaldo Ortega PT as Physical Therapist  Jake Serna MD (SURGERY, ORTHOPEDIC)  Audrey Mcdowell RD (Dietitian)  The patient indicates understanding of these issues and agrees to the plan.  The patient is asked to return to clinic as needed/scheduled/due with Dr. Bell.    Katelynn Dawson MD           [1]   Allergies  Allergen Reactions    Allopurinol RASH

## 2025-02-03 NOTE — TELEPHONE ENCOUNTER
Per Laura, [facility] insurance termed on 02/01/2025, they are reaching out to patient to get new insurance information.

## 2025-02-07 ENCOUNTER — OFFICE VISIT (OUTPATIENT)
Dept: INTERNAL MEDICINE CLINIC | Facility: CLINIC | Age: 80
End: 2025-02-07
Payer: COMMERCIAL

## 2025-02-07 VITALS
WEIGHT: 216.81 LBS | TEMPERATURE: 98 F | DIASTOLIC BLOOD PRESSURE: 80 MMHG | HEART RATE: 101 BPM | SYSTOLIC BLOOD PRESSURE: 168 MMHG | BODY MASS INDEX: 38.41 KG/M2 | HEIGHT: 63 IN | OXYGEN SATURATION: 83 %

## 2025-02-07 DIAGNOSIS — R26.89 BALANCE PROBLEM: Primary | ICD-10-CM

## 2025-02-07 DIAGNOSIS — F17.200 SMOKING: ICD-10-CM

## 2025-02-07 DIAGNOSIS — J44.9 CHRONIC OBSTRUCTIVE PULMONARY DISEASE, UNSPECIFIED COPD TYPE (HCC): ICD-10-CM

## 2025-02-07 RX ORDER — DEXTROMETHORPHAN HBR. AND GUAIFENESIN 10; 100 MG/5ML; MG/5ML
5 SOLUTION ORAL EVERY 12 HOURS PRN
Qty: 118 ML | Refills: 0 | Status: SHIPPED | OUTPATIENT
Start: 2025-02-07 | End: 2025-02-21

## 2025-02-07 RX ORDER — IPRATROPIUM BROMIDE AND ALBUTEROL SULFATE 2.5; .5 MG/3ML; MG/3ML
3 SOLUTION RESPIRATORY (INHALATION) ONCE
Status: SHIPPED | OUTPATIENT
Start: 2025-02-07

## 2025-02-07 RX ORDER — DOXYCYCLINE 100 MG/1
100 CAPSULE ORAL 2 TIMES DAILY
Qty: 14 CAPSULE | Refills: 0 | Status: SHIPPED | OUTPATIENT
Start: 2025-02-07

## 2025-02-07 NOTE — PROGRESS NOTES
Subjective:   The patient is still feels congested in the nose.  The patient has small painful swelling of the lower part of abdomen.    History/Other:    Chief Complaint Reviewed and Verified  No Further Nursing Notes to   Review  Tobacco Reviewed  Allergies Reviewed  Medications Reviewed    Medical History Reviewed  Surgical History Reviewed  Family History   Reviewed  Social History Reviewed         Tobacco:  Social History     Tobacco Use   Smoking Status Every Day    Current packs/day: 0.50    Average packs/day: 1 pack/day for 50.8 years (50.6 ttl pk-yrs)    Types: Cigarettes    Start date: 1/10/1974    Passive exposure: Past   Smokeless Tobacco Former    Quit date: 2016   Tobacco Comments    Smokes 2 cigarettes every morning with coffee.      E-Cigarettes/Vaping       Questions Responses    E-Cigarette Use Never User                 Current Outpatient Medications   Medication Sig Dispense Refill    semaglutide (OZEMPIC, 0.25 OR 0.5 MG/DOSE,) 2 MG/3ML Subcutaneous Solution Pen-injector INJECT 0.5 MG UNDER THE SKIN ONCE A WEEK AS DIRECTED      Blood Glucose Monitoring Suppl (ONETOUCH VERIO FLEX SYSTEM) w/Device Does not apply Kit Take 1 Bottle by mouth As Directed.      GVOKE HYPOPEN 1-PACK 1 MG/0.2ML Subcutaneous SUBQ injection INJECT 1MG SUBCUTANEOUSLY ONCE AS NEEDED FOR LOW BLOOD GLUCOSE      Insulin Glargine-yfgn 100 UNIT/ML Subcutaneous Solution Pen-injector Inject 35 Units/day into the skin daily.      latanoprost 0.005 % Ophthalmic Solution Apply 1 drop to eye nightly.      metoprolol tartrate 50 MG Oral Tab Take 1 tablet (50 mg total) by mouth 2 (two) times daily.      pantoprazole 40 MG Oral Tab EC Take 1 tablet (40 mg total) by mouth.      simvastatin 40 MG Oral Tab Take 1 tablet (40 mg total) by mouth nightly.      Glucose Blood In Vitro Strip 1 each As Directed.      empagliflozin (JARDIANCE) 10 MG Oral Tab Take 1 tablet (10 mg total) by mouth daily.      azelastine 0.1 % Nasal Solution 1  spray by Nasal route 2 (two) times daily. 30 mL 1    TORSEMIDE 20 MG Oral Tab TAKE 1 TABLET(20 MG) BY MOUTH TWICE DAILY 180 tablet 0    ALBUTEROL 108 (90 Base) MCG/ACT Inhalation Aero Soln INHALE 1 PUFF INTO THE LUNGS EVERY 6 HOURS AS NEEDED FOR WHEEZING 8.5 g 0    semaglutide (OZEMPIC, 0.25 OR 0.5 MG/DOSE,) 2 MG/3ML Subcutaneous Solution Pen-injector Inject 0.25 mg into the skin once a week for 28 days, THEN 0.5 mg once a week. 9 mL 0    Continuous Glucose Sensor (FREESTYLE TRUMAN 3 PLUS SENSOR) Does not apply Misc 1 each by Other route every 14 (fourteen) days. 6 each 0    insulin glargine (LANTUS SOLOSTAR) 100 UNIT/ML Subcutaneous Solution Pen-injector Inject 35 Units into the skin daily. 12 mL 0    Insulin Lispro, 1 Unit Dial, (HUMALOG KWIKPEN) 100 UNIT/ML Subcutaneous Solution Pen-injector 40 units daily as directed in divided doses based on BG readings. 15 mL 0    BD PEN NEEDLE NAHUM 2ND GEN 32G X 4 MM Does not apply Misc 4 injections daily 200 each 2    Glucose Blood (ONETOUCH VERIO) In Vitro Strip Check 4 times per day for gestational diabetes 300 strip 1    Glucose Blood (ONETOUCH VERIO) In Vitro Strip Use as directed. 100 strip 1    PROBENECID 500 MG Oral Tab TAKE 1 TABLET(500 MG) BY MOUTH TWICE DAILY 180 tablet 0    TRUEPLUS INSULIN SYRINGE 31G X 5/16\" 0.5 ML Does not apply Misc 1 each As Directed.      carvedilol 12.5 MG Oral Tab Take 1 tablet (12.5 mg total) by mouth 2 (two) times daily with meals. 180 tablet 3    empagliflozin (JARDIANCE) 10 MG Oral Tab Take 1 tablet (10 mg total) by mouth daily. 90 tablet 3    OneTouch Delica Lancets 33G Does not apply Misc 1 Lancet by Finger stick route in the morning, at noon, and at bedtime. 180 each 11    fluticasone-umeclidin-vilant (TRELEGY ELLIPTA) 100-62.5-25 MCG/ACT Inhalation Aerosol Powder, Breath Activated Inhale 1 puff into the lungs daily. 3 each 3    albuterol 108 (90 Base) MCG/ACT Inhalation Aero Soln Inhale 2 puffs into the lungs every 6 (six) hours as  needed for Wheezing. 1 each 3    metFORMIN 500 MG Oral Tab Take 1 tablet (500 mg total) by mouth 2 (two) times daily with meals. 180 tablet 3    GABAPENTIN 100 MG Oral Cap TAKE 2 CAPSULES(200 MG) BY MOUTH EVERY NIGHT 180 capsule 1    HYDRALAZINE 100 MG Oral Tab TAKE 1 TABLET(100 MG) BY MOUTH TWICE DAILY 180 tablet 0    clotrimazole 2 % Vaginal Cream Place 1 Application vaginally 2 (two) times daily as needed (vaginal itching). Can apply to external area of vaginal/vulva area 21 g 0    albuterol (2.5 MG/3ML) 0.083% Inhalation Nebu Soln Take 3 mL (2.5 mg total) by nebulization 3 (three) times daily. 300 mL 1    triamcinolone 0.1 % External Cream Apply topically 2 (two) times daily as needed. 60 g 1    ALENDRONATE 70 MG Oral Tab TAKE 1 TABLET(70 MG) BY MOUTH EVERY 7 DAYS 12 tablet 3    atorvastatin 20 MG Oral Tab Take 1 tablet (20 mg total) by mouth nightly. 90 tablet 3    spironolactone 25 MG Oral Tab Take 1 tablet (25 mg total) by mouth daily.      Potassium Chloride ER 10 MEQ Oral Tab CR Take 1 tablet (10 mEq total) by mouth daily. 90 tablet 1    sacubitril-valsartan (ENTRESTO)  MG Oral Tab Take 1 tablet by mouth 2 (two) times daily. 180 tablet 0    Respiratory Therapy Supplies (NEBULIZER) Does not apply Device Use as directed 1 each 0    ASPIRIN EC LOW DOSE 81 MG Oral Tab EC Take 1 tablet (81 mg total) by mouth daily.  0    Polyethylene Glycol 3350 (MIRALAX) 17 g Oral Powd Pack Take 17 g by mouth daily. (Patient not taking: Reported on 2/7/2025) 90 packet 3         Review of Systems:  Pertinent items are noted in HPI.  A comprehensive review of systems was negative.      Objective:   BP (!) 168/80 (BP Location: Right arm, Patient Position: Sitting, Cuff Size: adult)   Pulse 101   Temp 97.9 °F (36.6 °C) (Temporal)   Ht 5' 3\" (1.6 m)   Wt 216 lb 12.8 oz (98.3 kg)   SpO2 (!) 83%   BMI 38.40 kg/m²  Estimated body mass index is 38.4 kg/m² as calculated from the following:    Height as of this encounter: 5'  3\" (1.6 m).    Weight as of this encounter: 216 lb 12.8 oz (98.3 kg).    Wt Readings from Last 6 Encounters:   02/07/25 216 lb 12.8 oz (98.3 kg)   01/31/25 219 lb (99.3 kg)   01/20/25 217 lb 9.6 oz (98.7 kg)   01/09/25 221 lb 3.2 oz (100.3 kg)   11/15/24 228 lb (103.4 kg)   11/06/24 234 lb (106.1 kg)      General appearance: alert, appears stated age, and cooperative.  Obese looking.  Bilateral cataracts.  Nose: Nares normal. Septum midline. Mucosa normal. No drainage or sinus tenderness.  Throat: Oropharyngeal thrush is resolved.  Lungs: clear to auscultation bilaterally occasional wheeze  Heart: S1, S2 normal, no murmur, click, rub or gallop, regular rate and rhythm  Abdomen: soft, non-tender; bowel sounds normal; no masses,  no organomegaly  Extremities: extremities normal, atraumatic, no cyanosis or edema  Skin: Skin color, texture, turgor normal. No rashes or lesions  Lymph nodes: Cervical, supraclavicular, and axillary nodes normal.      Assessment & Plan:     1.Diabetes mellitus 1.5 -recent HHS with 3 days on hospital admission.      Currently the patient is on Lantus 35 units at bedtime and lispro 11 units 3 times a day.  Her average blood sugar is around 150s.  She did not have any hypoglycemic episodes.  I patient about dangers of hypoglycemia and strategies to resolve it by taking sugar and juice.    Discussed about diabetic diet.  Referral to diabetic educator is done.    2.  COPD and in room oxygen.  Did not come with oxygen in the clinic and saturated up to 83%. However, after few minutes of rest saturation went up to 88%.  Advised to have ambulatory oxygen tank.    3.  HFrEF-currently compensated.  Continue with GDMT    4.  Oropharyngeal thrush : resolved    5.Hypertension:   BP Readings from Last 3 Encounters:   02/07/25 (!) 168/80   01/31/25 134/64   01/20/25 120/58   Usually normal readings but today it is high.  Will closely monitor.    6. Class III obesity :   Wt Readings from Last 6 Encounters:    02/07/25 216 lb 12.8 oz (98.3 kg)   01/31/25 219 lb (99.3 kg)   01/20/25 217 lb 9.6 oz (98.7 kg)   01/09/25 221 lb 3.2 oz (100.3 kg)   11/15/24 228 lb (103.4 kg)   11/06/24 234 lb (106.1 kg)     Appropriate counseling was given.    7. Smoking: The patient is smoking less ,  just a cigarette with the morning coffee. Motivational interview was conducted against smoking.    8. Small boil at the lower part of abdomen -- pt declines aspiration. Start with doxycycline    9. Preventative medicine: up to date with flu shot, pneumonia vaccine, zoster and RSV.    GOALS OF CARE: I had an extensive discussion about goals of care including CODE STATUS.  The patient would be ok with intubation in case of respiratory arrest.  However she would not want chest compressions in the event of cardiac arrest.  Appropriate documentation is done and will be scanned.  Tobacco cessation counseling for 3-10 minutes (add E/M code #41503).

## 2025-02-07 NOTE — PATIENT INSTRUCTIONS
Quitting Smoking    Quitting smoking is the most important step you can take to improve your health. We're glad you have set a goal to improve your health.    Quit Smoking Resources    In addition to medications, use the STAR plan to help you successfully quit.   Stick with your quit date!   Tell friends, family, and coworkers your quit date. Request their understanding and support.  Anticipate and prepare for challenges. Some examples are withdrawal symptoms, being around others who smoke, and drinking alcohol.  Remove all tobacco products and paraphernalia from your environment. Make your home and vehicles smoke-free.    Free resources for additional support:  National tobacco quitline: 1-800-QUIT-NOW (1-890.916.2006).  SmokefreeTXT is a free text program to assist you in quitting. Visit https://www.smokefree.gov/smokefreetxt for more information.  Feel free to call your care manager at ([phone]) for additional support.

## 2025-02-07 NOTE — TELEPHONE ENCOUNTER
[facility]    \"  [place] [facility] [employer]  added a tracking number: [employer] - [id number]\"

## 2025-02-13 ENCOUNTER — TELEPHONE (OUTPATIENT)
Dept: INTERNAL MEDICINE CLINIC | Facility: CLINIC | Age: 80
End: 2025-02-13

## 2025-02-13 ENCOUNTER — TELEPHONE (OUTPATIENT)
Facility: CLINIC | Age: 80
End: 2025-02-13

## 2025-02-13 NOTE — TELEPHONE ENCOUNTER
JT: KATHLEEN, patient update regarding cough medicine    Patient returned call. Spoke with pharmacy and was told prescription strength robitussin was not covered by her insurance. She took OTC robitussin with no relief. Patient asked this RN to inform MD.

## 2025-02-13 NOTE — TELEPHONE ENCOUNTER
JT: Please see below and advise. Patient stating antibiotic did not help but also did not receive other medications prescribed by you on 2/7/2025. Patient to call pharmacy.    Spoke with patient. Reports still feeling sick after completing doxycycline. Reports continued congestion, sneezing, and coughing. Denies fever, denies shortness of breath. Reports cough is \"same as before\".     Patient states pharmacy only gave her the antibiotic, not the cough medicine or the albuterol. This RN asked patient to call pharmacy back and see if they have those meds for her. Patient requesting another medication to help her congestion and cough.

## 2025-02-13 NOTE — TELEPHONE ENCOUNTER
Pt states she was prescribed an antibiotic for sick sxs but she finished it today and it has not helped. Pt is still having coughing and chest congestion, can a new med be sent in?

## 2025-02-13 NOTE — TELEPHONE ENCOUNTER
Can double the dose such as 10 ml. Qid.   And do home remedies:   - luke warm water + crushed blackpeppers + ford + few drops of lemon and drink 3-4 cups per day

## 2025-02-13 NOTE — TELEPHONE ENCOUNTER
Educator called patient per patient request. Patient stated she has had some low blood sugars the last two days after meals. States she treated them with orange juice. States she has been taking 35 units of lantus and this has worked fine. States her blood sugar before eating was 95 and she took 11 units of lispro and afterwards had a low blood sugar. Educator had patient read her copy of sliding scale for lispro. It was an old version of sliding scale.  Educator read patient her new sliding scale and patient wrote it down. Patient verbalized understanding. Encouraged patient to call back if she had more low blood sugars.

## 2025-02-22 NOTE — TELEPHONE ENCOUNTER
Name from pharmacy: HYDRALAZINE 100MG (HUNDRED MG) TABS          Will file in chart as: HYDRALAZINE 100 MG Oral Tab    Sig: TAKE 1 TABLET(100 MG) BY MOUTH TWICE DAILY    Disp: 180 tablet    Refills: 0 (Pharmacy requested: Not specified)    Start: 2/22/2025    Class: Normal    Non-formulary    Last ordered: 2 months ago (11/25/2024) by Jonel Bell MD    Last refill: 11/25/2024    Rx #: [id number]    Hypertension Medications Protocol Neepcb8502/22/2025 05:47 AM   Protocol Details Last BP reading less than 140/90    EGFRCR or GFRAA > 50    CMP or BMP in past 12 months    In person appointment or virtual visit in the past 12 mos or appointment in next 3 mos    Medication is active on med list      To be filled at: [id number] [place], [place] - [address] [facility] [address], [phone], [phone]

## 2025-02-24 RX ORDER — HYDRALAZINE HYDROCHLORIDE 100 MG/1
100 TABLET, FILM COATED ORAL 2 TIMES DAILY
Qty: 180 TABLET | Refills: 0 | Status: SHIPPED | OUTPATIENT
Start: 2025-02-24

## 2025-03-21 ENCOUNTER — OFFICE VISIT (OUTPATIENT)
Dept: INTERNAL MEDICINE CLINIC | Facility: CLINIC | Age: 80
End: 2025-03-21
Payer: COMMERCIAL

## 2025-03-21 VITALS
OXYGEN SATURATION: 90 % | SYSTOLIC BLOOD PRESSURE: 152 MMHG | DIASTOLIC BLOOD PRESSURE: 74 MMHG | WEIGHT: 210.19 LBS | HEART RATE: 81 BPM | HEIGHT: 63 IN | BODY MASS INDEX: 37.24 KG/M2 | TEMPERATURE: 98 F

## 2025-03-21 DIAGNOSIS — R80.9 CONTROLLED TYPE 2 DIABETES MELLITUS WITH MICROALBUMINURIA, WITH LONG-TERM CURRENT USE OF INSULIN (HCC): Primary | ICD-10-CM

## 2025-03-21 DIAGNOSIS — E11.29 CONTROLLED TYPE 2 DIABETES MELLITUS WITH MICROALBUMINURIA, WITH LONG-TERM CURRENT USE OF INSULIN (HCC): Primary | ICD-10-CM

## 2025-03-21 DIAGNOSIS — Z79.4 CONTROLLED TYPE 2 DIABETES MELLITUS WITH MICROALBUMINURIA, WITH LONG-TERM CURRENT USE OF INSULIN (HCC): Primary | ICD-10-CM

## 2025-03-21 PROCEDURE — 99215 OFFICE O/P EST HI 40 MIN: CPT | Performed by: INTERNAL MEDICINE

## 2025-03-21 PROCEDURE — 3078F DIAST BP <80 MM HG: CPT | Performed by: INTERNAL MEDICINE

## 2025-03-21 PROCEDURE — 1160F RVW MEDS BY RX/DR IN RCRD: CPT | Performed by: INTERNAL MEDICINE

## 2025-03-21 PROCEDURE — 3077F SYST BP >= 140 MM HG: CPT | Performed by: INTERNAL MEDICINE

## 2025-03-21 PROCEDURE — 99406 BEHAV CHNG SMOKING 3-10 MIN: CPT | Performed by: INTERNAL MEDICINE

## 2025-03-21 PROCEDURE — 3008F BODY MASS INDEX DOCD: CPT | Performed by: INTERNAL MEDICINE

## 2025-03-21 PROCEDURE — 1159F MED LIST DOCD IN RCRD: CPT | Performed by: INTERNAL MEDICINE

## 2025-03-21 NOTE — PROGRESS NOTES
Subjective:   Anette Frazier is a 79 year old female who presents for Follow - Up (1 month follow up )       History/Other:    Chief Complaint Reviewed and Verified  Nursing Notes Reviewed and   Verified         Tobacco:  Social History     Tobacco Use   Smoking Status Every Day    Current packs/day: 0.50    Average packs/day: 1 pack/day for 51.2 years (50.7 ttl pk-yrs)    Types: Cigarettes    Start date: 1/10/1974    Passive exposure: Past   Smokeless Tobacco Former    Quit date: 2016   Tobacco Comments    Smokes 2 cigarettes every morning with coffee.      E-Cigarettes/Vaping       Questions Responses    E-Cigarette Use Never User           Tobacco cessation counseling for 3-10 minutes (add E/M code #64144).      Current Outpatient Medications   Medication Sig Dispense Refill    HYDRALAZINE 100 MG Oral Tab TAKE 1 TABLET(100 MG) BY MOUTH TWICE DAILY 180 tablet 0    doxycycline 100 MG Oral Cap Take 1 capsule (100 mg total) by mouth 2 (two) times daily. 14 capsule 0    semaglutide (OZEMPIC, 0.25 OR 0.5 MG/DOSE,) 2 MG/3ML Subcutaneous Solution Pen-injector INJECT 0.5 MG UNDER THE SKIN ONCE A WEEK AS DIRECTED      Blood Glucose Monitoring Suppl (ONETOUCH VERIO FLEX SYSTEM) w/Device Does not apply Kit Take 1 Bottle by mouth As Directed.      GVOKE HYPOPEN 1-PACK 1 MG/0.2ML Subcutaneous SUBQ injection INJECT 1MG SUBCUTANEOUSLY ONCE AS NEEDED FOR LOW BLOOD GLUCOSE      Insulin Glargine-yfgn 100 UNIT/ML Subcutaneous Solution Pen-injector Inject 35 Units/day into the skin daily.      latanoprost 0.005 % Ophthalmic Solution Apply 1 drop to eye nightly.      metoprolol tartrate 50 MG Oral Tab Take 1 tablet (50 mg total) by mouth 2 (two) times daily.      pantoprazole 40 MG Oral Tab EC Take 1 tablet (40 mg total) by mouth.      simvastatin 40 MG Oral Tab Take 1 tablet (40 mg total) by mouth nightly.      Glucose Blood In Vitro Strip 1 each As Directed.      empagliflozin (JARDIANCE) 10 MG Oral Tab Take 1 tablet (10 mg  total) by mouth daily.      azelastine 0.1 % Nasal Solution 1 spray by Nasal route 2 (two) times daily. 30 mL 1    TORSEMIDE 20 MG Oral Tab TAKE 1 TABLET(20 MG) BY MOUTH TWICE DAILY 180 tablet 0    ALBUTEROL 108 (90 Base) MCG/ACT Inhalation Aero Soln INHALE 1 PUFF INTO THE LUNGS EVERY 6 HOURS AS NEEDED FOR WHEEZING 8.5 g 0    semaglutide (OZEMPIC, 0.25 OR 0.5 MG/DOSE,) 2 MG/3ML Subcutaneous Solution Pen-injector Inject 0.25 mg into the skin once a week for 28 days, THEN 0.5 mg once a week. 9 mL 0    Continuous Glucose Sensor (FREESTYLE TRUMAN 3 PLUS SENSOR) Does not apply Misc 1 each by Other route every 14 (fourteen) days. 6 each 0    insulin glargine (LANTUS SOLOSTAR) 100 UNIT/ML Subcutaneous Solution Pen-injector Inject 35 Units into the skin daily. 12 mL 0    Insulin Lispro, 1 Unit Dial, (HUMALOG KWIKPEN) 100 UNIT/ML Subcutaneous Solution Pen-injector 40 units daily as directed in divided doses based on BG readings. 15 mL 0    BD PEN NEEDLE NAHUM 2ND GEN 32G X 4 MM Does not apply Misc 4 injections daily 200 each 2    Glucose Blood (ONETOUCH VERIO) In Vitro Strip Check 4 times per day for gestational diabetes 300 strip 1    Glucose Blood (ONETOUCH VERIO) In Vitro Strip Use as directed. 100 strip 1    PROBENECID 500 MG Oral Tab TAKE 1 TABLET(500 MG) BY MOUTH TWICE DAILY 180 tablet 0    TRUEPLUS INSULIN SYRINGE 31G X 5/16\" 0.5 ML Does not apply Misc 1 each As Directed.      carvedilol 12.5 MG Oral Tab Take 1 tablet (12.5 mg total) by mouth 2 (two) times daily with meals. 180 tablet 3    empagliflozin (JARDIANCE) 10 MG Oral Tab Take 1 tablet (10 mg total) by mouth daily. 90 tablet 3    OneTouch Delica Lancets 33G Does not apply Misc 1 Lancet by Finger stick route in the morning, at noon, and at bedtime. 180 each 11    fluticasone-umeclidin-vilant (TRELEGY ELLIPTA) 100-62.5-25 MCG/ACT Inhalation Aerosol Powder, Breath Activated Inhale 1 puff into the lungs daily. 3 each 3    albuterol 108 (90 Base) MCG/ACT Inhalation  Aero Soln Inhale 2 puffs into the lungs every 6 (six) hours as needed for Wheezing. 1 each 3    metFORMIN 500 MG Oral Tab Take 1 tablet (500 mg total) by mouth 2 (two) times daily with meals. 180 tablet 3    GABAPENTIN 100 MG Oral Cap TAKE 2 CAPSULES(200 MG) BY MOUTH EVERY NIGHT 180 capsule 1    clotrimazole 2 % Vaginal Cream Place 1 Application vaginally 2 (two) times daily as needed (vaginal itching). Can apply to external area of vaginal/vulva area 21 g 0    albuterol (2.5 MG/3ML) 0.083% Inhalation Nebu Soln Take 3 mL (2.5 mg total) by nebulization 3 (three) times daily. 300 mL 1    triamcinolone 0.1 % External Cream Apply topically 2 (two) times daily as needed. 60 g 1    ALENDRONATE 70 MG Oral Tab TAKE 1 TABLET(70 MG) BY MOUTH EVERY 7 DAYS 12 tablet 3    atorvastatin 20 MG Oral Tab Take 1 tablet (20 mg total) by mouth nightly. 90 tablet 3    spironolactone 25 MG Oral Tab Take 1 tablet (25 mg total) by mouth daily.      Potassium Chloride ER 10 MEQ Oral Tab CR Take 1 tablet (10 mEq total) by mouth daily. 90 tablet 1    sacubitril-valsartan (ENTRESTO)  MG Oral Tab Take 1 tablet by mouth 2 (two) times daily. 180 tablet 0    Respiratory Therapy Supplies (NEBULIZER) Does not apply Device Use as directed 1 each 0    ASPIRIN EC LOW DOSE 81 MG Oral Tab EC Take 1 tablet (81 mg total) by mouth daily.  0         Review of Systems:        Objective:   /74 (BP Location: Left arm, Patient Position: Sitting, Cuff Size: adult)   Pulse 81   Temp 98 °F (36.7 °C) (Temporal)   Ht 5' 3\" (1.6 m)   Wt 210 lb 3.2 oz (95.3 kg)   SpO2 90%   BMI 37.24 kg/m²  Estimated body mass index is 37.24 kg/m² as calculated from the following:    Height as of this encounter: 5' 3\" (1.6 m).    Weight as of this encounter: 210 lb 3.2 oz (95.3 kg).  General condition: Not in distress.    HEENT: Atraumatic normocephalic  No cervical or submandibular lymphadenopathy  Chest: Clear to auscultate  Heart: S1-S2 ,no murmur  Abdomen: Soft non  tender, no palpable organomegaly  Neurological examination: No facial asymmetry.  No lateralizing neurological signs.  Mental state examination: Good eye to eye contact.  Normal mood and behavior.  Engaged in meaningful conversations.  Extremities:  Normal upper extremities  Legs: No edema        Assessment & Plan:     1.  Diabetes mellitus with A1c of 12.9 in 1/20/2025.  The patient's blood sugar currently are running in less than 200.  Usually in low 100s.  The patient has not been taking Lantus and lispro.  If blood sugars shoots beyond 150 she would consider taking it as a sliding scale.  The patient is on Ozempic and empagliflozin.  She feels that it is controlling her blood sugar well.  She is following to endocrinologist next week.    2.  COPD-the patient is on long-term oxygen therapy.  However, the patient has hard time carrying the oxygen cylinders when coming to the clinic and came without supplemental oxygen.  No evidence of any shortness of breath at this present time.    3.  Hypertension: Still uncontrolled but better than the last time.  Continue with the current blood pressure management.    4.  HFrEF, currently compensated.  Continue with GDMT.    5.  Smoking: The patient is still smokes 3 cigarettes a day.  She is not 84 ready she has not had ready to give it up completely.      No follow-ups on file.    Dat Herman MD, 3/21/2025, 1:13 PM

## 2025-03-24 RX ORDER — ALBUTEROL SULFATE 90 UG/1
1 INHALANT RESPIRATORY (INHALATION) EVERY 6 HOURS PRN
Qty: 8.5 G | Refills: 0 | Status: SHIPPED | OUTPATIENT
Start: 2025-03-24

## 2025-03-24 NOTE — TELEPHONE ENCOUNTER
MISHA scheduled with patient for 4/21     Protocol failed     LOV: 3/21/25   RTC: no follow ups on file   Filled: 1/29/25 #8.5g 0 refill   Future Appointments   Date Time Provider Department Center   3/26/2025  2:00 PM Audrey Mcdowell RD [facility]   4/21/2025  9:00 AM Dat Herman MD EM[facility] 8 [facility] [place]   5/12/2025  8:15 AM BBK CT RM1 [facility] [place]   5/16/2025  1:00 PM Shawna Pena APRN [id number] [place]

## 2025-03-26 ENCOUNTER — DIABETIC EDUCATION (OUTPATIENT)
Facility: CLINIC | Age: 80
End: 2025-03-26
Payer: COMMERCIAL

## 2025-03-26 DIAGNOSIS — Z79.4 TYPE 2 DIABETES MELLITUS WITH HYPERGLYCEMIA, WITH LONG-TERM CURRENT USE OF INSULIN (HCC): Primary | ICD-10-CM

## 2025-03-26 DIAGNOSIS — E11.65 TYPE 2 DIABETES MELLITUS WITH HYPERGLYCEMIA, WITH LONG-TERM CURRENT USE OF INSULIN (HCC): Primary | ICD-10-CM

## 2025-03-26 NOTE — PROGRESS NOTES
Anette Frazier  (8/28/1945) attended Diabetes Education.    Referring Provider: CARRI Bernal         Date: 3/26/2025  Start time: 2:00pm End time: 3:00pm  _______________________________________________     Ms. Frazier is a 79 year old female who presents today with type II diabetes.    Reason for visit: follow up    What has your diabetes journey been? Diagnosed 15-20 years ago    Prev diab edu? yes        Assessment:     Wt Readings from Last 6 Encounters:   03/21/25 210 lb 3.2 oz   02/07/25 216 lb 12.8 oz   01/31/25 219 lb   01/20/25 217 lb 9.6 oz   01/09/25 221 lb 3.2 oz   11/15/24 228 lb            Lab Results   Component Value Date    A1C 6.9 (A) 03/26/2025    A1C 12.9 (A) 01/20/2025    A1C 7.5 (H) 09/16/2024    A1C 7.2 (H) 02/19/2024    A1C 8.1 (H) 10/03/2023        Medical History:   Past Medical History:    AIN (acute interstitial nephritis)    Back problem    Cataracts, bilateral    Congestive heart disease (HCC)    Diabetes (HCC)    Diverticulosis of large intestine    Essential hypertension    Gout    High blood pressure    High cholesterol    Hyperlipidemia    Neuropathy    feet    Obesity    ALEXANDRA (obstructive sleep apnea)    AHI 34 REM AHI 85 [place]2 [place] 59%     Sleep apnea    Tobacco abuse    Visual impairment         Medications:     Current DM management:     Oral:  metformin 500 BID, jardiance 10 mg    Ozempic 0.5 mg- constipated   Insulin:    Continue Lantus at 35 units daily (130 holding at night)   Decrease Humalog (meal-time insulin) as follows   <150 mg/dl - no insulin  150 - 200 mg/dl - 6 units with meals   201 - 250 mg/dl - + 1 unit  251 - 300 - + 2 units  301 -350 mg/dl - + 3 units  > 351 mg/dl = + 4 units              Injection sites: Abdomen  Lipodystrophy: No    Taking correctly: Yes    Monitoring Blood Glucose:     Currently checking BG: started suzanne  Highest 173, on average , one time 40 (called in and reviewed sliding scale with patient)          Healthy Eating &  Exercise:     Diet History:  Usually eats 2 meals/day and 1  snacks/day.      TYPICAL  FOOD  NOTES   Breakfast  turkey sausage links x2, egg and whole toast and milk,          Lunch  lettuce, tomato and cucumber salad, croissant, ranch          Dinner  skips   Sf jello and peach         Snacks  ritz crackers and pb          Beverages  water, coffee no sugar, green tea          Eating out   used to eat out [employer]           Instructed on basic diet guidelines including aiming for 3 meals/day, balancing each meal with variety of nutrients, using plate method as a model for meals - goal of 1/2 plate non-starchy vegetables, 1/4 plate lean proteins, 1/4 plate carbohydrates and modest portions of fruit, yogurt, milk if desired.    We reviewed impact of carbohydrates, fiber, protein, and fat on glucose levels and trends. We discussed balancing meals and snacks with a combination of carbohydrates, proteins, and fiber to help stabilize glucose levels.     We discussed aiming for consistent meal times/carb amounts can help us determine if medications are correct. Reviewed carbohydrate counting, goals for meals (45 grams per meal) and snacks (15 grams per snack). Reviewed that carb counting consistently for a week or so can help regain control over glucose.    Carbohydrate counting dyan recommendations provided. Carb \"Cheat Sheet\" provided for quick references.     Physical Activity:  Currently exercising: Nolimited due to mobility       Sleep Patterns: good  Stress: good    Lifestyle & Healthy Coping:     Discussed healthy coping options, resources available, and diabetes distress. Discussed signs of diabetes distress and actions to take at that time. Discussed ways to prevent diabetes distress.       Education:     Diabetes Overview  Reviewed diabetes pathophysiology.  Discussed current A1c result and goals.   Discussed benefits of blood glucose control and blood glucose targets.  Reviewed signs, symptoms, prevention,  and treatment of hyperglycemia and hypoglycemia.    Healthy Eating  Reviewed basic nutrition concepts for diabetes management.  Reviewed food groups and impact on blood glucose levels.  Discussed MyPlate Method of balancing diet.  Reviewed carbohydrate counting and label reading.    Being Active  Benefits and effect of activity on blood glucose discussed.  Reviewed when to call MD and benefits of goal setting.    Monitoring  Reinforced glucose monitoring schedules: once daily  Discussed use of personal CGM and benefits.    Taking Medication  Reviewed medication use, action, timing, and side effects.   Injectables: Site selection, rotation, action, timing reviewed.     Reducing Risk  Reviewed overview of complications including: renal protection and foot care    Healthy Coping  Family involvement/support encouraged  Depression and diabetes reviewed.      Goals:     Practice healthful eating patterns, emphasizing a variety of nutrient dense foods in appropriate portion sizes.    Monitor carbohydrate intake with the MyPlate method.  Take medications as prescribed.  Practice carbohydrate counting and label reading.  Increase or continue physical activity of at least 150 mins, over at least 3 days a week or per doctors recommendations.   Improve glycemic control working towards an A1c of 7.0% or less  Checking blood glucose with glucose meter as prescribed by provider, 1 times per day (fasting).      Blood Glucose Goals  Blood sugar goals: less than 130 mg/dl in the morning (fasting) and less than 180 mg/dl 2 hours after meals.  Keep glucose tabs or fast acting carbohydrates with you for treatment of lows; for blood glucose levels less than 70 mg/dl, take 15 grams of fast acting carbohydrates (3-4 glucose tabs, 4 ounces of juice, or as discussed). Retest in 15 min. If not above 70 mg/dl, retreat.    Recommendations:     Patient goals:  We reviewed your sliding scale  We started your suzanne   Great job with your  A1c!    Continue to use the Plate Method as a model for your meals:  1/2 of your plate is non-starchy vegetables, 1/4 of your plate is lean protein, 1/4 of your plate is starchy or carbohydrate foods     Check your blood sugars one time per day. Switch off when you check between these two times:  1.) Fasting (before you eat breakfast)  2.) 2 hours after you start eating a meal (vary the meal you test after between breakfast, lunch, and dinner)     Blood glucose guidelines/goals:     ADA (American Diabetes Association):  A1c:  7% or less  Fasting Blood Glucose (before you eat breakfast):    2 Hours After a Meal:  Less than 180     Your doctor may give more specific goals for you.     Recommend follow up with diabetes educator in 3 months. Bring your glucose log book with you to your visits.     Follow up:   Future Appointments   Date Time Provider Department Center   4/21/2025  8:40 AM Dat Herman MD [facility] 8 [facility] [place]   5/12/2025  8:15 AM BBK CT RM1 BBK CT [place]   5/16/2025  1:00 PM Shawna Pena APRN [facility] [id number] [facility] [place]   5/19/2025  8:30 AM Jenn Jackson APRN [facility]           Patient verbalized understanding and has no further questions at this time.    Audrey Mcdowell, RD, , LD, CDCES

## 2025-04-03 RX ORDER — TORSEMIDE 20 MG/1
20 TABLET ORAL 2 TIMES DAILY
Qty: 180 TABLET | Refills: 0 | Status: SHIPPED | OUTPATIENT
Start: 2025-04-03

## 2025-04-03 NOTE — TELEPHONE ENCOUNTER
Requesting    Name from pharmacy: TORSEMIDE 20MG TABLETS         Will file in chart as: TORSEMIDE 20 MG Oral Tab    Sig: TAKE 1 TABLET(20 MG) BY MOUTH TWICE DAILY    Disp: 180 tablet    Refills: 0 (Pharmacy requested: Not specified)    Start: 4/3/2025    Class: Normal    Non-formulary    Last ordered: 2 months ago (1/30/2025) by Jonel Bell MD    Last refill: 1/30/2025    Rx #: [id number]    Hypertension Medications Protocol Qfwvob1804/03/2025 05:46 AM   Protocol Details Last BP reading less than 140/90    EGFRCR or GFRAA > 50    CMP or BMP in past 12 months    In person appointment or virtual visit in the past 12 mos or appointment in next 3 mos    Medication is active on med list        LOV: 03/21/2025  RTC: none noted   Last Relevant Labs: 12/25/2024  Filled: 1/30/2025 #180 with 0 refills    Future Appointments   Date Time Provider Department Center   4/21/2025  9:00 AM Dat Herman MD [facility] 8 [facility] [place]   5/12/2025  8:15 AM BBK CT RM1 BBK CT [place]   5/16/2025  1:00 PM Shawna Pena APRN [facility] [id number] [facility] [place]   5/19/2025  8:30 AM Jenn Jackson APRN [facility]

## 2025-04-07 RX ORDER — CARVEDILOL 25 MG/1
25 TABLET ORAL 2 TIMES DAILY
Qty: 180 TABLET | Refills: 1 | OUTPATIENT
Start: 2025-04-07

## 2025-04-07 NOTE — TELEPHONE ENCOUNTER
Requesting   Name from pharmacy: CARVEDILOL 25MG TABLETS          Will file in chart as: CARVEDILOL 25 MG Oral Tab    The original prescription was discontinued on 1/9/2025 by Dat Herman MD for the following reason: Patient discontinued. Renewing this prescription may not be appropriate.    Sig: TAKE 1 TABLET(25 MG) BY MOUTH TWICE DAILY    Disp: 180 tablet    Refills: 1 (Pharmacy requested: Not specified)    Start: 4/5/2025    Class: Normal    Non-formulary    Last ordered: 6 months ago (10/7/2024) by Jonel Bell MD    Last refill: 1/4/2025    Rx #: [id number]    Hypertension Medications Protocol Brxhcu0204/05/2025 05:46 AM   Protocol Details Last BP reading less than 140/90    EGFRCR or GFRAA > 50    Medication is active on med list    CMP or BMP in past 12 months    In person appointment or virtual visit in the past 12 mos or appointment in next 3 mos        LOV: 3/21/2025  RTC: none noted   Last Relevant Labs: 12/25/2024  Patient on 12.5 mg   Future Appointments   Date Time Provider Department Center   4/21/2025  9:00 AM Dat Herman MD [facility] 8 [facility] [place]   5/12/2025  8:15 AM BBK CT RM1 BBK CT [place]   5/16/2025  1:00 PM Shawna Pena APRN [facility] [id number] [facility] [place]   5/19/2025  8:30 AM Jenn Jackson APRN [facility]

## 2025-04-10 RX ORDER — PROBENECID 500 MG/1
500 TABLET, FILM COATED ORAL 2 TIMES DAILY
Qty: 180 TABLET | Refills: 0 | Status: SHIPPED | OUTPATIENT
Start: 2025-04-10

## 2025-04-10 NOTE — TELEPHONE ENCOUNTER
No protocol     LOV: 3/21/25 LIZ   RTC: no follow ups on file   Filled: 1/10/25 #180   Labs: 1/20/25   Future Appointments   Date Time Provider Department Center   4/21/2025  9:00 AM Dat Herman MD [facility] 8 [facility] [place]   5/12/2025  8:15 AM BBK CT RM1 BBK CT [place]   5/16/2025  1:00 PM Shawna Pena APRN [facility] [id number] [facility] [place]   5/19/2025  8:30 AM Jenn Jackson APRN [facility]

## 2025-04-19 LAB — HEMOGLOBIN A1C: 6.9 % (ref 4.3–5.6)

## 2025-04-19 NOTE — PATIENT INSTRUCTIONS
It was great meeting with you today! I am so grateful we were able to meet and talk about your diabetes!  We reviewed your sliding scale  We started your suzanne   Great job with your A1c!    Blood Glucose Goals  Between  mg/dl in the morning  Less than 180 mg/dl 2 hours after meals.  (You and your doctor might have discussed more specific goals)    Treating low blood sugars and high blood sugars  Always check your blood sugar if you feel any symptoms of a low blood sugar.      If you have a low blood sugar (less than 70 mg/dL) follow the \"Rule of 15\" to treat it:  1.) Take 15 grams of a quick acting carbohydrate (1 tablespoon of honey, 3-4 glucose tablets, 1/2 cup fruit juice, 1/2 can regular soda, or to-go pouch of applesauce). Only eat ONE 15g SERVING of a quick acting carbohydrate.  2.) Then check your blood sugar again after 15 minutes of drinking or eating the quick acting carbohydrate to be sure your blood sugar is above 70 mg/dL.   3.) If your blood sugar is still less than 70 mg/dL, repeat treatment of 15 grams of a quick acting carbohydrate (1 tablespoon of honey, 3-4 glucose tablets, 1/2 cup fruit juice, 1/2 can regular soda, or to-go pouch  of applesauce).  4.) If your next meal is more than one hour away, eat a small snack. Try to have some protein and complex carbohydrate (peanut butter crackers, pretzels and cheese, etc).   5.) If you are not sure what caused your low blood sugar, call your healthcare provider.  6.) Always check your blood sugar before you drive.     _______________________________________________       To treat a low, we recommend you carry with you easy, pre-portioned treatment for low blood sugars that are 15G of carbs:   - Children sized squeeze pouch applesauce (high fiber + carbs help prevent too high of a spike)  - Small children's sized juicebox- 15g carb --> 4oz juice box  - Glucose tablets from "[employer], you can find them near diabetes supplies --> Note, you will  need to eat 3-4 tablets to get to 15g of carbs  - Children sized fruit snack pack- look for one with 15 grams of total carbohydrate     AVOID complex carbs, or foods that contain fats along with carbs (like chocolate) can slow the absorption of glucose and should NOT be used to treat an emergency low.    You are doing amazing! Keep up the great work!    Audrey Mcdowell, RD, , LD, CDCES(she/her/hers)  Diabetes Educator  [facility]  [email or web address]  [phone] phone  [phone] Fax

## 2025-04-22 DIAGNOSIS — E11.65 TYPE 2 DIABETES MELLITUS WITH HYPERGLYCEMIA, WITH LONG-TERM CURRENT USE OF INSULIN (HCC): ICD-10-CM

## 2025-04-22 DIAGNOSIS — Z79.4 TYPE 2 DIABETES MELLITUS WITH HYPERGLYCEMIA, WITH LONG-TERM CURRENT USE OF INSULIN (HCC): ICD-10-CM

## 2025-04-22 DIAGNOSIS — E78.5 HYPERLIPIDEMIA ASSOCIATED WITH TYPE 2 DIABETES MELLITUS (HCC): Chronic | ICD-10-CM

## 2025-04-22 DIAGNOSIS — E11.69 HYPERLIPIDEMIA ASSOCIATED WITH TYPE 2 DIABETES MELLITUS (HCC): Chronic | ICD-10-CM

## 2025-04-22 RX ORDER — INSULIN GLARGINE 100 [IU]/ML
35 INJECTION, SOLUTION SUBCUTANEOUS DAILY
Qty: 45 ML | Refills: 0 | Status: SHIPPED | OUTPATIENT
Start: 2025-04-22

## 2025-04-22 RX ORDER — INSULIN LISPRO 100 [IU]/ML
INJECTION, SOLUTION INTRAVENOUS; SUBCUTANEOUS
Qty: 15 ML | Refills: 1 | Status: SHIPPED | OUTPATIENT
Start: 2025-04-22

## 2025-04-22 RX ORDER — HYDROCHLOROTHIAZIDE 12.5 MG/1
CAPSULE ORAL
Qty: 6 EACH | Refills: 0 | Status: SHIPPED | OUTPATIENT
Start: 2025-04-22

## 2025-04-22 NOTE — TELEPHONE ENCOUNTER
Requested Prescriptions     Pending Prescriptions Disp Refills    FREESTYLE TRUMAN 3 PLUS SENSOR Does not apply Misc [Pharmacy Med Name: FREESTYLE TRUMAN 3+ SEN 15D KIT] 6 each 0     Sig: USE ONE SENSOR EVERY 15 DAYS    LANTUS SOLOSTAR 100 UNIT/ML Subcutaneous Solution Pen-injector [Pharmacy Med Name: Lantus SoloStar 100 UNIT/ML Subcutaneous Solution Pen-injector] 15 mL 0     Sig: INJECT 35 UNITS SUBCUTANEOUSLY ONCE DAILY    Insulin Lispro, 1 Unit Dial, 100 UNIT/ML Subcutaneous Solution Pen-injector [Pharmacy Med Name: Insulin Lispro (1 Unit Dial) 100 UNIT/ML Subcutaneous Solution Pen-injector] 15 mL 0     Sig: INJECT 40 UNITS DAILY AS DIRECTED (TAKE IN DIVIDED DOSES BASED ON BG READINGS)      Your appointments       Date & Time Appointment Department ([place])    Apr 28, 2025 9:20 AM CDT MA Supervisit with Dat Herman MD [facility] ([facility])        May 12, 2025 8:15 AM CDT CT CHEST LOW DOSE with [facility] CT [address] ([facility])    Please arrive 15 minutes prior to your scheduled appointment time.      May 16, 2025 1:00 PM CDT Exam - Established with Shawna Pena APRN [facility] ([facility])        May 19, 2025 8:30 AM CDT Diabetes Pump follow up with Jenn Jackson APRN [facility] ([facility] [place])              [facility]  [address] [facility] [address] [id number] [phone] [facility]  [facility] [place] [address] [address]  [facility] [address] [phone] [facility]  [address]  [facility] [id number] [phone]    [facility]  [place]  [facility] [address]  [facility] [address]  [phone]         Last A1c value was 6.9% done 3/26/2025.   Last office visit: 1/20/25 - AM  Last refill:   Kyle 1/20/25 - AM  Glargine 1/20/25 - AM  Lispro 1/20/25 - AM

## 2025-04-26 DIAGNOSIS — Z79.4 TYPE 2 DIABETES MELLITUS WITH HYPERGLYCEMIA, WITH LONG-TERM CURRENT USE OF INSULIN (HCC): ICD-10-CM

## 2025-04-26 DIAGNOSIS — E11.65 TYPE 2 DIABETES MELLITUS WITH HYPERGLYCEMIA, WITH LONG-TERM CURRENT USE OF INSULIN (HCC): ICD-10-CM

## 2025-04-28 ENCOUNTER — OFFICE VISIT (OUTPATIENT)
Dept: INTERNAL MEDICINE CLINIC | Facility: CLINIC | Age: 80
End: 2025-04-28
Payer: COMMERCIAL

## 2025-04-28 VITALS
RESPIRATION RATE: 16 BRPM | BODY MASS INDEX: 37.71 KG/M2 | TEMPERATURE: 97 F | HEIGHT: 63 IN | DIASTOLIC BLOOD PRESSURE: 60 MMHG | SYSTOLIC BLOOD PRESSURE: 138 MMHG | WEIGHT: 212.81 LBS

## 2025-04-28 DIAGNOSIS — N18.31 STAGE 3A CHRONIC KIDNEY DISEASE (HCC): ICD-10-CM

## 2025-04-28 DIAGNOSIS — I27.20 PULMONARY HTN (HCC): ICD-10-CM

## 2025-04-28 DIAGNOSIS — J96.11 CHRONIC RESPIRATORY FAILURE WITH HYPOXIA (HCC): ICD-10-CM

## 2025-04-28 RX ORDER — SENNOSIDES 8.6 MG
1 CAPSULE ORAL DAILY PRN
Qty: 30 CAPSULE | Refills: 0 | Status: SHIPPED | OUTPATIENT
Start: 2025-04-28 | End: 2025-05-05

## 2025-04-28 RX ORDER — BLOOD SUGAR DIAGNOSTIC
STRIP MISCELLANEOUS
Qty: 200 STRIP | Refills: 2 | Status: SHIPPED | OUTPATIENT
Start: 2025-04-28

## 2025-04-28 NOTE — PROGRESS NOTES
Subjective:   Anette Frazier is a 79 year old female who presents for Wellness Visit     The patient complains of passage of hard stool every other day when she does not take laxatives.  But if she takes laxatives it is in the daily basis.    History/Other:    Chief Complaint Reviewed and Verified  No Further Nursing Notes to   Review  Tobacco Reviewed  Allergies Reviewed  Medications Reviewed    Problem List Reviewed  Medical History Reviewed  Surgical History   Reviewed  OB Status Reviewed  Family History Reviewed         Tobacco:  Social History     Tobacco Use   Smoking Status Every Day    Current packs/day: 0.50    Average packs/day: 1 pack/day for 51.2 years (50.7 ttl pk-yrs)    Types: Cigarettes    Start date: 1/10/1974    Passive exposure: Past   Smokeless Tobacco Former    Quit date: 2016   Tobacco Comments    Smokes 2 cigarettes every morning with coffee.      E-Cigarettes/Vaping       Questions Responses    E-Cigarette Use Never User           Tobacco cessation counseling for 3-10 minutes (add E/M code #61229).      Current Outpatient Medications   Medication Sig Dispense Refill    FREESTYLE TRUMAN 3 PLUS SENSOR Does not apply Misc USE ONE SENSOR EVERY 15 DAYS 6 each 0    insulin glargine (LANTUS SOLOSTAR) 100 UNIT/ML Subcutaneous Solution Pen-injector INJECT 35 UNITS SUBCUTANEOUSLY ONCE DAILY 45 mL 0    Insulin Lispro, 1 Unit Dial, 100 UNIT/ML Subcutaneous Solution Pen-injector INJECT 30 UNITS DAILY AS DIRECTED (TAKE IN DIVIDED DOSES BASED ON BG READINGS) 15 mL 1    PROBENECID 500 MG Oral Tab TAKE 1 TABLET(500 MG) BY MOUTH TWICE DAILY 180 tablet 0    TORSEMIDE 20 MG Oral Tab TAKE 1 TABLET(20 MG) BY MOUTH TWICE DAILY 180 tablet 0    ALBUTEROL 108 (90 Base) MCG/ACT Inhalation Aero Soln INHALE 1 PUFF INTO THE LUNGS EVERY 6 HOURS AS NEEDED FOR WHEEZING 8.5 g 0    HYDRALAZINE 100 MG Oral Tab TAKE 1 TABLET(100 MG) BY MOUTH TWICE DAILY 180 tablet 0    semaglutide (OZEMPIC, 0.25 OR 0.5 MG/DOSE,) 2  MG/3ML Subcutaneous Solution Pen-injector INJECT 0.5 MG UNDER THE SKIN ONCE A WEEK AS DIRECTED      Blood Glucose Monitoring Suppl (ONETOUCH VERIO FLEX SYSTEM) w/Device Does not apply Kit Take 1 Bottle by mouth As Directed.      GVOKE HYPOPEN 1-PACK 1 MG/0.2ML Subcutaneous SUBQ injection INJECT 1MG SUBCUTANEOUSLY ONCE AS NEEDED FOR LOW BLOOD GLUCOSE      Insulin Glargine-yfgn 100 UNIT/ML Subcutaneous Solution Pen-injector Inject 35 Units/day into the skin daily.      latanoprost 0.005 % Ophthalmic Solution Apply 1 drop to eye nightly.      simvastatin 40 MG Oral Tab Take 1 tablet (40 mg total) by mouth nightly.      Glucose Blood In Vitro Strip 1 each As Directed.      empagliflozin (JARDIANCE) 10 MG Oral Tab Take 1 tablet (10 mg total) by mouth daily.      azelastine 0.1 % Nasal Solution 1 spray by Nasal route 2 (two) times daily. 30 mL 1    semaglutide (OZEMPIC, 0.25 OR 0.5 MG/DOSE,) 2 MG/3ML Subcutaneous Solution Pen-injector Inject 0.25 mg into the skin once a week for 28 days, THEN 0.5 mg once a week. 9 mL 0    BD PEN NEEDLE NAHUM 2ND GEN 32G X 4 MM Does not apply Misc 4 injections daily 200 each 2    Glucose Blood (ONETOUCH VERIO) In Vitro Strip Check 4 times per day for gestational diabetes 300 strip 1    Glucose Blood (ONETOUCH VERIO) In Vitro Strip Use as directed. 100 strip 1    TRUEPLUS INSULIN SYRINGE 31G X 5/16\" 0.5 ML Does not apply Misc 1 each As Directed.      carvedilol 12.5 MG Oral Tab Take 1 tablet (12.5 mg total) by mouth 2 (two) times daily with meals. 180 tablet 3    empagliflozin (JARDIANCE) 10 MG Oral Tab Take 1 tablet (10 mg total) by mouth daily. 90 tablet 3    OneTouch Delica Lancets 33G Does not apply Misc 1 Lancet by Finger stick route in the morning, at noon, and at bedtime. 180 each 11    fluticasone-umeclidin-vilant (TRELEGY ELLIPTA) 100-62.5-25 MCG/ACT Inhalation Aerosol Powder, Breath Activated Inhale 1 puff into the lungs daily. 3 each 3    albuterol 108 (90 Base) MCG/ACT Inhalation  Aero Soln Inhale 2 puffs into the lungs every 6 (six) hours as needed for Wheezing. 1 each 3    metFORMIN 500 MG Oral Tab Take 1 tablet (500 mg total) by mouth 2 (two) times daily with meals. 180 tablet 3    GABAPENTIN 100 MG Oral Cap TAKE 2 CAPSULES(200 MG) BY MOUTH EVERY NIGHT 180 capsule 1    albuterol (2.5 MG/3ML) 0.083% Inhalation Nebu Soln Take 3 mL (2.5 mg total) by nebulization 3 (three) times daily. 300 mL 1    triamcinolone 0.1 % External Cream Apply topically 2 (two) times daily as needed. 60 g 1    ALENDRONATE 70 MG Oral Tab TAKE 1 TABLET(70 MG) BY MOUTH EVERY 7 DAYS 12 tablet 3    atorvastatin 20 MG Oral Tab Take 1 tablet (20 mg total) by mouth nightly. 90 tablet 3    spironolactone 25 MG Oral Tab Take 1 tablet (25 mg total) by mouth daily.      Potassium Chloride ER 10 MEQ Oral Tab CR Take 1 tablet (10 mEq total) by mouth daily. 90 tablet 1    sacubitril-valsartan (ENTRESTO)  MG Oral Tab Take 1 tablet by mouth 2 (two) times daily. 180 tablet 0    Respiratory Therapy Supplies (NEBULIZER) Does not apply Device Use as directed 1 each 0    ASPIRIN EC LOW DOSE 81 MG Oral Tab EC Take 1 tablet (81 mg total) by mouth daily.  0         Review of Systems:        Objective:   /60 (BP Location: Left arm, Patient Position: Sitting, Cuff Size: adult)   Temp 97.2 °F (36.2 °C) (Temporal)   Resp 16   Ht 5' 3\" (1.6 m)   Wt 212 lb 12.8 oz (96.5 kg)   BMI 37.70 kg/m²  Estimated body mass index is 37.7 kg/m² as calculated from the following:    Height as of this encounter: 5' 3\" (1.6 m).    Weight as of this encounter: 212 lb 12.8 oz (96.5 kg).  General condition: Not in distress.    HEENT: Atraumatic normocephalic  No cervical or submandibular lymphadenopathy  Chest: Clear to auscultate  Heart: S1-S2 ,no murmur  Abdomen: Soft non tender, no palpable organomegaly  Neurological examination: No facial asymmetry.  No lateralizing neurological signs.  Mental state examination: Good eye to eye contact.  Normal  mood and behavior.  Engaged in meaningful conversations.  Extremities:  Normal upper extremities  Legs: No edema        Assessment & Plan:     1.  Diabetes mellitus with A1c of 12.9 in 1/20/2025.  Significant improvement of A1c on 3/26/25 - 6.9.    The patient is afraid of taking long-acting insulin at bedtime because she has a fear that blood sugar can go too low during her sleep.  The patient has been taking Lantus intermittently.      Was seen by endocrine team: Patient reported continue Lantus 35 daily if blood sugars were more than 130.  She reports that the blood sugars are less than 130 about 3 times a week.  Takes short acting insulin as sliding scale.    At one point , the blood sugar was 40.    Will decrease the lantus to 25 units at bedtime.    The patient was evaluated by eye doctor already.  She is followed by  In this week as well.  I do not have detailed report at.    2.  COPD-the patient is on long-term oxygen therapy.  She has not required portable oxygen yet.    3.  Hypertension: Still uncontrolled but better than the last time.  Continue with the current blood pressure management.    4.  HFrEF, with ejection fraction of 35 to 40% and grade 2 diastolic dysfunction - currently compensated.  Continue with GDMT.    5.  Smoking: The quantity of smoking has decreased to 1 cigarette a day..  She she is inching towards complete cessation.  I conducted motivational interview against smoking.      No follow-ups on file.    Dat Herman MD, 4/28/25

## 2025-04-28 NOTE — PATIENT INSTRUCTIONS
Please take Lantus 25 units at bedtime.  Hold it if your blood sugar is less than 125.  Continue taking short acting insulin as a sliding scale  Continue taking Ozempic , metformin and Jardiance.

## 2025-04-28 NOTE — TELEPHONE ENCOUNTER
Requested Prescriptions     Pending Prescriptions Disp Refills    Glucose Blood (ONETOUCH VERIO) In Vitro Strip [Pharmacy Med Name: OneTouch Verio In Vitro Strip]  0     Sig: USE 1 STRIP TO CHECK GLUCOSE 4 TIMES DAILY FOR  GESTATIONAL  DIABETES      Your appointments       Date & Time Appointment Department ([place])    Apr 28, 2025 9:20 AM CDT MA Supervisit with Dat Herman MD [facility] ([facility])        May 12, 2025 8:15 AM CDT CT CHEST LOW DOSE with [facility] CT 1 [facility] ([facility])    Please arrive 15 minutes prior to your scheduled appointment time.      May 16, 2025 1:00 PM CDT Exam - Established with Shawna Pena APRN [facility] ([facility])        May 19, 2025 8:30 AM CDT Diabetes Pump follow up with Jenn Jackson APRN [facility] ([facility] [place])              [facility]  [address]  [facility] [id number]  [phone] [facility]  [facility] [place] [address] [address]  [facility] [address]  [phone] [facility]  [address]  [facility] [address]  [phone]    [facility]  [address]  [facility] [id number] [phone]         Last A1c value was 6.9% done 3/26/2025.   Last office visit: 1/20/25 - AM  Last refill: 1/20/25 - AM

## 2025-05-12 ENCOUNTER — LAB ENCOUNTER (OUTPATIENT)
Dept: LAB | Age: 80
End: 2025-05-12
Payer: MEDICARE

## 2025-05-12 ENCOUNTER — HOSPITAL ENCOUNTER (OUTPATIENT)
Dept: CT IMAGING | Age: 80
Discharge: HOME | End: 2025-05-12
Payer: MEDICARE

## 2025-05-12 DIAGNOSIS — E11.9 DIABETES MELLITUS (HCC): Primary | ICD-10-CM

## 2025-05-12 DIAGNOSIS — I50.42: ICD-10-CM

## 2025-05-12 DIAGNOSIS — R91.8 LUNG NODULE, MULTIPLE: ICD-10-CM

## 2025-05-12 DIAGNOSIS — Z72.0 TOBACCO ABUSE: ICD-10-CM

## 2025-05-12 DIAGNOSIS — E78.5 HYPERLIPIDEMIA: ICD-10-CM

## 2025-05-12 LAB
ANION GAP SERPL CALC-SCNC: 9 MMOL/L (ref 0–18)
BUN BLD-MCNC: 22 MG/DL (ref 9–23)
CALCIUM BLD-MCNC: 8.9 MG/DL (ref 8.7–10.6)
CHLORIDE SERPL-SCNC: 107 MMOL/L (ref 98–112)
CO2 SERPL-SCNC: 24 MMOL/L (ref 21–32)
CREAT BLD-MCNC: 1.02 MG/DL (ref 0.55–1.02)
EGFRCR SERPLBLD CKD-EPI 2021: 57 ML/MIN/1.73M2 (ref 60–?)
FASTING STATUS PATIENT QL REPORTED: YES
GLUCOSE BLD-MCNC: 93 MG/DL (ref 70–99)
OSMOLALITY SERPL CALC.SUM OF ELEC: 293 MOSM/KG (ref 275–295)
POTASSIUM SERPL-SCNC: 4.1 MMOL/L (ref 3.5–5.1)
SODIUM SERPL-SCNC: 140 MMOL/L (ref 136–145)

## 2025-05-12 PROCEDURE — 71250 CT THORAX DX C-: CPT

## 2025-05-12 PROCEDURE — 36415 COLL VENOUS BLD VENIPUNCTURE: CPT

## 2025-05-12 PROCEDURE — 80048 BASIC METABOLIC PNL TOTAL CA: CPT

## 2025-05-16 ENCOUNTER — OFFICE VISIT (OUTPATIENT)
Facility: CLINIC | Age: 80
End: 2025-05-16
Payer: COMMERCIAL

## 2025-05-16 VITALS
SYSTOLIC BLOOD PRESSURE: 128 MMHG | DIASTOLIC BLOOD PRESSURE: 74 MMHG | RESPIRATION RATE: 16 BRPM | OXYGEN SATURATION: 92 % | HEART RATE: 83 BPM | HEIGHT: 63 IN | BODY MASS INDEX: 38.09 KG/M2 | WEIGHT: 215 LBS

## 2025-05-16 DIAGNOSIS — J44.9 CHRONIC OBSTRUCTIVE PULMONARY DISEASE, UNSPECIFIED COPD TYPE (HCC): ICD-10-CM

## 2025-05-16 DIAGNOSIS — F17.210 SMOKING GREATER THAN 20 PACK YEARS: ICD-10-CM

## 2025-05-16 DIAGNOSIS — R06.09 DOE (DYSPNEA ON EXERTION): ICD-10-CM

## 2025-05-16 DIAGNOSIS — J96.11 CHRONIC RESPIRATORY FAILURE WITH HYPOXIA (HCC): ICD-10-CM

## 2025-05-16 DIAGNOSIS — Z72.0 TOBACCO ABUSE: ICD-10-CM

## 2025-05-16 DIAGNOSIS — R91.8 LUNG NODULE, MULTIPLE: Primary | ICD-10-CM

## 2025-05-16 NOTE — PATIENT INSTRUCTIONS
Call office with any questions or concerns  Call office if develop any new or worsening respiratory symptoms.   Call office if your inhalers are more than $50 after co-pay  Continue trelegy inhaler - one puff once a day. Rinse your mouth out after using inhaler   Call central scheduling at [phone] to schedule the CT scan in one year  If you get ill (sinus infection, cold, respiratory illness or other illness) you should postpone the scan for at least 4-6 weeks after you have recovered. Please call with any questions.

## 2025-05-16 NOTE — PROGRESS NOTES
[facility] General Pulmonary Progress Note    History of Present Illness:  Anette Frazier is a 79 year old female wactive smoker (20+ pack years) with significant PMH of DM, CHF, pulmonary HTN, chronic hypoxic respiratory failure, obesity, ALEXANDRA who presents today for follow up. Overall feels the same. Reports SOB with activity. Denies acute concerns. Denies no cough, chest pain/pressure, wheezing, no fevers, chills, body aches, N/V/D, fatigue.  Tried nicotine patches but didnt work.     Previously 1/2025  Anette Frazier is a 79 year old female who presents today for follow up of 6 month CT scan. Overall feels okay. Using Trelegy as needed and albuterol as needed for SOB. Reports cough, SOB.   Denies wheezing, fevers, chills. Still smoking about a half a pack a day.   Up to date on flu, covid, and RSV vaccines for the year.      Educated on how to use Trelegy- once a day.      Previously 5/2024 Dr Vaughn  Anette Frazier is a 78 year old female active smoker (20+ pack years) with significant PMH of DM, CHF, pulmonary HTN, chronic hypoxic respiratory failure, obesity, ALEXANDRA who presents today for follow up. Since last visit she feels her breathing has slowly worsened. Using breo daily with some relief. No cough, phlegm, chest pain/pressure.  Following with Dr. Jiang - per his note echo with EF 35% and to have repeat soon  Still smoking 2-3 cigs/day, wants to quit     Nov 2023 previously KERRIE De Dios  Anette Frazier is a 78 year old female with significant PMHx ALEXANDRA,  CHF, Chronic respiratory failure who presents today for follow up to discuss 6MWT and CT chest results. Overall feels well despite SOB with minimal exertion. Unable to pull oxygen tanks due to balance issues therefore not bringing portable tanks to activities/appts outside the house. No new complaints.     Previously 10/2023  Seen by Dr Everett for ALEXANDRA; using CPAP nightly for 8 hrs but download shows average 4.45 minutes; large leak and machine turning off.       Previously 7/2023  presents for follow - up to review PET scan results. Continues with dyspnea with exertion/walking. Checks oxygen saturation at rest which is always > 90%. Has not checked O2 sats with walking. Has oxygen tanks at home but to heavy to carry.     Previously 6/21/23   presents for followup and CT review. Reports breathing is 60% better; less SOB with exertion. Overall feels well.      Previously Dr Vaughn 5/2023  77 year old female active smoker (20+ pack years) with significant PMH of DM, CHF, pulmonary HTN, chronic hypoxic respiratory failure, obesity, ALEXANDRA who presents today for follow up of dyspnea. She denies new concerns today.  Still with KYLE. Thinks slightly better after using breo, not using albuterol.  No cough.  Does note nasal dryness while using the oxygen and CPAP.   Has been using oxygen at 4L instead of prescribed 2L at rest and 6L on exertion.   Still smoking - interested in cessation therapies     3/8/2023 Previously  The patient had previously followed with [facility] for dyspnea and attributed to CHF, pulmonary HTN and obesity. She had previously trialed albuterol mdi and notes some slight improvement. Denies any cough, phlegm, wheeze. No chest pain/pressure, pleurisy. Has been trying to lose weight, down ~5 lbs over the last few months.   Denies any hx of asthma, bronchitis, COPD.      Active smoker - 1ppd, started in 50s, now smoking 2 cigs/day with coffee  Retired, previously in sales, not aware of any significant exposures to chemicals, fumes, asbestos or TB.   Born in [place], moved to [place] at age 11 and resided here since. No pets      Past Medical History:   Past Medical History[1]     Past Surgical History: Past Surgical History[2]    Family Medical History: Family History[3]     Social History:   Social History     Socioeconomic History    Marital status:      Spouse name: Not on file    Number of children: Not on file    Years of education: Not on file     Highest education level: Not on file   Occupational History    Occupation: [employer]    Tobacco Use    Smoking status: Every Day     Current packs/day: 0.50     Average packs/day: 1 pack/day for 51.3 years (50.8 ttl pk-yrs)     Types: Cigarettes     Start date: 1/10/1974     Passive exposure: Past    Smokeless tobacco: Former     Quit date: 2016    Tobacco comments:     Smokes 2 cigarettes every morning with coffee.    Vaping Use    Vaping status: Never Used   Substance and Sexual Activity    Alcohol use: No    Drug use: No    Sexual activity: Not Currently     Partners: Female   Other Topics Concern    Caffeine Concern Yes     Comment: 2 cups of coffee every morning.    Exercise No    Seat Belt Not Asked    Special Diet No    Stress Concern Not Asked    Weight Concern Not Asked   Social History Narrative    Not on file     Social Drivers of Health     Food Insecurity: No Food Insecurity (4/28/2025)    NCSS - Food Insecurity     Worried About Running Out of Food in the Last Year: No     Ran Out of Food in the Last Year: No   Transportation Needs: No Transportation Needs (4/28/2025)    NCSS - Transportation     Lack of Transportation: No   Stress: Not on file   Housing Stability: Not At Risk (4/28/2025)    NCSS - Housing/Utilities     Has Housing: Yes     Worried About Losing Housing: No     Unable to Get Utilities: No        Medications: Current Medications[4]    Review of Systems: Review of Systems   Constitutional: Negative.    HENT: Negative.     Respiratory:  Positive for shortness of breath. Negative for cough and wheezing.    Cardiovascular: Negative.    Gastrointestinal: Negative.         Physical Exam:  /74 (BP Location: Right arm, Patient Position: Sitting, Cuff Size: adult)   Pulse 83   Resp 16   Ht 5' 3\" (1.6 m)   Wt 215 lb (97.5 kg)   SpO2 92%   BMI 38.09 kg/m²      Constitutional: alert, cooperative. No acute distress.  HEENT: Head NC/AT.   Cardio: Regular rate and rhythm. Normal  S1 and S2. No murmurs.   Respiratory: Thorax symmetrical with no labored breathing. Clear to ausculation bilaterally with symmetrical breath sounds. No wheezing, rhonchi, rales, or crackles.   Extremities: No clubbing or cyanosis. No BLE edema.    Neurologic: A&Ox3. No gross motor deficits.  Skin: Warm, dry  Psych: Calm, cooperative. Pleasant affect.    Results:  Personally reviewed    WBC: 7.2, done on 2/19/2024.  HGB: 13.3, done on 2/19/2024.  PLT: 330, done on 2/19/2024.     Glucose: 93, done on 5/12/2025.  Cr: 1.02, done on 5/12/2025.  Last eGFR was 57 on 5/12/2025.  CA: 8.9, done on 5/12/2025.  Na: 140, done on 5/12/2025.  K: 4.1, done on 5/12/2025.  Cl: 107, done on 5/12/2025.  CO2: 24, done on 5/12/2025.  Last ALB was 4.2% done on 9/16/2024.     CT CHEST LD NO CAD(CPT=71250)  Result Date: 5/12/2025  CONCLUSION:  1. Emphysematous changes within the lungs.  The presence of emphysematous changes on CT is an independent risk factor for lung cancer.  Consider annual low-dose CT lung cancer screening. 2. Stable pulmonary nodules as described above.  Irregular nodule within the right upper lobe is stable from prior examination. 3. Small hiatal hernia.   LOCATION:  [facility]   Dictated by (CST): Xenia Riggs MD on 5/12/2025 at 9:28 AM     Finalized by (CST): Xenia Riggs MD on 5/12/2025 at 9:38 AM          Assessment/Plan:  #1. Dyspnea on exertion  multifactorial due to CHF, morbid obesity, obesity hypoventilation, pulmonary hypertension, possible COPD  Active smoker - see below  3/2023 6MWT with 2L at rest, 6L on exertion. She does not qualify for POC  4/2023 PFTs with no obstruction, restriction, but severely reduced DLCO concerning for pulmonary HTN  Echo previously demonstrated pulmonary HTN  5/2023 CT chest no evidence of significant ILD however 2 nodules in the RUL have increased in size  6/2023 PET/CT with low grade uptake  7/2023 CT chest with either stable or decrease nodules  11/2023 CT chest  stable  5/2024 & 10/2024 CT Chest; see results below   Continue Trelegy and albuterol PRN     #2. Pulmonary nodules  5/2023 CT chest no evidence of significant ILD however 2 nodules in the RUL have increased in size  6/2023 PET/CT with low grade uptake  7/2023 CT chest with either stable or decrease nodules  11/2023 CT chest stable  5/2024 CT Chest Stable; While this is stable in size compared to 11/01/2023, this previously measured 8 mm in average diameter on 02/07/2022.   10/2024 Ct Chest While the spiculated semi solid nodule within the right upper lobe along the minor fissure has not significantly changed since the most recent study, the nodule is clearly larger than studies from 2 years ago.  Therefore the possibility of a low-grade malignancy is still within the differential that includes inflammatory scarring   5/2025 CT Chest emphysema, stable nodules  I have reviewed the results in detail with the patient and all questions answered. Per Brittany, it is recommended to obtain a CT chest in one year     #3. Chronic hypoxic respiratory failure  Multifactorial due to CHF, pulmonary HTN, morbid obesity, obesity hypoventilation, possible COPD  Previous o2 walk with [facility] 20[facility]: RA at rest, 3L on exertion  3/2023 6MWT with 2L at rest, 6L on exertion. She does not qualify for POC  4/2023 PFTs with no obstruction, no restriction, but severely reduced DLCO concerning for pulmonary HTN  No ILD on HRCT  Inhalers as above     #4. Tobacco abuse  Active smoker - 1ppd, started in 50s, now smoking 2 cigs/day with coffee  Smoking cessation counseling provided x 5 minutes and reviewed options including changes to habits as well as chantix/wellbutrin.   Continue with annual screening, May 2025  Lung Cancer Counseling and Shared Decision Making Session in an Asymptomatic Smoker/Former Smoker   Anette Frazier is a 79 year old female without current symptoms of lung cancer.  History   Smoking Status    Every Day    Types:  Cigarettes   Smokeless Tobacco    Former    Quit date: 2016     She received information on the importance of adherence to annual lung cancer Low Dose CT (LDCT) screening, the impact of her comorbidities and her ability or willingness to undergo diagnosis and treatment. she is in agreement and an order will be placed for CT LUNG LD SCREENING(CPT=71271).  We counseled the importance of maintaining cigarette smoking abstinence if she is a former smoker and the importance of smoking cessation if she is a current smoker and we discussed and furnished information about tobacco cessation interventions.     Shawna Pena [facility]  Pulmonary Medicine   5/16/2025          [1]   Past Medical History:   AIN (acute interstitial nephritis)    Back problem    Cataracts, bilateral    Congestive heart disease (HCC)    Diabetes (HCC)    Diverticulosis of large intestine    Essential hypertension    Gout    High blood pressure    High cholesterol    Hyperlipidemia    Neuropathy    feet    Obesity    ALEXANDRA (obstructive sleep apnea)    AHI 34 REM AHI 85 [place] 59%     Sleep apnea    Tobacco abuse    Visual impairment   [2]   Past Surgical History:  Procedure Laterality Date    Colonoscopy  2012    [facility]    Colonoscopy  05/18/2018    diverticulosis - repeat 10 years - Dr. Montague    Colonoscopy      Knee replacement surgery  2005    left knee    Total knee replacement      Left knee    [3]   Family History  Problem Relation Age of Onset    Diabetes Mother     Other (Lung Cancer) Mother     Hypertension Mother     COPD Sister     Breast Cancer Maternal Aunt 60   [4]   Current Outpatient Medications   Medication Sig Dispense Refill    Glucose Blood (ONETOUCH VERIO) In Vitro Strip USE 1 STRIP TO CHECK GLUCOSE 3 - 4 TIMES DAILY FOR Type 2 DM with long-term current insulin use 200 strip 2    FREESTYLE TRUMAN 3 PLUS SENSOR Does not apply Misc USE ONE SENSOR EVERY 15 DAYS 6 each 0    insulin glargine (LANTUS SOLOSTAR) 100 UNIT/ML  Subcutaneous Solution Pen-injector INJECT 35 UNITS SUBCUTANEOUSLY ONCE DAILY 45 mL 0    Insulin Lispro, 1 Unit Dial, 100 UNIT/ML Subcutaneous Solution Pen-injector INJECT 30 UNITS DAILY AS DIRECTED (TAKE IN DIVIDED DOSES BASED ON BG READINGS) 15 mL 1    PROBENECID 500 MG Oral Tab TAKE 1 TABLET(500 MG) BY MOUTH TWICE DAILY 180 tablet 0    TORSEMIDE 20 MG Oral Tab TAKE 1 TABLET(20 MG) BY MOUTH TWICE DAILY 180 tablet 0    ALBUTEROL 108 (90 Base) MCG/ACT Inhalation Aero Soln INHALE 1 PUFF INTO THE LUNGS EVERY 6 HOURS AS NEEDED FOR WHEEZING 8.5 g 0    HYDRALAZINE 100 MG Oral Tab TAKE 1 TABLET(100 MG) BY MOUTH TWICE DAILY 180 tablet 0    semaglutide (OZEMPIC, 0.25 OR 0.5 MG/DOSE,) 2 MG/3ML Subcutaneous Solution Pen-injector INJECT 0.5 MG UNDER THE SKIN ONCE A WEEK AS DIRECTED      Blood Glucose Monitoring Suppl (ONETOUCH VERIO FLEX SYSTEM) w/Device Does not apply Kit Take 1 Bottle by mouth As Directed.      GVOKE HYPOPEN 1-PACK 1 MG/0.2ML Subcutaneous SUBQ injection INJECT 1MG SUBCUTANEOUSLY ONCE AS NEEDED FOR LOW BLOOD GLUCOSE      Insulin Glargine-yfgn 100 UNIT/ML Subcutaneous Solution Pen-injector Inject 25 Units/day into the skin in the morning.      latanoprost 0.005 % Ophthalmic Solution Apply 1 drop to eye nightly.      simvastatin 40 MG Oral Tab Take 1 tablet (40 mg total) by mouth nightly.      Glucose Blood In Vitro Strip 1 each As Directed.      empagliflozin (JARDIANCE) 10 MG Oral Tab Take 1 tablet (10 mg total) by mouth daily.      azelastine 0.1 % Nasal Solution 1 spray by Nasal route 2 (two) times daily. 30 mL 1    semaglutide (OZEMPIC, 0.25 OR 0.5 MG/DOSE,) 2 MG/3ML Subcutaneous Solution Pen-injector Inject 0.25 mg into the skin once a week for 28 days, THEN 0.5 mg once a week. 9 mL 0    BD PEN NEEDLE NAHUM 2ND GEN 32G X 4 MM Does not apply Misc 4 injections daily 200 each 2    Glucose Blood (ONETOUCH VERIO) In Vitro Strip Use as directed. 100 strip 1    TRUEPLUS INSULIN SYRINGE 31G X 5/16\" 0.5 ML Does not  apply Misc 1 each As Directed.      carvedilol 12.5 MG Oral Tab Take 1 tablet (12.5 mg total) by mouth 2 (two) times daily with meals. 180 tablet 3    empagliflozin (JARDIANCE) 10 MG Oral Tab Take 1 tablet (10 mg total) by mouth daily. 90 tablet 3    OneTouch Delica Lancets 33G Does not apply Misc 1 Lancet by Finger stick route in the morning, at noon, and at bedtime. 180 each 11    fluticasone-umeclidin-vilant (TRELEGY ELLIPTA) 100-62.5-25 MCG/ACT Inhalation Aerosol Powder, Breath Activated Inhale 1 puff into the lungs daily. 3 each 3    albuterol 108 (90 Base) MCG/ACT Inhalation Aero Soln Inhale 2 puffs into the lungs every 6 (six) hours as needed for Wheezing. 1 each 3    metFORMIN 500 MG Oral Tab Take 1 tablet (500 mg total) by mouth 2 (two) times daily with meals. 180 tablet 3    GABAPENTIN 100 MG Oral Cap TAKE 2 CAPSULES(200 MG) BY MOUTH EVERY NIGHT 180 capsule 1    albuterol (2.5 MG/3ML) 0.083% Inhalation Nebu Soln Take 3 mL (2.5 mg total) by nebulization 3 (three) times daily. 300 mL 1    triamcinolone 0.1 % External Cream Apply topically 2 (two) times daily as needed. 60 g 1    ALENDRONATE 70 MG Oral Tab TAKE 1 TABLET(70 MG) BY MOUTH EVERY 7 DAYS 12 tablet 3    atorvastatin 20 MG Oral Tab Take 1 tablet (20 mg total) by mouth nightly. 90 tablet 3    spironolactone 25 MG Oral Tab Take 1 tablet (25 mg total) by mouth daily.      Potassium Chloride ER 10 MEQ Oral Tab CR Take 1 tablet (10 mEq total) by mouth daily. 90 tablet 1    sacubitril-valsartan (ENTRESTO)  MG Oral Tab Take 1 tablet by mouth 2 (two) times daily. 180 tablet 0    Respiratory Therapy Supplies (NEBULIZER) Does not apply Device Use as directed 1 each 0    ASPIRIN EC LOW DOSE 81 MG Oral Tab EC Take 1 tablet (81 mg total) by mouth daily.  0

## 2025-05-18 NOTE — PROGRESS NOTES
Chief Complaint   Patient presents with    Diabetes     Follow up/ type 2/ did bring meter/ fasting/ checks sugars 3x a day/ checked sugar this morning/ poc 76      Anette Frazier is a 79 year old female who presents for follow-up today for the management of diabetes.   Primary care physician: Jonel Bell MD  Last DM visit w/ me: 2025  Latest A1C 6.9% on 3/26/2025 (last  A1C was 12.9% 25)  In office POC: 76 mg/d  today, had breakfast (Glucose at home today before breakfast 113 mg/dl)    Anette seen for the first time on last OV is a pleasant, 79 yr old, appears stated age is here in diabetes ctr for the first time accompanied by caregiver. Referred by PCP for management of hyperglycemia. Patient was given Ozempic but had constipation, however from last OV, we restarted Ozempic and able to tolerate current dose at 0.5 mg weekly. She denies any constipation, appetite suppression or any any abdominal discomfort. However, she has been having low blood sugar. PCP decrease insulin to 25 units from 35 units but pt skips doses when glucose is around 100. Unrecalled last Lantus injection but states she gave self one today because she is coming to the appointment.     POC in the office is 76 mg/dl but was 113 mg/dl at home. Can not recall last Novolog injection, she states that it has been a long time. Reports that she is also consistent with Metformin.     I also ordered CGM - Kyle reader with Libre3+ but did not continue right after appointment with HAILEE Ghotra on 3/26/25 because she said it was beeping too much that it was low but the meter reading says otherwise.     Monitoring blood glucose: 2-3 x daily, meter showing reading in the past 4 days. States that she was using another meter, ran out of strips    Average glucose readin mg/dl  Highest glucose readin  mg/dl before lunch  Lowest glucose readin  mg/dl before breakfast  Hypoglycemia frequency Frequent per patient 2 x/wk, none  documented on the current meter    Diabetes History:  Type 2 ~ 2003  Patient has had hospitalizations for blood sugar issues and Recent 12/23/24, had HHS (hyperosmolar hyperglycemic state)   denies any history of pancreatitis  Previous DM therapies:  Insulin - changed to insulin  Ozempic - took for 8 months, stopped in October d/t constipation    Current DM Regimen:    Jardiance 10 daily  Metformin 500 mg twice a day  Lantus 35 units daily   - > holding when blood sugar is around 120 mg/dl, last dose unrecalled, states maybe 2 - 3 days ago prior today's dose  Lispro three times a day  - > not dosing at all  <150 mg/dl - no insulin  150 - 200 mg/dl - 6 units with meals   201 - 250 mg/dl - + 1 unit  251 - 300 - + 2 units  301 -350 mg/dl - + 3 units    HGBA1C:    Lab Results   Component Value Date    A1C 6.9 (A) 03/26/2025    A1C 12.9 (A) 01/20/2025    A1C 7.5 (H) 09/16/2024     (H) 09/16/2024     Hx of COPD, CHF  Nicotine/cigarette use - advised to use Nicotine patch.Not started. Able to limit to 2 cigarettes with coffee in the morning (from a pack yrs ago); on PRN 02 at home  ALEXANDRA - Uses CPAP nightly    DM associated symptoms:  Polyuria, polyphagia, polydipsia: no  Paresthesias: no   Blurred vision: yes   Hypoglycemia: Yes pt reports    DM Complications:    Microvascular:   Neuropathy: Yes on medical record; Per pt Gabapentin 200 mg hs is for gout.  Retinopathy: no last retinal exam 3/2024 - new Eye MD referral provided by Dr Herman 1/9/2025  Nephropathy: yes last done 2/2024, urine sent today, last eGFR 33 9/2024    Macrovascular  PVD: no  CAD: no HFrEF (35-40% 2022)    Seeing Cardio Dr. Jiang every 6 months w/ [facility] [place] [facility]- LOV 3/28/2025, looking for a new Cardio w/ [facility] ([facility])  Stroke/CVA: no    Modifying factors:  Medication adherence: Yes  Nutrition Limits carbs, black coffee   Exercise: Uses wheelchair for mobility in long distances, not very active  Recent steroids, illness or  infections: Yes, had recent HHS in 12/2024    Family History:  (+) DM - mother  (-) Autoimmune dse, pancreatic, thyroid, or any endocrine cancers    Allergies[1]  Past Medical History:    AIN (acute interstitial nephritis)    Back problem    Cataracts, bilateral    Congestive heart disease (HCC)    Diabetes (HCC)    Diverticulosis of large intestine    Essential hypertension    Gout    High blood pressure    High cholesterol    Hyperlipidemia    Neuropathy    feet    Obesity    ALEXANDRA (obstructive sleep apnea)    AHI 34 REM AHI 85 [place] 59%     Sleep apnea    Tobacco abuse    Visual impairment     Past Surgical History:   Procedure Laterality Date    Colonoscopy  2012    [facility], [facility]    Colonoscopy  05/18/2018    diverticulosis - repeat 10 years - Dr. Montague    Colonoscopy      Knee replacement surgery  2005    left knee    Total knee replacement      Left knee      Social History     Socioeconomic History    Marital status:    Occupational History    Occupation: [employer]   Tobacco Use    Smoking status: Every Day     Current packs/day: 0.50     Average packs/day: 1 pack/day for 51.4 years (50.8 ttl pk-yrs)     Types: Cigarettes     Start date: 1/10/1974     Passive exposure: Past    Smokeless tobacco: Former     Quit date: 2016    Tobacco comments:     Smokes 2 cigarettes every morning with coffee.    Vaping Use    Vaping status: Never Used   Substance and Sexual Activity    Alcohol use: No    Drug use: No    Sexual activity: Not Currently     Partners: Female   Other Topics Concern    Caffeine Concern Yes     Comment: 2 cups of coffee every morning.    Exercise No    Special Diet No     Social Drivers of Health     Food Insecurity: No Food Insecurity (4/28/2025)    NCSS - Food Insecurity     Worried About Running Out of Food in the Last Year: No     Ran Out of Food in the Last Year: No   Transportation Needs: No Transportation Needs (4/28/2025)    NCSS - Transportation     Lack of  Transportation: No   Housing Stability: Not At Risk (4/28/2025)    NCSS - Housing/Utilities     Has Housing: Yes     Worried About Losing Housing: No     Unable to Get Utilities: No     Family History   Problem Relation Age of Onset    Diabetes Mother     Other (Lung Cancer) Mother     Hypertension Mother     COPD Sister     Breast Cancer Maternal Aunt 60     Current Outpatient Medications   Medication Sig Dispense Refill    Glucose Blood (ONETOUCH VERIO) In Vitro Strip USE 1 STRIP TO CHECK GLUCOSE 3 TIMES DAILY FOR Type 2 DM with long-term current insulin use 300 strip 2    insulin glargine (LANTUS SOLOSTAR) 100 UNIT/ML Subcutaneous Solution Pen-injector INJECT 35 UNITS SUBCUTANEOUSLY ONCE DAILY 45 mL 0    Insulin Lispro, 1 Unit Dial, 100 UNIT/ML Subcutaneous Solution Pen-injector INJECT 30 UNITS DAILY AS DIRECTED (TAKE IN DIVIDED DOSES BASED ON BG READINGS) 15 mL 1    PROBENECID 500 MG Oral Tab TAKE 1 TABLET(500 MG) BY MOUTH TWICE DAILY 180 tablet 0    TORSEMIDE 20 MG Oral Tab TAKE 1 TABLET(20 MG) BY MOUTH TWICE DAILY 180 tablet 0    ALBUTEROL 108 (90 Base) MCG/ACT Inhalation Aero Soln INHALE 1 PUFF INTO THE LUNGS EVERY 6 HOURS AS NEEDED FOR WHEEZING 8.5 g 0    HYDRALAZINE 100 MG Oral Tab TAKE 1 TABLET(100 MG) BY MOUTH TWICE DAILY 180 tablet 0    semaglutide (OZEMPIC, 0.25 OR 0.5 MG/DOSE,) 2 MG/3ML Subcutaneous Solution Pen-injector INJECT 0.5 MG UNDER THE SKIN ONCE A WEEK AS DIRECTED      GVOKE HYPOPEN 1-PACK 1 MG/0.2ML Subcutaneous SUBQ injection INJECT 1MG SUBCUTANEOUSLY ONCE AS NEEDED FOR LOW BLOOD GLUCOSE      Insulin Glargine-yfgn 100 UNIT/ML Subcutaneous Solution Pen-injector Inject 25 Units/day into the skin in the morning.      latanoprost 0.005 % Ophthalmic Solution Apply 1 drop to eye nightly.      simvastatin 40 MG Oral Tab Take 1 tablet (40 mg total) by mouth nightly.      Glucose Blood In Vitro Strip 1 each As Directed.      empagliflozin (JARDIANCE) 10 MG Oral Tab Take 1 tablet (10 mg total) by  mouth daily.      azelastine 0.1 % Nasal Solution 1 spray by Nasal route 2 (two) times daily. 30 mL 1    BD PEN NEEDLE NAHUM 2ND GEN 32G X 4 MM Does not apply Misc 4 injections daily 200 each 2    TRUEPLUS INSULIN SYRINGE 31G X 5/16\" 0.5 ML Does not apply Misc 1 each As Directed.      carvedilol 12.5 MG Oral Tab Take 1 tablet (12.5 mg total) by mouth 2 (two) times daily with meals. 180 tablet 3    OneTouch Delica Lancets 33G Does not apply Misc 1 Lancet by Finger stick route in the morning, at noon, and at bedtime. 180 each 11    albuterol 108 (90 Base) MCG/ACT Inhalation Aero Soln Inhale 2 puffs into the lungs every 6 (six) hours as needed for Wheezing. 1 each 3    metFORMIN 500 MG Oral Tab Take 1 tablet (500 mg total) by mouth 2 (two) times daily with meals. 180 tablet 3    GABAPENTIN 100 MG Oral Cap TAKE 2 CAPSULES(200 MG) BY MOUTH EVERY NIGHT 180 capsule 1    albuterol (2.5 MG/3ML) 0.083% Inhalation Nebu Soln Take 3 mL (2.5 mg total) by nebulization 3 (three) times daily. 300 mL 1    triamcinolone 0.1 % External Cream Apply topically 2 (two) times daily as needed. 60 g 1    atorvastatin 20 MG Oral Tab Take 1 tablet (20 mg total) by mouth nightly. 90 tablet 3    spironolactone 25 MG Oral Tab Take 1 tablet (25 mg total) by mouth daily.      Potassium Chloride ER 10 MEQ Oral Tab CR Take 1 tablet (10 mEq total) by mouth daily. 90 tablet 1    sacubitril-valsartan (ENTRESTO)  MG Oral Tab Take 1 tablet by mouth 2 (two) times daily. 180 tablet 0    Respiratory Therapy Supplies (NEBULIZER) Does not apply Device Use as directed 1 each 0    ASPIRIN EC LOW DOSE 81 MG Oral Tab EC Take 1 tablet (81 mg total) by mouth daily.  0    FREESTYLE TRUMAN 3 PLUS SENSOR Does not apply Misc USE ONE SENSOR EVERY 15 DAYS 6 each 0    Blood Glucose Monitoring Suppl (ONETOUCH VERIO FLEX SYSTEM) w/Device Does not apply Kit Take 1 Bottle by mouth As Directed.      semaglutide (OZEMPIC, 0.25 OR 0.5 MG/DOSE,) 2 MG/3ML Subcutaneous Solution  Pen-injector Inject 0.25 mg into the skin once a week for 28 days, THEN 0.5 mg once a week. 9 mL 0    fluticasone-umeclidin-vilant (TRELEGY ELLIPTA) 100-62.5-25 MCG/ACT Inhalation Aerosol Powder, Breath Activated Inhale 1 puff into the lungs daily. 3 each 3    ALENDRONATE 70 MG Oral Tab TAKE 1 TABLET(70 MG) BY MOUTH EVERY 7 DAYS 12 tablet 3     Review of Systems   LUNGS: denies shortness of breath   CARDIOVASCULAR: denies chest pain  GI: denies abdominal pain, nausea or diarrhea   : denies dysuria  MUSCULOSKELETAL: denies pain/limited mobility    Physical exam:  /60 (BP Location: Left arm, Patient Position: Sitting, Cuff Size: adult)   Pulse 62   Resp 16   Ht 5' 3\" (1.6 m)   Wt 214 lb (97.1 kg)   SpO2 (!) 89%   BMI 37.91 kg/m²   Body mass index is 37.91 kg/m².    Physical Exam   Vitals reviewed.  Constitutional: Normal appearance   Cardiovascular: Normal rate , rhythm   Pulmonary/Chest: Effort normal  Neurological: Alert and oriented .   Psychiatric: Normal mood and affect.   No bipedal edema    Assessment/Plan:  Hyperlipidemia  Last Lipid panel - 9/2024  Continue Atorvastatin     Cholesterol: 133, done on 9/16/2024.  HDL Cholesterol: 28, done on 9/16/2024.  TriGlycerides 164, done on 9/16/2024.  LDL Cholesterol: 76, done on 9/16/2024.       Hypertension/CHFrEF/Nonischemic Dilated Cardiomyopathy  Well-controlled  On Entresto, Spironolactone, Carvedilol and Hydralazine  CPM    Nephropathy  Improved eGFR 57 on 5/12/25 (last was 33 on 12/23/24)  History of microalbuminuria - resent was improved in 1/2024  Continue Entresto/Jardiance    Obesity  Continue Ozempic - see DM notes  Hopefully, wean off on insulin  Lifestyle modification - seeing RDCES w/ nutrition management    Type 2 diabetes mellitus with long-term insulin use (HCC)    Weight: 214 lbs today - last  lbs    Wt Readings from Last 3 Encounters:   05/19/25 214 lb (97.1 kg)   05/16/25 215 lb (97.5 kg)   04/28/25 212 lb 12.8 oz (96.5 kg)        A1C :   Lab Results   Component Value Date     (H) 09/16/2024    A1C 6.9 (A) 03/26/2025     Recommendation:    Stop insulin (basal and bolus) for now - not consistent with dosing due to low trends  ~ We will reconsider restarting if with higher levels  ~ 76 mg/dl in office today after dosing Lantus this morning and last basal prior to this dose was unrecalled; Not dosing prandial at all for several months.    Continue   Jardiance 10 daily  ~Reminded about hydration , sick day management and watching for UTI or mycotic rash       Metformin 500 twice a day     Ozempic 0.5 mg weekly every Saturdays   ~ we will try to be cautious on increasing dose due to history of constipation previously (reason why pt stopped the medication few months ago)    ~ If trends are higher, consider increasing Jardiance first, or restarting low dose insulin     ~ Reviewed use of CGM vs meter readings - discussed calibration with Kyle sensors  Still has sensor at home - provided [employer] phone/customer service to assist with patient's concerns on alarms/calibration/accuracy etc - see AVS  ~ Advised patient to restart CGM and if still with difficulty or concerns - see RDCES again    Discussed how to treat hypoglycemia -   Rule of 15; Reviewed Gvoke use   Available at home provided prescription in January 2025    -Discussed the A1C test, what the value reflects and the goal for the patient.   Discussed w patient glucose targets (Fasting < 130 and post prandial <180 ) and complications associated with hyperglycemia and uncontrolled DM (on AVS)   Discussed ABCs of diabetes    Continue with lifestyle modifications due to positive impact on diabetes/blood sugars/health (portion control, physical activity, weight loss)      The patient is asked to return in 2 m but recommended to contact DM clinic sooner if questions or concerns.    The patient indicates understanding of these issues and agrees to the plan.    Orders Placed This Encounter     ASSAY QUANTITATIVE, GLUCOSE     Release to patient:   Immediate    Glucose Blood (ONETOUCH VERIO) In Vitro Strip     Sig: USE 1 STRIP TO CHECK GLUCOSE 3 TIMES DAILY FOR Type 2 DM with long-term current insulin use     Dispense:  300 strip     Refill:  2       Diabetes complications & risks surveillance:   A1C/Blood pressure: as reported above   Nephropathy screening: urine sent today last UACR 2024 continue ace /arb rx.   LIPID screenin2024 . Atorvastatin 20 mg last filled  statin rx.   Last dilated eye exam: Last Dilated Eye Exam: 25   Exam shows retinopathy? Eye Exam shows Diabetic Retinopathy?: Yes  Date of last PHQ-2 depression screen: done 2025  Last diabetic foot exam: Last Foot Exam: 25   see Podiatry every 6 months    Spent 50 min obtaining patient history, evaluating patient, reviewing blood glucose trends, discussing treatment options, lifestyle modifications and completing documentation -this time does not including sensor interpretation time if applicable  The risks and benefits of my recommendations, as well as other treatment options were discussed with the patient today. questions were also answered to the best of my knowledge.      Note to patient: The  Cures Act makes medical notes like these available to patients in the interest of transparency. However, be advised this is a medical document. It is intended as peer to peer communication. It is written in medical language and may contain abbreviations or verbiage that are unfamiliar. It may appear blunt or direct. Medical documents are intended to carry relevant information, facts as evident, and the clinical opinion of the practitioner.             [1]   Allergies  Allergen Reactions    Allopurinol RASH

## 2025-05-19 ENCOUNTER — OFFICE VISIT (OUTPATIENT)
Facility: CLINIC | Age: 80
End: 2025-05-19
Payer: COMMERCIAL

## 2025-05-19 VITALS
WEIGHT: 214 LBS | SYSTOLIC BLOOD PRESSURE: 132 MMHG | RESPIRATION RATE: 16 BRPM | DIASTOLIC BLOOD PRESSURE: 60 MMHG | HEIGHT: 63 IN | OXYGEN SATURATION: 89 % | BODY MASS INDEX: 37.92 KG/M2 | HEART RATE: 62 BPM

## 2025-05-19 DIAGNOSIS — E66.9 TYPE 2 DIABETES MELLITUS WITH OBESITY (HCC): ICD-10-CM

## 2025-05-19 DIAGNOSIS — Z79.4 CONTROLLED TYPE 2 DIABETES MELLITUS WITH MICROALBUMINURIA, WITH LONG-TERM CURRENT USE OF INSULIN (HCC): Chronic | ICD-10-CM

## 2025-05-19 DIAGNOSIS — E11.69 TYPE 2 DIABETES MELLITUS WITH OBESITY (HCC): ICD-10-CM

## 2025-05-19 DIAGNOSIS — E11.29 CONTROLLED TYPE 2 DIABETES MELLITUS WITH MICROALBUMINURIA, WITH LONG-TERM CURRENT USE OF INSULIN (HCC): Chronic | ICD-10-CM

## 2025-05-19 DIAGNOSIS — R80.9 CONTROLLED TYPE 2 DIABETES MELLITUS WITH MICROALBUMINURIA, WITH LONG-TERM CURRENT USE OF INSULIN (HCC): Chronic | ICD-10-CM

## 2025-05-19 DIAGNOSIS — Z79.4 TYPE 2 DIABETES MELLITUS WITH HYPERGLYCEMIA, WITH LONG-TERM CURRENT USE OF INSULIN (HCC): Primary | ICD-10-CM

## 2025-05-19 DIAGNOSIS — E11.65 TYPE 2 DIABETES MELLITUS WITH HYPERGLYCEMIA, WITH LONG-TERM CURRENT USE OF INSULIN (HCC): Primary | ICD-10-CM

## 2025-05-19 DIAGNOSIS — E11.69 HYPERLIPIDEMIA ASSOCIATED WITH TYPE 2 DIABETES MELLITUS (HCC): ICD-10-CM

## 2025-05-19 DIAGNOSIS — I15.2 HYPERTENSION ASSOCIATED WITH TYPE 2 DIABETES MELLITUS (HCC): ICD-10-CM

## 2025-05-19 DIAGNOSIS — E11.59 HYPERTENSION ASSOCIATED WITH TYPE 2 DIABETES MELLITUS (HCC): ICD-10-CM

## 2025-05-19 DIAGNOSIS — E78.5 HYPERLIPIDEMIA ASSOCIATED WITH TYPE 2 DIABETES MELLITUS (HCC): ICD-10-CM

## 2025-05-19 LAB
GLUCOSE BLOOD: 76
TEST STRIP LOT #: NORMAL NUMERIC

## 2025-05-19 RX ORDER — LANCETS 33 GAUGE
EACH MISCELLANEOUS
Qty: 100 EACH | Refills: 3 | Status: SHIPPED | OUTPATIENT
Start: 2025-05-19

## 2025-05-19 RX ORDER — BLOOD SUGAR DIAGNOSTIC
STRIP MISCELLANEOUS
Qty: 300 STRIP | Refills: 2 | Status: SHIPPED | OUTPATIENT
Start: 2025-05-19

## 2025-05-19 NOTE — PATIENT INSTRUCTIONS
Kyle Support  Kyle 1:1 free virtual training (sensor application, smartphone set up and understanding trends  By Phone [phone]  Or online     Stop all insulin    Ozempic 0.5 mg weekly  Jardiance 25 mg daily  Metformin 500 mg 1 tab twice a day      Make some glucose logs before next appointment    Follow up plan:  With Jenn Skaggsnena, APRN: Return in about 6 weeks (around 6/30/2025).          Office phone number: [phone]; phones are open Monday-Friday 8:00-4:00  Thank you for visiting our office. We look forward to working with you to reach your health goals. As a reminder, if you need refills, please request early so there is enough time to process the request. We ask that you provide at least 5 days' notice before a refill is due, so there is time to send a request to pharmacy, process the refill, and ensure there are no other problems with obtaining the medication (backorders, prior authorization paperwork, etc). Routine refills will not be addressed on weekends, so please submit these requests during the week.    American Diabetes Association recommends blood sugar targets:      Fasting blood sugar (before breakfast) Target:    (ideally less than 110)  2 hours after eating less than 180 (ideally less than  150 )   A1c goal <7.0%     Call for blood sugars  greater than 180 more than 2 times in a week  Time in Range goal is higher than 70% if using a continuous glucose monitor (Dexcom or Kyle).  Time in Range can be found within the Dexcom G7 dyan, on Clarity if using Dexcom G6, or on LibreView if using Kyle.     Continue with lifestyle modification and healthier choices for meal.  Do regular activity/exercise. Continue with the strengthening exercises with physical therapy.  Frequent BG monitoring, at most 3 x a day, on random time either before meals or 2 hours after a meal.  Strengthen gut health, can have yogurt, sauerkraut, probiotics but mindful of sugar/carb content.   Include in your  routine, stress releasing activities like yoga, swimming, stretching as tolerated that will help with glucose control.     HOW TO TREAT LOW BLOOD SUGAR (Hypoglycemia)  Low blood sugar= Less than 70, or if you start to have symptoms (below)  Symptoms: Shaking or trembling, fast heart rate, extreme hunger, sweating, confusion/difficulty concentrating, dizziness.    How to treat a low blood sugar if you are able to eat/drink: The Rule of 15  If you are using continuous glucose monitor that says you are low, but you do not have any symptoms, verify on fingerstick that your blood sugar is actually low before treating.   Eat 15 grams of carbs (see examples below)  Check your blood sugar after 15 minutes. If it’s still below your target range, have another serving.   Repeat these steps until it’s in your target range. Once it’s in range, if you're nervous about your sugar going low again, have a protein source (ie, a spoonful of peanut butter).   If you have a CGM you want to look for how your arrow has changed. If you arrow is pointed up or sideways after 15 min, give your CGM more time OR check with a finger stick. Try not to eat more food until at least 15 min after the first BG check - otherwise you risk having a rebound high.  If you are experiencing symptoms and you are unable to check your blood glucose for any reason, treat the hypoglycemia.  If someone has a low blood sugar and is unconscious: Don’t hesitate to call 911. If someone is unconscious and glucagon is not available or someone does not know how to use it, call 911 immediately.    To treat a low, I recommend you carry with you easy, pre-portioned treatment for low blood sugars that are 15G of carbs:   - Children sized squeeze pouch applesauce (high fiber + carbs help prevent too high of a spike)  - Small children's sized juicebox- 15g carb --> 4oz juice box  - Glucose tablets from [employer], you can find them near diabetes supplies --> Note, you will  need to eat 3-4 tablets to get to 15g of carbs  - Children sized fruit snack pack- look for one with 15 grams of total carbohydrate  - Choice of how to treat your low is important. Complex carbs, or foods that contain fats along with carbs (like chocolate) can slow the absorption of glucose and should NOT be used to treat an emergency low

## 2025-05-20 ENCOUNTER — TELEPHONE (OUTPATIENT)
Dept: INTERNAL MEDICINE CLINIC | Facility: CLINIC | Age: 80
End: 2025-05-20

## 2025-05-20 DIAGNOSIS — J44.9 CHRONIC OBSTRUCTIVE PULMONARY DISEASE, UNSPECIFIED COPD TYPE (HCC): Primary | ICD-10-CM

## 2025-05-20 RX ORDER — FLUTICASONE FUROATE, UMECLIDINIUM BROMIDE AND VILANTEROL TRIFENATATE 100; 62.5; 25 UG/1; UG/1; UG/1
1 POWDER RESPIRATORY (INHALATION) DAILY
Qty: 180 EACH | Refills: 0 | Status: SHIPPED | OUTPATIENT
Start: 2025-05-20

## 2025-05-20 NOTE — TELEPHONE ENCOUNTER
Received request for: Trelegy 100 mcg    Patient last seen by: Shawna CHRISTINA on 5/16/2025    Has scheduled appointment: none    Physician recommendation:  Continue Trelegy and albuterol PRN

## 2025-05-27 ENCOUNTER — TELEPHONE (OUTPATIENT)
Facility: CLINIC | Age: 80
End: 2025-05-27

## 2025-05-27 DIAGNOSIS — J44.9 CHRONIC OBSTRUCTIVE PULMONARY DISEASE, UNSPECIFIED COPD TYPE (HCC): ICD-10-CM

## 2025-05-27 RX ORDER — FLUTICASONE FUROATE, UMECLIDINIUM BROMIDE AND VILANTEROL TRIFENATATE 100; 62.5; 25 UG/1; UG/1; UG/1
1 POWDER RESPIRATORY (INHALATION) DAILY
Qty: 180 EACH | Refills: 3 | Status: SHIPPED | OUTPATIENT
Start: 2025-05-27

## 2025-05-27 NOTE — TELEPHONE ENCOUNTER
Received request for: Trelegy 100-62.5-25    Patient last seen by: CARRI Farmer on 5-16-25    Has scheduled appointment: none    Physician recommendation: Continue trelegy inhaler - one puff once a day and follow up in 1 year.    Refills for Trelegy sent to pharmacy

## 2025-05-28 ENCOUNTER — TELEPHONE (OUTPATIENT)
Facility: CLINIC | Age: 80
End: 2025-05-28

## 2025-05-29 NOTE — TELEPHONE ENCOUNTER
Requesting    hydrALAZINE 100 MG Oral Tab         Sig: Take 1 tablet (100 mg total) by mouth 2 (two) times daily.    Disp: 180 tablet    Refills: 0    Start: 5/28/2025    Class: Normal    Non-formulary    Last ordered: 3 months ago (2/24/2025) by Jonel Bell MD    Hypertension Medications Protocol [id number]/2025 03:42 PM   Protocol Details CMP or BMP in past 12 months    Last BP reading less than 140/90    In person appointment or virtual visit in the past 12 mos or appointment in next 3 mos    EGFRCR or GFRAA > 50    Medication is active on med list        LOV: 4/28/2025  RTC: none noted   Last Relevant Labs: 5/12/2025  Filled: 2/24/2025 #180 with 0 refills    Future Appointments   Date Time Provider Department Center   6/12/2025  9:00 AM Dat Herman MD [facility] [place]   6/12/2025 10:40 AM [facility]   7/7/2025  8:15 AM Jenn Jackson APRN [facility]

## 2025-05-30 RX ORDER — HYDRALAZINE HYDROCHLORIDE 100 MG/1
100 TABLET, FILM COATED ORAL 2 TIMES DAILY
Qty: 180 TABLET | Refills: 0 | Status: SHIPPED | OUTPATIENT
Start: 2025-05-30

## 2025-06-10 ENCOUNTER — TELEPHONE (OUTPATIENT)
Facility: CLINIC | Age: 80
End: 2025-06-10

## 2025-06-10 NOTE — TELEPHONE ENCOUNTER
Recommendation: called patient Anette on her mobile number twice this morning. No answer at this time. Called daughter Ysabel and left a message regarding recommendation on low glucose management.    Recommendation:  Hold Metformin for now  Continue with Ozempic 0.5 mg weekly  Continue Jardiance 25 mg daily    Please provide food diary if able.     Spoken to Anette on 1:19 PM  Aware of plan. Stop Metformin.   Reports that she has no change in appetite. Eating well and no change in weight noted.

## 2025-06-10 NOTE — TELEPHONE ENCOUNTER
Patient left message while this RN was on phone - advised she has been having low readings, usually drinks juice, but about 20 minutes later her blood sugar will drop again. Call placed to patient - phone kept ringing, no option for voicemail - phone call disconnected. Call placed to patient again - not active on My Chart. No answer again and no option to leave voicemail.     Call placed to daughter - straight to voicemail. Asked for her or patient to call back at their convenience.     Hypoglycemia triage:   Confirm current Diabetes medications with patient (not from chart note)   Ozempic 0.5 mg weekly  Jardiance 25 mg daily  Metformin 500 mg 1 tab twice a day  Onset, frequency and duration of symptoms?     Obtain blood sugar readings for past 7days : BG log or CGM ( pull report)     Changes in diet? Changes in medications?          Rule of 15 hypoglycemia   Watch for low blood sugars: (less than 70 )    Treatment of Low Blood Glucose Action Plan  1. Check blood glucose to be sure that it is low. You cannot  always go by symptoms or how you feel. If in doubt, treat your low blood glucose anyway.  Rule of 15 :     2. Take 15 grams of carbohydrate (carb). Here are some choices:    4 oz. regular fruit juice  3-4 glucose tablets  6 oz. regular soda   7-8 jelly beans    3. Recheck blood glucose after 10-15 minutes. If blood glucose is still low (less than 70 mg/dl) repeat the treatment (step 2).    4. If your next meal is more than one hour away, eat a small snack.    5. If you’re not sure what caused your low blood glucose, call your healthcare provider.    6. Always check your blood glucose before you drive

## 2025-06-12 ENCOUNTER — OFFICE VISIT (OUTPATIENT)
Dept: INTERNAL MEDICINE CLINIC | Facility: CLINIC | Age: 80
End: 2025-06-12
Payer: COMMERCIAL

## 2025-06-12 ENCOUNTER — HOSPITAL ENCOUNTER (OUTPATIENT)
Dept: MAMMOGRAPHY | Age: 80
Discharge: HOME | End: 2025-06-12
Attending: INTERNAL MEDICINE
Payer: MEDICARE

## 2025-06-12 VITALS
SYSTOLIC BLOOD PRESSURE: 127 MMHG | HEART RATE: 84 BPM | OXYGEN SATURATION: 90 % | BODY MASS INDEX: 38.34 KG/M2 | TEMPERATURE: 97 F | HEIGHT: 63 IN | WEIGHT: 216.38 LBS | DIASTOLIC BLOOD PRESSURE: 74 MMHG

## 2025-06-12 DIAGNOSIS — Z12.31 ENCOUNTER FOR SCREENING MAMMOGRAM FOR MALIGNANT NEOPLASM OF BREAST: ICD-10-CM

## 2025-06-12 DIAGNOSIS — E78.5 HYPERLIPIDEMIA ASSOCIATED WITH TYPE 2 DIABETES MELLITUS (HCC): Primary | Chronic | ICD-10-CM

## 2025-06-12 DIAGNOSIS — E11.69 HYPERLIPIDEMIA ASSOCIATED WITH TYPE 2 DIABETES MELLITUS (HCC): Primary | Chronic | ICD-10-CM

## 2025-06-12 LAB — HEMOGLOBIN A1C: 6 % (ref 4.3–5.6)

## 2025-06-12 PROCEDURE — 77067 SCR MAMMO BI INCL CAD: CPT | Performed by: INTERNAL MEDICINE

## 2025-06-12 PROCEDURE — 77063 BREAST TOMOSYNTHESIS BI: CPT | Performed by: INTERNAL MEDICINE

## 2025-06-12 RX ORDER — GABAPENTIN 300 MG/1
300 CAPSULE ORAL NIGHTLY
Qty: 90 CAPSULE | Refills: 0 | Status: SHIPPED | OUTPATIENT
Start: 2025-06-12

## 2025-06-12 RX ORDER — PROBENECID 500 MG/1
500 TABLET, FILM COATED ORAL 2 TIMES DAILY
Qty: 180 TABLET | Refills: 3 | Status: SHIPPED | OUTPATIENT
Start: 2025-06-12

## 2025-06-12 NOTE — PATIENT INSTRUCTIONS
If you have pounding heart, sweatiness, anxiety; it may be the symptom of low glucose.  In that situation please take sugar tablets or orange juice.  Even if you do not have those symptoms but your sugar is less than 70, please take sugar tablets or orange juice.    Please follow-up with the lung doctors and endocrine doctors.

## 2025-06-12 NOTE — PROGRESS NOTES
Subjective:   Anette Frazier is a 79 year old female who presents for Follow - Up       History/Other:    Chief Complaint Reviewed and Verified  No Further Nursing Notes to   Review  Tobacco Reviewed  Allergies Reviewed  Medications Reviewed    Medical History Reviewed  Surgical History Reviewed  OB Status Reviewed    Family History Reviewed  Social History Reviewed         Tobacco:  Tobacco Use[1]  E-Cigarettes/Vaping       Questions Responses    E-Cigarette Use Never User           Tobacco cessation counseling for <3 minutes.      Current Medications[2]      Review of Systems:  Pertinent items are noted in HPI.  A comprehensive review of systems was negative.      Objective:   /74 (BP Location: Left arm, Patient Position: Sitting, Cuff Size: adult)   Pulse 84   Temp 97 °F (36.1 °C) (Temporal)   Ht 5' 3\" (1.6 m)   Wt 216 lb 6.4 oz (98.2 kg)   SpO2 90%   BMI 38.33 kg/m²  Estimated body mass index is 38.33 kg/m² as calculated from the following:    Height as of this encounter: 5' 3\" (1.6 m).    Weight as of this encounter: 216 lb 6.4 oz (98.2 kg).  General condition: Not in distress.    HEENT: Atraumatic normocephalic  No cervical or submandibular lymphadenopathy  Chest: Clear to auscultate  Heart: S1-S2 ,no murmur  Abdomen: Soft non tender, no palpable organomegaly  Neurological examination: No facial asymmetry.  No lateralizing neurological signs.  Mental state examination: Good eye to eye contact.  Normal mood and behavior.  Engaged in meaningful conversations.  Extremities:  Normal upper extremities  Legs: No edema      Assessment & Plan:     Diabetic peripheral neuropathy with burning pain in the soles.:  Increase the dose of gabapentin to 300 mg nightly.     Diabetes mellitus with A1c of 12.9 in 1/20/2025.  Significant improvement of A1c on 3/26/25 - 6.9. Recheck A1c today: 6      Endocrine Doc has discontinued    lantus and metformin.  Takes ozempic and Jadiance for   DM.     3.   COPD-the patient is on long-term oxygen therapy.  She takes the oxygen as needed.  Will be seeing the lung doctor in few weeks.  The patient had CT low-dose lung was revealed stable pulmonary nodules.     4.  Hypertension: Well-controlled and continue same management.     5.  HFrEF, with ejection fraction of 35 to 40% and grade 2 diastolic dysfunction - currently compensated.  Continue with GDMT.     6.  Smoking: The quantity of smoking has decreased to 1 cigarette a day. Currently smokes one cig alternate day and at times one cig per 2-3 days.  She she is inching towards complete cessation.  I conducted motivational interview against smoking.    7. Class 2 obesity:  Wt Readings from Last 6 Encounters:   06/12/25 216 lb 6.4 oz (98.2 kg)   05/19/25 214 lb (97.1 kg)   05/16/25 215 lb (97.5 kg)   04/28/25 212 lb 12.8 oz (96.5 kg)   03/21/25 210 lb 3.2 oz (95.3 kg)   02/07/25 216 lb 12.8 oz (98.3 kg)   Continue Ozempic and Jardiance.    8.  History of gout with recurrent attacks in the past.  At one time uric acid level was over 10.  Continue with probenecid.     9.  Discussed about signs and symptoms of hypoglycemia and asked her to take sugar tablets if those symptoms come.  Even if 2 symptoms are not there and blood sugar is less than 70, she should take orange juice or sugar tablets.    10.      Vaccines:    - Flu shot  10/11/2024     Age and Sex specific cancer and other screening:  Colonoscopy : 05/18/2018   Last mammogram: 05/20/2024   Dexa Scan: 8/27/2022       No follow-ups on file.    Dat Herman MD, 6/12/2025, 9:14 AM       [1]   Social History  Tobacco Use   Smoking Status Every Day    Current packs/day: 0.50    Average packs/day: 1 pack/day for 51.4 years (50.9 ttl pk-yrs)    Types: Cigarettes    Start date: 1/10/1974    Passive exposure: Past   Smokeless Tobacco Former    Quit date: 2016   Tobacco Comments    Smokes 2 cigarettes every morning with coffee.    [2]   Current Outpatient Medications    Medication Sig Dispense Refill    hydrALAZINE 100 MG Oral Tab Take 1 tablet (100 mg total) by mouth 2 (two) times daily. 180 tablet 0    fluticasone-umeclidin-vilant (TRELEGY ELLIPTA) 100-62.5-25 MCG/ACT Inhalation Aerosol Powder, Breath Activated Inhale 1 puff into the lungs daily. 180 each 3    Glucose Blood (ONETOUCH VERIO) In Vitro Strip USE 1 STRIP TO CHECK GLUCOSE 3 TIMES DAILY FOR Type 2 DM with long-term current insulin use 300 strip 2    OneTouch Delica Lancets 33G Does not apply Misc Check 3 times per day. Type 2 DM with current insulin use 100 each 3    FREESTYLE TRUMAN 3 PLUS SENSOR Does not apply Misc USE ONE SENSOR EVERY 15 DAYS 6 each 0    PROBENECID 500 MG Oral Tab TAKE 1 TABLET(500 MG) BY MOUTH TWICE DAILY 180 tablet 0    TORSEMIDE 20 MG Oral Tab TAKE 1 TABLET(20 MG) BY MOUTH TWICE DAILY 180 tablet 0    ALBUTEROL 108 (90 Base) MCG/ACT Inhalation Aero Soln INHALE 1 PUFF INTO THE LUNGS EVERY 6 HOURS AS NEEDED FOR WHEEZING 8.5 g 0    semaglutide (OZEMPIC, 0.25 OR 0.5 MG/DOSE,) 2 MG/3ML Subcutaneous Solution Pen-injector Inject 0.5 mg into the skin once a week.      Blood Glucose Monitoring Suppl (ONETOUCH VERIO FLEX SYSTEM) w/Device Does not apply Kit Take 1 Bottle by mouth As Directed.      GVOKE HYPOPEN 1-PACK 1 MG/0.2ML Subcutaneous SUBQ injection INJECT 1MG SUBCUTANEOUSLY ONCE AS NEEDED FOR LOW BLOOD GLUCOSE      Insulin Glargine-yfgn 100 UNIT/ML Subcutaneous Solution Pen-injector Inject 25 Units/day into the skin in the morning.      latanoprost 0.005 % Ophthalmic Solution Apply 1 drop to eye nightly.      simvastatin 40 MG Oral Tab Take 1 tablet (40 mg total) by mouth nightly.      Glucose Blood In Vitro Strip 1 each As Directed.      empagliflozin (JARDIANCE) 10 MG Oral Tab Take 1 tablet (10 mg total) by mouth daily.      azelastine 0.1 % Nasal Solution 1 spray by Nasal route 2 (two) times daily. 30 mL 1    BD PEN NEEDLE NAHUM 2ND GEN 32G X 4 MM Does not apply Misc 4 injections daily 200 each 2     carvedilol 12.5 MG Oral Tab Take 1 tablet (12.5 mg total) by mouth 2 (two) times daily with meals. 180 tablet 3    OneTouch Delica Lancets 33G Does not apply Misc 1 Lancet by Finger stick route in the morning, at noon, and at bedtime. 180 each 11    albuterol 108 (90 Base) MCG/ACT Inhalation Aero Soln Inhale 2 puffs into the lungs every 6 (six) hours as needed for Wheezing. 1 each 3    metFORMIN 500 MG Oral Tab Take 1 tablet (500 mg total) by mouth 2 (two) times daily with meals. 180 tablet 3    GABAPENTIN 100 MG Oral Cap TAKE 2 CAPSULES(200 MG) BY MOUTH EVERY NIGHT 180 capsule 1    albuterol (2.5 MG/3ML) 0.083% Inhalation Nebu Soln Take 3 mL (2.5 mg total) by nebulization 3 (three) times daily. 300 mL 1    triamcinolone 0.1 % External Cream Apply topically 2 (two) times daily as needed. 60 g 1    ALENDRONATE 70 MG Oral Tab TAKE 1 TABLET(70 MG) BY MOUTH EVERY 7 DAYS 12 tablet 3    atorvastatin 20 MG Oral Tab Take 1 tablet (20 mg total) by mouth nightly. 90 tablet 3    spironolactone 25 MG Oral Tab Take 1 tablet (25 mg total) by mouth daily.      Potassium Chloride ER 10 MEQ Oral Tab CR Take 1 tablet (10 mEq total) by mouth daily. 90 tablet 1    sacubitril-valsartan (ENTRESTO)  MG Oral Tab Take 1 tablet by mouth 2 (two) times daily. 180 tablet 0    Respiratory Therapy Supplies (NEBULIZER) Does not apply Device Use as directed 1 each 0    ASPIRIN EC LOW DOSE 81 MG Oral Tab EC Take 1 tablet (81 mg total) by mouth daily.  0

## 2025-06-16 ENCOUNTER — TELEPHONE (OUTPATIENT)
Facility: CLINIC | Age: 80
End: 2025-06-16

## 2025-06-17 NOTE — TELEPHONE ENCOUNTER
Patient called back, states metformin was recently stopped due to low Kyle readings.  However, patient states they determined her Kyle sensor was giving faulty low readings, [employer] will be sending a replacement.  Patient asked if she should still hold metformin.  Kyle sensor removed Sunday, readings since then as follows:  Sunday  97, 121, 103  Monday 102, 131, 100  Tuesday 131 after coffee    Continue to hold metformin?  Current meds: Ozempic 2 mg once weekly, Jardiance 25 mg once daily.

## 2025-06-17 NOTE — TELEPHONE ENCOUNTER
Noted trends. Called patient but did not answer. Left a message that she may continue to hold Metformin for now. Continue Jardiance and Ozempic.

## 2025-06-20 NOTE — TELEPHONE ENCOUNTER
Call placed to patient to confirm understanding - Left detailed message- okay per [facility] release - please call with questions/concerns.

## 2025-06-20 NOTE — TELEPHONE ENCOUNTER
Patient called office - advised everything is going well and she is feeling well, no questions/concerns other than confirming time/date of upcoming appointment. Provided information to patient and patient repeated back.  Future Appointments   Date Time Provider Department Center   7/7/2025  8:15 AM Jenn Jackson APRN [facility]

## 2025-06-21 DIAGNOSIS — E11.69 TYPE 2 DIABETES MELLITUS WITH OBESITY (HCC): Primary | ICD-10-CM

## 2025-06-21 DIAGNOSIS — E66.9 TYPE 2 DIABETES MELLITUS WITH OBESITY (HCC): Primary | ICD-10-CM

## 2025-06-23 RX ORDER — SEMAGLUTIDE 0.68 MG/ML
0.5 INJECTION, SOLUTION SUBCUTANEOUS WEEKLY
Qty: 9 ML | Refills: 0 | Status: SHIPPED | OUTPATIENT
Start: 2025-06-23

## 2025-06-23 NOTE — TELEPHONE ENCOUNTER
Requested Prescriptions     Pending Prescriptions Disp Refills    OZEMPIC, 0.25 OR 0.5 MG/DOSE, 2 MG/3ML Subcutaneous Solution Pen-injector [Pharmacy Med Name: Ozempic (0.25 or 0.5 MG/DOSE) 2 MG/3ML Subcutaneous Solution Pen-injector] 9 mL 0     Sig: INJECT 0.25MG SUBCUTANEOUSLY ONCE A WEEK FOR 28DAYS, THEN TAKE 0.5MG ONCE A WEEK     Your appointments       Date & Time Appointment Department ([place])    Jul 07, 2025 8:15 AM CDT Diabetes Pump follow up with Jenn Jackson APRN [facility] ([facility] [place])              [facility]  [facility] [place]  [address], [address] [address]  [phone]           Last A1c value was 6% done 6/12/2025.   Last office visit: 5/19/25 - AM  Last refill: 9/30/24 - am

## 2025-07-03 RX ORDER — TORSEMIDE 20 MG/1
20 TABLET ORAL 2 TIMES DAILY
Qty: 180 TABLET | Refills: 0 | Status: SHIPPED | OUTPATIENT
Start: 2025-07-03

## 2025-07-03 NOTE — TELEPHONE ENCOUNTER
Protocol: Passed  Last Office Visit: 6/12/25  Labs: Completed in   Last Refill: 4/3/25 #180   Return to Clinic:   Future Appointments   Date Time Provider Department Center   7/7/2025  8:15 AM Jenn Jackson APRN [facility]

## 2025-07-09 ENCOUNTER — TELEPHONE (OUTPATIENT)
Facility: CLINIC | Age: 80
End: 2025-07-09

## 2025-07-09 RX ORDER — BLOOD-GLUCOSE METER
EACH MISCELLANEOUS
Qty: 1 KIT | Refills: 0 | Status: SHIPPED | OUTPATIENT
Start: 2025-07-09

## 2025-07-09 RX ORDER — BLOOD SUGAR DIAGNOSTIC
STRIP MISCELLANEOUS
Qty: 100 STRIP | Refills: 3 | Status: SHIPPED | OUTPATIENT
Start: 2025-07-09

## 2025-07-09 RX ORDER — LANCETS
EACH MISCELLANEOUS
Qty: 100 EACH | Refills: 3 | Status: SHIPPED | OUTPATIENT
Start: 2025-07-09

## 2025-07-09 NOTE — TELEPHONE ENCOUNTER
Patient called in stating insurance no longer covers onetouch, now prefers accu-chek.  Will send.    Patient not currently using insulin, although history of insulin use.  Wrote for testing once daily.    Last office visit 05/2025  Future Appointments   Date Time Provider Department Center   8/25/2025  8:15 AM Jenn Jackson APRN [facility]   Last A1c value was 6% done 6/12/2025.

## 2025-07-12 RX ORDER — ATORVASTATIN CALCIUM 20 MG/1
20 TABLET, FILM COATED ORAL NIGHTLY
Qty: 90 TABLET | Refills: 0 | Status: SHIPPED | OUTPATIENT
Start: 2025-07-12

## 2025-07-24 ENCOUNTER — TELEPHONE (OUTPATIENT)
Facility: CLINIC | Age: 80
End: 2025-07-24

## 2025-07-24 NOTE — TELEPHONE ENCOUNTER
Received call from [facility] regarding request for supervising physician's signature and agreement for diabetic footwear.  Fax was received on 07/03/2025 but addressed to CARLOS Mcdowell.    Reviewed form and obtained Dr Baird's signature.  Faxed back to [phone], confirmed at 9:48 am.  Sent to scan, copy to brown folder.

## 2025-07-28 RX ORDER — ALENDRONATE SODIUM 70 MG/1
70 TABLET ORAL
Qty: 12 TABLET | Refills: 3 | Status: SHIPPED | OUTPATIENT
Start: 2025-07-28

## 2025-07-28 NOTE — TELEPHONE ENCOUNTER
Protocol failed     LOV: 6/12/25  RTC: 3 months  Filled: 7/30/24 #12 3 refills   Labs: 5/12/25   Last dexa 8/27/22  Future Appointments   Date Time Provider Department Center   8/25/2025  8:15 AM Jenn Jackson, CARRI [facility]

## 2025-08-08 DIAGNOSIS — I50.20 HFREF (HEART FAILURE WITH REDUCED EJECTION FRACTION) (HCC): ICD-10-CM

## 2025-08-08 DIAGNOSIS — Z79.4 CONTROLLED TYPE 2 DIABETES MELLITUS WITH MICROALBUMINURIA, WITH LONG-TERM CURRENT USE OF INSULIN (HCC): Primary | Chronic | ICD-10-CM

## 2025-08-08 DIAGNOSIS — N18.31 STAGE 3A CHRONIC KIDNEY DISEASE (HCC): ICD-10-CM

## 2025-08-08 DIAGNOSIS — E11.29 CONTROLLED TYPE 2 DIABETES MELLITUS WITH MICROALBUMINURIA, WITH LONG-TERM CURRENT USE OF INSULIN (HCC): Primary | Chronic | ICD-10-CM

## 2025-08-08 DIAGNOSIS — R80.9 CONTROLLED TYPE 2 DIABETES MELLITUS WITH MICROALBUMINURIA, WITH LONG-TERM CURRENT USE OF INSULIN (HCC): Primary | Chronic | ICD-10-CM

## 2025-08-22 ENCOUNTER — APPOINTMENT (OUTPATIENT)
Dept: FAMILY MEDICINE | Age: 80
End: 2025-08-22

## 2025-08-25 ENCOUNTER — OFFICE VISIT (OUTPATIENT)
Facility: CLINIC | Age: 80
End: 2025-08-25

## 2025-08-25 VITALS
HEIGHT: 63 IN | OXYGEN SATURATION: 90 % | BODY MASS INDEX: 38.09 KG/M2 | RESPIRATION RATE: 16 BRPM | DIASTOLIC BLOOD PRESSURE: 60 MMHG | HEART RATE: 76 BPM | SYSTOLIC BLOOD PRESSURE: 122 MMHG | WEIGHT: 215 LBS

## 2025-08-25 DIAGNOSIS — E11.22 CONTROLLED TYPE 2 DIABETES MELLITUS WITH STAGE 3 CHRONIC KIDNEY DISEASE, WITHOUT LONG-TERM CURRENT USE OF INSULIN (HCC): ICD-10-CM

## 2025-08-25 DIAGNOSIS — I15.2 HYPERTENSION ASSOCIATED WITH TYPE 2 DIABETES MELLITUS (HCC): ICD-10-CM

## 2025-08-25 DIAGNOSIS — N18.30 CONTROLLED TYPE 2 DIABETES MELLITUS WITH STAGE 3 CHRONIC KIDNEY DISEASE, WITHOUT LONG-TERM CURRENT USE OF INSULIN (HCC): ICD-10-CM

## 2025-08-25 DIAGNOSIS — E11.59 HYPERTENSION ASSOCIATED WITH TYPE 2 DIABETES MELLITUS (HCC): ICD-10-CM

## 2025-08-25 DIAGNOSIS — E11.69 HYPERLIPIDEMIA ASSOCIATED WITH TYPE 2 DIABETES MELLITUS (HCC): ICD-10-CM

## 2025-08-25 DIAGNOSIS — E11.69 TYPE 2 DIABETES MELLITUS WITH OBESITY (HCC): Primary | ICD-10-CM

## 2025-08-25 DIAGNOSIS — E78.5 HYPERLIPIDEMIA ASSOCIATED WITH TYPE 2 DIABETES MELLITUS (HCC): ICD-10-CM

## 2025-08-25 DIAGNOSIS — E66.9 TYPE 2 DIABETES MELLITUS WITH OBESITY (HCC): Primary | ICD-10-CM

## 2025-08-25 PROCEDURE — 3078F DIAST BP <80 MM HG: CPT

## 2025-08-25 PROCEDURE — G2211 COMPLEX E/M VISIT ADD ON: HCPCS

## 2025-08-25 PROCEDURE — 1160F RVW MEDS BY RX/DR IN RCRD: CPT

## 2025-08-25 PROCEDURE — 1159F MED LIST DOCD IN RCRD: CPT

## 2025-08-25 PROCEDURE — 3074F SYST BP LT 130 MM HG: CPT

## 2025-08-25 PROCEDURE — 99215 OFFICE O/P EST HI 40 MIN: CPT

## 2025-08-25 PROCEDURE — 3008F BODY MASS INDEX DOCD: CPT

## 2025-08-25 RX ORDER — SEMAGLUTIDE 0.68 MG/ML
0.5 INJECTION, SOLUTION SUBCUTANEOUS WEEKLY
Qty: 9 ML | Refills: 1 | Status: SHIPPED | OUTPATIENT
Start: 2025-08-25

## 2025-09-12 ENCOUNTER — APPOINTMENT (OUTPATIENT)
Dept: FAMILY MEDICINE | Age: 80
End: 2025-09-12

## 2025-09-22 ENCOUNTER — APPOINTMENT (OUTPATIENT)
Dept: FAMILY MEDICINE | Age: 80
End: 2025-09-22

## (undated) DIAGNOSIS — I50.20 HFREF (HEART FAILURE WITH REDUCED EJECTION FRACTION) (HCC): Primary | ICD-10-CM

## (undated) DIAGNOSIS — R06.02 SHORTNESS OF BREATH: Primary | ICD-10-CM

## (undated) DIAGNOSIS — Z02.9 ENCOUNTERS FOR ADMINISTRATIVE PURPOSE: ICD-10-CM

## (undated) DIAGNOSIS — J44.9 CHRONIC OBSTRUCTIVE PULMONARY DISEASE, UNSPECIFIED COPD TYPE (HCC): ICD-10-CM

## (undated) DEVICE — STANDARD HYPODERMIC NEEDLE,POLYPROPYLENE HUB: Brand: MONOJECT

## (undated) DEVICE — MINI-BLADE®: Brand: BEAVER®

## (undated) DEVICE — SOLUTION RUBBING 4OZ 70% ISO ALC CLR

## (undated) DEVICE — TOWEL SURG SM W12XL18IN CLR PLAS TEAR RESIST

## (undated) DEVICE — STERILE POLYISOPRENE POWDER-FREE SURGICAL GLOVES WITH EMOLLIENT COATING: Brand: PROTEXIS

## (undated) DEVICE — GOWN,SIRUS,FABRIC-REINFORCED,X-LARGE: Brand: MEDLINE

## (undated) DEVICE — STERILE POLYISOPRENE POWDER-FREE SURGICAL GLOVES: Brand: PROTEXIS

## (undated) DEVICE — DISPOSABLE BIPOLAR FORCEPS 4" (10.2CM) JEWELERS, STRAIGHT 0.4MM TIP AND 12 FT. (3.6M) CABLE: Brand: KIRWAN

## (undated) DEVICE — DISPOSABLE TOURNIQUET CUFF SINGLE BLADDER, DUAL PORT AND QUICK CONNECT CONNECTOR: Brand: COLOR CUFF

## (undated) DEVICE — STERILE HOOK LOCK LATEX FREE ELASTIC BANDAGE 2INX5YD: Brand: HOOK LOCK™

## (undated) DEVICE — PADDING CAST W4INXL4YD 100% COT SFT SELF BOND

## (undated) DEVICE — OINTMENT SKIN 3 ANTIBIO BACI ZN NEOMYCN SULF

## (undated) DEVICE — SPONGE GZ 4XL4IN 100% COT 12 PLY TYP VII WVN

## (undated) DEVICE — SUTURE ETHLN SZ 4-0 L18IN NABSRB BLK L19MM

## (undated) DEVICE — UPPER EXTREMITY CDS-LF: Brand: MEDLINE INDUSTRIES, INC.

## (undated) DEVICE — SLEEVE COMPR M KNEE LEN SGL USE KENDALL SCD

## (undated) DEVICE — DRESSING PETRO W3XL3IN OIL EMUL N ADH GZ KNIT

## (undated) DEVICE — SOLUTION IRRIG 1000ML 0.9% NACL USP BTL

## (undated) NOTE — Clinical Note
I had the pleasure of seeing Melanie Landis on 4/30/2018. Please see my attached note.   Jaxson Billings MD FACS [facility]

## (undated) NOTE — Clinical Note
FYI, TCM call made, see notes. BRYON confirmed with patient she has a HFU at Dr. Fernandez Philip office scheduled with CARRI Clay  on 10/1/19, BRYON changed visit type to TCM HFU.  Patient will need a medication reconciliation at her follow up appointment

## (undated) NOTE — MR AVS SNAPSHOT
[place]  [address] [facility] [address]  [address] [facility] [address] [phone]               Thank you for choosing us for your health care visit with DURAN Olivas.   We are glad to serve you and happy to provide you with this coelho obesity with BMI of 40.0-44.9, adult ([facility] [E66.01, Z68.41]           ALT (SGPT) [823] [Q]    Complete by:  Jun 20, 2017 (Approximate)    Assoc Dx:  Type 2 diabetes mellitus without complication, without long-term current use of insulin ([facility] [E11.9], [facility] - Dr. Gus Parker    Complete by:  As directed    Assoc Dx:  Hypertensive heart disease with heart failure [facility]) [I11.0]                 Scheduling Instructions     Tuesday June 20, 2017     Imaging:  XR DEXA BONE DENSITOMETRY (CPT=[id number]) physician's office. At that time, you will be provided with any authorization numbers or be assured that none are required. You can then schedule your appointment.  Failure to obtain required authorization numbers can create reimbursement difficulties for y your appointment immediately. However, if you are unsure about the requirements for authorization, please wait 5-7 days and then contact your physician's office.  At that time, you will be provided with any authorization numbers or be assured that none are Commonly known as:  COREG           hydrALAzine HCl 50 MG Tabs   Take 1 tablet (50 mg total) by mouth every 8 (eight) hours. Commonly known as:  APRESOLINE           Losartan Potassium-HCTZ 100-25 MG Tabs   Take 1 tablet by mouth daily.    Commonly known Eat plenty of low-fat dairy products High fat meats and dairy   Choose whole grain products Foods high in sodium   Water is best for hydration Fast food.    Eat at home when possible     Tips for increasing your physical activity – Adults who are physically

## (undated) NOTE — Clinical Note
Pt will be coming in for TCC appt on 10/9/19. HH was not ordered/discussed prior to d/c, but now that pt is home she is interested in possibly starting [facility].   I offered to get [facility] orders started, but she refused stating she would like to discuss with you fir

## (undated) NOTE — Clinical Note
Hi,  I met with Anette. Her phone is not compatible with [employer] so we could not start it but I gave her written instructions and we sent a reader to her through [employer].

## (undated) NOTE — IP AVS SNAPSHOT
[facility] [place]  [address] [place], [address] ~ [phone]                Discharge Summary   2/16/2017    Greta Ding           Admission Information        Provider Department    2/16/2017 DO David Price 2ne-A Take 1 tablet (50 mg total) by mouth every 8 (eight) hours.     Ozzie Webber taking these medications        Instructions Authorizing Provider    Morning Afternoon Evening As Needed    Albuterol Sulfate  first few days after you leave the hospital. Continued use of these tools will help you develop the skills necessary to keep your heart failure under control.      Heart Failure Guidelines - place this worksheet on your refrigerator or somewhere you can ref · You urinate less often during the day and more often at night  · You have cramps in your legs  · You have blurred vision or see yellowish-green halos around objects of lights    Go to the Emergency Room If:   · You have pain or tightness in your chest  · Neutrophil % Lymphocyte % Monocyte % Eosinophil % Basophil % Prelim Neut Abs Final Neut Abs Lymphocyte Abso Monocyte Absolu Eosinophil Abso Basophil Absolu    (02/16/17)  76.5 (02/16/17)  15.8 (02/16/17)  6.2 (02/16/17)  0.6 (02/16/17)  0.6 -- (02/16/17) visit,  view other health information, and more. To sign up or find more information, go to [email or web address] and click on the Sign Up Now link in the [employer] box.      Enter your Where Was it Filmed Activation Code exactly as it appears below along with yo Most common side effects: Dizziness, loss of potassium (increased urination is expected)   What to report to your healthcare team: Dizziness, decreased urine amount           Blood Sugar Medications     MetFORMIN HCl 850 MG Oral Tab         Use:  Treat hig What to report to your healthcare team: Changes in thinking, talking or movement, drowsiness, shaking

## (undated) NOTE — MR AVS SNAPSHOT
After Visit Summary   11/21/2023    Valentin Wheat   MRN: [id number]           Visit Information     Date & Time  11/21/2023 10:30 AM Provider  Rene Angelo, [address] [place] [address] Neurodiagnostics Dept. Phone  [phone]      Allergies as of 11/21/2023  Review status set to Review Complete on 11/21/2023       Noted Reaction Type Reactions    Allopurinol 09/23/2019   Systemic RASH      Your Current Medications        Dosage    torsemide 20 MG Oral Tab Take 1 tablet (20 mg total) by mouth 2 (two) times daily. probenecid 500 MG Oral Tab Take 1 tablet (500 mg total) by mouth 2 (two) times daily. traMADol 50 MG Oral Tab Take 1 tablet (50 mg total) by mouth every 6 (six) hours as needed for Pain. alendronate 70 MG Oral Tab Take 1 tablet (70 mg total) by mouth every 7 days. sacubitril-valsartan (ENTRESTO)  MG Oral Tab Take 1 tablet by mouth 2 (two) times daily. gabapentin 100 MG Oral Cap Take 2 capsules (200 mg total) by mouth nightly. TAKE AT BEDTIME    atorvastatin 20 MG Oral Tab Take 1 tablet (20 mg total) by mouth nightly. Potassium Chloride ER 10 MEQ Oral Tab CR Take 1 tablet (10 mEq total) by mouth daily. METFORMIN HCL 1000 MG Oral Tab TAKE 1 TABLET(1000 MG) BY MOUTH TWICE DAILY WITH MEALS    OZEMPIC, 0.25 OR 0.5 MG/DOSE, 2 MG/3ML Subcutaneous Solution Pen-injector INJECT 0.5MG SUBCUTANEOUS UNDER THE SKIN ONCE A WEEK    HYDRALAZINE 100 MG Oral Tab TAKE 1 TABLET(100 MG) BY MOUTH TWICE DAILY    varenicline 1 MG Oral Tab After finishing the 0.5mg dosing, then start this medication. take 1 tablet (1MG)  by ORAL route 2 times every day with a glass of waterafter meals    ALBUTEROL 108 (90 Base) MCG/ACT Inhalation Aero Soln INHALE 1 PUFF INTO THE LUNGS EVERY 6 HOURS AS NEEDED FOR WHEEZING    fluticasone furoate-vilanterol (BREO ELLIPTA) 100-25 MCG/ACT Inhalation Aerosol Powder, Breath Activated Inhale 1 puff into the lungs daily.     albuterol (2.5 MG/3ML) 0.083% Inhalation Nebu Soln Take 3 mL (2.5 mg total) by nebulization 3 (three) times daily. Insulin Pen Needle (PEN NEEDLES) 32G X 4 MM Does not apply Misc 1 each every 7 days. Insulin Pen Needle (PEN NEEDLES) 32G X 4 MM Does not apply Misc 1 each every 7 days. BD PEN NEEDLE NAHUM 2ND GEN 32G X 4 MM Does not apply Misc USE DAILY    Respiratory Therapy Supplies (NEBULIZER) Does not apply Device Use as directed    colchicine 0.6 MG Oral Tab colchicine 0.6 mg tablet   TAKE 2 TABS (1.2mg) at first sign of glare up, then 1 tab (0.6mg) Q6H PRN if needed    triamcinolone 0.1 % External Cream Apply topically 2 (two) times daily as needed. CARVEDILOL 25 MG Oral Tab TAKE 1 TABLET BY MOUTH TWICE DAILY AS DIRECTED    ASPIRIN EC LOW DOSE 81 MG Oral Tab EC Take 1 tablet (81 mg total) by mouth daily. Diagnoses for This Visit    Tingling of both feet   [[id number]]             We Ordered the Following     Normal Orders This Visit    EMG (at [facility]) [NEU5 CUSTOM]     [facility]G/N[facility] [NCG2 CUSTOM]       Future Appointments        Provider Department    1/18/2024 9:20 AM [facility], [address], [place], [facility]    5/13/2024 9:30 AM Hazel Ureña [address]    5/20/2024 9:30 AM [address]    11/25/2024 10:30 AM [facility], [address], [facility]       November 22, 2023      [address] [address]     Dear Genoveva Morales :    Thank you for enrolling in [address] Please follow the instructions below to securely access your online medical record. [employer] allows you to send messages to your doctor, view test results, renew prescriptions and request appointments. How Do I Sign Up? 1. In your Internet browser, go to [email or web address]  2.  Click on the Activate your Account if you have an activation code in the box under the *New User? section. 3. Enter your [employer] Activation Code exactly as it appears below. You will not need to use this code after you have completed the sign-up process. If you do not sign up before the expiration date, you must request a new code. [employer] Activation Code: Activation code not generated      4. Enter your Zip Code and Date of Birth (mm/dd/yyyy) as indicated and click Next. You will be taken to the next sign-up page. 5. Create a [employer] Username. This will be your [employer] login Username and cannot be changed, so think of one that is secure and easy to remember. 6. Create a [employer] password. You can change your password at any time. 7. Choose a Security Question and enter your Answer and click Next. This can be used at a later time if you forget your password. 8. Enter your e-mail address. You will receive e-mail notification when new information is available in [facility]. 9. Click Sign In. You can now view your medical record. Additional Information  If you have questions, you can call [phone] to talk to our [address] staff. Remember, [employer] is NOT to be used for urgent needs. For medical emergencies, dial 911. Sincerely,    Presley Louis, DO              Did you know that [facility] primary care physicians now offer Video Visits through [address] for adult patients for a variety of conditions such as allergies, back pain and cold symptoms? Skip the drive and waiting room and online chat with a doctor face-to-face using your web-cam enabled computer or mobile device wherever you are. Video Visits cost $50 and can be paid hassle-free using a credit, debit, or health savings card. Not active on [employer]? Ask us how to get signed up today! If you receive a survey from [employer], please take a few minutes to complete it and provide feedback. We strive to deliver the best patient experience and are looking for ways to make improvements.  Your feedback will help us do so. For more information on [employer], please visit [email or web address]           No text in SmartText           No text in SmartText

## (undated) NOTE — Clinical Note
Doing well, added combo of basal insulin with victoza to help with weight and cv benefits simplified metformin to f/u in 5 weeks thanks

## (undated) NOTE — LETTER
[address] [facility]  [address] [facility] [address]  [facility] [phone]        Rosalee Lowe        2/5/2016  [address]  [place] [address]         Dear Marija Ellis,    Our records show that you are ove

## (undated) NOTE — LETTER
Your patient was recently seen at the [facility] for a hospital follow-up visit. The visit note is attached. Please contact the clinic with any questions at [phone].     Thank you,  CARRI Mcfadden

## (undated) NOTE — LETTER
12/04/19        [facility] [address]      Dear Mick Lizama,    Our records indicate that you have outstanding lab work and or testing that was ordered for you and has not yet been completed:  Orders Placed This Encou
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
## (undated) NOTE — Clinical Note
Luis Alberto Barajas,  It looks like I saw Anette before you did. I set a follow up for 3 months out. If you want me to meet with her before that for education I am happy to!

## (undated) NOTE — LETTER
05/13/19        [place] [address]      Dear Astrid,    Our records indicate that you have outstanding lab work and or testing that was ordered for you and has not yet been completed: Repeat lab work   To comp

## (undated) NOTE — Clinical Note
Thank you her A1C was elevated, but didn't match her BG log or hx, rechecked and was 7.7 asked her to continue her current regimen, and return in 2 months post labs, may be able to simplify things but overall I think she is fine, eats a very high fat/na di